# Patient Record
Sex: MALE | Race: WHITE | Employment: OTHER | URBAN - METROPOLITAN AREA
[De-identification: names, ages, dates, MRNs, and addresses within clinical notes are randomized per-mention and may not be internally consistent; named-entity substitution may affect disease eponyms.]

---

## 2017-01-12 DIAGNOSIS — Z76.0 MEDICATION REFILL: ICD-10-CM

## 2017-01-12 RX ORDER — TRAZODONE HYDROCHLORIDE 50 MG/1
TABLET ORAL
Qty: 180 TAB | Refills: 3 | Status: SHIPPED | OUTPATIENT
Start: 2017-01-12 | End: 2018-03-25 | Stop reason: SDUPTHER

## 2017-03-09 ENCOUNTER — OFFICE VISIT (OUTPATIENT)
Dept: INTERNAL MEDICINE CLINIC | Age: 67
End: 2017-03-09

## 2017-03-09 VITALS
DIASTOLIC BLOOD PRESSURE: 65 MMHG | HEIGHT: 74 IN | TEMPERATURE: 97.9 F | SYSTOLIC BLOOD PRESSURE: 133 MMHG | WEIGHT: 244.2 LBS | OXYGEN SATURATION: 96 % | HEART RATE: 69 BPM | RESPIRATION RATE: 18 BRPM | BODY MASS INDEX: 31.34 KG/M2

## 2017-03-09 DIAGNOSIS — E78.5 DYSLIPIDEMIA: Chronic | ICD-10-CM

## 2017-03-09 DIAGNOSIS — I11.9 BENIGN HYPERTENSIVE HEART DISEASE WITHOUT HEART FAILURE: Chronic | ICD-10-CM

## 2017-03-09 DIAGNOSIS — E11.9 TYPE 2 DIABETES MELLITUS WITHOUT COMPLICATION, WITHOUT LONG-TERM CURRENT USE OF INSULIN (HCC): Primary | Chronic | ICD-10-CM

## 2017-03-09 NOTE — PROGRESS NOTES
HISTORY OF PRESENT ILLNESS  Amada Mejia is a 77 y.o. male. Visit Vitals    /65    Pulse 69    Temp 97.9 °F (36.6 °C) (Oral)    Resp 18    Ht 6' 2\" (1.88 m)    Wt 244 lb 3.2 oz (110.8 kg)    SpO2 96%    BMI 31.35 kg/m2       Diabetes   The history is provided by the patient. This is a chronic problem. The current episode started more than 1 week ago. The problem occurs daily. The problem has not changed since onset. Exacerbated by: diet. Relieved by: diet. Treatments tried: see med list.   Hypertension    The history is provided by the patient. This is a chronic problem. The current episode started more than 1 week ago. The problem has not changed since onset. There are no associated agents to hypertension. Risk factors include stress, diabetes mellitus and male gender. Review of Systems   Constitutional: Negative. Respiratory: Negative. Cardiovascular: Negative. Neurological: Negative. Physical Exam   Constitutional: He is oriented to person, place, and time. He appears well-developed and well-nourished. No distress. Cardiovascular: Normal rate and regular rhythm. Pulmonary/Chest: Effort normal and breath sounds normal.   Musculoskeletal: He exhibits no edema. Neurological: He is alert and oriented to person, place, and time. Skin: Skin is warm and dry. He is not diaphoretic. Several small skin tears on his left arm   Psychiatric: He has a normal mood and affect. Nursing note and vitals reviewed. ASSESSMENT and PLAN    ICD-10-CM ICD-9-CM    1. Type 2 diabetes mellitus without complication, without long-term current use of insulin ([facility]) W92.9 710.11 METABOLIC PANEL, COMPREHENSIVE      HEMOGLOBIN A1C W/O EAG   2. Hypertension Q49.9 658.73 METABOLIC PANEL, COMPREHENSIVE   3.  Dyslipidemia E78.5 272.4 LIPID PANEL       BP controlled    Update lab    F/u 3-4 months

## 2017-03-09 NOTE — MR AVS SNAPSHOT
Visit Information Date & Time Provider Department Dept. Phone Encounter #  
 3/9/2017  1:15 PM [address] [phone] Follow-up Instructions Return in about 4 months (around 7/9/2017) for HTN, DM, cholesterol. Upcoming Health Maintenance Date Due HEMOGLOBIN A1C Q6M 3/6/2017 MICROALBUMIN Q1 5/3/2017 LIPID PANEL Q1 8/4/2017 FOOT EXAM Q1 9/6/2017 MEDICARE YEARLY EXAM 9/7/2017 EYE EXAM RETINAL OR DILATED Q1 11/10/2017 COLONOSCOPY 6/1/2018 GLAUCOMA SCREENING Q2Y 11/10/2018 DTaP/Tdap/Td series (2 - Td) 8/12/2023 Allergies as of 3/9/2017  Review Complete On: 3/9/2017 By: Esteban Jose MD  
  
 Severity Noted Reaction Type Reactions Beta Blocker [Beta-blockers (Beta-adrenergic Blocking Agts)]    Other (comments)  
 symptomatic bradycardia Codeine   Side Effect Other (comments) Passed//fainted 
patient doesn't recall reaction, occurred as a teenager Darvocet A500 [Propoxyphene N-acetaminophen]   Systemic Other (comments)  
 throat swelling Dexbrompheniramine-pseudoephed    Other (comments) Facial swelling. 1980\"s Lasix [Furosemide]  09/06/2016   Systemic Itching Lipitor [Atorvastatin]  03/16/2016    Other (comments) Sulfa (Sulfonamide Antibiotics)  10/18/2016    Hives Current Immunizations  Reviewed on 6/15/2015 Name Date Influenza High Dose Vaccine PF 10/18/2016 Influenza Vaccine 9/30/2014 Influenza Vaccine Split 10/1/2012 Pneumococcal Conjugate (PCV-13) 4/28/2015 Pneumococcal Polysaccharide (PPSV-23) 9/6/2016 10:15 AM  
 Pneumococcal Vaccine (Unspecified Type) 12/5/2009 Tdap 8/12/2013 10:56 AM  
 Zoster Vaccine, Live 1/25/2013 Not reviewed this visit You Were Diagnosed With   
  
 Codes Comments Type 2 diabetes mellitus without complication, without long-term current use of insulin (HCC)    -  Primary ICD-10-CM: E11.9 ICD-9-CM: 250.00 Benign hypertensive heart disease without heart failure     ICD-10-CM: I11.9 ICD-9-CM: 402.10 Dyslipidemia     ICD-10-CM: E78.5 ICD-9-CM: 272.4 Vitals BP Pulse Temp Resp Height(growth percentile) Weight(growth percentile) 133/65 69 97.9 °F (36.6 °C) (Oral) 18 6' 2\" (1.88 m) 244 lb 3.2 oz (110.8 kg) SpO2 BMI Smoking Status 96% 31.35 kg/m2 Current Every Day Smoker BMI and BSA Data Body Mass Index Body Surface Area  
 31.35 kg/m 2 2.41 m 2 Preferred Pharmacy Pharmacy Name Phone [facility] [place], [address] [place] [address] [phone] Your Updated Medication List  
  
   
This list is accurate as of: 3/9/17  1:39 PM.  Always use your most recent med list. amLODIPine 5 mg tablet Commonly known as:  Alma Delia Dumont Take 1 Tab by mouth two (2) times a day. aspirin delayed-release 81 mg tablet Take 1 tablet by mouth daily. chlorpheniramine-HYDROcodone 10-8 mg/5 mL suspension Commonly known as:  Tomer Melendez Take 5 mL by mouth every twelve (12) hours as needed for Cough. Max Daily Amount: 10 mL. Indications: COUGH, Nasal Congestion  
  
 cholecalciferol 1,000 unit tablet Commonly known as:  VITAMIN D3 Take  by mouth daily. clopidogrel 75 mg Tab Commonly known as:  PLAVIX Take 1 Tab by mouth daily. fluticasone 50 mcg/actuation nasal spray Commonly known as:  Michaelyn Drought 2 Sprays by Both Nostrils route daily. FREESTYLE LITE STRIPS strip Generic drug:  glucose blood VI test strips USE TWICE DAILY. garlic 8,032 mg Cap Take 1 Cap by mouth daily. glimepiride 1 mg tablet Commonly known as:  AMARYL Take 1 Tab by mouth Daily (before breakfast). hydroCHLOROthiazide 25 mg tablet Commonly known as:  HYDRODIURIL  
TAKE 1 TABLET BY MOUTH DAILY  
  
 isosorbide mononitrate ER 30 mg tablet Commonly known as:  IMDUR Take 1 Tab by mouth daily. Lancets Misc Use bid  
  
 losartan 100 mg tablet Commonly known as:  COZAAR Take 1 Tab by mouth daily. multivit-min-FA-lycopen-lutein 0.4-300-250 mg-mcg-mcg Tab Take 1 Tab by mouth daily. naproxen 375 mg tablet Commonly known as:  NAPROSYN Take 1 Tab by mouth as needed. NITROSTAT 0.4 mg SL tablet Generic drug:  nitroglycerin PLACE 1 TABLET UNDER THE TONGUE EVERY FIVE MINUTES AS NEEDED FOR CHEST PAIN( AS DIRECTED) nystatin 100,000 unit/gram ointment Commonly known as:  MYCOSTATIN Apply  to affected area two (2) times daily as needed. potassium chloride 10 mEq tablet Commonly known as:  K-DUR, KLOR-CON  
TAKE 1 TABLET BY MOUTH TWICE DAILY pravastatin 40 mg tablet Commonly known as:  PRAVACHOL Take 1 Tab by mouth nightly. raNITIdine 300 mg tablet Commonly known as:  ZANTAC TAKE 1 TABLET BY MOUTH ONCE DAILY  
  
 sertraline 100 mg tablet Commonly known as:  ZOLOFT Take 2 Tabs by mouth daily. terazosin 2 mg capsule Commonly known as:  HYTRIN Take 2 mg by mouth nightly. terbinafine HCl 1 % topical cream  
Commonly known as:  LAMISIL Use 1 Application to affected area Twice Daily. traMADol 50 mg tablet Commonly known as:  ULTRAM  
Take 1 Tab by mouth every six (6) hours as needed for Pain. Max Daily Amount: 200 mg.  
  
 traZODone 50 mg tablet Commonly known as:  DESYREL  
TAKE 1 TO 2 TABLETS BY MOUTH AT BEDTIME AS NEEDED  
  
 triamcinolone acetonide 0.025 % topical cream  
Commonly known as:  KENALOG Apply  to affected area two (2) times daily as needed. use thin layer VITAMIN C 250 mg tablet Generic drug:  ascorbic acid (vitamin C) Take  by mouth. Follow-up Instructions Return in about 4 months (around 7/9/2017) for HTN, DM, cholesterol. To-Do List   
 03/09/2017 Lab:  HEMOGLOBIN A1C W/O EAG   
  
 03/09/2017 Lab: LIPID PANEL   
  
 03/09/2017 Lab:  METABOLIC PANEL, COMPREHENSIVE \[facility] [place] [facility]\"" & HEALTH SERVICES! Dear Tracey Gomez: Thank you for requesting a [employer] account. Our records indicate that you already have an active [employer]ame[employer] account. You can access your account anytime at [email or web address]. [email or web address] Did you know that you can access your hospital and ER discharge instructions at any time in [employer]? You can also review all of your test results from your hospital stay or ER visit. Additional Information If you have questions, please visit the Frequently Asked Questions section of the [employer]ame[employer] website at [email or web address] Remember, [employer] is NOT to be used for urgent needs. For medical emergencies, dial 911. Now available from your iPhone and Android! Please provide this summary of care documentation to your next provider. Your primary care clinician is listed as [facility]. If you have any questions after today's visit, please call [phone].

## 2017-03-09 NOTE — PROGRESS NOTES
Chief Complaint   Patient presents with    Diabetes    Hypertension       Pt preferred language for health care discussion is english. Is someone accompanying this pt? no    Is the patient using any DME equipment during OV? no    Depression Screening completed. Yes    Learning Assessment completed. Yes    Abuse Screening completed. Yes    Health Maintenance reviewed and discussed per provider. Yes    Pt is due for A1C. Please order/place referral if appropriate. Advance Directive:  1. Do you have an advance directive in place? Patient Reply:no    2. If not, would you like material regarding how to put one in place? Patient Reply: No    Coordination of Care:  1. Have you been to the ER, urgent care clinic since your last visit? Hospitalized since your last visit? no    2. Have you seen or consulted any other health care providers outside of the [address] [place] [address] since your last visit? Include any pap smears or colon screening.  no

## 2017-03-26 DIAGNOSIS — Z76.0 MEDICATION REFILL: ICD-10-CM

## 2017-03-27 RX ORDER — AMLODIPINE BESYLATE 5 MG/1
5 TABLET ORAL 2 TIMES DAILY
Qty: 180 TAB | Refills: 3 | Status: SHIPPED | OUTPATIENT
Start: 2017-03-27 | End: 2018-11-06

## 2017-03-27 RX ORDER — NAPROXEN 375 MG/1
375 TABLET ORAL AS NEEDED
Qty: 90 TAB | Refills: 3 | Status: SHIPPED | OUTPATIENT
Start: 2017-03-27 | End: 2018-03-28

## 2017-03-27 RX ORDER — TERAZOSIN 2 MG/1
2 CAPSULE ORAL
Qty: 90 CAP | Refills: 3 | Status: SHIPPED | OUTPATIENT
Start: 2017-03-27 | End: 2018-04-30 | Stop reason: SDUPTHER

## 2017-03-27 NOTE — TELEPHONE ENCOUNTER
From: Keshia Yuen  To: Markel Larson MD  Sent: 3/26/2017 12:13 PM EDT  Subject: Medication Renewal Request    Original authorizing provider: MD Keshia Bolden would like a refill of the following medications:  naproxen (NAPROSYN) 375 mg tablet Markel Larson MD]  amLODIPine (NORVASC) 5 mg tablet Markel Larson MD]    Preferred pharmacy: [address] [place] [address], [address] [place] [address] [address] [facility]:  i need a perscription for TERAZOSIN HCL 2MG CAP

## 2017-04-18 ENCOUNTER — TELEPHONE (OUTPATIENT)
Dept: CARDIOLOGY CLINIC | Age: 67
End: 2017-04-18

## 2017-04-18 NOTE — TELEPHONE ENCOUNTER
Patient called and states that for the past few weeks he has noticed increased bilateral leg swelling. He says if he props his feet up at night then it sometimes is better in the morning but not always. He has mild shortness of breath. He mentioned it to his PCP at the visit with them and he was told it could be from \"his age. \"     Reviewed with Dr. Lexus Flanagan in the office, needs to schedule follow up with him for evaluation. No change in meds at this time. Continue to monitor, if worsens or develops symptoms before appointment call to let us know or go to ER. Patient aware and verbalized understanding.

## 2017-04-22 DIAGNOSIS — Z76.0 MEDICATION REFILL: ICD-10-CM

## 2017-04-24 RX ORDER — SERTRALINE HYDROCHLORIDE 100 MG/1
200 TABLET, FILM COATED ORAL DAILY
Qty: 180 TAB | Refills: 5 | Status: SHIPPED | OUTPATIENT
Start: 2017-04-24 | End: 2019-02-11 | Stop reason: SDUPTHER

## 2017-04-24 RX ORDER — TRAMADOL HYDROCHLORIDE 50 MG/1
50 TABLET ORAL
Qty: 120 TAB | Refills: 5 | Status: SHIPPED | OUTPATIENT
Start: 2017-04-24 | End: 2017-05-04

## 2017-04-24 NOTE — TELEPHONE ENCOUNTER
From: Pillo Yuan  To: Yaneth Jewell MD  Sent: 4/22/2017 9:49 PM EDT  Subject: Medication Renewal Request    Original authorizing provider:  MD Jaimee Palencia would like a refill of the following medications:  traMADol (ULTRAM) 50 mg tablet Theresa Robles MD]  sertraline (ZOLOFT) 100 mg tablet [EMILY Robles MD]    Preferred pharmacy: [facility], [address] [place] [address] [address] [facility] AT [address]:

## 2017-05-01 ENCOUNTER — APPOINTMENT (OUTPATIENT)
Dept: CT IMAGING | Age: 67
End: 2017-05-01
Attending: NURSE PRACTITIONER
Payer: MEDICARE

## 2017-05-01 ENCOUNTER — APPOINTMENT (OUTPATIENT)
Dept: GENERAL RADIOLOGY | Age: 67
End: 2017-05-01
Attending: NURSE PRACTITIONER
Payer: MEDICARE

## 2017-05-01 ENCOUNTER — HOSPITAL ENCOUNTER (EMERGENCY)
Age: 67
Discharge: HOME | End: 2017-05-01
Attending: EMERGENCY MEDICINE
Payer: MEDICARE

## 2017-05-01 VITALS
BODY MASS INDEX: 30.48 KG/M2 | HEART RATE: 79 BPM | SYSTOLIC BLOOD PRESSURE: 105 MMHG | OXYGEN SATURATION: 95 % | RESPIRATION RATE: 13 BRPM | DIASTOLIC BLOOD PRESSURE: 51 MMHG | TEMPERATURE: 98.7 F | WEIGHT: 230 LBS | HEIGHT: 73 IN

## 2017-05-01 DIAGNOSIS — S01.81XA FOREHEAD LACERATION, INITIAL ENCOUNTER: Primary | ICD-10-CM
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
## 2017-05-01 DIAGNOSIS — S41.112A SKIN TEAR OF LEFT UPPER ARM WITHOUT COMPLICATION, INITIAL ENCOUNTER: ICD-10-CM

## 2017-05-01 LAB
ATRIAL RATE: 80 BPM
CALCULATED P AXIS, ECG09: 67 DEGREES
CALCULATED R AXIS, ECG10: 27 DEGREES
CALCULATED T AXIS, ECG11: 33 DEGREES
DIAGNOSIS, 93000: NORMAL
GLUCOSE BLD STRIP.AUTO-MCNC: 81 MG/DL (ref 70–110)
P-R INTERVAL, ECG05: 154 MS
Q-T INTERVAL, ECG07: 414 MS
QRS DURATION, ECG06: 126 MS
QTC CALCULATION (BEZET), ECG08: 477 MS
VENTRICULAR RATE, ECG03: 80 BPM

## 2017-05-01 PROCEDURE — 70486 CT MAXILLOFACIAL W/O DYE: CPT

## 2017-05-01 PROCEDURE — 73080 X-RAY EXAM OF ELBOW: CPT

## 2017-05-01 PROCEDURE — 90471 IMMUNIZATION ADMIN: CPT

## 2017-05-01 PROCEDURE — 73130 X-RAY EXAM OF HAND: CPT

## 2017-05-01 PROCEDURE — 77030018836 HC SOL IRR NACL ICUM -A

## 2017-05-01 PROCEDURE — 90715 TDAP VACCINE 7 YRS/> IM: CPT | Performed by: NURSE PRACTITIONER

## 2017-05-01 PROCEDURE — 74011000250 HC RX REV CODE- 250: Performed by: NURSE PRACTITIONER

## 2017-05-01 PROCEDURE — 93005 ELECTROCARDIOGRAM TRACING: CPT

## 2017-05-01 PROCEDURE — 77030031132 HC SUT NYL COVD -A

## 2017-05-01 PROCEDURE — 82962 GLUCOSE BLOOD TEST: CPT

## 2017-05-01 PROCEDURE — 74011250636 HC RX REV CODE- 250/636: Performed by: NURSE PRACTITIONER

## 2017-05-01 PROCEDURE — 75810000293 HC SIMP/SUPERF WND  RPR

## 2017-05-01 PROCEDURE — 99285 EMERGENCY DEPT VISIT HI MDM: CPT

## 2017-05-01 PROCEDURE — 70450 CT HEAD/BRAIN W/O DYE: CPT

## 2017-05-01 PROCEDURE — 74011250637 HC RX REV CODE- 250/637: Performed by: NURSE PRACTITIONER

## 2017-05-01 RX ORDER — OXYCODONE AND ACETAMINOPHEN 5; 325 MG/1; MG/1
2 TABLET ORAL
Status: COMPLETED | OUTPATIENT
Start: 2017-05-01 | End: 2017-05-01

## 2017-05-01 RX ORDER — CEPHALEXIN 250 MG/1
250 CAPSULE ORAL 4 TIMES DAILY
Qty: 20 CAP | Refills: 0 | Status: SHIPPED | OUTPATIENT
Start: 2017-05-01 | End: 2017-05-06

## 2017-05-01 RX ORDER — OXYCODONE AND ACETAMINOPHEN 5; 325 MG/1; MG/1
1 TABLET ORAL
Qty: 6 TAB | Refills: 0 | Status: SHIPPED | OUTPATIENT
Start: 2017-05-01 | End: 2017-05-04 | Stop reason: SDUPTHER

## 2017-05-01 RX ADMIN — OXYCODONE HYDROCHLORIDE AND ACETAMINOPHEN 2 TABLET: 5; 325 TABLET ORAL at 14:56

## 2017-05-01 RX ADMIN — BACITRACIN ZINC AND POLYMYXIN B SULFATE: 500; 10000 OINTMENT TOPICAL at 17:40

## 2017-05-01 RX ADMIN — TETANUS TOXOID, REDUCED DIPHTHERIA TOXOID AND ACELLULAR PERTUSSIS VACCINE, ADSORBED 0.5 ML: 5; 2.5; 8; 8; 2.5 SUSPENSION INTRAMUSCULAR at 17:39

## 2017-05-01 NOTE — ED NOTES
Pt being discharged home. Discharge instructions given, and pt expresses understanding. Helped patient to gather belongings. Pt discharged with family member. Prescription given for percocet.

## 2017-05-01 NOTE — ED PROVIDER NOTES
HPI Comments: Shruti Franz is a 79year old male who presents to the Ed with a c/o head and face pain, left elbow pain, and  Xx  After falling today while walking. Pt denies a loss of consciousness, but states he got dizzy before he fell. States he felt like his sugar was low, but the Pt BS 79 on arrival to the ED. Patient is a 79 y.o. male presenting with fall. The history is provided by the patient and the EMS personnel. History limited by: No communication barrier. Fall   The accident occurred less than 1 hour ago.         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 Randy Delgado      7/14  bilat    HX CERVICAL LAMINECTOMY      HX LUMBAR LAMINECTOMY      1983    HX TUMOR REMOVAL      3/13  wide excision right thigh sarcoma    HX TYMPANOMASTOIDECTOMY           Family History:   Problem Relation Age of Onset    Diabetes Father     Heart Disease Father      cardiomyopathydementia    Hypertension Father     Cancer Father      prostate    Parkinsonism Father     Dementia Father     High Cholesterol Father     Kidney Disease Father     Other Father      colon polyps    Headache Mother     Psychiatric Disorder Mother     Kidney Disease Mother     Hypertension Mother     High Cholesterol Mother     Other Mother      GERD/polymyalgia rheumaticacolon osiris  Heart Disease Mother     Cancer Sister        Social History     Social History    Marital status: SINGLE     Spouse name: N/A    Number of children: N/A    Years of education: N/A     Occupational History    Not on file. Social History Main Topics    Smoking status: Former Smoker     Packs/day: 1.00     Years: 15.00    Smokeless tobacco: Never Used      Comment: Patient quit smoking December 3rd 2017, now does vaping    Alcohol use No    Drug use: No    Sexual activity: No     Other Topics Concern    Not on file     Social History Narrative         ALLERGIES: Beta blocker [beta-blockers (beta-adrenergic blocking agts)]; Codeine; Darvocet a500 [propoxyphene n-acetaminophen]; Dexbrompheniramine-pseudoephed; Lasix [furosemide]; Lipitor [atorvastatin]; and Sulfa (sulfonamide antibiotics)    Review of Systems   Constitutional: Negative. HENT: Negative. Eyes: Negative. Respiratory: Negative. Cardiovascular: Negative. Gastrointestinal: Negative. Endocrine: Negative. Genitourinary: Negative. Musculoskeletal: Negative. Skin: Negative. Allergic/Immunologic: Negative. Neurological: Negative. Psychiatric/Behavioral: Negative. Vitals:    05/01/17 1404   BP: 123/61   Pulse: 79   Resp: 18   Temp: 98.7 °F (37.1 °C)   SpO2: 97%   Weight: 104.3 kg (230 lb)   Height: 6' 1\" (1.854 m)            Physical Exam   Constitutional: He is oriented to person, place, and time. He appears well-developed and well-nourished. No distress. HENT:   Head: Normocephalic and atraumatic. Eyes: Pupils are equal, round, and reactive to light. Neck: Normal range of motion. Neck supple. Cardiovascular: Normal rate and regular rhythm. Pulmonary/Chest: Effort normal and breath sounds normal. He has no wheezes. He has no rales. Abdominal: Soft. Bowel sounds are normal.   Genitourinary:   Genitourinary Comments: NE   Musculoskeletal: Normal range of motion.    Neurological: He is alert and oriented to person, place, and time. No cranial nerve deficit. He exhibits normal muscle tone. Coordination normal.   Skin: Skin is warm and dry. Two lacerations on the left forehead just above the eyebrow. No FB. No tendon, ligament or nerve damage noted. Multiple abrasion notd on the left elbow and left forearm. No FB. Psychiatric: He has a normal mood and affect. Nursing note and vitals reviewed. MDM  Number of Diagnoses or Management Options  Fall, initial encounter:   Forehead laceration, initial encounter:   Skin tear of left upper arm without complication, initial encounter:   Diagnosis management comments: PROGRESS NOTE:  Glascow Coma Scale is 15. NOC score is 3. Will CT head for further evaluation of trauma. Amount and/or Complexity of Data Reviewed  Clinical lab tests: ordered and reviewed  Tests in the radiology section of CPT®: ordered and reviewed    Risk of Complications, Morbidity, and/or Mortality  Presenting problems: moderate  Diagnostic procedures: moderate  Management options: moderate    Patient Progress  Patient progress: stable    ED Course       Wound Repair  Date/Time: 5/1/2017 5:30 PM  Performed by: Barbara AGUILAR. Supervising provider: Dr Sharona Suarez MD.  Preparation: skin prepped with Shur-Clens and sterile field established  Pre-procedure re-eval: Immediately prior to the procedure, the patient was reevaluated and found suitable for the planned procedure and any planned medications. Location: left forehead.   Wound length:2.5 cm or less  Anesthesia: local infiltration    Anesthesia:  Anesthesia: local infiltration  Local Anesthetic: lidocaine 1% without epinephrine   Anesthetic total: 4 mL  Foreign bodies: no foreign bodies  Irrigation solution: saline  Skin closure: 5-0 nylon  Number of sutures: 4  Technique: simple  Approximation: close  Dressing: 4x4  Patient tolerance: Patient tolerated the procedure well with no immediate complications  My total time at bedside, performing this procedure was 1-15 minutes. Diagnosis:   1. Forehead laceration, initial encounter    2. Skin tear of left upper arm without complication, initial encounter    3. Fall, initial encounter          Disposition:   Discharged to Home. Follow-up Information     Follow up With Details Comments Contact Manda Lal MD Call in the morning to arrange follow ujp.   [address]  [address] [facility]  [phone]            Patient's Medications   Start Taking    CEPHALEXIN (KEFLEX) 250 MG CAPSULE    Take 1 Cap by mouth four (4) times daily for 5 days. OXYCODONE-ACETAMINOPHEN (PERCOCET) 5-325 MG PER TABLET    Take 1 Tab by mouth every six (6) hours as needed for Pain. Max Daily Amount: 4 Tabs. Continue Taking    AMLODIPINE (NORVASC) 5 MG TABLET    Take 1 Tab by mouth two (2) times a day. ASCORBIC ACID, VITAMIN C, (VITAMIN C) 250 MG TABLET    Take  by mouth. ASPIRIN DELAYED-RELEASE 81 MG TABLET    Take 1 tablet by mouth daily. CHLORPHENIRAMINE-HYDROCODONE (TUSSIONEX) 10-8 MG/5 ML SUSPENSION    Take 5 mL by mouth every twelve (12) hours as needed for Cough. Max Daily Amount: 10 mL. Indications: COUGH, Nasal Congestion    CHOLECALCIFEROL (VITAMIN D3) 1,000 UNIT TABLET    Take  by mouth daily. CLOPIDOGREL (PLAVIX) 75 MG TABLET    Take 1 Tab by mouth daily. FLUTICASONE (FLONASE) 50 MCG/ACTUATION NASAL SPRAY    2 Sprays by Both Nostrils route daily. FREESTYLE LITE STRIPS STRIP    USE TWICE DAILY. GARLIC 2,500 MG CAP    Take 1 Cap by mouth daily. GLIMEPIRIDE (AMARYL) 1 MG TABLET    Take 1 Tab by mouth Daily (before breakfast). HYDROCHLOROTHIAZIDE (HYDRODIURIL) 25 MG TABLET    TAKE 1 TABLET BY MOUTH DAILY    ISOSORBIDE MONONITRATE ER (IMDUR) 30 MG TABLET    Take 1 Tab by mouth daily. LANCETS MISC    Use bid    LOSARTAN (COZAAR) 100 MG TABLET    Take 1 Tab by mouth daily.     MULTIVIT-MIN-FA-LYCOPEN-LUTEIN 0.4-300-250 MG-MCG-MCG TAB    Take 1 Tab by mouth daily. NAPROXEN (NAPROSYN) 375 MG TABLET    Take 1 Tab by mouth as needed. NITROSTAT 0.4 MG SL TABLET    PLACE 1 TABLET UNDER THE TONGUE EVERY FIVE MINUTES AS NEEDED FOR CHEST PAIN( AS DIRECTED)    NYSTATIN (MYCOSTATIN) 100,000 UNIT/GRAM OINTMENT    Apply  to affected area two (2) times daily as needed. POTASSIUM CHLORIDE (K-DUR, KLOR-CON) 10 MEQ TABLET    TAKE 1 TABLET BY MOUTH TWICE DAILY    PRAVASTATIN (PRAVACHOL) 40 MG TABLET    Take 1 Tab by mouth nightly. RANITIDINE (ZANTAC) 300 MG TABLET    TAKE 1 TABLET BY MOUTH ONCE DAILY    SERTRALINE (ZOLOFT) 100 MG TABLET    Take 2 Tabs by mouth daily. TERAZOSIN (HYTRIN) 2 MG CAPSULE    Take 1 Cap by mouth nightly. TERBINAFINE HCL (LAMISIL) 1 % TOPICAL CREAM    Use 1 Application to affected area Twice Daily. TRAMADOL (ULTRAM) 50 MG TABLET    Take 1 Tab by mouth every six (6) hours as needed for Pain. Max Daily Amount: 200 mg. TRAZODONE (DESYREL) 50 MG TABLET    TAKE 1 TO 2 TABLETS BY MOUTH AT BEDTIME AS NEEDED    TRIAMCINOLONE ACETONIDE (KENALOG) 0.025 % TOPICAL CREAM    Apply  to affected area two (2) times daily as needed.  use thin layer    These Medications have changed    No medications on file   Stop Taking    No medications on file

## 2017-05-01 NOTE — DISCHARGE INSTRUCTIONS
Preventing Falls: Care Instructions  Your Care Instructions  Getting around your home safely can be a challenge if you have injuries or health problems that make it easy for you to fall. Loose rugs and furniture in walkways are among the dangers for many older people who have problems walking or who have poor eyesight. People who have conditions such as arthritis, osteoporosis, or dementia also have to be careful not to fall. You can make your home safer with a few simple measures. Follow-up care is a key part of your treatment and safety. Be sure to make and go to all appointments, and call your doctor if you are having problems. It's also a good idea to know your test results and keep a list of the medicines you take. How can you care for yourself at home? Taking care of yourself  · You may get dizzy if you do not drink enough water. To prevent dehydration, drink plenty of fluids, enough so that your urine is light yellow or clear like water. Choose water and other caffeine-free clear liquids. If you have kidney, heart, or liver disease and have to limit fluids, talk with your doctor before you increase the amount of fluids you drink. · Exercise regularly to improve your strength, muscle tone, and balance. Walk if you can. Swimming may be a good choice if you cannot walk easily. · Have your vision and hearing checked each year or any time you notice a change. If you have trouble seeing and hearing, you might not be able to avoid objects and could lose your balance. · Know the side effects of the medicines you take. Ask your doctor or pharmacist whether the medicines you take can affect your balance. Sleeping pills or sedatives can affect your balance. · Limit the amount of alcohol you drink. Alcohol can impair your balance and other senses. · Ask your doctor whether calluses or corns on your feet need to be removed.  If you wear loose-fitting shoes because of calluses or corns, you can lose your balance and fall. · Talk to your doctor if you have numbness in your feet. Preventing falls at home  · Remove raised doorway thresholds, throw rugs, and clutter. Repair loose carpet or raised areas in the floor. · Move furniture and electrical cords to keep them out of walking paths. · Use nonskid floor wax, and wipe up spills right away, especially on ceramic tile floors. · If you use a walker or cane, put rubber tips on it. If you use crutches, clean the bottoms of them regularly with an abrasive pad, such as steel wool. · Keep your house well lit, especially [place], and outside walkways. Use night-lights in areas such as hallways and bathrooms. Add extra light switches or use remote switches (such as switches that go on or off when you clap your hands) to make it easier to turn lights on if you have to get up during the night. · Install sturdy handrails on stairways. · Move items in your cabinets so that the things you use a lot are on the lower shelves (about waist level). · Keep a cordless phone and a flashlight with new batteries by your bed. If possible, put a phone in each of the main rooms of your house, or carry a cell phone in case you fall and cannot reach a phone. Or, you can wear a device around your neck or wrist. You push a button that sends a signal for help. · Wear low-heeled shoes that fit well and give your feet good support. Use footwear with nonskid soles. Check the heels and soles of your shoes for wear. Repair or replace worn heels or soles. · Do not wear socks without shoes on wood floors. · Walk on the grass when the sidewalks are slippery. If you live in an area that gets snow and ice in the winter, sprinkle salt on slippery steps and sidewalks. Preventing falls in the bath  · Install grab bars and nonskid mats inside and outside your shower or tub and near the toilet and sinks. · Use shower chairs and bath benches.   · Use a hand-held shower head that will allow you to sit while showering. · Get into a tub or shower by putting the weaker leg in first. Get out of a tub or shower with your strong side first.  · Repair loose toilet seats and consider installing a raised toilet seat to make getting on and off the toilet easier. · Keep your bathroom door unlocked while you are in the shower. Where can you learn more? Go to http://fuad-william.info/. Enter [id number] [id number] in the search box to learn more about \"Preventing Falls: Care Instructions. \"  Current as of: August 4, 2016  Content Version: 11.2  © 2166-7813 [employer]. Care instructions adapted under license by [employer] (which disclaims liability or warranty for this information). If you have questions about a medical condition or this instruction, always ask your healthcare professional. [place] [address] any warranty or liability for your use of this information. Cuts: Care Instructions  Your Care Instructions  A cut can happen anywhere on your body. Stitches, staples, skin adhesives, or pieces of tape called Steri-Strips are sometimes used to keep the edges of a cut together and help it heal. Steri-Strips can be used by themselves or with stitches or staples. Sometimes cuts are left open. If the cut went deep and through the skin, the doctor may have closed the cut in two layers. A deeper layer of stitches brings the deep part of the cut together. These stitches will dissolve and don't need to be removed. The upper layer closure, which could be stitches, staples, Steri-Strips, or adhesive, is what you see on the cut. A cut is often covered by a bandage. The doctor has checked you carefully, but problems can develop later. If you notice any problems or new symptoms, get medical treatment right away. Follow-up care is a key part of your treatment and safety. Be sure to make and go to all appointments, and call your doctor if you are having problems. It's also a good idea to know your test results and keep a list of the medicines you take. How can you care for yourself at home? If a cut is open or closed  · Prop up the sore area on a pillow anytime you sit or lie down during the next 3 days. Try to keep it above the level of your heart. This will help reduce swelling. · Keep the cut dry for the first 24 to 48 hours. After this, you can shower if your doctor okays it. Pat the cut dry. · Don't soak the cut, such as in a bathtub. Your doctor will tell you when it's safe to get the cut wet. · After the first 24 to 48 hours, clean the cut with soap and water 2 times a day unless your doctor gives you different instructions. ¨ Don't use hydrogen peroxide or alcohol, which can slow healing. ¨ You may cover the cut with a thin layer of petroleum jelly and a nonstick bandage. ¨ If the doctor put a bandage over the cut, put on a new bandage after cleaning the cut or if the bandage gets wet or dirty. · Avoid any activity that could cause your cut to reopen. · Be safe with medicines. Read and follow all instructions on the label. ¨ If the doctor gave you a prescription medicine for pain, take it as prescribed. ¨ If you are not taking a prescription pain medicine, ask your doctor if you can take an over-the-counter medicine. If the cut is closed with stitches, staples, or Steri-Strips  · Follow the above instructions for open or closed cuts. · Do not remove the stitches or staples on your own. Your doctor will tell you when to come back to have the stitches or staples removed. · Leave Steri-Strips on until they fall off. If the cut is closed with a skin adhesive  · Follow the above instructions for open or closed cuts. · Leave the skin adhesive on your skin until it falls off on its own. This may take 5 to 10 days. · Do not scratch, rub, or pick at the adhesive. · Do not put the sticky part of a bandage directly on the adhesive.   · Do not put any kind of ointment, cream, or lotion over the area. This can make the adhesive fall off too soon. Do not use hydrogen peroxide or alcohol, which can slow healing. When should you call for help? Call your doctor now or seek immediate medical care if:  · You have new pain, or your pain gets worse. · The skin near the cut is cold or pale or changes color. · You have tingling, weakness, or numbness near the cut. · The cut starts to bleed, and blood soaks through the bandage. Oozing small amounts of blood is normal.  · You have trouble moving the area near the cut. · You have symptoms of infection, such as:  ¨ Increased pain, swelling, warmth, or redness around the cut. ¨ Red streaks leading from the cut. ¨ Pus draining from the cut. ¨ A fever. Watch closely for changes in your health, and be sure to contact your doctor if:  · The cut reopens. · You do not get better as expected. Where can you learn more? Go to http://fuad-willima.info/. Enter M735 in the search box to learn more about \"Cuts: Care Instructions. \"  Current as of: May 27, 2016  Content Version: 11.2  © 5526-4116 [employer]. Care instructions adapted under license by [employer] (which disclaims liability or warranty for this information). If you have questions about a medical condition or this instruction, always ask your healthcare professional. [place] [address] any warranty or liability for your use of this information. Keep wound clean and dry. Wound check in 48 hours. Sutures out in 7 days. Return to the ER at once for increased pain, swelling or bleeding.

## 2017-05-01 NOTE — ED TRIAGE NOTES
Patient arrived via EMS after a fall, patient believes to be related to low blood sugar (76 in EMS) and 81 on arrival. Patient felt dizzy and then fell in the driveway. Has lacerations to left forehead, left elbow, and left knee and left hand.

## 2017-05-02 ENCOUNTER — PATIENT OUTREACH (OUTPATIENT)
Dept: INTERNAL MEDICINE CLINIC | Age: 67
End: 2017-05-02

## 2017-05-02 NOTE — PROGRESS NOTES
Transitional Care Nurse Navigator Note:  Emergency Department Follow Up for [facility] 5/1/17    Per EMR due to:  Patient became dizzy and fell in driveway. NN outgoing call to patient. ID verified. Pt states he's okay. He's sore from falling. He states it was from not eating, had lunch. He has sutures to his forehead. Pt education done on dizziness, seek medical attention, eat regular meals. Pt appt for Dr Schmid Shadow 5/11/17.

## 2017-05-04 ENCOUNTER — OFFICE VISIT (OUTPATIENT)
Dept: CARDIOLOGY CLINIC | Age: 67
End: 2017-05-04

## 2017-05-04 VITALS
BODY MASS INDEX: 31.32 KG/M2 | OXYGEN SATURATION: 96 % | HEART RATE: 78 BPM | DIASTOLIC BLOOD PRESSURE: 72 MMHG | WEIGHT: 244 LBS | SYSTOLIC BLOOD PRESSURE: 148 MMHG | HEIGHT: 74 IN

## 2017-05-04 DIAGNOSIS — R55 PRE-SYNCOPE: Primary | ICD-10-CM

## 2017-05-04 DIAGNOSIS — I11.9 BENIGN HYPERTENSIVE HEART DISEASE WITHOUT HEART FAILURE: Chronic | ICD-10-CM

## 2017-05-04 RX ORDER — OXYCODONE AND ACETAMINOPHEN 5; 325 MG/1; MG/1
1 TABLET ORAL
Qty: 20 TAB | Refills: 0 | Status: SHIPPED | OUTPATIENT
Start: 2017-05-04 | End: 2017-07-11

## 2017-05-04 NOTE — PROGRESS NOTES
[facility]    Mr. Ismael Mullen  is a 79 y.o. gentleman with diabetes, hypertension, dyslipidemia and tobacco use. He has coronary artery disease as documented by cardiac catheterization in October of 2014. His anatomy is as follows:      LM - patent  LAD - 30% prox, 80% mid, 90% apical  D1 - 100%  RI - 95% ostial (failed PCI, calcified 1.75 - 2.0 mm vessel)  Cx - 30-40% prox  OM1 - subtotal  OM2 - 80% (3.5 x 18mm XIENCE 10/14)  RCA - 30% diffuse  RPDA - 100%  RPLV - 50%    Mr. Ismael Mullen is here today for follow up appointment. He used to be an established patient of Dr. Cha Youssef. He is here to establish care with me. A few weeks ago Mr. Ismael Mullen had a presyncopal episode where he was walking on his driveway to go to the car. Suddenly he started feeling dizziness. He wanted to hold on to something to avoid fall, however he ended up sustaining a fall, hitting his head in the face. He was taken to the emergency department, where he was found to have a facial laceration requiring stitches. He did have some rib fractures. He has multiple bruises of upper extremities, lower extremities and on the face from the fall. He specifically remembers that he did not lose consciousness, he just felt dizzy. He thought he had hypoglycemia. His sugar was 76 according to ENT report. Since then Mr. Ismael Mullen has been doing well except he continues to have multiple joint problems, especially on the left side after he sustained a fall. He was given six tablets of Percocet by emergency department physician, which he's run out of. He continues to have this musculoskeletal pain after the fall. He denies any chest pain or chest tightness. He takes the rest of the medication appropriately.      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 Delicia Day      7/14  bilat    HX CERVICAL LAMINECTOMY      HX LUMBAR LAMINECTOMY      1983    HX TUMOR REMOVAL      3/13  wide excision right thigh sarcoma    HX TYMPANOMASTOIDECTOMY         Current Outpatient Prescriptions   Medication Sig    oxyCODONE-acetaminophen (PERCOCET) 5-325 mg per tablet Take 1 Tab by mouth every six (6) hours as needed for Pain. Max Daily Amount: 4 Tabs.  cephALEXin (KEFLEX) 250 mg capsule Take 1 Cap by mouth four (4) times daily for 5 days.  traMADol (ULTRAM) 50 mg tablet Take 1 Tab by mouth every six (6) hours as needed for Pain. Max Daily Amount: 200 mg.  sertraline (ZOLOFT) 100 mg tablet Take 2 Tabs by mouth daily.  naproxen (NAPROSYN) 375 mg tablet Take 1 Tab by mouth as needed.  amLODIPine (NORVASC) 5 mg tablet Take 1 Tab by mouth two (2) times a day.  terazosin (HYTRIN) 2 mg capsule Take 1 Cap by mouth nightly.  raNITIdine (ZANTAC) 300 mg tablet TAKE 1 TABLET BY MOUTH ONCE DAILY    traZODone (DESYREL) 50 mg tablet TAKE 1 TO 2 TABLETS BY MOUTH AT BEDTIME AS NEEDED    terbinafine HCl (LAMISIL) 1 % topical cream Use 1 Application to affected area Twice Daily.  chlorpheniramine-HYDROcodone (TUSSIONEX) 10-8 mg/5 mL suspension Take 5 mL by mouth every twelve (12) hours as needed for Cough. Max Daily Amount: 10 mL.  Indications: COUGH, Nasal Congestion    potassium chloride (K-DUR, KLOR-CON) 10 mEq tablet TAKE 1 TABLET BY MOUTH TWICE DAILY    cholecalciferol (VITAMIN D3) 1,000 unit tablet Take  by mouth daily.  ascorbic acid, vitamin C, (VITAMIN C) 250 mg tablet Take  by mouth.  hydroCHLOROthiazide (HYDRODIURIL) 25 mg tablet TAKE 1 TABLET BY MOUTH DAILY    glimepiride (AMARYL) 1 mg tablet Take 1 Tab by mouth Daily (before breakfast).  pravastatin (PRAVACHOL) 40 mg tablet Take 1 Tab by mouth nightly.  clopidogrel (PLAVIX) 75 mg tablet Take 1 Tab by mouth daily.  isosorbide mononitrate ER (IMDUR) 30 mg tablet Take 1 Tab by mouth daily.  losartan (COZAAR) 100 mg tablet Take 1 Tab by mouth daily.  garlic 3,836 mg cap Take 1 Cap by mouth daily.  multivit-min-FA-lycopen-lutein 0.4-300-250 mg-mcg-mcg tab Take 1 Tab by mouth daily.  triamcinolone acetonide (KENALOG) 0.025 % topical cream Apply  to affected area two (2) times daily as needed. use thin layer     nystatin (MYCOSTATIN) 100,000 unit/gram ointment Apply  to affected area two (2) times daily as needed.  Lancets misc Use bid    fluticasone (FLONASE) 50 mcg/actuation nasal spray 2 Sprays by Both Nostrils route daily.  FREESTYLE LITE STRIPS strip USE TWICE DAILY.  NITROSTAT 0.4 mg SL tablet PLACE 1 TABLET UNDER THE TONGUE EVERY FIVE MINUTES AS NEEDED FOR CHEST PAIN( AS DIRECTED)    aspirin delayed-release 81 mg tablet Take 1 tablet by mouth daily. No current facility-administered medications for this visit. Allergies   Allergen Reactions    Beta Blocker [Beta-Blockers (Beta-Adrenergic Blocking Agts)] Other (comments)     symptomatic bradycardia    Codeine Other (comments)     Passed//fainted  patient doesn't recall reaction, occurred as a teenager    Darvocet A500 [Propoxyphene N-Acetaminophen] Other (comments)     throat swelling    Dexbrompheniramine-Pseudoephed Other (comments)     Facial swelling.  1980\"s    Lasix [Furosemide] Itching    Lipitor [Atorvastatin] Other (comments)    Sulfa (Sulfonamide Antibiotics) Hives       Family History:   Non contributory for premature coronary disease in first degree relatives. Social History:   He  reports that he has quit smoking. He has a 15.00 pack-year smoking history. He has never used smokeless tobacco.  He  reports that he does not drink alcohol. [address], 3 years    Review of Systems:   As above otherwise 11 point review of systems negative including constitutional, skin, HENT, eyes, respiratory, cardiovascular, gastrointestinal, genitourinary, musculoskeletal, endo/heme/aller, neurological.      Physical:   Vitals:   BP: 148/72  HR: 78  Wt: 244 lb (110.7 kg)    Exam:   General: Older gentleman, no complaints, no distress.     Head/Neck: No JVD  Lungs:  No respiratory distress. Clear with good air movement. Heart:  Regular. No rubs or gallops. Abdomen: Bowel sounds present  Extremities: Intact pulses. No edema.     Neurological: Alert and oriented to person, place, time. No gross neurological deficit. Skin:  Warm and dry. Bruises over left eye, left arm     Review of Data:   LABS:   Lab Results   Component Value Date/Time    WBC 7.4 12/03/2015 10:00 AM    HGB 15.3 12/03/2015 10:00 AM    HCT 47.2 12/03/2015 10:00 AM    PLATELET 124 85/35/0976 10:00 AM     Lab Results   Component Value Date/Time    Sodium 137 09/06/2016 10:29 AM    Potassium 3.3 09/06/2016 10:29 AM    Chloride 100 09/06/2016 10:29 AM    CO2 30 09/06/2016 10:29 AM    Anion gap 7 09/06/2016 10:29 AM    Glucose 170 09/06/2016 10:29 AM    BUN 21 09/06/2016 10:29 AM    Creatinine 1.39 09/06/2016 10:29 AM    BUN/Creatinine ratio 15 09/06/2016 10:29 AM    GFR est AA >60 09/06/2016 10:29 AM    GFR est non-AA 51 09/06/2016 10:29 AM    Calcium 9.7 09/06/2016 10:29 AM    Bilirubin, total 0.5 08/04/2016 01:33 PM    AST (SGOT) 17 08/04/2016 01:33 PM    Alk.  phosphatase 73 08/04/2016 01:33 PM    Protein, total 7.1 08/04/2016 01:33 PM    Albumin 3.6 08/04/2016 01:33 PM    Globulin 3.5 08/04/2016 01:33 PM    A-G Ratio 1.0 08/04/2016 01:33 PM    ALT (SGPT) 24 08/04/2016 01:33 PM     Lab Results   Component Value Date/Time    Cholesterol, total 135 08/04/2016 01:33 PM    HDL Cholesterol 35 08/04/2016 01:33 PM    LDL, calculated 38.6 08/04/2016 01:33 PM    Triglyceride 307 08/04/2016 01:33 PM    CHOL/HDL Ratio 3.9 08/04/2016 01:33 PM     Lab Results   Component Value Date/Time    Hemoglobin A1c 5.6 09/06/2016 10:29 AM    Hemoglobin A1c (POC) 5.9 05/06/2013 11:17 AM     EKG:   Sinus rhythm, 78 beats per minute, right bundle branch block, nonspecific ST-T wave changes. TILT TABLE: February 2015:  Patient was initially placed in supine position. Heart rate was between 65-70 beats per minute, sinus rhythm. Blood pressure was 113/61 mmHg, with maximum blood pressure of 116/63 mmHg. Patient was placed in 60-degree upright tilting position. About 5-7 minutes into upright tilting position at 70 degrees, patient started feeling like her heart rate speeding up and legs feeling heavy, along with some dizziness. Blood pressure slowly decreased up to 70/43 mmHg. Heart rate maximum noted was up from 65 to 81 beats per minute. There was no obvious tachycardia. At the time, patient was given IV fluid and then placed back supine position, with normalization of the blood pressure and resolution of his symptoms. Heart rhythm remained in sinus. Patient did have a drop in the systolic blood pressure more than 20 mmHg upon upright tilting position, without any significant increase in the heart rate. This suggests possible autonomic dysfunction. SUMMARY:  Upright tilt table testing with reproduction of symptoms. More than 20 mmHg drop in systolic blood pressure, without change in the heart rate during upright tilting position, associated with symptoms. This may represent autonomic dysfunction    ECHO: February 2015:  LEFT VENTRICLE: Size was normal. Systolic function was normal. Ejection fraction was estimated in the range of 55-60%.  No obvious wall motion abnormalities identified in the views obtained. Wall thickness was normal. DOPPLER: Features were consistent with a pseudonormal left ventricular filling pattern, with concomitant abnormal relaxation and increased filling pressure (grade 2 diastolic dysfunction). RIGHT VENTRICLE: The size was normal. Systolic function was normal. Wall thickness was normal. DOPPLER: Estimated peak pressure was in the range of 25 mmHg to 30 mmHg. LEFT ATRIUM: The atrium was mildly dilated. LA volume index was 33 ml/m squared. RIGHT ATRIUM: The atrium was mildly dilated. MITRAL VALVE: There was normal leaflet separation. DOPPLER: The transmitral velocity was within the normal range. There was no evidence for stenosis. There was trivial regurgitation. AORTIC VALVE: The valve was trileaflet. Leaflets exhibited normal thickness and normal cuspal separation. DOPPLER: Transaortic velocity was within the normal range. There was no stenosis. There was no regurgitation. TRICUSPID VALVE: There was normal leaflet separation. DOPPLER: The transtricuspid velocity was within the normal range. There was no evidence for tricuspid stenosis. There was trivial regurgitation. PULMONIC VALVE: Not well visualized, but normal Doppler findings. DOPPLER: There was no stenosis. There was trivial regurgitation. AORTA: The root exhibited normal size. SYSTEMIC VEINS: IVC: The inferior vena cava was normal in size and course. Respirophasic changes were normal.    PERICARDIUM: No significant pericardial effusion identified. STRESS TEST (EST, PHARM, NUC, ECHO etc): October 2014:  1. Small to medium sized area of mildly intense reversible defect involvement anterolateral wall. 2.  There is also a small area of reversible defect involving basilar inferior wall concerning for ischemic focus. 3.  Calculated ejection fraction of 56% without any wall motion abnormality.      CATHETERIZATION: October 2014:  LM - patent  LAD - 30% prox, 80% mid, 90% apical  D1 - 100%  RI - 95% ostial (failed PCI, calcified 1.75 - 2.0 mm vessel)  Cx - 30-40% prox  OM1 - subtotal  OM2 - 80%  RCA - 30% diffuse  RPDA - 100%  RPLV - 50%    Impression / Plan:     Diabetes    Hypertension    Dyslipidemia    Coronary artery disease       Mr. Jermaine West returns to the office for follow up. Coronary artery disease:  Mr. Jermaine West had a cardiac catheterization in October, 2014 and required stent of the obtuse marginal branch. Since then he's remained angina free. He is on appropriate medication with dual antiplatelet agent aspirin and Plavix. He is taking Amlodipine, aspirin, Plavix, Pravachol and isosorbide mononitrate. He has a history of beta blocker induced symptomatic bradycardia, that is why he is not on any beta blocker. For now I would recommend to continue same. Hypertension:  His blood pressure today is 148/72 mmHg. As mentioned above, he is on appropriate medication. For now I would recommend to continue same. Diabetes: This is being managed by primary care provider. Goal Hgb A1c less than 7 is recommended from cardiovascular standpoint. Last Hgb A1c from September, 2016 was 5.6. Hyperlipidemia:  He is on lipid lowering agent. Last lipid profile on file is from August, 2016 and LDL was 38. Continue same. Presyncopal episode:  Mr. Jermaine West had what appears to be extreme dizziness, which caused a fall and traumatic injury after the fall. Considering his known history of coronary artery disease and hypertension, I would like to make sure he does not have any hypertensive cardiovascular heart disease or worsening of ejection fraction. I am going to proceed with echocardiogram to make sure there is no structural heart disease or valvular heart disease especially in the event of recent dizziness causing fall. Other than the above, or unless any additional issues arise, I have asked that he follow up in six-9months.

## 2017-05-04 NOTE — MR AVS SNAPSHOT
Visit Information Date & Time Provider Department Dept. Phone Encounter #  
 5/4/2017  9:30 AM Iglesia Lagunas MD Cardio Specialist at [facility] [phone] [id number] Follow-up Instructions Return in about 7 months (around 12/4/2017). Your Appointments 5/11/2017 11:00 AM  
Office Visit with MD Robin [address] [place] [address] Road) Appt Note: ER - F/U - [address]  
  
   
 [address] [facility]  
  
    
 7/11/2017 12:00 PM  
Office Visit with MD Robin [address] ([facility]) Appt Note: dx 4mos f/u appt  
 [address]  
  
   
 [id number] [place] [address]  
  
    
 12/14/2017 10:30 AM  
Follow Up with Hyacinth Balderas MD  
Cardio Specialist at [facility]/[address] [facility]) [facility] [address] Upcoming Health Maintenance Date Due HEMOGLOBIN A1C Q6M 3/6/2017 MICROALBUMIN Q1 5/3/2017 INFLUENZA AGE 9 TO ADULT 8/1/2017 LIPID PANEL Q1 8/4/2017 FOOT EXAM Q1 9/6/2017 MEDICARE YEARLY EXAM 9/7/2017 EYE EXAM RETINAL OR DILATED Q1 11/10/2017 COLONOSCOPY 6/1/2018 GLAUCOMA SCREENING Q2Y 11/10/2018 DTaP/Tdap/Td series (3 - Td) 5/1/2027 Allergies as of 5/4/2017  Review Complete On: 5/4/2017 By: Iris Melendez LPN Severity Noted Reaction Type Reactions Beta Blocker [Beta-blockers (Beta-adrenergic Blocking Agts)]    Other (comments)  
 symptomatic bradycardia Codeine   Side Effect Other (comments) Passed//fainted 
patient doesn't recall reaction, occurred as a teenager Darvocet A500 [Propoxyphene N-acetaminophen]   Systemic Other (comments)  
 throat swelling Dexbrompheniramine-pseudoephed    Other (comments) Facial swelling. 1980\"s Lasix [Furosemide]  09/06/2016   Systemic Itching Lipitor [Atorvastatin]  03/16/2016    Other (comments) Sulfa (Sulfonamide Antibiotics)  10/18/2016    Hives Current Immunizations  Reviewed on 6/15/2015 Name Date Influenza High Dose Vaccine PF 10/18/2016 Influenza Vaccine 9/30/2014 Influenza Vaccine Split 10/1/2012 Pneumococcal Conjugate (PCV-13) 4/28/2015 Pneumococcal Polysaccharide (PPSV-23) 9/6/2016 10:15 AM  
 Pneumococcal Vaccine (Unspecified Type) 12/5/2009 Tdap 5/1/2017  5:39 PM, 8/12/2013 10:56 AM  
 Zoster Vaccine, Live 1/25/2013 Not reviewed this visit You Were Diagnosed With   
  
 Codes Comments Pre-syncope    -  Primary ICD-10-CM: R55 
ICD-9-CM: 780.2 Benign hypertensive heart disease without heart failure     ICD-10-CM: I11.9 ICD-9-CM: 402.10 Vitals BP Pulse Height(growth percentile) Weight(growth percentile) SpO2 BMI  
 148/72 78 6' 2\" (1.88 m) 244 lb (110.7 kg) 96% 31.33 kg/m2 Smoking Status Former Smoker BMI and BSA Data Body Mass Index Body Surface Area  
 31.33 kg/m 2 2.4 m 2 Preferred Pharmacy Pharmacy Name Phone [facility] [place], [address] [place] [address] [phone] Your Updated Medication List  
  
   
This list is accurate as of: 5/4/17 10:18 AM.  Always use your most recent med list. amLODIPine 5 mg tablet Commonly known as:  Rody Arnt Take 1 Tab by mouth two (2) times a day. aspirin delayed-release 81 mg tablet Take 1 tablet by mouth daily. cephALEXin 250 mg capsule Commonly known as:  January Harding Take 1 Cap by mouth four (4) times daily for 5 days. cholecalciferol 1,000 unit tablet Commonly known as:  VITAMIN D3 Take  by mouth daily. clopidogrel 75 mg Tab Commonly known as:  PLAVIX Take 1 Tab by mouth daily. fluticasone 50 mcg/actuation nasal spray Commonly known as:  March ARB Longest 2 Sprays by Both Nostrils route daily. FREESTYLE LITE STRIPS strip Generic drug:  glucose blood VI test strips USE TWICE DAILY. garlic 4,708 mg Cap Take 1 Cap by mouth daily. glimepiride 1 mg tablet Commonly known as:  AMARYL Take 1 Tab by mouth Daily (before breakfast). hydroCHLOROthiazide 25 mg tablet Commonly known as:  HYDRODIURIL  
TAKE 1 TABLET BY MOUTH DAILY  
  
 isosorbide mononitrate ER 30 mg tablet Commonly known as:  IMDUR Take 1 Tab by mouth daily. Lancets Misc Use bid  
  
 losartan 100 mg tablet Commonly known as:  COZAAR Take 1 Tab by mouth daily. multivit-min-FA-lycopen-lutein 0.4-300-250 mg-mcg-mcg Tab Take 1 Tab by mouth daily. naproxen 375 mg tablet Commonly known as:  NAPROSYN Take 1 Tab by mouth as needed. NITROSTAT 0.4 mg SL tablet Generic drug:  nitroglycerin PLACE 1 TABLET UNDER THE TONGUE EVERY FIVE MINUTES AS NEEDED FOR CHEST PAIN( AS DIRECTED) nystatin 100,000 unit/gram ointment Commonly known as:  MYCOSTATIN Apply  to affected area two (2) times daily as needed. oxyCODONE-acetaminophen 5-325 mg per tablet Commonly known as:  PERCOCET Take 1 Tab by mouth every six (6) hours as needed for Pain. Max Daily Amount: 4 Tabs. potassium chloride 10 mEq tablet Commonly known as:  KLOR-CON  
TAKE 1 TABLET BY MOUTH TWICE DAILY pravastatin 40 mg tablet Commonly known as:  PRAVACHOL Take 1 Tab by mouth nightly. raNITIdine 300 mg tablet Commonly known as:  ZANTAC TAKE 1 TABLET BY MOUTH ONCE DAILY  
  
 sertraline 100 mg tablet Commonly known as:  ZOLOFT Take 2 Tabs by mouth daily. terazosin 2 mg capsule Commonly known as:  HYTRIN Take 1 Cap by mouth nightly. terbinafine HCl 1 % topical cream  
Commonly known as:  LAMISIL Use 1 Application to affected area Twice Daily. traZODone 50 mg tablet Commonly known as:  DESYREL  
TAKE 1 TO 2 TABLETS BY MOUTH AT BEDTIME AS NEEDED  
  
 triamcinolone acetonide 0.025 % topical cream  
Commonly known as:  KENALOG Apply  to affected area two (2) times daily as needed. use thin layer VITAMIN C 250 mg tablet Generic drug:  ascorbic acid (vitamin C) Take  by mouth. Prescriptions Printed Refills  
 oxyCODONE-acetaminophen (PERCOCET) 5-325 mg per tablet 0 Sig: Take 1 Tab by mouth every six (6) hours as needed for Pain. Max Daily Amount: 4 Tabs. Class: Print Route: Oral  
  
Follow-up Instructions Return in about 7 months (around 12/4/2017). To-Do List   
 05/04/2017 ECHO:  2D ECHO COMPLETE ADULT (TTE) W OR WO CONTR Introducing [facility]! Dear Martin Rather: Thank you for requesting a [employer] account. Our records indicate that you already have an active [employer] account. You can access your account anytime at [email or web address] Did you know that you can access your hospital and ER discharge instructions at any time in [employer]? You can also review all of your test results from your hospital stay or ER visit. Additional Information If you have questions, please visit the Frequently Asked Questions section of the [employer] website at [email or web address] Remember, [employer] is NOT to be used for urgent needs. For medical emergencies, dial 911. Now available from your iPhone and Android! Please provide this summary of care documentation to your next provider. Your primary care clinician is listed as [facility]. If you have any questions after today's visit, please call [phone].

## 2017-05-04 NOTE — PROGRESS NOTES
1. Have you been to the ER, urgent care clinic since your last visit? Hospitalized since your last visit? Yes When: 5/1/17 Where: [facility] ER Reason for visit: FALL    2. Have you seen or consulted any other health care providers outside of the [address] [place] [address] since your last visit? Include any pap smears or colon screening.  No

## 2017-05-09 ENCOUNTER — HOSPITAL ENCOUNTER (OUTPATIENT)
Dept: NON INVASIVE DIAGNOSTICS | Age: 67
Discharge: HOME | End: 2017-05-09
Attending: INTERNAL MEDICINE
Payer: MEDICARE

## 2017-05-09 DIAGNOSIS — I11.9 BENIGN HYPERTENSIVE HEART DISEASE WITHOUT HEART FAILURE: Chronic | ICD-10-CM

## 2017-05-09 DIAGNOSIS — R55 PRE-SYNCOPE: ICD-10-CM

## 2017-05-09 PROCEDURE — 93306 TTE W/DOPPLER COMPLETE: CPT

## 2017-05-11 ENCOUNTER — OFFICE VISIT (OUTPATIENT)
Dept: INTERNAL MEDICINE CLINIC | Age: 67
End: 2017-05-11

## 2017-05-11 VITALS
WEIGHT: 241.6 LBS | OXYGEN SATURATION: 97 % | SYSTOLIC BLOOD PRESSURE: 161 MMHG | DIASTOLIC BLOOD PRESSURE: 85 MMHG | BODY MASS INDEX: 31.01 KG/M2 | HEART RATE: 81 BPM | RESPIRATION RATE: 18 BRPM | TEMPERATURE: 97.9 F | HEIGHT: 74 IN

## 2017-05-11 DIAGNOSIS — S01.81XD FOREHEAD LACERATION, SUBSEQUENT ENCOUNTER: Primary | ICD-10-CM

## 2017-05-11 RX ORDER — TRAMADOL HYDROCHLORIDE 50 MG/1
TABLET ORAL
Refills: 5 | COMMUNITY
Start: 2017-04-24 | End: 2018-01-24 | Stop reason: SDUPTHER

## 2017-05-11 NOTE — MR AVS SNAPSHOT
Visit Information Date & Time Provider Department Dept. Phone Encounter #  
 5/11/2017 11:00 AM Miriam Horvath [phone] Your Appointments 7/11/2017 12:00 PM  
Office Visit with MD Robin [address] [facility]) Appt Note: dx 4mos f/u appt  
 [address]  
  
    
 12/14/2017 10:30 AM  
Follow Up with Asad Hernandez MD  
Cardio Specialist at [address]) Appt Note: 7 1/2 m f/u  
 [facility] [address] Upcoming Health Maintenance Date Due HEMOGLOBIN A1C Q6M 3/6/2017 MICROALBUMIN Q1 5/3/2017 INFLUENZA AGE 9 TO ADULT 8/1/2017 LIPID PANEL Q1 8/4/2017 FOOT EXAM Q1 9/6/2017 MEDICARE YEARLY EXAM 9/7/2017 EYE EXAM RETINAL OR DILATED Q1 11/10/2017 COLONOSCOPY 6/1/2018 GLAUCOMA SCREENING Q2Y 11/10/2018 DTaP/Tdap/Td series (3 - Td) 5/1/2027 Allergies as of 5/11/2017  Review Complete On: 5/11/2017 By: Claudia Cr LPN Severity Noted Reaction Type Reactions Beta Blocker [Beta-blockers (Beta-adrenergic Blocking Agts)]    Other (comments)  
 symptomatic bradycardia Codeine   Side Effect Other (comments) Passed//fainted 
patient doesn't recall reaction, occurred as a teenager Darvocet A500 [Propoxyphene N-acetaminophen]   Systemic Other (comments)  
 throat swelling Dexbrompheniramine-pseudoephed    Other (comments) Facial swelling. 1980\"s Lasix [Furosemide]  09/06/2016   Systemic Itching Lipitor [Atorvastatin]  03/16/2016    Other (comments) Sulfa (Sulfonamide Antibiotics)  10/18/2016    Hives Current Immunizations  Reviewed on 6/15/2015 Name Date Influenza High Dose Vaccine PF 10/18/2016 Influenza Vaccine 9/30/2014 Influenza Vaccine Split 10/1/2012 Pneumococcal Conjugate (PCV-13) 4/28/2015 Pneumococcal Polysaccharide (PPSV-23) 9/6/2016 10:15 AM  
 Pneumococcal Vaccine (Unspecified Type) 12/5/2009 Tdap 5/1/2017  5:39 PM, 8/12/2013 10:56 AM  
 Zoster Vaccine, Live 1/25/2013 Not reviewed this visit You Were Diagnosed With   
  
 Codes Comments Forehead laceration, subsequent encounter    -  Primary ICD-10-CM: L12.21QM ICD-9-CM: V58.89, 873.42 Vitals BP Pulse Temp Resp Height(growth percentile) Weight(growth percentile) 161/85 81 97.9 °F (36.6 °C) (Oral) 18 6' 2\" (1.88 m) 241 lb 9.6 oz (109.6 kg) SpO2 BMI Smoking Status 97% 31.02 kg/m2 Former Smoker BMI and BSA Data Body Mass Index Body Surface Area 31.02 kg/m 2 2.39 m 2 Preferred Pharmacy Pharmacy Name Phone [facility] [address] [phone] Your Updated Medication List  
  
   
This list is accurate as of: 5/11/17 11:33 AM.  Always use your most recent med list. amLODIPine 5 mg tablet Commonly known as:  Benny Donna Take 1 Tab by mouth two (2) times a day. aspirin delayed-release 81 mg tablet Take 1 tablet by mouth daily. cholecalciferol 1,000 unit tablet Commonly known as:  VITAMIN D3 Take  by mouth daily. clopidogrel 75 mg Tab Commonly known as:  PLAVIX Take 1 Tab by mouth daily. fluticasone 50 mcg/actuation nasal spray Commonly known as:  Katherne Fix 2 Sprays by Both Nostrils route daily. FREESTYLE LITE STRIPS strip Generic drug:  glucose blood VI test strips USE TWICE DAILY. garlic 5,212 mg Cap Take 1 Cap by mouth daily. glimepiride 1 mg tablet Commonly known as:  AMARYL Take 1 Tab by mouth Daily (before breakfast). hydroCHLOROthiazide 25 mg tablet Commonly known as:  HYDRODIURIL  
TAKE 1 TABLET BY MOUTH DAILY  
  
 isosorbide mononitrate ER 30 mg tablet Commonly known as:  IMDUR Take 1 Tab by mouth daily. Lancets Misc Use bid  
  
 losartan 100 mg tablet Commonly known as:  COZAAR Take 1 Tab by mouth daily. multivit-min-FA-lycopen-lutein 0.4-300-250 mg-mcg-mcg Tab Take 1 Tab by mouth daily. naproxen 375 mg tablet Commonly known as:  NAPROSYN Take 1 Tab by mouth as needed. NITROSTAT 0.4 mg SL tablet Generic drug:  nitroglycerin PLACE 1 TABLET UNDER THE TONGUE EVERY FIVE MINUTES AS NEEDED FOR CHEST PAIN( AS DIRECTED) nystatin 100,000 unit/gram ointment Commonly known as:  MYCOSTATIN Apply  to affected area two (2) times daily as needed. oxyCODONE-acetaminophen 5-325 mg per tablet Commonly known as:  PERCOCET Take 1 Tab by mouth every six (6) hours as needed for Pain. Max Daily Amount: 4 Tabs. potassium chloride 10 mEq tablet Commonly known as:  KLOR-CON  
TAKE 1 TABLET BY MOUTH TWICE DAILY pravastatin 40 mg tablet Commonly known as:  PRAVACHOL Take 1 Tab by mouth nightly. raNITIdine 300 mg tablet Commonly known as:  ZANTAC TAKE 1 TABLET BY MOUTH ONCE DAILY  
  
 sertraline 100 mg tablet Commonly known as:  ZOLOFT Take 2 Tabs by mouth daily. terazosin 2 mg capsule Commonly known as:  HYTRIN Take 1 Cap by mouth nightly. terbinafine HCl 1 % topical cream  
Commonly known as:  LAMISIL Use 1 Application to affected area Twice Daily. traMADol 50 mg tablet Commonly known as:  ULTRAM  
  
 traZODone 50 mg tablet Commonly known as:  DESYREL  
TAKE 1 TO 2 TABLETS BY MOUTH AT BEDTIME AS NEEDED  
  
 triamcinolone acetonide 0.025 % topical cream  
Commonly known as:  KENALOG Apply  to affected area two (2) times daily as needed. use thin layer VITAMIN C 250 mg tablet Generic drug:  ascorbic acid (vitamin C) Take  by mouth. Introducing hospitals & HEALTH SERVICES! Dear Brie Carter: Thank you for requesting a [employer] account. Our records indicate that you already have an active [employer] account. You can access your account anytime at [email or web address] Did you know that you can access your hospital and ER discharge instructions at any time in [employer]? You can also review all of your test results from your hospital stay or ER visit. Additional Information If you have questions, please visit the Frequently Asked Questions section of the [employer] website at [email or web address] Remember, [employer] is NOT to be used for urgent needs. For medical emergencies, dial 911. Now available from your iPhone and Android! Please provide this summary of care documentation to your next provider. Your primary care clinician is listed as [facility]. If you have any questions after today's visit, please call [phone].

## 2017-05-11 NOTE — PROGRESS NOTES
FAMILY MEDICINE CLINIC NOTE    S: The patient presents for follow up after a recent emergency department visit at [facility] on 5/1/17 for pre-syncopal episode resulting in a fall onto the patients face. The patietn sustained 2 lacerations to the left forehead above the eyebrow which were sutured. The patient needs sutures removed. No pain or subsequent presyncopal episodes. CT scan demonstrated minimally displaced nasal bone fracture. Current Outpatient Prescriptions on File Prior to Visit   Medication Sig Dispense Refill    sertraline (ZOLOFT) 100 mg tablet Take 2 Tabs by mouth daily. 180 Tab 5    naproxen (NAPROSYN) 375 mg tablet Take 1 Tab by mouth as needed. 90 Tab 3    amLODIPine (NORVASC) 5 mg tablet Take 1 Tab by mouth two (2) times a day. 180 Tab 3    terazosin (HYTRIN) 2 mg capsule Take 1 Cap by mouth nightly. 90 Cap 3    raNITIdine (ZANTAC) 300 mg tablet TAKE 1 TABLET BY MOUTH ONCE DAILY 90 Tab 1    traZODone (DESYREL) 50 mg tablet TAKE 1 TO 2 TABLETS BY MOUTH AT BEDTIME AS NEEDED 180 Tab 3    terbinafine HCl (LAMISIL) 1 % topical cream Use 1 Application to affected area Twice Daily.  potassium chloride (K-DUR, KLOR-CON) 10 mEq tablet TAKE 1 TABLET BY MOUTH TWICE DAILY 180 Tab 5    cholecalciferol (VITAMIN D3) 1,000 unit tablet Take  by mouth daily.  ascorbic acid, vitamin C, (VITAMIN C) 250 mg tablet Take  by mouth.  hydroCHLOROthiazide (HYDRODIURIL) 25 mg tablet TAKE 1 TABLET BY MOUTH DAILY 90 Tab 3    glimepiride (AMARYL) 1 mg tablet Take 1 Tab by mouth Daily (before breakfast). 90 Tab 4    pravastatin (PRAVACHOL) 40 mg tablet Take 1 Tab by mouth nightly. 90 Tab 3    clopidogrel (PLAVIX) 75 mg tablet Take 1 Tab by mouth daily. 90 Tab 3    isosorbide mononitrate ER (IMDUR) 30 mg tablet Take 1 Tab by mouth daily. 90 Tab 3    losartan (COZAAR) 100 mg tablet Take 1 Tab by mouth daily. 90 Tab 3    garlic 9,473 mg cap Take 1 Cap by mouth daily.       multivit-min-FA-lycopen-lutein 0.4-300-250 mg-mcg-mcg tab Take 1 Tab by mouth daily.  triamcinolone acetonide (KENALOG) 0.025 % topical cream Apply  to affected area two (2) times daily as needed. use thin layer       nystatin (MYCOSTATIN) 100,000 unit/gram ointment Apply  to affected area two (2) times daily as needed.  Lancets misc Use bid 1 Package 11    fluticasone (FLONASE) 50 mcg/actuation nasal spray 2 Sprays by Both Nostrils route daily. 1 Bottle 11    FREESTYLE LITE STRIPS strip USE TWICE DAILY. 100 Strip 5    aspirin delayed-release 81 mg tablet Take 1 tablet by mouth daily. 90 tablet 5    oxyCODONE-acetaminophen (PERCOCET) 5-325 mg per tablet Take 1 Tab by mouth every six (6) hours as needed for Pain. Max Daily Amount: 4 Tabs. 20 Tab 0    NITROSTAT 0.4 mg SL tablet PLACE 1 TABLET UNDER THE TONGUE EVERY FIVE MINUTES AS NEEDED FOR CHEST PAIN( AS DIRECTED) 25 Tab 0     No current facility-administered medications on file prior to visit. 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      Social History Main Topics    Smoking status: Former Smoker     Packs/day: 1.00     Years: 15.00    Smokeless tobacco: Never Used      Comment: Patient quit smoking December 3rd 2017, now does vaping    Alcohol use No    Drug use: No    Sexual activity: No     Other Topics Concern    Not on file     Social History Narrative       Family History   Problem Relation Age of Onset    Diabetes Father     Heart Disease Father      cardiomyopathydementia    Hypertension Father     Cancer Father      prostate    Parkinsonism Father     Dementia Father     High Cholesterol Father     Kidney Disease Father     Other Father      colon polyps    Headache Mother     Psychiatric Disorder Mother     Kidney Disease Mother     Hypertension Mother     High Cholesterol Mother     Other Mother      GERD/polymyalgia rheumaticacolon osiris    Heart Disease Mother     Cancer Sister        Review of Systems - Negative except as noted in HPI    O:  Visit Vitals    /85    Pulse 81    Temp 97.9 °F (36.6 °C) (Oral)    Resp 18    Ht 6' 2\" (1.88 m)    Wt 241 lb 9.6 oz (109.6 kg)    SpO2 97%    BMI 31.02 kg/m2     NAD, comfortable  RRR, no murmurs  CTABL, no wheezing/ronchi/rales  4 sutures in lac to the left forehead, well healed, no TTP    79 y.o. male      ICD-10-CM ICD-9-CM    1.  Forehead laceration, subsequent encounter S01.81XD V58.89 4 sutures removed  Follow up PRN     873.42

## 2017-07-11 ENCOUNTER — OFFICE VISIT (OUTPATIENT)
Dept: INTERNAL MEDICINE CLINIC | Age: 67
End: 2017-07-11

## 2017-07-11 VITALS
BODY MASS INDEX: 31.18 KG/M2 | WEIGHT: 243 LBS | HEART RATE: 72 BPM | RESPIRATION RATE: 18 BRPM | OXYGEN SATURATION: 96 % | SYSTOLIC BLOOD PRESSURE: 129 MMHG | HEIGHT: 74 IN | TEMPERATURE: 98 F | DIASTOLIC BLOOD PRESSURE: 65 MMHG

## 2017-07-11 DIAGNOSIS — E11.9 TYPE 2 DIABETES MELLITUS WITHOUT COMPLICATION, WITHOUT LONG-TERM CURRENT USE OF INSULIN (HCC): Primary | Chronic | ICD-10-CM

## 2017-07-11 DIAGNOSIS — I11.9 BENIGN HYPERTENSIVE HEART DISEASE WITHOUT HEART FAILURE: Chronic | ICD-10-CM

## 2017-07-11 DIAGNOSIS — E78.5 DYSLIPIDEMIA: Chronic | ICD-10-CM

## 2017-07-11 NOTE — PROGRESS NOTES
HISTORY OF PRESENT ILLNESS  Shea Houston is a 79 y.o. male. Visit Vitals    /65    Pulse 72    Temp 98 °F (36.7 °C) (Oral)    Resp 18    Ht 6' 2\" (1.88 m)    Wt 243 lb (110.2 kg)    SpO2 96%    BMI 31.2 kg/m2       HPI Comments: [facility] suggested he cut his diabetic med in half--his last HBA1c was 5.6 here. Saw cardiology last week and is doing well    Will see his ortho doctor in [place] in December. Cholesterol Problem   The history is provided by the patient. This is a chronic problem. The current episode started more than 1 week ago. The problem occurs daily. The problem has not changed since onset. Pertinent negatives include no headaches. Exacerbated by: diet. Relieved by: diet. Hypertension    The history is provided by the patient. This is a chronic problem. The current episode started more than 1 week ago. The problem has not changed since onset. Pertinent negatives include no headaches. There are no associated agents to hypertension. Risk factors include obesity, male gender, diabetes mellitus and dyslipidemia. Diabetes   The history is provided by the patient. This is a chronic problem. The current episode started more than 1 week ago. The problem occurs daily. The problem has not changed since onset. Pertinent negatives include no headaches. Exacerbated by: diet. The symptoms are relieved by medications (diet). Treatments tried: see med list. The treatment provided moderate relief. Review of Systems   Constitutional: Negative. Respiratory: Negative. Cardiovascular: Negative. Genitourinary: Negative for frequency and urgency. Neurological: Negative. Negative for headaches. Endo/Heme/Allergies: Negative for polydipsia. Physical Exam   Constitutional: He is oriented to person, place, and time. He appears well-developed and well-nourished. No distress. Cardiovascular: Normal rate and regular rhythm.     Pulmonary/Chest: Effort normal and breath sounds normal.   Musculoskeletal: He exhibits no edema. Neurological: He is alert and oriented to person, place, and time. Skin: Skin is warm and dry. He is not diaphoretic. Psychiatric: He has a normal mood and affect. Nursing note and vitals reviewed. ASSESSMENT and PLAN    ICD-10-CM ICD-9-CM    1. Type 2 diabetes mellitus without complication, without long-term current use of insulin ([facility]) H71.5 737.59 METABOLIC PANEL, COMPREHENSIVE      LIPID PANEL      HEMOGLOBIN A1C WITH EAG      MICROALBUMIN, UR, RAND W/ MICROALBUMIN/CREA RATIO   2. Dyslipidemia E78.5 272.4 LIPID PANEL   3.  Hypertension I88.7 514.72 METABOLIC PANEL, COMPREHENSIVE      LIPID PANEL       BP controlled    Looks good    Update lab today    Discussed BMI/weight, lifestyle, diet and exercise    F/u 4 months for MWV, HTN, DM, chol

## 2017-07-11 NOTE — PROGRESS NOTES
Chief Complaint   Patient presents with    Cholesterol Problem    Hypertension    Diabetes       Pt preferred language for health care discussion is [place]. Is someone accompanying this pt? no    Is the patient using any DME equipment during OV? no    Depression Screening:  PHQ over the last two weeks 7/11/2017 5/11/2017 3/9/2017 9/6/2016 5/2/2016 4/13/2015 4/17/2014   PHQ Not Done Active Diagnosis of Depression or Bipolar Disorder Active Diagnosis of Depression or Bipolar Disorder - Patient Decline Active Diagnosis of Depression or Bipolar Disorder - -   Little interest or pleasure in doing things Nearly every day - Not at all Nearly every day - Several days Not at all   Feeling down, depressed or hopeless Several days - Not at all Nearly every day - More than half the days Not at all   Total Score PHQ 2 4 - 0 6 - 3 0       Learning Assessment:  Learning Assessment 4/13/2015 4/17/2014 12/12/2013   PRIMARY LEARNER Patient Patient -   HIGHEST LEVEL OF EDUCATION - PRIMARY LEARNER  SOME COLLEGE - SOME COLLEGE   BARRIERS PRIMARY LEARNER NONE - [address] CAREGIVER No - -   PRIMARY LANGUAGE ENGLISH ENGLISH ENGLISH    NEED - - No   LEARNER PREFERENCE PRIMARY LISTENING DEMONSTRATION PICTURES   LEARNING SPECIAL TOPICS - - none   ANSWERED BY patient - -   RELATIONSHIP SELF - -       Abuse Screening:  Abuse Screening Questionnaire 4/13/2015   Do you ever feel afraid of your partner? N   Are you in a relationship with someone who physically or mentally threatens you? N   Is it safe for you to go home? Y       Fall Risk  Fall Risk Assessment, last 12 mths 7/11/2017 5/11/2017 3/9/2017 12/29/2016 9/6/2016 5/2/2016 4/13/2015   Able to walk? Yes Yes Yes Yes Yes Yes Yes   Fall in past 12 months? Yes Yes No Yes Yes Yes Yes   Fall with injury? Yes Yes - No No No Yes   Number of falls in past 12 months 1 1 - 3 3 3 2   Fall Risk Score 2 2 - 3 3 3 3         Health Maintenance reviewed and discussed per provider. Yes    Pt is due for Microalbumin, A1C. Please order/place referral if appropriate. Advance Directive:  1. Do you have an advance directive in place? Patient Reply:no    2. If not, would you like material regarding how to put one in place? Patient Reply: no    Coordination of Care:  1. Have you been to the ER, urgent care clinic since your last visit? Hospitalized since your last visit? no    2. Have you seen or consulted any other health care providers outside of the [address] [place] [address] since your last visit? Include any pap smears or colon screening.  [address] [place] [address]

## 2017-07-11 NOTE — MR AVS SNAPSHOT
Visit Information Date & Time Provider Department Dept. Phone Encounter #  
 7/11/2017 12:00 PM Garima West, [phone] Follow-up Instructions Return in about 4 months (around 11/11/2017) for Medicare Wellness Visit, HTN, DM, cholesterol. Your Appointments 12/14/2017 10:30 AM  
Follow Up with Iglesia Barron MD  
Cardio Specialist at [place] [id number] [facility]) Appt Note: 7 1/2 m f/u  
 [facility] [address] Upcoming Health Maintenance Date Due HEMOGLOBIN A1C Q6M 3/6/2017 MICROALBUMIN Q1 5/3/2017 LIPID PANEL Q1 8/4/2017 INFLUENZA AGE 9 TO ADULT 8/1/2017 FOOT EXAM Q1 9/6/2017 MEDICARE YEARLY EXAM 9/7/2017 EYE EXAM RETINAL OR DILATED Q1 11/10/2017 COLONOSCOPY 6/1/2018 GLAUCOMA SCREENING Q2Y 11/10/2018 DTaP/Tdap/Td series (3 - Td) 5/1/2027 Allergies as of 7/11/2017  Review Complete On: 7/11/2017 By: Garima West MD  
  
 Severity Noted Reaction Type Reactions Beta Blocker [Beta-blockers (Beta-adrenergic Blocking Agts)]    Other (comments)  
 symptomatic bradycardia Codeine   Side Effect Other (comments) Passed//fainted 
patient doesn't recall reaction, occurred as a teenager Darvocet A500 [Propoxyphene N-acetaminophen]   Systemic Other (comments)  
 throat swelling Dexbrompheniramine-pseudoephed    Other (comments) Facial swelling. 1980\"s Lasix [Furosemide]  09/06/2016   Systemic Itching Lipitor [Atorvastatin]  03/16/2016    Other (comments) Sulfa (Sulfonamide Antibiotics)  10/18/2016    Hives Current Immunizations  Reviewed on 6/15/2015 Name Date Influenza High Dose Vaccine PF 10/18/2016 Influenza Vaccine 9/30/2014 Influenza Vaccine Split 10/1/2012 Pneumococcal Conjugate (PCV-13) 4/28/2015 Pneumococcal Polysaccharide (PPSV-23) 9/6/2016 10:15 AM  
 Pneumococcal Vaccine (Unspecified Type) 12/5/2009 Tdap 5/1/2017  5:39 PM, 8/12/2013 10:56 AM  
 Zoster Vaccine, Live 1/25/2013 Not reviewed this visit You Were Diagnosed With   
  
 Codes Comments Type 2 diabetes mellitus without complication, without long-term current use of insulin (HCC)    -  Primary ICD-10-CM: E11.9 ICD-9-CM: 250.00 Dyslipidemia     ICD-10-CM: E78.5 ICD-9-CM: 272.4 Benign hypertensive heart disease without heart failure     ICD-10-CM: I11.9 ICD-9-CM: 402.10 Vitals BP Pulse Temp Resp Height(growth percentile) Weight(growth percentile) 129/65 72 98 °F (36.7 °C) (Oral) 18 6' 2\" (1.88 m) 243 lb (110.2 kg) SpO2 BMI Smoking Status 96% 31.2 kg/m2 Former Smoker BMI and BSA Data Body Mass Index Body Surface Area  
 31.2 kg/m 2 2.4 m 2 Preferred Pharmacy Pharmacy Name Phone [facility] [address] [phone] Your Updated Medication List  
  
   
This list is accurate as of: 7/11/17 12:37 PM.  Always use your most recent med list. amLODIPine 5 mg tablet Commonly known as:  Sherrye Acron Take 1 Tab by mouth two (2) times a day. aspirin delayed-release 81 mg tablet Take 1 tablet by mouth daily. cholecalciferol 1,000 unit tablet Commonly known as:  VITAMIN D3 Take  by mouth daily. clopidogrel 75 mg Tab Commonly known as:  PLAVIX Take 1 Tab by mouth daily. fluticasone 50 mcg/actuation nasal spray Commonly known as:  Yonathan Pete 2 Sprays by Both Nostrils route daily. FREESTYLE LITE STRIPS strip Generic drug:  glucose blood VI test strips USE TWICE DAILY. garlic 8,632 mg Cap Take 1 Cap by mouth daily. glimepiride 1 mg tablet Commonly known as:  AMARYL Take 1 Tab by mouth Daily (before breakfast). hydroCHLOROthiazide 25 mg tablet Commonly known as:  HYDRODIURIL  
TAKE 1 TABLET BY MOUTH DAILY  
  
 isosorbide mononitrate ER 30 mg tablet Commonly known as:  IMDUR Take 1 Tab by mouth daily. losartan 100 mg tablet Commonly known as:  COZAAR Take 1 Tab by mouth daily. multivit-min-FA-lycopen-lutein 0.4-300-250 mg-mcg-mcg Tab Take 1 Tab by mouth daily. naproxen 375 mg tablet Commonly known as:  NAPROSYN Take 1 Tab by mouth as needed. pravastatin 40 mg tablet Commonly known as:  PRAVACHOL Take 1 Tab by mouth nightly. raNITIdine 300 mg tablet Commonly known as:  ZANTAC TAKE 1 TABLET BY MOUTH ONCE DAILY  
  
 sertraline 100 mg tablet Commonly known as:  ZOLOFT Take 2 Tabs by mouth daily. terazosin 2 mg capsule Commonly known as:  HYTRIN Take 1 Cap by mouth nightly. traMADol 50 mg tablet Commonly known as:  ULTRAM  
  
 traZODone 50 mg tablet Commonly known as:  DESYREL  
TAKE 1 TO 2 TABLETS BY MOUTH AT BEDTIME AS NEEDED  
  
 triamcinolone acetonide 0.025 % topical cream  
Commonly known as:  KENALOG Apply  to affected area two (2) times daily as needed. use thin layer VITAMIN C 250 mg tablet Generic drug:  ascorbic acid (vitamin C) Take  by mouth. Follow-up Instructions Return in about 4 months (around 11/11/2017) for Medicare Wellness Visit, HTN, DM, cholesterol. To-Do List   
 07/11/2017 Lab:  HEMOGLOBIN A1C WITH EAG   
  
 07/11/2017 Lab:  LIPID PANEL   
  
 07/11/2017 Lab:  METABOLIC PANEL, COMPREHENSIVE   
  
 07/11/2017 Lab:  MICROALBUMIN, UR, RAND W/ MICROALBUMIN/CREA RATIO Introducing [facility]! Dear Gearldine Phoenix: Thank you for requesting a BizBrag account. Our records indicate that you already have an active BizBrag account. You can access your account anytime at [email or web address] Did you know that you can access your hospital and ER discharge instructions at any time in Trailerpop? You can also review all of your test results from your hospital stay or ER visit. Additional Information If you have questions, please visit the Frequently Asked Questions section of the Trailerpop website at [email or web address] Remember, Trailerpop is NOT to be used for urgent needs. For medical emergencies, dial 911. Now available from your iPhone and Android! Please provide this summary of care documentation to your next provider. Your primary care clinician is listed as [facility]. If you have any questions after today's visit, please call [phone].

## 2017-11-09 NOTE — PATIENT DISCUSSION
1.  DM Type II (Oral Medication) without sign of diabetic retinopathy and no blot heme on dilated retinal examination today OU No Macular Edema:  Discussed the pathophysiology of diabetes and its effect on the eye and risk of blindness. Stressed the importance of strong glucose control. Advised of importance of at least yearly dilated examinations but to contact us immediately for any problems or concerns. 2. PCO OU-Visually Significant secondary to glare and yag cap recommended. Risks and benefits discussed with pt and pt desires to schedule yag cap OD then OS. 3. Pseudophakia OU 4. Choroidal Nevus OS: Appears Benign and stable. Observe for changes. Followed by Dr. Jl Reilly 5. BREE w/ PEK OU-The use/continuation of artificial tears were recommended. 6.  Glaucoma Suspect OU (CD 0.55/0.70): Past work up negative. Patient is considered Low Risk. Condition was discussed with patient and patient understands. Will continue to monitor patient for any progression in condition. Patient was advised to call us with any problems questions or concerns. 7. PXE OUReturn for an appointment in YAG Cap OD then OS with Dr. Joaquin Simon.

## 2017-11-13 ENCOUNTER — HOSPITAL ENCOUNTER (OUTPATIENT)
Dept: LAB | Age: 67
Discharge: HOME | End: 2017-11-13
Payer: MEDICARE

## 2017-11-13 ENCOUNTER — OFFICE VISIT (OUTPATIENT)
Dept: INTERNAL MEDICINE CLINIC | Age: 67
End: 2017-11-13

## 2017-11-13 VITALS
BODY MASS INDEX: 30.16 KG/M2 | WEIGHT: 235 LBS | HEART RATE: 76 BPM | SYSTOLIC BLOOD PRESSURE: 151 MMHG | RESPIRATION RATE: 16 BRPM | DIASTOLIC BLOOD PRESSURE: 77 MMHG | TEMPERATURE: 95.9 F | OXYGEN SATURATION: 97 % | HEIGHT: 74 IN

## 2017-11-13 DIAGNOSIS — I11.9 BENIGN HYPERTENSIVE HEART DISEASE WITHOUT HEART FAILURE: Chronic | ICD-10-CM

## 2017-11-13 DIAGNOSIS — Z12.5 SPECIAL SCREENING FOR MALIGNANT NEOPLASM OF PROSTATE: ICD-10-CM

## 2017-11-13 DIAGNOSIS — E78.5 DYSLIPIDEMIA: Chronic | ICD-10-CM

## 2017-11-13 DIAGNOSIS — E11.9 TYPE 2 DIABETES MELLITUS WITHOUT COMPLICATION, WITHOUT LONG-TERM CURRENT USE OF INSULIN (HCC): Chronic | ICD-10-CM

## 2017-11-13 DIAGNOSIS — Z00.00 MEDICARE ANNUAL WELLNESS VISIT, SUBSEQUENT: Primary | ICD-10-CM

## 2017-11-13 DIAGNOSIS — M79.2 NEURALGIC PAIN: ICD-10-CM

## 2017-11-13 DIAGNOSIS — Z23 ENCOUNTER FOR IMMUNIZATION: ICD-10-CM

## 2017-11-13 DIAGNOSIS — Z76.0 MEDICATION REFILL: ICD-10-CM

## 2017-11-13 LAB
ALBUMIN SERPL-MCNC: 4.1 G/DL (ref 3.4–5)
ALBUMIN/GLOB SERPL: 1.1 {RATIO} (ref 0.8–1.7)
ALP SERPL-CCNC: 79 U/L (ref 45–117)
ALT SERPL-CCNC: 25 U/L (ref 16–61)
ANION GAP SERPL CALC-SCNC: 8 MMOL/L (ref 3–18)
AST SERPL-CCNC: 18 U/L (ref 15–37)
BILIRUB SERPL-MCNC: 0.6 MG/DL (ref 0.2–1)
BUN SERPL-MCNC: 18 MG/DL (ref 7–18)
BUN/CREAT SERPL: 14 (ref 12–20)
CALCIUM SERPL-MCNC: 8.8 MG/DL (ref 8.5–10.1)
CHLORIDE SERPL-SCNC: 100 MMOL/L (ref 100–108)
CHOLEST SERPL-MCNC: 128 MG/DL
CO2 SERPL-SCNC: 30 MMOL/L (ref 21–32)
CREAT SERPL-MCNC: 1.26 MG/DL (ref 0.6–1.3)
CREAT UR-MCNC: 76.12 MG/DL (ref 30–125)
GLOBULIN SER CALC-MCNC: 3.9 G/DL (ref 2–4)
GLUCOSE SERPL-MCNC: 95 MG/DL (ref 74–99)
HBA1C MFR BLD: 5.9 % (ref 4.2–5.6)
HDLC SERPL-MCNC: 47 MG/DL (ref 40–60)
HDLC SERPL: 2.7 {RATIO} (ref 0–5)
LDLC SERPL CALC-MCNC: 56.8 MG/DL (ref 0–100)
LIPID PROFILE,FLP: NORMAL
MICROALBUMIN UR-MCNC: 1.2 MG/DL (ref 0–3)
MICROALBUMIN/CREAT UR-RTO: 16 MG/G (ref 0–30)
POTASSIUM SERPL-SCNC: 3.7 MMOL/L (ref 3.5–5.5)
PROT SERPL-MCNC: 8 G/DL (ref 6.4–8.2)
PSA SERPL-MCNC: 0.6 NG/ML (ref 0–4)
SODIUM SERPL-SCNC: 138 MMOL/L (ref 136–145)
TRIGL SERPL-MCNC: 121 MG/DL (ref ?–150)
VLDLC SERPL CALC-MCNC: 24.2 MG/DL

## 2017-11-13 PROCEDURE — 84153 ASSAY OF PSA TOTAL: CPT | Performed by: INTERNAL MEDICINE

## 2017-11-13 PROCEDURE — 80053 COMPREHEN METABOLIC PANEL: CPT | Performed by: INTERNAL MEDICINE

## 2017-11-13 PROCEDURE — 83036 HEMOGLOBIN GLYCOSYLATED A1C: CPT | Performed by: INTERNAL MEDICINE

## 2017-11-13 PROCEDURE — 82043 UR ALBUMIN QUANTITATIVE: CPT | Performed by: INTERNAL MEDICINE

## 2017-11-13 PROCEDURE — 80061 LIPID PANEL: CPT | Performed by: INTERNAL MEDICINE

## 2017-11-13 RX ORDER — NEOMYCIN SULFATE, POLYMYXIN B SULFATE, HYDROCORTISONE 3.5; 10000; 1 MG/ML; [USP'U]/ML; MG/ML
SOLUTION/ DROPS AURICULAR (OTIC)
Refills: 0 | COMMUNITY
Start: 2017-11-04 | End: 2018-01-24 | Stop reason: ALTCHOICE

## 2017-11-13 RX ORDER — GABAPENTIN 300 MG/1
300 CAPSULE ORAL 3 TIMES DAILY
Qty: 90 CAP | Refills: 1 | Status: SHIPPED | OUTPATIENT
Start: 2017-11-13 | End: 2018-05-31

## 2017-11-13 RX ORDER — LANCETS
EACH MISCELLANEOUS
Qty: 200 EACH | Refills: 5 | Status: SHIPPED | OUTPATIENT
Start: 2017-11-13 | End: 2018-07-03 | Stop reason: CLARIF

## 2017-11-13 RX ORDER — METHYLPREDNISOLONE 4 MG/1
TABLET ORAL
Qty: 1 DOSE PACK | Refills: 0 | Status: SHIPPED | OUTPATIENT
Start: 2017-11-13 | End: 2018-01-24 | Stop reason: ALTCHOICE

## 2017-11-13 NOTE — PROGRESS NOTES
This is a Subsequent Medicare Annual Wellness Exam (AWV) (Performed 12 months after IPPE or effective date of Medicare Part B enrollment)    I have reviewed the patient's medical history in detail and updated the computerized patient record. 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 Butch Hutchinson      7/14  bilat    HX CERVICAL LAMINECTOMY      HX Stefan Clam HX TUMOR REMOVAL      3/13  wide excision right thigh sarcoma    HX TYMPANOMASTOIDECTOMY       Current Outpatient Prescriptions   Medication Sig Dispense Refill    neomycin-polymyxin-hydrocortisone (CORTISPORIN) otic solution INSTILL 4 DROPS ON BOTH EARS QID  0    traMADol (ULTRAM) 50 mg tablet   5    sertraline (ZOLOFT) 100 mg tablet Take 2 Tabs by mouth daily. 180 Tab 5    naproxen (NAPROSYN) 375 mg tablet Take 1 Tab by mouth as needed. 90 Tab 3    amLODIPine (NORVASC) 5 mg tablet Take 1 Tab by mouth two (2) times a day. 180 Tab 3    terazosin (HYTRIN) 2 mg capsule Take 1 Cap by mouth nightly.  90 Cap 3    raNITIdine (ZANTAC) 300 mg tablet TAKE 1 TABLET BY MOUTH ONCE DAILY 90 Tab 1    traZODone (DESYREL) 50 mg tablet TAKE 1 TO 2 TABLETS BY MOUTH AT BEDTIME AS NEEDED 180 Tab 3    cholecalciferol (VITAMIN D3) 1,000 unit tablet Take  by mouth daily.      ascorbic acid, vitamin C, (VITAMIN C) 250 mg tablet Take  by mouth.  hydroCHLOROthiazide (HYDRODIURIL) 25 mg tablet TAKE 1 TABLET BY MOUTH DAILY 90 Tab 3    glimepiride (AMARYL) 1 mg tablet Take 1 Tab by mouth Daily (before breakfast). 90 Tab 4    pravastatin (PRAVACHOL) 40 mg tablet Take 1 Tab by mouth nightly. 90 Tab 3    clopidogrel (PLAVIX) 75 mg tablet Take 1 Tab by mouth daily. 90 Tab 3    isosorbide mononitrate ER (IMDUR) 30 mg tablet Take 1 Tab by mouth daily. 90 Tab 3    losartan (COZAAR) 100 mg tablet Take 1 Tab by mouth daily. 90 Tab 3    garlic 5,843 mg cap Take 1 Cap by mouth daily.  multivit-min-FA-lycopen-lutein 0.4-300-250 mg-mcg-mcg tab Take 1 Tab by mouth daily.  triamcinolone acetonide (KENALOG) 0.025 % topical cream Apply  to affected area two (2) times daily as needed. use thin layer       fluticasone (FLONASE) 50 mcg/actuation nasal spray 2 Sprays by Both Nostrils route daily. 1 Bottle 11    FREESTYLE LITE STRIPS strip USE TWICE DAILY. 100 Strip 5    aspirin delayed-release 81 mg tablet Take 1 tablet by mouth daily. 90 tablet 5     Allergies   Allergen Reactions    Beta Blocker [Beta-Blockers (Beta-Adrenergic Blocking Agts)] Other (comments)     symptomatic bradycardia    Codeine Other (comments)     Passed//fainted  patient doesn't recall reaction, occurred as a teenager    Darvocet A500 [Propoxyphene N-Acetaminophen] Other (comments)     throat swelling    Dexbrompheniramine-Pseudoephed Other (comments)     Facial swelling.  1980\"s    Lasix [Furosemide] Itching    Lipitor [Atorvastatin] Other (comments)    Sulfa (Sulfonamide Antibiotics) Hives     Family History   Problem Relation Age of Onset    Diabetes Father     Heart Disease Father      cardiomyopathydementia    Hypertension Father     Cancer Father      prostate    Parkinsonism Father     Dementia Father     High Cholesterol Father     Kidney Disease Father     Other Father      colon polyps    Headache Mother     Psychiatric Disorder Mother     Kidney Disease Mother     Hypertension Mother     High Cholesterol Mother     Other Mother      GERD/polymyalgia rheumaticacolon osiris    Heart Disease Mother     Cancer Sister      Social History   Substance Use Topics    Smoking status: Former Smoker     Packs/day: 1.00     Years: 15.00    Smokeless tobacco: Never Used      Comment: Patient quit smoking December 3rd 2017, now does vaping    Alcohol use No     Patient Active Problem List   Diagnosis Code    Dyslipidemia E78.5    Coronary artery disease I25.10    Diabetes E11.9    Hypertension I11.9       Depression Risk Factor Screening:     PHQ over the last two weeks 7/11/2017   PHQ Not Done Active Diagnosis of Depression or Bipolar Disorder   Little interest or pleasure in doing things Nearly every day   Feeling down, depressed or hopeless Several days   Total Score PHQ 2 4     Alcohol Risk Factor Screening: You do not drink alcohol or very rarely. Functional Ability and Level of Safety:   Hearing Loss  The patient is supposed to wear hearing aids. But often does not. Activities of Daily Living  The home contains: grab bars. Moved into his late mother's house which had handicap adaptations. Patient does total self care    Fall Risk  Fall Risk Assessment, last 12 mths 11/13/2017   Able to walk? Yes   Fall in past 12 months? Yes   Fall with injury?  Yes   Number of falls in past 12 months 1   Fall Risk Score 2       Abuse Screen  Patient is not abused    Cognitive Screening   Evaluation of Cognitive Function:  Has your family/caregiver stated any concerns about your memory: no  Normal, Animal Naming test, Mini Cog test--2/3 initially    Patient Care Team   Patient Care Team:  Pam Nails MD as PCP - General (Internal Medicine)  Pam Nails MD (Internal Medicine)  Johnna Bryan MD as Consulting Provider (Ophthalmology)  Geetha Hunt DPM as Consulting Provider (Podiatry)  Mi Ponce MD as Consulting Provider (Cardiology)  Anuj Gaston MD as Consulting Provider (Gastroenterology)  Beni Kwok MD as Consulting Provider (Orthopedic Surgery)  Ca Mar MD as Consulting Provider (Ophthalmology)  Ashok Fung DPM as Consulting Provider (Podiatry)  Mi Ponce MD as Consulting Provider (Cardiology)  Anuj Gaston MD as Consulting Provider (Gastroenterology)  Beni Kwok MD as Consulting Provider (Orthopedic Surgery)  June Camarillo RN as Ambulatory Care Navigator    Assessment/Plan   Education and counseling provided:  Are appropriate based on today's review and evaluation    Diagnoses and all orders for this visit:    1. Medicare annual wellness visit, subsequent    2. Special screening for malignant neoplasm of prostate  -     PSA Screening-Future  ([id number]); Future    3.  Encounter for immunization  -     Influenza virus vaccine ([address]) 72 years and older ([address])  -     Administration fee () for Medicare insured patients        Health Maintenance Due   Topic Date Due    HEMOGLOBIN A1C Q6M  03/06/2017    MICROALBUMIN Q1  05/03/2017    Influenza Age 5 to Adult  08/01/2017    LIPID PANEL Q1  08/04/2017    FOOT EXAM Q1  09/06/2017    MEDICARE YEARLY EXAM  09/07/2017    EYE EXAM RETINAL OR DILATED Q1  11/10/2017

## 2017-11-13 NOTE — PROGRESS NOTES
HISTORY OF PRESENT ILLNESS  Yoko Archibald is a 79 y.o. male. Visit Vitals    /77 (BP 1 Location: Right arm, BP Patient Position: Sitting)    Pulse 76    Temp 95.9 °F (35.5 °C) (Oral)    Resp 16    Ht 6' 2\" (1.88 m)    Wt 235 lb (106.6 kg)    SpO2 97%    BMI 30.17 kg/m2       Cholesterol Problem   The history is provided by the patient. This is a chronic problem. The current episode started more than 1 week ago. The problem occurs daily. The problem has not changed since onset. Pertinent negatives include no chest pain and no shortness of breath. Exacerbated by: diet. The symptoms are relieved by medications. Diabetes   The history is provided by the patient. This is a chronic problem. The current episode started more than 1 week ago. The problem occurs daily. The problem has not changed since onset. Pertinent negatives include no chest pain and no shortness of breath. Exacerbated by: diet. The symptoms are relieved by medications (diet). Hypertension    The history is provided by the patient. This is a chronic problem. The current episode started more than 1 week ago. The problem has not changed since onset. Pertinent negatives include no chest pain, no palpitations and no shortness of breath. There are no associated agents to hypertension. Risk factors include obesity and hypertension. Leg Pain    The history is provided by the patient (right thigh nerve type. This is the side where he had a sarcoma. ). This is a new problem. The current episode started more than 1 week ago. The problem occurs daily. Associated symptoms include stiffness. Pertinent negatives include no tingling. Review of Systems   Constitutional: Negative for chills and fever. Respiratory: Negative for shortness of breath. Cardiovascular: Negative for chest pain and palpitations. Musculoskeletal: Positive for stiffness. Neurological: Negative for tingling and sensory change. Deep nagging thigh pain. Physical Exam   Constitutional: He is oriented to person, place, and time. He appears well-developed and well-nourished. No distress. Cardiovascular: Normal rate and regular rhythm. Pulmonary/Chest: Effort normal and breath sounds normal.   Musculoskeletal: He exhibits no edema. Right thigh is abnormal due to prior surgery. There is some tightness over the muscle but does not have the hardness of his prior tumor. It hurts to flex  His thigh. Good lower leg strength. No edema no redness. Neurological: He is alert and oriented to person, place, and time. Skin: Skin is warm and dry. He is not diaphoretic. Psychiatric: He has a normal mood and affect. Nursing note and vitals reviewed. ASSESSMENT and PLAN    ICD-10-CM ICD-9-CM    1. Medicare annual wellness visit, subsequent Z00.00 V70.0    2. Special screening for malignant neoplasm of prostate Z12.5 V76.44 PSA SCREENING (SCREENING)   3. Encounter for immunization Z23 V03.89 INFLUENZA VIRUS VACCINE, HIGH DOSE SEASONAL, PRESERVATIVE FREE      ADMIN INFLUENZA VIRUS VAC   4. Type 2 diabetes mellitus without complication, without long-term current use of insulin (HCC) X35.0 799.40 METABOLIC PANEL, COMPREHENSIVE      HEMOGLOBIN A1C W/O EAG      MICROALBUMIN, UR, RAND W/ MICROALBUMIN/CREA RATIO   5. Dyslipidemia E78.5 272.4 LIPID PANEL   6. Hypertension H23.5 837.98 METABOLIC PANEL, COMPREHENSIVE   7. Neuralgic pain M79.2 729.2 methylPREDNISolone (MEDROL DOSEPACK) 4 mg tablet      gabapentin (NEURONTIN) 300 mg capsule   8. Medication refill Z76.0 V68.1 glucose blood VI test strips (FREESTYLE LITE STRIPS) strip      Lancets misc     The neuropathic pain in his leg is worrisome given the h/o sarcoma. Will try and calm down with meds, but if does not responds, needs new CT (pt states had one at the [place] in Sept)  Start gabapentin.  Add medrol dose pack    Update lab  BP up a little today but he is in pain    BS has been good    Refills as noted    Discussed BMI/weight, lifestyle, diet and exercise  Pr is aware and is working on it    F/u 4 months

## 2017-11-13 NOTE — PROGRESS NOTES
Chief Complaint   Patient presents with    Annual Wellness Visit    Cholesterol Problem    Diabetes    Hypertension       Pt preferred language for health care discussion is english. Is someone accompanying this pt? no    Is the patient using any DME equipment during [address]? Yes, cane    Depression Screening:  PHQ over the last two weeks 7/11/2017 5/11/2017 3/9/2017 9/6/2016 5/2/2016 4/13/2015 4/17/2014   PHQ Not Done Active Diagnosis of Depression or Bipolar Disorder Active Diagnosis of Depression or Bipolar Disorder - Patient Decline Active Diagnosis of Depression or Bipolar Disorder - -   Little interest or pleasure in doing things Nearly every day - Not at all Nearly every day - Several days Not at all   Feeling down, depressed or hopeless Several days - Not at all Nearly every day - More than half the days Not at all   Total Score PHQ 2 4 - 0 6 - 3 0       Learning Assessment:  Learning Assessment 4/13/2015 4/17/2014 12/12/2013   PRIMARY LEARNER Patient Patient -   HIGHEST LEVEL OF EDUCATION - PRIMARY LEARNER  SOME COLLEGE - SOME COLLEGE   BARRIERS PRIMARY LEARNER NONE - NONE   CO-LEARNER CAREGIVER No - -   PRIMARY LANGUAGE ENGLISH ENGLISH ENGLISH    NEED - - No   LEARNER PREFERENCE PRIMARY LISTENING DEMONSTRATION PICTURES   LEARNING SPECIAL TOPICS - - none   ANSWERED BY patient - -   RELATIONSHIP SELF - -       Abuse Screening:  Abuse Screening Questionnaire 11/13/2017 4/13/2015   Do you ever feel afraid of your partner? N N   Are you in a relationship with someone who physically or mentally threatens you? N N   Is it safe for you to go home? Y Y       Fall Risk  Fall Risk Assessment, last 12 mths 11/13/2017 7/11/2017 5/11/2017 3/9/2017 12/29/2016 9/6/2016 5/2/2016   Able to walk? Yes Yes Yes Yes Yes Yes Yes   Fall in past 12 months? Yes Yes Yes No Yes Yes Yes   Fall with injury?  Yes Yes Yes - No No No   Number of falls in past 12 months 1 1 1 - 3 3 3   Fall Risk Score 2 2 2 - 3 3 3 Health Maintenance reviewed and discussed per provider. Yes    Pt is due for a1c, microalbumin, influenza getting today, lipid, foot exam, mwv (getting today) eye exam (getting release). Please order/place referral if appropriate. Pt currently taking Antiplatelet therapy? Yes, aspirin    Advance Directive:  1. Do you have an advance directive in place? Patient Reply:no    2. If not, would you like material regarding how to put one in place? Patient Reply: yes      Coordination of Care:  1. Have you been to the ER, urgent care clinic since your last visit? Hospitalized since your last visit? Urgent care, laceration ear pain    2. Have you seen or consulted any other health care providers outside of the [address] [place] [address] since your last visit? Include any pap smears or colon screening. Yes, ent    Please see Red banners under Allergies, Med rec, Immunizations to remove outside inquires. All correct information has been verified with patient and added to chart.

## 2017-11-13 NOTE — MR AVS SNAPSHOT
Visit Information Date & Time Provider Department Dept. Phone Encounter #  
 11/13/2017 11:00 AM Gerson Jimenez [phone] [id number] Follow-up Instructions Return in about 4 months (around 3/13/2018) for HTN, DM, cholesterol. Your Appointments 12/14/2017 10:30 AM  
Follow Up with Iglesia Baker MD  
Cardio Specialist at [facility] Appt Note: 7 1/2 m f/u  
 [id number] [facility] [address] [facility] [address]  
  
   
 [id number] [place] [address] 133 Upcoming Health Maintenance Date Due HEMOGLOBIN A1C Q6M 3/6/2017 MICROALBUMIN Q1 5/3/2017 Influenza Age 5 to Adult 8/1/2017 LIPID PANEL Q1 8/4/2017 MEDICARE YEARLY EXAM 9/7/2017 FOOT EXAM Q1 2/11/2018* EYE EXAM RETINAL OR DILATED Q1 2/11/2018* COLONOSCOPY 6/1/2018 GLAUCOMA SCREENING Q2Y 11/10/2018 DTaP/Tdap/Td series (3 - Td) 5/1/2027 *Topic was postponed. The date shown is not the original due date. Allergies as of 11/13/2017  Review Complete On: 11/13/2017 By: Randi Rivera Severity Noted Reaction Type Reactions Beta Blocker [Beta-blockers (Beta-adrenergic Blocking Agts)]    Other (comments)  
 symptomatic bradycardia Codeine   Side Effect Other (comments) Passed//fainted 
patient doesn't recall reaction, occurred as a teenager Darvocet A500 [Propoxyphene N-acetaminophen]   Systemic Other (comments)  
 throat swelling Dexbrompheniramine-pseudoephed    Other (comments) Facial swelling. 1980\"s Lasix [Furosemide]  09/06/2016   Systemic Itching Lipitor [Atorvastatin]  03/16/2016    Other (comments) Sulfa (Sulfonamide Antibiotics)  10/18/2016    Hives Current Immunizations  Reviewed on 6/15/2015 Name Date Influenza High Dose Vaccine PF  Incomplete, 10/18/2016 Influenza Vaccine 9/30/2014 Influenza Vaccine Split 10/1/2012 Pneumococcal Conjugate (PCV-13) 4/28/2015 Pneumococcal Polysaccharide (PPSV-23) 9/6/2016 10:15 AM  
 Tdap 5/1/2017  5:39 PM, 8/12/2013 10:56 AM  
 ZZZ-RETIRED (DO NOT USE) Pneumococcal Vaccine (Unspecified Type) 12/5/2009 Zoster Vaccine, Live 1/25/2013 Not reviewed this visit You Were Diagnosed With   
  
 Codes Comments Medicare annual wellness visit, subsequent    -  Primary ICD-10-CM: Z00.00 ICD-9-CM: V70.0 Special screening for malignant neoplasm of prostate     ICD-10-CM: Z12.5 ICD-9-CM: V76.44 Encounter for immunization     ICD-10-CM: V39 ICD-9-CM: V03.89 Type 2 diabetes mellitus without complication, without long-term current use of insulin (HCC)     ICD-10-CM: E11.9 ICD-9-CM: 250.00 Dyslipidemia     ICD-10-CM: E78.5 ICD-9-CM: 272.4 Benign hypertensive heart disease without heart failure     ICD-10-CM: I11.9 ICD-9-CM: 402.10 Neuralgic pain     ICD-10-CM: M79.2 ICD-9-CM: 729.2 Medication refill     ICD-10-CM: Z76.0 ICD-9-CM: V68.1 Vitals BP Pulse Temp Resp Height(growth percentile) Weight(growth percentile) 151/77 (BP 1 Location: Right arm, BP Patient Position: Sitting) 76 95.9 °F (35.5 °C) (Oral) 16 6' 2\" (1.88 m) 235 lb (106.6 kg) SpO2 BMI Smoking Status 97% 30.17 kg/m2 Former Smoker Vitals History BMI and BSA Data Body Mass Index Body Surface Area  
 30.17 kg/m 2 2.36 m 2 Preferred Pharmacy Pharmacy Name Phone [facility] [address] [phone] Your Updated Medication List  
  
   
This list is accurate as of: 11/13/17 11:58 AM.  Always use your most recent med list. amLODIPine 5 mg tablet Commonly known as:  Yvette Spruce Take 1 Tab by mouth two (2) times a day. aspirin delayed-release 81 mg tablet Take 1 tablet by mouth daily. cholecalciferol 1,000 unit tablet Commonly known as:  VITAMIN D3 Take  by mouth daily. clopidogrel 75 mg Tab Commonly known as:  PLAVIX Take 1 Tab by mouth daily. fluticasone 50 mcg/actuation nasal spray Commonly known as:  Kandis Fryer 2 Sprays by Both Nostrils route daily. gabapentin 300 mg capsule Commonly known as:  NEURONTIN Take 1 Cap by mouth three (3) times daily. Indications: NEUROPATHIC PAIN  
  
 garlic 5,291 mg Cap Take 1 Cap by mouth daily. glimepiride 1 mg tablet Commonly known as:  AMARYL Take 1 Tab by mouth Daily (before breakfast). glucose blood VI test strips strip Commonly known as:  FREESTYLE LITE STRIPS  
USE TWICE DAILY. hydroCHLOROthiazide 25 mg tablet Commonly known as:  HYDRODIURIL  
TAKE 1 TABLET BY MOUTH DAILY  
  
 isosorbide mononitrate ER 30 mg tablet Commonly known as:  IMDUR Take 1 Tab by mouth daily. Lancets Misc Use to test blood sugar twice daily  
  
 losartan 100 mg tablet Commonly known as:  COZAAR Take 1 Tab by mouth daily. methylPREDNISolone 4 mg tablet Commonly known as:  Willa Quintana Take as per package instructions  
  
 multivit-min-FA-lycopen-lutein 0.4-300-250 mg-mcg-mcg Tab Take 1 Tab by mouth daily. naproxen 375 mg tablet Commonly known as:  NAPROSYN Take 1 Tab by mouth as needed. neomycin-polymyxin-hydrocortisone otic solution Commonly known as:  CORTISPORIN  
INSTILL 4 DROPS ON BOTH EARS QID  
  
 pravastatin 40 mg tablet Commonly known as:  PRAVACHOL Take 1 Tab by mouth nightly. raNITIdine 300 mg tablet Commonly known as:  ZANTAC TAKE 1 TABLET BY MOUTH ONCE DAILY  
  
 sertraline 100 mg tablet Commonly known as:  ZOLOFT Take 2 Tabs by mouth daily. terazosin 2 mg capsule Commonly known as:  HYTRIN Take 1 Cap by mouth nightly. traMADol 50 mg tablet Commonly known as:  ULTRAM  
  
 traZODone 50 mg tablet Commonly known as:  Rodrick Long  
 TAKE 1 TO 2 TABLETS BY MOUTH AT BEDTIME AS NEEDED  
  
 triamcinolone acetonide 0.025 % topical cream  
Commonly known as:  KENALOG Apply  to affected area two (2) times daily as needed. use thin layer VITAMIN C 250 mg tablet Generic drug:  ascorbic acid (vitamin C) Take  by mouth. Prescriptions Sent to Pharmacy Refills  
 glucose blood VI test strips (FREESTYLE LITE STRIPS) strip 5 Sig: USE TWICE DAILY. Class: Normal  
 Pharmacy: [address] Ph #: [phone] Lancets misc 5 Sig: Use to test blood sugar twice daily Class: Normal  
 Pharmacy: [address] Ph #: [phone]  
 methylPREDNISolone (MEDROL DOSEPACK) 4 mg tablet 0 Sig: Take as per package instructions Class: Normal  
 Pharmacy: [address] Ph #: [phone]  
 gabapentin (NEURONTIN) 300 mg capsule 1 Sig: Take 1 Cap by mouth three (3) times daily. Indications: NEUROPATHIC PAIN Class: Normal  
 Pharmacy: [address] Ph #: [phone] Route: Oral  
  
We Performed the Following ADMIN INFLUENZA VIRUS VAC [ [facility] [place]] INFLUENZA VIRUS VACCINE, HIGH DOSE SEASONAL, PRESERVATIVE FREE [19576 CPT(R)] Follow-up Instructions Return in about 4 months (around 3/13/2018) for HTN, DM, cholesterol. To-Do List   
 11/13/2017 Lab:  HEMOGLOBIN A1C W/O EAG   
  
 11/13/2017 Lab:  LIPID PANEL   
  
 11/13/2017 Lab:  METABOLIC PANEL, COMPREHENSIVE   
  
 11/13/2017 Lab:  MICROALBUMIN, UR, RAND W/ MICROALBUMIN/CREA RATIO   
  
 11/13/2017 Lab:  PSA SCREENING (SCREENING) Patient Instructions Influenza (Flu) Vaccine (Inactivated or Recombinant): What You Need to Know Why get vaccinated? Influenza (\"flu\") is a contagious disease that spreads around the [place] every winter, usually between October and May. Flu is caused by influenza viruses and is spread mainly by coughing, sneezing, and close contact. Anyone can get flu. Flu strikes suddenly and can last several days. Symptoms vary by age, but can include: · Fever/chills. · Sore throat. · Muscle aches. · Fatigue. · Cough. · Headache. · Runny or stuffy nose. Flu can also lead to pneumonia and blood infections, and cause diarrhea and seizures in children. If you have a medical condition, such as heart or lung disease, flu can make it worse. Flu is more dangerous for some people. Infants and young children, people 72years of age and older, pregnant women, and people with certain health conditions or a weakened immune system are at greatest risk. Each year thousands of people in the [facility] die from flu, and many more are hospitalized. Flu vaccine can: · Keep you from getting flu. · Make flu less severe if you do get it. · Keep you from spreading flu to your family and other people. Inactivated and recombinant flu vaccines A dose of flu vaccine is recommended every flu season. Children 6 months through 6years of age may need two doses during the same flu season. Everyone else needs only one dose each flu season. Some inactivated flu vaccines contain a very small amount of a mercury-based preservative called thimerosal. Studies have not shown thimerosal in vaccines to be harmful, but flu vaccines that do not contain thimerosal are available. There is no live flu virus in flu shots. They cannot cause the flu. There are many flu viruses, and they are always changing. Each year a new flu vaccine is made to protect against three or four viruses that are likely to cause disease in the upcoming flu season.  But even when the vaccine doesn't exactly match these viruses, it may still provide some protection. Flu vaccine cannot prevent: · Flu that is caused by a virus not covered by the vaccine. · Illnesses that look like flu but are not. Some people should not get this vaccine Tell the person who is giving you the vaccine: · If you have any severe (life-threatening) allergies. If you ever had a life-threatening allergic reaction after a dose of flu vaccine, or have a severe allergy to any part of this vaccine, you may be advised not to get vaccinated. Most, but not all, types of flu vaccine contain a small amount of egg protein. · If you ever had Guillain-Barré syndrome (also called GBS) Some people with a history of GBS should not get this vaccine. This should be discussed with your doctor. · If you are not feeling well. It is usually okay to get flu vaccine when you have a mild illness, but you might be asked to come back when you feel better. Risks of a vaccine reaction With any medicine, including vaccines, there is a chance of reactions. These are usually mild and go away on their own, but serious reactions are also possible. Most people who get a flu shot do not have any problems with it. Minor problems following a flu shot include: · Soreness, redness, or swelling where the shot was given · Hoarseness · Sore, red or itchy eyes · Cough · Fever · Aches · Headache · Itching · Fatigue If these problems occur, they usually begin soon after the shot and last 1 or 2 days. More serious problems following a flu shot can include the following: · There may be a small increased risk of Guillain-Barré Syndrome (GBS) after inactivated flu vaccine. This risk has been estimated at 1 or 2 additional cases per million people vaccinated. This is much lower than the risk of severe complications from flu, which can be prevented by flu vaccine.  
· [facility] children who get the flu shot along with pneumococcal vaccine (PCV13) and/or DTaP vaccine at the same time might be slightly more likely to have a seizure caused by fever. Ask your doctor for more information. Tell your doctor if a child who is getting flu vaccine has ever had a seizure Problems that could happen after any injected vaccine: · People sometimes faint after a medical procedure, including vaccination. Sitting or lying down for about 15 minutes can help prevent fainting, and injuries caused by a fall. Tell your doctor if you feel dizzy, or have vision changes or ringing in the ears. · Some people get severe pain in the shoulder and have difficulty moving the arm where a shot was given. This happens very rarely. · Any medication can cause a severe allergic reaction. Such reactions from a vaccine are very rare, estimated at about 1 in a million doses, and would happen within a few minutes to a few hours after the vaccination. As with any medicine, there is a very remote chance of a vaccine causing a serious injury or death. The safety of vaccines is always being monitored. For more information, visit: www.cdc.gov/vaccinesafety/. What if there is a serious reaction? What should I look for? · Look for anything that concerns you, such as signs of a severe allergic reaction, very high fever, or unusual behavior. Signs of a severe allergic reaction can include hives, swelling of the face and throat, difficulty breathing, a fast heartbeat, dizziness, and weakness - usually within a few minutes to a few hours after the vaccination. What should I do? · If you think it is a severe allergic reaction or other emergency that can't wait, call 9-1-1 and get the person to the nearest hospital. Otherwise, call your doctor. · Reactions should be reported to the \"Vaccine Adverse Event Reporting System\" (VAERS). Your doctor should file this report, or you can do it yourself through the VAERS website at www.vaers. hhs.gov, or by calling 2-377.336.9328. [facility] does not give medical advice. The National Vaccine Injury Compensation Program 
The National Vaccine Injury Compensation Program (VICP) is a federal program that was created to compensate people who may have been injured by certain vaccines. Persons who believe they may have been injured by a vaccine can learn about the program and about filing a claim by calling 6-520.943.4669 or visiting the [address] website at www.[facility].gov/vaccinecompensation. There is a time limit to file a claim for compensation. How can I learn more? · Ask your healthcare provider. He or she can give you the vaccine package insert or suggest other sources of information. · Call your local or state health department. · Contact the Centers for Disease Control and Prevention (CDC): 
¨ Call 3-804.156.9735 (1-800-CDC-INFO) or ¨ Visit CDC's website at www.cdc.gov/flu Vaccine Information Statement Inactivated Influenza Vaccine 8/7/2015) [address] 061SN-10 [facility] and [employer] Centers for Disease Control and Prevention Many Vaccine Information Statements are available in [place] and other languages. See www.immunize.org/vis. Muchas hojas de información sobre vacunas están disponibles en español y en otros idiomas. Visite www.immunize.org/vis. Care instructions adapted under license by [employer] (which disclaims liability or warranty for this information). If you have questions about a medical condition or this instruction, always ask your healthcare professional. [place] [address] any warranty or liability for your use of this information. Medicare Wellness Visit, Male The best way to live healthy is to have a healthy lifestyle by eating a well-balanced diet, exercising regularly, limiting alcohol and stopping smoking. Regular physical exams and screening tests are another way to keep healthy.   
Preventive exams provided by your health care provider can find health problems before they become diseases or illnesses. Preventive services including immunizations, screening tests, monitoring and exams can help you take care of your own health. All people over age 72 should have a pneumovax  and and a prevnar shot to prevent pneumonia. These are once in a lifetime unless you and your provider decide differently. All people over 65 should have a yearly flu shot and a tetanus vaccine every 10 years. Screening for diabetes mellitus with a blood sugar test should be done every year. Glaucoma is a disease of the eye due to increased ocular pressure that can lead to blindness and it should be done every year by an eye professional. 
 
Cardiovascular screening tests that check for elevated lipids (fatty part of blood) which can lead to heart disease and strokes should be done every 5 years. Colorectal screening that evaluates for blood or polyps in your colon should be done yearly as a stool test or every five years as a flexible sigmoidoscope or every 10 years as a colonoscopy up to age 76. Men up to age 76 may need a screening blood test for prostate cancer at certain intervals, depending on their personal and family history. This decision is between the patient and his provider. If you have been a smoker or had family history of abdominal aortic aneurysms, you and your provider may decide to schedule an ultrasound test of your aorta. Hepatitis C screening is also recommended for anyone born between [birth year] through [address]. A shingles vaccine is also recommended once in a lifetime after age 61. Your Medicare Wellness Exam is recommended annually. Here is a list of your current Health Maintenance items with a due date: 
Health Maintenance Due Topic Date Due  
 Hemoglobin A1C    03/06/2017  Albumin Urine Test  05/03/2017  Flu Vaccine  08/01/2017  Cholesterol Test   08/04/2017 [facility] Diabetic Foot Care  09/06/2017 [facility] Annual Well Visit  09/07/2017 [facility] Eye Exam  11/10/2017 Introducing [facility]! Dear Boby Alvarenga: Thank you for requesting a [employer] account. Our records indicate that you already have an active [employer] account. You can access your account anytime at [email or web address] Did you know that you can access your hospital and ER discharge instructions at any time in [employer]? You can also review all of your test results from your hospital stay or ER visit. Additional Information If you have questions, please visit the Frequently Asked Questions section of the [employer] website at [email or web address] Remember, [employer] is NOT to be used for urgent needs. For medical emergencies, dial 911. Now available from your iPhone and Android! Please provide this summary of care documentation to your next provider. Your primary care clinician is listed as [facility]. If you have any questions after today's visit, please call [phone].

## 2017-11-13 NOTE — PATIENT INSTRUCTIONS
Influenza (Flu) Vaccine (Inactivated or Recombinant): What You Need to Know  Why get vaccinated? Influenza (\"flu\") is a contagious disease that spreads around the [place] every winter, usually between October and May. Flu is caused by influenza viruses and is spread mainly by coughing, sneezing, and close contact. Anyone can get flu. Flu strikes suddenly and can last several days. Symptoms vary by age, but can include:  · Fever/chills. · Sore throat. · Muscle aches. · Fatigue. · Cough. · Headache. · Runny or stuffy nose. Flu can also lead to pneumonia and blood infections, and cause diarrhea and seizures in children. If you have a medical condition, such as heart or lung disease, flu can make it worse. Flu is more dangerous for some people. Infants and young children, people 72years of age and older, pregnant women, and people with certain health conditions or a weakened immune system are at greatest risk. Each year thousands of people in the [facility] die from flu, and many more are hospitalized. Flu vaccine can:  · Keep you from getting flu. · Make flu less severe if you do get it. · Keep you from spreading flu to your family and other people. Inactivated and recombinant flu vaccines  A dose of flu vaccine is recommended every flu season. Children 6 months through 6years of age may need two doses during the same flu season. Everyone else needs only one dose each flu season. Some inactivated flu vaccines contain a very small amount of a mercury-based preservative called thimerosal. Studies have not shown thimerosal in vaccines to be harmful, but flu vaccines that do not contain thimerosal are available. There is no live flu virus in flu shots. They cannot cause the flu. There are many flu viruses, and they are always changing. Each year a new flu vaccine is made to protect against three or four viruses that are likely to cause disease in the upcoming flu season.  But even when the vaccine doesn't exactly match these viruses, it may still provide some protection. Flu vaccine cannot prevent:  · Flu that is caused by a virus not covered by the vaccine. · Illnesses that look like flu but are not. Some people should not get this vaccine  Tell the person who is giving you the vaccine:  · If you have any severe (life-threatening) allergies. If you ever had a life-threatening allergic reaction after a dose of flu vaccine, or have a severe allergy to any part of this vaccine, you may be advised not to get vaccinated. Most, but not all, types of flu vaccine contain a small amount of egg protein. · If you ever had Guillain-Barré syndrome (also called GBS) Some people with a history of GBS should not get this vaccine. This should be discussed with your doctor. · If you are not feeling well. It is usually okay to get flu vaccine when you have a mild illness, but you might be asked to come back when you feel better. Risks of a vaccine reaction  With any medicine, including vaccines, there is a chance of reactions. These are usually mild and go away on their own, but serious reactions are also possible. Most people who get a flu shot do not have any problems with it. Minor problems following a flu shot include:  · Soreness, redness, or swelling where the shot was given  · Hoarseness  · Sore, red or itchy eyes  · Cough  · Fever  · Aches  · Headache  · Itching  · Fatigue  If these problems occur, they usually begin soon after the shot and last 1 or 2 days. More serious problems following a flu shot can include the following:  · There may be a small increased risk of Guillain-Barré Syndrome (GBS) after inactivated flu vaccine. This risk has been estimated at 1 or 2 additional cases per million people vaccinated. This is much lower than the risk of severe complications from flu, which can be prevented by flu vaccine.   · Oscar Dutae children who get the flu shot along with pneumococcal vaccine (PCV13) and/or DTaP vaccine at the same time might be slightly more likely to have a seizure caused by fever. Ask your doctor for more information. Tell your doctor if a child who is getting flu vaccine has ever had a seizure  Problems that could happen after any injected vaccine:  · People sometimes faint after a medical procedure, including vaccination. Sitting or lying down for about 15 minutes can help prevent fainting, and injuries caused by a fall. Tell your doctor if you feel dizzy, or have vision changes or ringing in the ears. · Some people get severe pain in the shoulder and have difficulty moving the arm where a shot was given. This happens very rarely. · Any medication can cause a severe allergic reaction. Such reactions from a vaccine are very rare, estimated at about 1 in a million doses, and would happen within a few minutes to a few hours after the vaccination. As with any medicine, there is a very remote chance of a vaccine causing a serious injury or death. The safety of vaccines is always being monitored. For more information, visit: www.cdc.gov/vaccinesafety/. What if there is a serious reaction? What should I look for? · Look for anything that concerns you, such as signs of a severe allergic reaction, very high fever, or unusual behavior. Signs of a severe allergic reaction can include hives, swelling of the face and throat, difficulty breathing, a fast heartbeat, dizziness, and weakness - usually within a few minutes to a few hours after the vaccination. What should I do? · If you think it is a severe allergic reaction or other emergency that can't wait, call 9-1-1 and get the person to the nearest hospital. Otherwise, call your doctor. · Reactions should be reported to the \"Vaccine Adverse Event Reporting System\" (VAERS). Your doctor should file this report, or you can do it yourself through the VAERS website at www.vaers. [facility].gov, or by calling 3-570.416.6309.   "[employer] does not give medical advice. The National Vaccine Injury Compensation Program  The National Vaccine Injury Compensation Program (VICP) is a federal program that was created to compensate people who may have been injured by certain vaccines. Persons who believe they may have been injured by a vaccine can learn about the program and about filing a claim by calling 1-113.749.2824 or visiting the [address] website at www.[facility].gov/vaccinecompensation. There is a time limit to file a claim for compensation. How can I learn more? · Ask your healthcare provider. He or she can give you the vaccine package insert or suggest other sources of information. · Call your local or state health department. · Contact the Centers for Disease Control and Prevention (CDC):  ¨ Call 5-326.828.4710 (1-800-CDC-INFO) or  ¨ Visit CDC's website at www.cdc.gov/flu  Vaccine Information Statement  Inactivated Influenza Vaccine  8/7/2015)  [address] SHERLYN Alfonso [id number]  Department of Health and Human Services  Centers for Disease Control and Prevention  Many Vaccine Information Statements are available in [place] and other languages. See www.immunize.org/vis. Muchas hojas de información sobre vacunas están disponibles en español y en otros idiomas. Visite www.immunize.org/vis. Care instructions adapted under license by [employer] (which disclaims liability or warranty for this information). If you have questions about a medical condition or this instruction, always ask your healthcare professional. [address] any warranty or liability for your use of this information. Medicare Wellness Visit, Male    The best way to live healthy is to have a healthy lifestyle by eating a well-balanced diet, exercising regularly, limiting alcohol and stopping smoking. Regular physical exams and screening tests are another way to keep healthy.    Preventive exams provided by your health care provider can find health problems before they become diseases or illnesses. Preventive services including immunizations, screening tests, monitoring and exams can help you take care of your own health. All people over age 72 should have a pneumovax  and and a prevnar shot to prevent pneumonia. These are once in a lifetime unless you and your provider decide differently. All people over 65 should have a yearly flu shot and a tetanus vaccine every 10 years. Screening for diabetes mellitus with a blood sugar test should be done every year. Glaucoma is a disease of the eye due to increased ocular pressure that can lead to blindness and it should be done every year by an eye professional.    Cardiovascular screening tests that check for elevated lipids (fatty part of blood) which can lead to heart disease and strokes should be done every 5 years. Colorectal screening that evaluates for blood or polyps in your colon should be done yearly as a stool test or every five years as a flexible sigmoidoscope or every 10 years as a colonoscopy up to age 76. Men up to age 76 may need a screening blood test for prostate cancer at certain intervals, depending on their personal and family history. This decision is between the patient and his provider. If you have been a smoker or had family history of abdominal aortic aneurysms, you and your provider may decide to schedule an ultrasound test of your aorta. Hepatitis C screening is also recommended for anyone born between [birth year] through [address]. A shingles vaccine is also recommended once in a lifetime after age 61. Your Medicare Wellness Exam is recommended annually.     Here is a list of your current Health Maintenance items with a due date:  Health Maintenance Due   Topic Date Due    Hemoglobin A1C    03/06/2017    Albumin Urine Test  05/03/2017    Flu Vaccine  08/01/2017    Cholesterol Test   08/04/2017    Diabetic Foot Care  09/06/2017    Annual Well Visit  09/07/2017    Eye Exam  11/10/2017

## 2017-11-20 ENCOUNTER — LAB ONLY (OUTPATIENT)
Dept: INTERNAL MEDICINE CLINIC | Age: 67
End: 2017-11-20

## 2017-11-20 ENCOUNTER — HOSPITAL ENCOUNTER (OUTPATIENT)
Dept: LAB | Age: 67
Discharge: HOME | End: 2017-11-20
Payer: MEDICARE

## 2017-11-20 DIAGNOSIS — G89.29 OTHER CHRONIC PAIN: Primary | ICD-10-CM

## 2017-11-20 DIAGNOSIS — G89.29 OTHER CHRONIC PAIN: ICD-10-CM

## 2017-11-20 PROCEDURE — G0480 DRUG TEST DEF 1-7 CLASSES: HCPCS | Performed by: INTERNAL MEDICINE

## 2017-11-30 ENCOUNTER — TELEPHONE (OUTPATIENT)
Dept: INTERNAL MEDICINE CLINIC | Age: 67
End: 2017-11-30

## 2017-11-30 DIAGNOSIS — Z76.0 MEDICATION REFILL: ICD-10-CM

## 2017-11-30 RX ORDER — TRAMADOL HYDROCHLORIDE 50 MG/1
50 TABLET ORAL
Qty: 120 TAB | Refills: 5 | Status: SHIPPED | OUTPATIENT
Start: 2017-11-30 | End: 2018-03-28

## 2017-11-30 NOTE — TELEPHONE ENCOUNTER
Patient called in to get his UDS results. States this whole process is ridiculous and he doesn't want to have to keep going through this. States to just keep the Rx, he will manage without it.

## 2017-12-01 NOTE — PROGRESS NOTES
toxassure pending. I have never had any problems with this patient.  And  fine  Refill tramadol pending same

## 2017-12-04 LAB — TOXASSURE SELECT 13: NORMAL

## 2017-12-13 RX ORDER — HYDROCHLOROTHIAZIDE 25 MG/1
TABLET ORAL
Qty: 90 TAB | Refills: 3 | Status: SHIPPED | OUTPATIENT
Start: 2017-12-13 | End: 2018-01-02 | Stop reason: ALTCHOICE

## 2017-12-15 NOTE — PATIENT DISCUSSION
YAG CAP OS: (Consent signed and scanned into attachments) 1 gtt Prolensa applied. The purpose and nature of the procedure possible alternative methods of treatment the risks involved and the possibility of complications were discussed with patient. The Patient wishes to proceed and the consent was signed. The laser was then performed under topical anesthesia with no complications. Post op instructions were given to patient as well as a follow-up appointment. Patient was advised to call our office if any questions or concerns. Return for an appointment for Yag PO in 4-6 weeks with Dr. Dawit Jules.

## 2017-12-26 DIAGNOSIS — Z76.0 MEDICATION REFILL: ICD-10-CM

## 2017-12-26 RX ORDER — PRAVASTATIN SODIUM 40 MG/1
TABLET ORAL
Qty: 90 TAB | Refills: 0 | Status: SHIPPED | OUTPATIENT
Start: 2017-12-26 | End: 2018-01-24 | Stop reason: SDUPTHER

## 2017-12-26 RX ORDER — TRAZODONE HYDROCHLORIDE 50 MG/1
TABLET ORAL
Qty: 180 TAB | Refills: 0 | Status: SHIPPED | OUTPATIENT
Start: 2017-12-26 | End: 2018-01-24 | Stop reason: SDUPTHER

## 2018-01-01 DIAGNOSIS — Z76.0 MEDICATION REFILL: ICD-10-CM

## 2018-01-02 ENCOUNTER — OFFICE VISIT (OUTPATIENT)
Dept: CARDIOLOGY CLINIC | Age: 68
End: 2018-01-02

## 2018-01-02 VITALS
HEART RATE: 74 BPM | SYSTOLIC BLOOD PRESSURE: 140 MMHG | HEIGHT: 74 IN | DIASTOLIC BLOOD PRESSURE: 80 MMHG | BODY MASS INDEX: 31.32 KG/M2 | WEIGHT: 244 LBS | OXYGEN SATURATION: 96 %

## 2018-01-02 DIAGNOSIS — R60.0 LOCALIZED EDEMA: ICD-10-CM

## 2018-01-02 DIAGNOSIS — I25.10 CORONARY ARTERY DISEASE DUE TO LIPID RICH PLAQUE: Primary | ICD-10-CM

## 2018-01-02 DIAGNOSIS — I11.9 HYPERTENSIVE HEART DISEASE WITHOUT HEART FAILURE: ICD-10-CM

## 2018-01-02 DIAGNOSIS — E78.00 PURE HYPERCHOLESTEROLEMIA: ICD-10-CM

## 2018-01-02 DIAGNOSIS — I10 ESSENTIAL HYPERTENSION WITH GOAL BLOOD PRESSURE LESS THAN 140/90: ICD-10-CM

## 2018-01-02 DIAGNOSIS — I25.83 CORONARY ARTERY DISEASE DUE TO LIPID RICH PLAQUE: Primary | ICD-10-CM

## 2018-01-02 RX ORDER — GLIMEPIRIDE 1 MG/1
TABLET ORAL
Qty: 90 TAB | Refills: 0 | Status: SHIPPED | OUTPATIENT
Start: 2018-01-02 | End: 2018-09-15 | Stop reason: SDUPTHER

## 2018-01-02 RX ORDER — CHLORTHALIDONE 25 MG/1
25 TABLET ORAL DAILY
Qty: 30 TAB | Refills: 11 | Status: SHIPPED | OUTPATIENT
Start: 2018-01-02 | End: 2018-01-02 | Stop reason: SDUPTHER

## 2018-01-02 NOTE — PROGRESS NOTES
1. Have you been to the ER, urgent care clinic since your last visit? Hospitalized since your last visit? No    2. Have you seen or consulted any other health care providers outside of the [address] [place] [address] since your last visit? Include any pap smears or colon screening.  No

## 2018-01-02 NOTE — PROGRESS NOTES
[facility]    Mr. Pk Pena  is a 79 y.o. gentleman with diabetes, hypertension, dyslipidemia and tobacco use. He has coronary artery disease as documented by cardiac catheterization in October of 2014. His anatomy is as follows:      LM - patent  LAD - 30% prox, 80% mid, 90% apical  D1 - 100%  RI - 95% ostial (failed PCI, calcified 1.75 - 2.0 mm vessel)  Cx - 30-40% prox  OM1 - subtotal  OM2 - 80% (3.5 x 18mm XIENCE 10/14)  RCA - 30% diffuse  RPDA - 100%  RPLV - 50%    Mr. Pk Pena is here today for follow up appointment. He had two mechanical falls since last time. He is considering left knee replacement surgery next month. No chest pain or chest tightness, however he has noticed some lower extremity and ankle swelling for the last one month. He does not have any PND. He uses one pillow at night. He continues to have degenerative joint disease of knee and hip joints. He denies any presyncope or syncope.     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 Katty Kasper      7/14  bilat    HX CERVICAL LAMINECTOMY      HX Toñito Conradi HX TUMOR REMOVAL      3/13  wide excision right thigh sarcoma    HX TYMPANOMASTOIDECTOMY         Current Outpatient Prescriptions   Medication Sig    glimepiride (AMARYL) 1 mg tablet TAKE 1 TABLET BY MOUTH DAILY BEFORE BREAKFAST    aspirin-calcium carbonate 81 mg-300 mg calcium(777 mg) tab 81 mg.  pravastatin (PRAVACHOL) 40 mg tablet TAKE 1 TABLET BY MOUTH EVERY EVENING    traZODone (DESYREL) 50 mg tablet TAKE 1 TO 2 TABLETS BY MOUTH AT BEDTIME AS NEEDED    hydroCHLOROthiazide (HYDRODIURIL) 25 mg tablet TAKE 1 TABLET BY MOUTH DAILY    potassium chloride (KLOR-CON) 10 mEq tablet TAKE 1 TABLET BY MOUTH TWICE DAILY    traMADol (ULTRAM) 50 mg tablet Take 1 Tab by mouth every six (6) hours as needed for Pain. Max Daily Amount: 200 mg.    neomycin-polymyxin-hydrocortisone (CORTISPORIN) otic solution INSTILL 4 DROPS ON BOTH EARS QID    glucose blood VI test strips (FREESTYLE LITE STRIPS) strip USE TWICE DAILY.  Lancets misc Use to test blood sugar twice daily    methylPREDNISolone (MEDROL DOSEPACK) 4 mg tablet Take as per package instructions    gabapentin (NEURONTIN) 300 mg capsule Take 1 Cap by mouth three (3) times daily. Indications: NEUROPATHIC PAIN    traMADol (ULTRAM) 50 mg tablet     sertraline (ZOLOFT) 100 mg tablet Take 2 Tabs by mouth daily.  naproxen (NAPROSYN) 375 mg tablet Take 1 Tab by mouth as needed.  amLODIPine (NORVASC) 5 mg tablet Take 1 Tab by mouth two (2) times a day.  terazosin (HYTRIN) 2 mg capsule Take 1 Cap by mouth nightly.  raNITIdine (ZANTAC) 300 mg tablet TAKE 1 TABLET BY MOUTH ONCE DAILY    traZODone (DESYREL) 50 mg tablet TAKE 1 TO 2 TABLETS BY MOUTH AT BEDTIME AS NEEDED    cholecalciferol (VITAMIN D3) 1,000 unit tablet Take  by mouth daily.  ascorbic acid, vitamin C, (VITAMIN C) 250 mg tablet Take  by mouth.  pravastatin (PRAVACHOL) 40 mg tablet Take 1 Tab by mouth nightly.  clopidogrel (PLAVIX) 75 mg tablet Take 1 Tab by mouth daily.  isosorbide mononitrate ER (IMDUR) 30 mg tablet Take 1 Tab by mouth daily.  losartan (COZAAR) 100 mg tablet Take 1 Tab by mouth daily.     garlic 3,758 mg cap Take 1 Cap by mouth daily.    multivit-min-FA-lycopen-lutein 0.4-300-250 mg-mcg-mcg tab Take 1 Tab by mouth daily.  triamcinolone acetonide (KENALOG) 0.025 % topical cream Apply  to affected area two (2) times daily as needed. use thin layer     fluticasone (FLONASE) 50 mcg/actuation nasal spray 2 Sprays by Both Nostrils route daily.  aspirin delayed-release 81 mg tablet Take 1 tablet by mouth daily. No current facility-administered medications for this visit. Allergies   Allergen Reactions    Beta Blocker [Beta-Blockers (Beta-Adrenergic Blocking Agts)] Other (comments)     symptomatic bradycardia    Codeine Other (comments)     Passed//fainted  patient doesn't recall reaction, occurred as a teenager    Darvocet A500 [Propoxyphene N-Acetaminophen] Other (comments)     throat swelling    Dexbrompheniramine-Pseudoephed Other (comments)     Facial swelling. 1980\"s    Lasix [Furosemide] Itching    Lipitor [Atorvastatin] Other (comments)    Sulfa (Sulfonamide Antibiotics) Hives       Family History:   Non contributory for premature coronary disease in first degree relatives. Social History:   He  reports that he has quit smoking. He has a 15.00 pack-year smoking history. He has never used smokeless tobacco.  He  reports that he does not drink alcohol. [address] [facility], 3 years    Physical:   Vitals:   BP: 140/80  HR: 74  Wt: 244 lb (110.7 kg)    Exam:   General: Older gentleman, no complaints, no distress.     Head/Neck: No JVD  Lungs:  No respiratory distress. Clear with good air movement. Heart:  Regular. No rubs or gallops. Abdomen: Bowel sounds present  Extremities: Intact pulses. No edema.     Neurological: Alert and oriented to person, place, time. No gross neurological deficit. Skin:  Warm and dry.     Bruises over left eye, left arm     Review of Data:   LABS:   Lab Results   Component Value Date/Time    WBC 7.4 12/03/2015 10:00 AM    HGB 15.3 12/03/2015 10:00 AM    HCT 47.2 12/03/2015 10:00 AM    PLATELET 977 26/04/5436 10:00 AM     Lab Results   Component Value Date/Time    Sodium 138 11/13/2017 12:20 PM    Potassium 3.7 11/13/2017 12:20 PM    Chloride 100 11/13/2017 12:20 PM    CO2 30 11/13/2017 12:20 PM    Anion gap 8 11/13/2017 12:20 PM    Glucose 95 11/13/2017 12:20 PM    BUN 18 11/13/2017 12:20 PM    Creatinine 1.26 11/13/2017 12:20 PM    BUN/Creatinine ratio 14 11/13/2017 12:20 PM    GFR est AA >60 11/13/2017 12:20 PM    GFR est non-AA 57 11/13/2017 12:20 PM    Calcium 8.8 11/13/2017 12:20 PM    Bilirubin, total 0.6 11/13/2017 12:20 PM    AST (SGOT) 18 11/13/2017 12:20 PM    Alk. phosphatase 79 11/13/2017 12:20 PM    Protein, total 8.0 11/13/2017 12:20 PM    Albumin 4.1 11/13/2017 12:20 PM    Globulin 3.9 11/13/2017 12:20 PM    A-G Ratio 1.1 11/13/2017 12:20 PM    ALT (SGPT) 25 11/13/2017 12:20 PM     Lab Results   Component Value Date/Time    Cholesterol, total 128 11/13/2017 12:20 PM    HDL Cholesterol 47 11/13/2017 12:20 PM    LDL, calculated 56.8 11/13/2017 12:20 PM    Triglyceride 121 11/13/2017 12:20 PM    CHOL/HDL Ratio 2.7 11/13/2017 12:20 PM     Lab Results   Component Value Date/Time    Hemoglobin A1c 5.9 11/13/2017 12:20 PM    Hemoglobin A1c (POC) 5.9 05/06/2013 11:17 AM     EKG:   Sinus rhythm, 78 beats per minute, right bundle branch block, nonspecific ST-T wave changes. TILT TABLE: February 2015:  Patient was initially placed in supine position. Heart rate was between 65-70 beats per minute, sinus rhythm. Blood pressure was 113/61 mmHg, with maximum blood pressure of 116/63 mmHg. Patient was placed in 60-degree upright tilting position. About 5-7 minutes into upright tilting position at 70 degrees, patient started feeling like her heart rate speeding up and legs feeling heavy, along with some dizziness. Blood pressure slowly decreased up to 70/43 mmHg. Heart rate maximum noted was up from 65 to 81 beats per minute. There was no obvious tachycardia.  At the time, patient was given IV fluid and then placed back supine position, with normalization of the blood pressure and resolution of his symptoms. Heart rhythm remained in sinus. Patient did have a drop in the systolic blood pressure more than 20 mmHg upon upright tilting position, without any significant increase in the heart rate. This suggests possible autonomic dysfunction. SUMMARY:  Upright tilt table testing with reproduction of symptoms. More than 20 mmHg drop in systolic blood pressure, without change in the heart rate during upright tilting position, associated with symptoms. This may represent autonomic dysfunction    ECHO: February 2015:  LEFT VENTRICLE: Size was normal. Systolic function was normal. Ejection fraction was estimated in the range of 55-60%. No obvious wall motion abnormalities identified in the views obtained. Wall thickness was normal. DOPPLER: Features were consistent with a pseudonormal left ventricular filling pattern, with concomitant abnormal relaxation and increased filling pressure (grade 2 diastolic dysfunction). RIGHT VENTRICLE: The size was normal. Systolic function was normal. Wall thickness was normal. DOPPLER: Estimated peak pressure was in the range of 25 mmHg to 30 mmHg. LEFT ATRIUM: The atrium was mildly dilated. LA volume index was 33 ml/m squared. RIGHT ATRIUM: The atrium was mildly dilated. MITRAL VALVE: There was normal leaflet separation. DOPPLER: The transmitral velocity was within the normal range. There was no evidence for stenosis. There was trivial regurgitation. AORTIC VALVE: The valve was trileaflet. Leaflets exhibited normal thickness and normal cuspal separation. DOPPLER: Transaortic velocity was within the normal range. There was no stenosis. There was no regurgitation. TRICUSPID VALVE: There was normal leaflet separation. DOPPLER: The transtricuspid velocity was within the normal range. There was no evidence for tricuspid stenosis.  There was trivial regurgitation. PULMONIC VALVE: Not well visualized, but normal Doppler findings. DOPPLER: There was no stenosis. There was trivial regurgitation. AORTA: The root exhibited normal size. SYSTEMIC VEINS: IVC: The inferior vena cava was normal in size and course. Respirophasic changes were normal.    PERICARDIUM: No significant pericardial effusion identified. STRESS TEST (EST, PHARM, NUC, ECHO etc): October 2014:  1. Small to medium sized area of mildly intense reversible defect involvement anterolateral wall. 2.  There is also a small area of reversible defect involving basilar inferior wall concerning for ischemic focus. 3.  Calculated ejection fraction of 56% without any wall motion abnormality. CATHETERIZATION: October 2014:  LM - patent  LAD - 30% prox, 80% mid, 90% apical  D1 - 100%  RI - 95% ostial (failed PCI, calcified 1.75 - 2.0 mm vessel)  Cx - 30-40% prox  OM1 - subtotal  OM2 - 80%  RCA - 30% diffuse  RPDA - 100%  RPLV - 50%    Impression / Plan:     Diabetes    Hypertension    Dyslipidemia    Coronary artery disease       Mr. Dayron Wolfe returns to the office for follow up. Coronary artery disease:    Mr. Dayron Wolfe had a cardiac catheterization in October, 2014 and required stent of the obtuse marginal branch. No angina currently. He is on appropriate medication with dual antiplatelet agent aspirin and Plavix. He is taking Amlodipine, Pravachol and isosorbide mononitrate. He has a history of beta blocker induced symptomatic bradycardia, that is why he is not on any beta blocker. For now I would recommend to continue same. Hypertension:  His blood pressure today is 140/80 mmHg. As mentioned above, he is on appropriate medication. For now I would recommend to continue same. Diabetes: This is being managed by primary care provider. Goal Hgb A1c less than 7 is recommended from cardiovascular standpoint. Last Hgb A1c  was 5.9.     Hyperlipidemia:  He is on lipid lowering agent. Last lipid profile on file is 56. Continue same. Edema:  No PND  NO exertional dyspnea  Edema 1+ still being on HCTZ  Will try Chlorthalidone. Allergic to lasix. Stop HCTZ  ECHO order to rule out hypertensive CV disease and low EF.

## 2018-01-02 NOTE — MR AVS SNAPSHOT
Visit Information Date & Time Provider Department Dept. Phone Encounter #  
 1/2/2018  3:45 PM Iglesia Sidhu MD [facility] at [facility] [phone] [id number] Your Appointments 1/24/2018  1:00 PM  
PHYSICAL PRE OP with MD George Garnett Giftly ([facility]) Appt Note: PRE OP- LT TOTAL KNEE REPLACEMENT, 02/05/2018,DR. Stokes Doctor [address]  
  
   
 [id number] [facility]  
  
    
 1/30/2018  3:45 PM  
Follow Up with Sophy Helm MD  
Cardio Specialist at [facility]) Appt Note: 4 weeks f/u  
 [facility] [address] [facility] 3/13/2018 10:30 AM  
Office Visit with Tanisha Burroughs MD  
[facility]) Appt Note: 4 mo f/u htn, dm, cholesterol [address]  
  
   
 [address] [facility] Upcoming Health Maintenance Date Due COLONOSCOPY 6/1/2018 FOOT EXAM Q1 2/11/2018* HEMOGLOBIN A1C Q6M 5/13/2018 EYE EXAM RETINAL OR DILATED Q1 11/9/2018 MICROALBUMIN Q1 11/13/2018 LIPID PANEL Q1 11/13/2018 MEDICARE YEARLY EXAM 11/14/2018 GLAUCOMA SCREENING Q2Y 11/9/2019 DTaP/Tdap/Td series (3 - Td) 5/1/2027 *Topic was postponed. The date shown is not the original due date. Allergies as of 1/2/2018  Review Complete On: 1/2/2018 By: Jacky Alfredo RN Severity Noted Reaction Type Reactions Beta Blocker [Beta-blockers (Beta-adrenergic Blocking Agts)]    Other (comments)  
 symptomatic bradycardia Codeine   Side Effect Other (comments) Passed//fainted 
patient doesn't recall reaction, occurred as a teenager Darvocet A500 [Propoxyphene N-acetaminophen]   Systemic Other (comments)  
 throat swelling Dexbrompheniramine-pseudoephed    Other (comments) Facial swelling. 1980\"s Lasix [Furosemide]  09/06/2016   Systemic Itching Lipitor [Atorvastatin]  03/16/2016    Other (comments) Sulfa (Sulfonamide Antibiotics)  10/18/2016    Hives Current Immunizations  Reviewed on 11/13/2017 Name Date Influenza High Dose Vaccine PF 11/13/2017, 10/18/2016 Influenza Vaccine 9/30/2014, 9/2/2014 12:00 AM  
 Influenza Vaccine Split 10/1/2012 Pneumococcal Conjugate (PCV-13) 4/28/2015 Pneumococcal Polysaccharide (PPSV-23) 9/6/2016 10:15 AM  
 Tdap 5/1/2017  5:39 PM, 8/12/2013 10:56 AM  
 ZZZ-RETIRED (DO NOT USE) Pneumococcal Vaccine (Unspecified Type) 12/5/2009 Zoster Vaccine, Live 1/25/2013 Not reviewed this visit Vitals BP Pulse Height(growth percentile) Weight(growth percentile) SpO2 BMI  
 140/80 74 6' 2\" (1.88 m) 244 lb (110.7 kg) 96% 31.33 kg/m2 Smoking Status Former Smoker BMI and BSA Data Body Mass Index Body Surface Area  
 31.33 kg/m 2 2.4 m 2 Preferred Pharmacy Pharmacy Name Phone [facility] [place], [address] [place] [address] [phone] Your Updated Medication List  
  
   
This list is accurate as of: 1/2/18  4:16 PM.  Always use your most recent med list. amLODIPine 5 mg tablet Commonly known as:  Pat Zaidi Take 1 Tab by mouth two (2) times a day. aspirin delayed-release 81 mg tablet Take 1 tablet by mouth daily. aspirin-calcium carbonate 81 mg-300 mg calcium(777 mg) Tab 81 mg.  
  
 cholecalciferol 1,000 unit tablet Commonly known as:  VITAMIN D3 Take  by mouth daily. clopidogrel 75 mg Tab Commonly known as:  PLAVIX Take 1 Tab by mouth daily. fluticasone 50 mcg/actuation nasal spray Commonly known as:  Taj Cannon 2 Sprays by Both Nostrils route daily. gabapentin 300 mg capsule Commonly known as:  NEURONTIN Take 1 Cap by mouth three (3) times daily. Indications: NEUROPATHIC PAIN  
  
 garlic 4,808 mg Cap Take 1 Cap by mouth daily. glimepiride 1 mg tablet Commonly known as:  AMARYL  
TAKE 1 TABLET BY MOUTH DAILY BEFORE BREAKFAST  
  
 glucose blood VI test strips strip Commonly known as:  FREESTYLE LITE STRIPS  
USE TWICE DAILY. isosorbide mononitrate ER 30 mg tablet Commonly known as:  IMDUR Take 1 Tab by mouth daily. Lancets Misc Use to test blood sugar twice daily  
  
 losartan 100 mg tablet Commonly known as:  COZAAR Take 1 Tab by mouth daily. methylPREDNISolone 4 mg tablet Commonly known as:  Darien Bokeagan Take as per package instructions  
  
 multivit-min-FA-lycopen-lutein 0.4-300-250 mg-mcg-mcg Tab Take 1 Tab by mouth daily. naproxen 375 mg tablet Commonly known as:  NAPROSYN Take 1 Tab by mouth as needed. neomycin-polymyxin-hydrocortisone otic solution Commonly known as:  CORTISPORIN  
INSTILL 4 DROPS ON BOTH EARS QID  
  
 potassium chloride 10 mEq tablet Commonly known as:  KLOR-CON  
TAKE 1 TABLET BY MOUTH TWICE DAILY  
  
 * pravastatin 40 mg tablet Commonly known as:  PRAVACHOL Take 1 Tab by mouth nightly. * pravastatin 40 mg tablet Commonly known as:  PRAVACHOL  
TAKE 1 TABLET BY MOUTH EVERY EVENING  
  
 raNITIdine 300 mg tablet Commonly known as:  ZANTAC TAKE 1 TABLET BY MOUTH ONCE DAILY  
  
 sertraline 100 mg tablet Commonly known as:  ZOLOFT Take 2 Tabs by mouth daily. terazosin 2 mg capsule Commonly known as:  HYTRIN Take 1 Cap by mouth nightly. * traMADol 50 mg tablet Commonly known as:  ULTRAM  
  
 * traMADol 50 mg tablet Commonly known as:  ULTRAM  
Take 1 Tab by mouth every six (6) hours as needed for Pain. Max Daily Amount: 200 mg.  
  
 * traZODone 50 mg tablet Commonly known as:  DESYREL  
TAKE 1 TO 2 TABLETS BY MOUTH AT BEDTIME AS NEEDED  
  
 * traZODone 50 mg tablet Commonly known as:  DESYREL  
TAKE 1 TO 2 TABLETS BY MOUTH AT BEDTIME AS NEEDED  
  
 triamcinolone acetonide 0.025 % topical cream  
Commonly known as:  KENALOG Apply  to affected area two (2) times daily as needed. use thin layer VITAMIN C 250 mg tablet Generic drug:  ascorbic acid (vitamin C) Take  by mouth. * Notice: This list has 6 medication(s) that are the same as other medications prescribed for you. Read the directions carefully, and ask your doctor or other care provider to review them with you. Patient Instructions Stop HCTZ Start Chlorthalidone 25mg daily Follow up after ECHO Introducing [facility] SERVICES! Dear Rafael Melendez: Thank you for requesting a [employer] account. Our records indicate that you already have an active [employer] account. You can access your account anytime at [email or web address] Did you know that you can access your hospital and ER discharge instructions at any time in iBox[employer]? You can also review all of your test results from your hospital stay or ER visit. Additional Information If you have questions, please visit the Frequently Asked Questions section of the iBox[employer] website at [email or web address] Remember, iBoxPay is NOT to be used for urgent needs. For medical emergencies, dial 911. Now available from your iPhone and Android! Please provide this summary of care documentation to your next provider. Your primary care clinician is listed as [facility]. If you have any questions after today's visit, please call [phone].

## 2018-01-03 RX ORDER — CHLORTHALIDONE 25 MG/1
TABLET ORAL
Qty: 90 TAB | Refills: 11 | Status: SHIPPED | OUTPATIENT
Start: 2018-01-03 | End: 2018-04-30

## 2018-01-12 ENCOUNTER — HOSPITAL ENCOUNTER (OUTPATIENT)
Dept: NON INVASIVE DIAGNOSTICS | Age: 68
Discharge: HOME | End: 2018-01-12
Attending: INTERNAL MEDICINE
Payer: MEDICARE

## 2018-01-12 DIAGNOSIS — I11.9 HYPERTENSIVE HEART DISEASE WITHOUT HEART FAILURE: ICD-10-CM

## 2018-01-12 PROCEDURE — 74011000250 HC RX REV CODE- 250: Performed by: INTERNAL MEDICINE

## 2018-01-12 PROCEDURE — 74011250636 HC RX REV CODE- 250/636: Performed by: INTERNAL MEDICINE

## 2018-01-12 PROCEDURE — C8929 TTE W OR WO FOL WCON,DOPPLER: HCPCS

## 2018-01-12 RX ADMIN — PERFLUTREN 1 ML: 6.52 INJECTION, SUSPENSION INTRAVENOUS at 14:46

## 2018-01-19 NOTE — PATIENT DISCUSSION
PO YAG Cap OU: good result. MRX given. Return for an appointment for a 27 in November with Dr. Stevie Martinez.

## 2018-01-24 ENCOUNTER — OFFICE VISIT (OUTPATIENT)
Dept: INTERNAL MEDICINE CLINIC | Age: 68
End: 2018-01-24

## 2018-01-24 VITALS
OXYGEN SATURATION: 98 % | DIASTOLIC BLOOD PRESSURE: 74 MMHG | SYSTOLIC BLOOD PRESSURE: 154 MMHG | RESPIRATION RATE: 16 BRPM | BODY MASS INDEX: 30.67 KG/M2 | WEIGHT: 239 LBS | HEIGHT: 74 IN | HEART RATE: 72 BPM | TEMPERATURE: 97.6 F

## 2018-01-24 DIAGNOSIS — I11.9 BENIGN HYPERTENSIVE HEART DISEASE WITHOUT HEART FAILURE: Chronic | ICD-10-CM

## 2018-01-24 DIAGNOSIS — E11.9 TYPE 2 DIABETES MELLITUS WITHOUT COMPLICATION, WITHOUT LONG-TERM CURRENT USE OF INSULIN (HCC): Chronic | ICD-10-CM

## 2018-01-24 DIAGNOSIS — E11.21 TYPE 2 DIABETES MELLITUS WITH NEPHROPATHY (HCC): ICD-10-CM

## 2018-01-24 DIAGNOSIS — Z01.818 PRE-OP EXAM: Primary | ICD-10-CM

## 2018-01-24 DIAGNOSIS — M17.0 PRIMARY OSTEOARTHRITIS OF BOTH KNEES: ICD-10-CM

## 2018-01-24 DIAGNOSIS — E78.5 DYSLIPIDEMIA: Chronic | ICD-10-CM

## 2018-01-24 RX ORDER — DICLOFENAC SODIUM 10 MG/G
GEL TOPICAL 4 TIMES DAILY
COMMUNITY
End: 2018-06-20

## 2018-01-24 RX ORDER — HYDROCHLOROTHIAZIDE 25 MG/1
TABLET ORAL
Refills: 3 | COMMUNITY
Start: 2017-12-13 | End: 2018-03-28

## 2018-01-24 NOTE — ASSESSMENT & PLAN NOTE
Stable, based on history, physical exam and review of pertinent labs, studies and medications; meds reconciled; continue current treatment plan, lifestyle modifications recommended, medication compliance emphasized. Key Antihyperglycemic Medications             glimepiride (AMARYL) 1 mg tablet  (Taking) TAKE 1 TABLET BY MOUTH DAILY BEFORE BREAKFAST        Other Key Diabetic Medications             gabapentin (NEURONTIN) 300 mg capsule  (Taking) Take 1 Cap by mouth three (3) times daily. Indications: NEUROPATHIC PAIN    pravastatin (PRAVACHOL) 40 mg tablet  (Taking) Take 1 Tab by mouth nightly. losartan (COZAAR) 100 mg tablet  (Taking) Take 1 Tab by mouth daily.         Lab Results   Component Value Date/Time    Hemoglobin A1c 5.9 11/13/2017 12:20 PM    Glucose 95 11/13/2017 12:20 PM    Creatinine 1.26 11/13/2017 12:20 PM    Microalbumin/Creat ratio (mg/g creat) 16 11/13/2017 12:20 PM    Microalbumin,urine random 1.20 11/13/2017 12:20 PM    Cholesterol, total 128 11/13/2017 12:20 PM    HDL Cholesterol 47 11/13/2017 12:20 PM    LDL, calculated 56.8 11/13/2017 12:20 PM    Triglyceride 121 11/13/2017 12:20 PM     Diabetic Foot and Eye Exam HM Status   Topic Date Due    Diabetic Foot Care  02/11/2018 (Originally 9/6/2017)   [facility] Exam  11/09/2018

## 2018-01-24 NOTE — PROGRESS NOTES
ROOM # R Jessie 39 presents today for   Chief Complaint   Patient presents with    Pre-op Exam       Lennie Harley preferred language for health care discussion is english/other. Is someone accompanying this pt? no    Is the patient using any DME equipment during [address]? cane    Depression Screening:  PHQ over the last two weeks 7/11/2017 5/11/2017 3/9/2017 9/6/2016 5/2/2016 4/13/2015 4/17/2014   PHQ Not Done Active Diagnosis of Depression or Bipolar Disorder Active Diagnosis of Depression or Bipolar Disorder - Patient Decline Active Diagnosis of Depression or Bipolar Disorder - -   Little interest or pleasure in doing things Nearly every day - Not at all Nearly every day - Several days Not at all   Feeling down, depressed or hopeless Several days - Not at all Nearly every day - More than half the days Not at all   Total Score PHQ 2 4 - 0 6 - 3 0       Learning Assessment:  Learning Assessment 4/13/2015 4/17/2014 12/12/2013   PRIMARY LEARNER Patient Patient -   HIGHEST LEVEL OF EDUCATION - PRIMARY LEARNER  SOME COLLEGE - SOME COLLEGE   BARRIERS PRIMARY LEARNER NONE - NONE   CO-LEARNER CAREGIVER No - -   PRIMARY LANGUAGE ENGLISH ENGLISH ENGLISH    NEED - - No   LEARNER PREFERENCE PRIMARY LISTENING DEMONSTRATION PICTURES   LEARNING SPECIAL TOPICS - - none   ANSWERED BY patient - -   RELATIONSHIP SELF - -       Abuse Screening:  Abuse Screening Questionnaire 11/13/2017 4/13/2015   Do you ever feel afraid of your partner? N N   Are you in a relationship with someone who physically or mentally threatens you? N N   Is it safe for you to go home? Y Y       Fall Risk  Fall Risk Assessment, last 12 mths 11/13/2017 7/11/2017 5/11/2017 3/9/2017 12/29/2016 9/6/2016 5/2/2016   Able to walk? Yes Yes Yes Yes Yes Yes Yes   Fall in past 12 months? Yes Yes Yes No Yes Yes Yes   Fall with injury?  Yes Yes Yes - No No No   Number of falls in past 12 months 1 1 1 - 3 3 3   Fall Risk Score 2 2 2 - 3 3 3 Health Maintenance reviewed and discussed per provider. Yes    No hm due at this time. Will contact Dr Adolfo Sung for foot exam    Advance Directive:  1. Do you have an advance directive in place? Patient Reply: yes    2. If not, would you like material regarding how to put one in place? Patient Reply: no    Coordination of Care:  1. Have you been to the ER, urgent care clinic since your last visit? Hospitalized since your last visit? no    2. Have you seen or consulted any other health care providers outside of the [address] [place] [address] since your last visit? Include any pap smears or colon screening.  no

## 2018-01-24 NOTE — PROGRESS NOTES
HISTORY OF PRESENT ILLNESS  Celina Garcia is a 79 y.o. male. Visit Vitals    /74    Pulse 72    Temp 97.6 °F (36.4 °C) (Oral)    Resp 16    Ht 6' 2\" (1.88 m)    Wt 239 lb (108.4 kg)    SpO2 98%    BMI 30.69 kg/m2       HPI Comments: Pt here for evaluation for left TKR. Has never had complications from any prior surgery. No recent cardiac sxs--will be having a clearance with cardiology, Dr. Eva Martell, as well. Can \"barely\" walk a block. Can not bend over. Back aches a lot. He has known arthritis in his back. Prior lumbar and cervical spinal surgeries    He is not limited by chests pains or breathing issues during his daily routine but can can not do much due to orthopedic issues.  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 Arthor Lennox  No date: HX CATARACT REMOVAL      Comment: 7/14  bilat  No date: HX CERVICAL LAMINECTOMY  No date: HX LUMBAR LAMINECTOMY      Comment: 1983  No date: HX TUMOR REMOVAL      Comment: 3/13  wide excision right thigh sarcoma  No date: HX TYMPANOMASTOIDECTOMY                Smoking status: Former Smoker Packs/day: 1.00      Years: 15.00     Smokeless status: Never Used                      Comment: Patient quit smoking December 3rd 2017, now            does vaping  Alcohol use: No                              Current Outpatient Prescriptions:  ZINC ACETATE PO, Take 1,000 mg by mouth. diclofenac (VOLTAREN) 1 % gel, Apply  to affected area four (4) times daily. chlorthalidone (HYGROTEN) 25 mg tablet, TAKE 1 TABLET BY MOUTH DAILY. glimepiride (AMARYL) 1 mg tablet, TAKE 1 TABLET BY MOUTH DAILY BEFORE BREAKFAST  potassium chloride (KLOR-CON) 10 mEq tablet, TAKE 1 TABLET BY MOUTH TWICE DAILY  traMADol (ULTRAM) 50 mg tablet, Take 1 Tab by mouth every six (6) hours as needed for Pain. Max Daily Amount: 200 mg.  glucose blood VI test strips (FREESTYLE LITE STRIPS) strip, USE TWICE DAILY. Lancets misc, Use to test blood sugar twice daily  gabapentin (NEURONTIN) 300 mg capsule, Take 1 Cap by mouth three (3) times daily. Indications: NEUROPATHIC PAIN  sertraline (ZOLOFT) 100 mg tablet, Take 2 Tabs by mouth daily. naproxen (NAPROSYN) 375 mg tablet, Take 1 Tab by mouth as needed. amLODIPine (NORVASC) 5 mg tablet, Take 1 Tab by mouth two (2) times a day. terazosin (HYTRIN) 2 mg capsule, Take 1 Cap by mouth nightly. raNITIdine (ZANTAC) 300 mg tablet, TAKE 1 TABLET BY MOUTH ONCE DAILY  traZODone (DESYREL) 50 mg tablet, TAKE 1 TO 2 TABLETS BY MOUTH AT BEDTIME AS NEEDED  cholecalciferol (VITAMIN D3) 1,000 unit tablet, Take  by mouth daily. ascorbic acid, vitamin C, (VITAMIN C) 250 mg tablet, Take  by mouth.  pravastatin (PRAVACHOL) 40 mg tablet, Take 1 Tab by mouth nightly. clopidogrel (PLAVIX) 75 mg tablet, Take 1 Tab by mouth daily. isosorbide mononitrate ER (IMDUR) 30 mg tablet, Take 1 Tab by mouth daily. losartan (COZAAR) 100 mg tablet, Take 1 Tab by mouth daily. garlic 2,154 mg cap, Take 1 Cap by mouth daily.   multivit-min-FA-lycopen-lutein 0.4-300-250 mg-mcg-mcg tab, Take 1 Tab by mouth daily. fluticasone (FLONASE) 50 mcg/actuation nasal spray, 2 Sprays by Both Nostrils route daily. aspirin delayed-release 81 mg tablet, Take 1 tablet by mouth daily. hydroCHLOROthiazide (HYDRODIURIL) 25 mg tablet, TK 1 T PO QD  aspirin-calcium carbonate 81 mg-300 mg calcium(777 mg) tab, 81 mg.  triamcinolone acetonide (KENALOG) 0.025 % topical cream, Apply  to affected area two (2) times daily as needed. use thin layer     No current facility-administered medications for this visit. -- Beta Blocker (Beta-Blockers (Beta-Adrenergic Blocking Agts)) -- Other (comments)    --  symptomatic bradycardia   -- Codeine -- Other (comments)    --  Passed//fainted             patient doesn't recall reaction, occurred as a             teenager   -- Darvocet A500 (Propoxyphene N-Acetaminophen) -- Other (comments)    --  throat swelling   -- Dexbrompheniramine-Pseudoephed -- Other (comments)    --  Facial swelling. 1980\"s   -- Lasix (Furosemide) -- Itching   -- Lipitor (Atorvastatin) -- Other (comments)   -- Sulfa (Sulfonamide Antibiotics) -- Hives                Pre-op Exam   The history is provided by the patient. This is a chronic problem. The current episode started more than 1 week ago. The problem occurs daily. The problem has not changed since onset. Pertinent negatives include no chest pain and no shortness of breath. Review of Systems   Constitutional: Negative for chills and fever. HENT:        Bilateral hearing aids. Mucinous debris in left canal near TM   Respiratory: Negative for shortness of breath. Cardiovascular: Negative for chest pain and palpitations. Gastrointestinal: Negative for constipation, diarrhea, nausea and vomiting. Genitourinary: Negative for dysuria. Neurological: Negative. Physical Exam   Constitutional: He is oriented to person, place, and time. He appears well-developed and well-nourished. No distress.    HENT:   Right Ear: External ear normal.   Left Ear: External ear normal.   Mouth/Throat: Oropharynx is clear and moist.   Eyes: Conjunctivae are normal.   Cardiovascular: Normal rate and regular rhythm. Pulmonary/Chest: Effort normal and breath sounds normal. No respiratory distress. He has no wheezes. He has no rales. He exhibits no tenderness. Musculoskeletal: He exhibits no edema. Neurological: He is alert and oriented to person, place, and time. Skin: Skin is warm and dry. He is not diaphoretic. Psychiatric: He has a normal mood and affect. Nursing note and vitals reviewed. ASSESSMENT and PLAN    ICD-10-CM ICD-9-CM    1. Pre-op exam Z01.818 V72.84    2. Primary osteoarthritis of both knees M17.0 715.16    3. Hypertension I11.9 402.10    4. Type 2 diabetes mellitus without complication, without long-term current use of insulin (HCC) E11.9 250.00    5. Dyslipidemia E78.5 272.4        BP slightly high today  Cardiac appears stable  Lungs appear stable    Reviewed lab, CXR, EKG. EKG has old changes--RBBB. But will see cardiology as well    He appears at average risk for surgery    Cleared for surgery from general medical perspective.     F/u as appointed in March if he can

## 2018-01-24 NOTE — MR AVS SNAPSHOT
[facility] 
 
 
 [address] [address] 
[phone] Patient: Rosalee Sorto MRN: [id number] [facility]:7/69/6620 Visit Information Date & Time Provider Department Dept. Phone Encounter #  
 1/24/2018  1:00 PM Neha Cueva [place] [phone] [id number] Follow-up Instructions Return if symptoms worsen or fail to improve, for as appointed in March. Your Appointments 1/30/2018  3:45 PM  
Follow Up with John Metcalf MD  
Cardio Specialist at [facility]) Appt Note: 4 weeks f/u  
 [employer] [address] 1334 3/13/2018 10:30 AM  
Office Visit with Neha Cueva MD  
[facility] [place] [facility] Appt Note: 4 mo f/u htn, dm, cholesterol [address]  
  
   
 [address] [facility] Upcoming Health Maintenance Date Due COLONOSCOPY 6/1/2018 FOOT EXAM Q1 2/11/2018* HEMOGLOBIN A1C Q6M 5/13/2018 EYE EXAM RETINAL OR DILATED Q1 11/9/2018 MICROALBUMIN Q1 11/13/2018 LIPID PANEL Q1 11/13/2018 MEDICARE YEARLY EXAM 11/14/2018 GLAUCOMA SCREENING Q2Y 11/9/2019 DTaP/Tdap/Td series (3 - Td) 5/1/2027 *Topic was postponed. The date shown is not the original due date. Allergies as of 1/24/2018  Review Complete On: 1/24/2018 By: Neha Cueva MD  
  
 Severity Noted Reaction Type Reactions Beta Blocker [Beta-blockers (Beta-adrenergic Blocking Agts)]    Other (comments)  
 symptomatic bradycardia Codeine   Side Effect Other (comments) Passed//fainted 
patient doesn't recall reaction, occurred as a teenager Darvocet A500 [Propoxyphene N-acetaminophen]   Systemic Other (comments)  
 throat swelling Dexbrompheniramine-pseudoephed    Other (comments) Facial swelling. 1980\"s Lasix [Furosemide]  09/06/2016   Systemic Itching Lipitor [Atorvastatin]  03/16/2016    Other (comments) Sulfa (Sulfonamide Antibiotics)  10/18/2016    Hives Current Immunizations  Reviewed on 11/13/2017 Name Date Influenza High Dose Vaccine PF 11/13/2017, 10/18/2016 Influenza Vaccine 9/30/2014, 9/2/2014 12:00 AM  
 Influenza Vaccine Split 10/1/2012 Pneumococcal Conjugate (PCV-13) 4/28/2015 Pneumococcal Polysaccharide (PPSV-23) 9/6/2016 10:15 AM  
 Tdap 5/1/2017  5:39 PM, 8/12/2013 10:56 AM  
 ZZZ-RETIRED (DO NOT USE) Pneumococcal Vaccine (Unspecified Type) 12/5/2009 Zoster Vaccine, Live 1/25/2013 Not reviewed this visit You Were Diagnosed With   
  
 Codes Comments Pre-op exam    -  Primary ICD-10-CM: S16.221 ICD-9-CM: V72.84 Primary osteoarthritis of both knees     ICD-10-CM: M17.0 ICD-9-CM: 715.16 Benign hypertensive heart disease without heart failure     ICD-10-CM: I11.9 ICD-9-CM: 402.10 Type 2 diabetes mellitus without complication, without long-term current use of insulin (HCC)     ICD-10-CM: E11.9 ICD-9-CM: 250.00 Dyslipidemia     ICD-10-CM: E78.5 ICD-9-CM: 272.4 Vitals BP Pulse Temp Resp Height(growth percentile) Weight(growth percentile) 154/74 72 97.6 °F (36.4 °C) (Oral) 16 6' 2\" (1.88 m) 239 lb (108.4 kg) SpO2 BMI Smoking Status 98% 30.69 kg/m2 Former Smoker BMI and BSA Data Body Mass Index Body Surface Area  
 30.69 kg/m 2 2.38 m 2 Preferred Pharmacy Pharmacy Name Phone [facility] [place], [facility] [address] [phone] Your Updated Medication List  
  
   
This list is accurate as of: 1/24/18  1:44 PM.  Always use your most recent med list. amLODIPine 5 mg tablet Commonly known as:  Lynnell Manual Take 1 Tab by mouth two (2) times a day. aspirin delayed-release 81 mg tablet Take 1 tablet by mouth daily. aspirin-calcium carbonate 81 mg-300 mg calcium(777 mg) Tab 81 mg.  
  
 chlorthalidone 25 mg tablet Commonly known as:  HYGROTEN  
TAKE 1 TABLET BY MOUTH DAILY. cholecalciferol 1,000 unit tablet Commonly known as:  VITAMIN D3 Take  by mouth daily. clopidogrel 75 mg Tab Commonly known as:  PLAVIX Take 1 Tab by mouth daily. fluticasone 50 mcg/actuation nasal spray Commonly known as:  Mary Grace Finders 2 Sprays by Both Nostrils route daily. gabapentin 300 mg capsule Commonly known as:  NEURONTIN Take 1 Cap by mouth three (3) times daily. Indications: NEUROPATHIC PAIN  
  
 garlic 9,139 mg Cap Take 1 Cap by mouth daily. glimepiride 1 mg tablet Commonly known as:  AMARYL  
TAKE 1 TABLET BY MOUTH DAILY BEFORE BREAKFAST  
  
 glucose blood VI test strips strip Commonly known as:  FREESTYLE LITE STRIPS  
USE TWICE DAILY. hydroCHLOROthiazide 25 mg tablet Commonly known as:  HYDRODIURIL TK 1 T PO QD  
  
 isosorbide mononitrate ER 30 mg tablet Commonly known as:  IMDUR Take 1 Tab by mouth daily. Lancets Misc Use to test blood sugar twice daily  
  
 losartan 100 mg tablet Commonly known as:  COZAAR Take 1 Tab by mouth daily. multivit-min-FA-lycopen-lutein 0.4-300-250 mg-mcg-mcg Tab Take 1 Tab by mouth daily. naproxen 375 mg tablet Commonly known as:  NAPROSYN Take 1 Tab by mouth as needed. potassium chloride 10 mEq tablet Commonly known as:  KLOR-CON  
TAKE 1 TABLET BY MOUTH TWICE DAILY pravastatin 40 mg tablet Commonly known as:  PRAVACHOL Take 1 Tab by mouth nightly. raNITIdine 300 mg tablet Commonly known as:  ZANTAC TAKE 1 TABLET BY MOUTH ONCE DAILY  
  
 sertraline 100 mg tablet Commonly known as:  ZOLOFT Take 2 Tabs by mouth daily. terazosin 2 mg capsule Commonly known as:  HYTRIN Take 1 Cap by mouth nightly. traMADol 50 mg tablet Commonly known as:  ULTRAM  
Take 1 Tab by mouth every six (6) hours as needed for Pain. Max Daily Amount: 200 mg.  
  
 traZODone 50 mg tablet Commonly known as:  DESYREL  
TAKE 1 TO 2 TABLETS BY MOUTH AT BEDTIME AS NEEDED  
  
 triamcinolone acetonide 0.025 % topical cream  
Commonly known as:  KENALOG Apply  to affected area two (2) times daily as needed. use thin layer VITAMIN C 250 mg tablet Generic drug:  ascorbic acid (vitamin C) Take  by mouth. VOLTAREN 1 % Gel Generic drug:  diclofenac Apply  to affected area four (4) times daily. ZINC ACETATE PO Take 1,000 mg by mouth. Follow-up Instructions Return if symptoms worsen or fail to improve, for as appointed in March. Introducing [address]! Dear Filipe Gilbert: Thank you for requesting a [employer] account. Our records indicate that you already have an active [employer] account. You can access your account anytime at [email or web address] Did you know that you can access your hospital and ER discharge instructions at any time in [employer]? You can also review all of your test results from your hospital stay or ER visit. Additional Information If you have questions, please visit the Frequently Asked Questions section of the [employer] website at [email or web address] Remember, [employer] is NOT to be used for urgent needs. For medical emergencies, dial 911. Now available from your iPhone and Android! Please provide this summary of care documentation to your next provider. Your primary care clinician is listed as [facility]. If you have any questions after today's visit, please call [phone].

## 2018-01-30 ENCOUNTER — OFFICE VISIT (OUTPATIENT)
Dept: CARDIOLOGY CLINIC | Age: 68
End: 2018-01-30

## 2018-01-30 VITALS
WEIGHT: 241 LBS | HEART RATE: 91 BPM | BODY MASS INDEX: 30.93 KG/M2 | HEIGHT: 74 IN | DIASTOLIC BLOOD PRESSURE: 86 MMHG | OXYGEN SATURATION: 97 % | SYSTOLIC BLOOD PRESSURE: 142 MMHG

## 2018-01-30 DIAGNOSIS — E78.00 PURE HYPERCHOLESTEROLEMIA: ICD-10-CM

## 2018-01-30 DIAGNOSIS — I10 ESSENTIAL HYPERTENSION WITH GOAL BLOOD PRESSURE LESS THAN 140/90: ICD-10-CM

## 2018-01-30 DIAGNOSIS — Z01.818 PRE-OP EVALUATION: ICD-10-CM

## 2018-01-30 DIAGNOSIS — I25.83 CORONARY ARTERY DISEASE DUE TO LIPID RICH PLAQUE: ICD-10-CM

## 2018-01-30 DIAGNOSIS — I11.9 HYPERTENSIVE HEART DISEASE WITHOUT HEART FAILURE: Primary | ICD-10-CM

## 2018-01-30 DIAGNOSIS — I25.10 CORONARY ARTERY DISEASE DUE TO LIPID RICH PLAQUE: ICD-10-CM

## 2018-01-30 NOTE — MR AVS SNAPSHOT
[address] [facility] [address] [address] 
[phone] Patient: Lennie Harley MRN: [id number] RFW:[birth year] Visit Information Date & Time Provider Department Dept. Phone Encounter #  
 1/30/2018  3:45 PM Iglesia Barney MD [facility][note 3] [address] Specialist at [facility]/[address] [phone] [id number] Follow-up Instructions Return in about 6 months (around 7/30/2018). Your Appointments 3/13/2018 10:30 AM  
Office Visit with Maggie Noel MD  
[facility] Appt Note: 4 mo f/u htn, dm, cholesterol [address]  
  
    
 7/31/2018  3:15 PM  
Follow Up with Adia Cho MD  
Cardio Specialist at [facility]) Appt Note: 6 months [employer] [address] [place] [address] Upcoming Health Maintenance Date Due COLONOSCOPY 6/1/2018 FOOT EXAM Q1 2/11/2018* HEMOGLOBIN A1C Q6M 5/13/2018 EYE EXAM RETINAL OR DILATED Q1 11/9/2018 MICROALBUMIN Q1 11/13/2018 LIPID PANEL Q1 11/13/2018 MEDICARE YEARLY EXAM 11/14/2018 GLAUCOMA SCREENING Q2Y 11/9/2019 DTaP/Tdap/Td series (3 - Td) 5/1/2027 *Topic was postponed. The date shown is not the original due date. Allergies as of 1/30/2018  Review Complete On: 1/30/2018 By: Linden Gibbs Severity Noted Reaction Type Reactions Beta Blocker [Beta-blockers (Beta-adrenergic Blocking Agts)]    Other (comments)  
 symptomatic bradycardia Codeine   Side Effect Other (comments) Passed//fainted 
patient doesn't recall reaction, occurred as a teenager Darvocet A500 [Propoxyphene N-acetaminophen]   Systemic Other (comments)  
 throat swelling Dexbrompheniramine-pseudoephed    Other (comments) Facial swelling. 1980\"s Lasix [Furosemide]  09/06/2016   Systemic Itching Lipitor [Atorvastatin]  03/16/2016    Other (comments) Sulfa (Sulfonamide Antibiotics)  10/18/2016    Hives Current Immunizations  Reviewed on 11/13/2017 Name Date Influenza High Dose Vaccine PF 11/13/2017, 10/18/2016 Influenza Vaccine 9/30/2014, 9/2/2014 12:00 AM  
 Influenza Vaccine Split 10/1/2012 Pneumococcal Conjugate (PCV-13) 4/28/2015 Pneumococcal Polysaccharide (PPSV-23) 9/6/2016 10:15 AM  
 Tdap 5/1/2017  5:39 PM, 8/12/2013 10:56 AM  
 ZZZ-RETIRED (DO NOT USE) Pneumococcal Vaccine (Unspecified Type) 12/5/2009 Zoster Vaccine, Live 1/25/2013 Not reviewed this visit Vitals BP Pulse Height(growth percentile) Weight(growth percentile) SpO2 BMI  
 142/86 91 6' 2\" (1.88 m) 241 lb (109.3 kg) 97% 30.94 kg/m2 Smoking Status Former Smoker Vitals History BMI and BSA Data Body Mass Index Body Surface Area 30.94 kg/m 2 2.39 m 2 Preferred Pharmacy Pharmacy Name Phone [facility] [address] [phone] Your Updated Medication List  
  
   
This list is accurate as of: 1/30/18  4:20 PM.  Always use your most recent med list. amLODIPine 5 mg tablet Commonly known as:  Dazey Soles Take 1 Tab by mouth two (2) times a day. aspirin delayed-release 81 mg tablet Take 1 tablet by mouth daily. aspirin-calcium carbonate 81 mg-300 mg calcium(777 mg) Tab 81 mg.  
  
 chlorthalidone 25 mg tablet Commonly known as:  HYGROTEN  
TAKE 1 TABLET BY MOUTH DAILY. cholecalciferol 1,000 unit tablet Commonly known as:  VITAMIN D3 Take  by mouth daily. clopidogrel 75 mg Tab Commonly known as:  PLAVIX Take 1 Tab by mouth daily. fluticasone 50 mcg/actuation nasal spray Commonly known as:  Loren Paget 2 Sprays by Both Nostrils route daily. gabapentin 300 mg capsule Commonly known as:  NEURONTIN Take 1 Cap by mouth three (3) times daily. Indications: NEUROPATHIC PAIN  
  
 garlic 5,910 mg Cap Take 1 Cap by mouth daily. glimepiride 1 mg tablet Commonly known as:  AMARYL  
TAKE 1 TABLET BY MOUTH DAILY BEFORE BREAKFAST  
  
 glucose blood VI test strips strip Commonly known as:  FREESTYLE LITE STRIPS  
USE TWICE DAILY. hydroCHLOROthiazide 25 mg tablet Commonly known as:  HYDRODIURIL TK 1 T PO QD  
  
 isosorbide mononitrate ER 30 mg tablet Commonly known as:  IMDUR Take 1 Tab by mouth daily. Lancets Misc Use to test blood sugar twice daily  
  
 losartan 100 mg tablet Commonly known as:  COZAAR Take 1 Tab by mouth daily. multivit-min-FA-lycopen-lutein 0.4-300-250 mg-mcg-mcg Tab Take 1 Tab by mouth daily. naproxen 375 mg tablet Commonly known as:  NAPROSYN Take 1 Tab by mouth as needed. potassium chloride 10 mEq tablet Commonly known as:  KLOR-CON  
TAKE 1 TABLET BY MOUTH TWICE DAILY pravastatin 40 mg tablet Commonly known as:  PRAVACHOL Take 1 Tab by mouth nightly. raNITIdine 300 mg tablet Commonly known as:  ZANTAC TAKE 1 TABLET BY MOUTH ONCE DAILY  
  
 sertraline 100 mg tablet Commonly known as:  ZOLOFT Take 2 Tabs by mouth daily. terazosin 2 mg capsule Commonly known as:  HYTRIN Take 1 Cap by mouth nightly. traMADol 50 mg tablet Commonly known as:  ULTRAM  
Take 1 Tab by mouth every six (6) hours as needed for Pain. Max Daily Amount: 200 mg.  
  
 traZODone 50 mg tablet Commonly known as:  DESYREL  
TAKE 1 TO 2 TABLETS BY MOUTH AT BEDTIME AS NEEDED  
  
 triamcinolone acetonide 0.025 % topical cream  
Commonly known as:  KENALOG Apply  to affected area two (2) times daily as needed. use thin layer VITAMIN C 250 mg tablet Generic drug:  ascorbic acid (vitamin C) Take  by mouth. VOLTAREN 1 % Gel Generic drug:  diclofenac Apply  to affected area four (4) times daily. ZINC ACETATE PO Take 1,000 mg by mouth. Follow-up Instructions Return in about 6 months (around 7/30/2018). Introducing [facility]! Dear Marce Connors: Thank you for requesting a [employer] account. Our records indicate that you already have an active [employer] account. You can access your account anytime at [email or web address] Did you know that you can access your hospital and ER discharge instructions at any time in [employer]? You can also review all of your test results from your hospital stay or ER visit. Additional Information If you have questions, please visit the Frequently Asked Questions section of the [employer] website at [email or web address] Remember, [employer] is NOT to be used for urgent needs. For medical emergencies, dial 911. Now available from your iPhone and Android! Please provide this summary of care documentation to your next provider. Your primary care clinician is listed as [facility]. If you have any questions after today's visit, please call [phone].

## 2018-02-22 ENCOUNTER — OFFICE VISIT (OUTPATIENT)
Dept: INTERNAL MEDICINE CLINIC | Age: 68
End: 2018-02-22

## 2018-02-22 VITALS
SYSTOLIC BLOOD PRESSURE: 123 MMHG | OXYGEN SATURATION: 99 % | BODY MASS INDEX: 30.03 KG/M2 | HEIGHT: 74 IN | DIASTOLIC BLOOD PRESSURE: 78 MMHG | HEART RATE: 83 BPM | WEIGHT: 234 LBS | RESPIRATION RATE: 16 BRPM

## 2018-02-22 DIAGNOSIS — Z79.01 ANTICOAGULATED ON COUMADIN: ICD-10-CM

## 2018-02-22 DIAGNOSIS — E11.21 TYPE 2 DIABETES MELLITUS WITH NEPHROPATHY (HCC): ICD-10-CM

## 2018-02-22 DIAGNOSIS — Z09 HOSPITAL DISCHARGE FOLLOW-UP: Primary | ICD-10-CM

## 2018-02-22 DIAGNOSIS — I11.9 BENIGN HYPERTENSIVE HEART DISEASE WITHOUT HEART FAILURE: Chronic | ICD-10-CM

## 2018-02-22 LAB
INR BLD: 1.4
PT POC: 16.6 SECONDS
VALID INTERNAL CONTROL?: YES

## 2018-02-22 NOTE — PROGRESS NOTES
HISTORY OF PRESENT ILLNESS  Joe Reyez is a 79 y.o. male. Visit Vitals    /78 (BP 1 Location: Left arm, BP Patient Position: Sitting)    Pulse 83    Resp 16    Ht 6' 2\" (1.88 m)    Wt 234 lb (106.1 kg)    SpO2 99%    BMI 30.04 kg/m2       HPI Comments: Pt here to f/u after left TKR. He went to [facility]. for a week  He has been home 5 days. He declined home PT as he will be going to outpatient starting tomorrow. He is icing his knee when it aches    Weight is down 7#  States his BS was good at LT. At home it has also been good. He did have an episode of low BP and he was feeling poorly. He went to the ER and everything checked out. No evidence of blood clots/PE    He is on coumadin until Mar 1, 2018. Then can resume his plavix. He has oxycontin but is trying to use tylenol instead      Hospital Follow Up   The history is provided by the patient. This is a new problem. Pertinent negatives include no chest pain and no shortness of breath. Review of Systems   Constitutional: Negative. Respiratory: Negative for cough and shortness of breath. Cardiovascular: Positive for leg swelling. Negative for chest pain and palpitations. Musculoskeletal: Positive for joint pain. Neurological: Negative for dizziness. Physical Exam   Constitutional: He is oriented to person, place, and time. He appears well-developed and well-nourished. No distress. Cardiovascular: Normal rate and regular rhythm. Pulmonary/Chest: Effort normal and breath sounds normal.   Musculoskeletal:   Left knee healing well. Minimal redness noted. No warmth or evidence of infection. Using walker today and ambulating well with it currently   Neurological: He is alert and oriented to person, place, and time. Skin: Skin is warm and dry. He is not diaphoretic. Psychiatric: He has a normal mood and affect. Nursing note and vitals reviewed. ASSESSMENT and PLAN    ICD-10-CM ICD-9-CM    1.  Hospital discharge follow-up Z09 V67.59    2. Anticoagulated on Coumadin Z51.81 V58.83 AMB POC PT/INR    Z79.01 V58.61    3. Hypertension I11.9 402.10    4. Type 2 diabetes mellitus with nephropathy (HCC) E11.21 250.40      583.81      Available hospital/ER record reviewed    BP controlled    DM has been doing well    No home health needs. Will be going to outpt PT tomorrow. INR still low--will have him take 7 mg tonight. Took 6 mg last night, was on 5 mg daily. Will f/u at [place] coumadin clinic tomorrow. Stop date is March 1.     F/u here as appointed next month

## 2018-02-22 NOTE — ACP (ADVANCE CARE PLANNING)
Advance Directive:  1. Do you have an advance directive in place? Patient Reply:  yes    2. If not, would you like material regarding how to put one in place?  Patient Reply: No

## 2018-02-22 NOTE — MR AVS SNAPSHOT
Lizz Aram 
 
 
 [address] [address] 
[phone] Patient: Memo Medrano MRN: [id number]:0[id number] Visit Information Date & Time Provider Department Dept. Phone Encounter #  
 2/22/2018  3:00 PM [address] [phone] [id number] Follow-up Instructions Return if symptoms worsen or fail to improve, for as appointed. Your Appointments 3/13/2018 10:30 AM  
Office Visit with MD [address] [place] [address] Road) Appt Note: 4 mo f/u htn, dm, cholesterol [address]  
  
    
 7/31/2018  3:15 PM  
Follow Up with Rosa Bassett MD  
Cardio Specialist at [facility]/[address] [place] [address]) Appt Note: 6 months [address]  
  
   
 [address] Upcoming Health Maintenance Date Due COLONOSCOPY 6/1/2018 FOOT EXAM Q1 5/23/2018* HEMOGLOBIN A1C Q6M 5/13/2018 EYE EXAM RETINAL OR DILATED Q1 11/9/2018 MICROALBUMIN Q1 11/13/2018 LIPID PANEL Q1 11/13/2018 MEDICARE YEARLY EXAM 11/14/2018 GLAUCOMA SCREENING Q2Y 11/9/2019 DTaP/Tdap/Td series (3 - Td) 5/1/2027 *Topic was postponed. The date shown is not the original due date. Allergies as of 2/22/2018  Review Complete On: 2/22/2018 By: Sima Schreiber MD  
  
 Severity Noted Reaction Type Reactions Beta Blocker [Beta-blockers (Beta-adrenergic Blocking Agts)]    Other (comments)  
 symptomatic bradycardia Codeine   Side Effect Other (comments) Passed//fainted 
patient doesn't recall reaction, occurred as a teenager Darvocet A500 [Propoxyphene N-acetaminophen]   Systemic Other (comments)  
 throat swelling Dexbrompheniramine-pseudoephed    Other (comments) Facial swelling. 1980\"s Lasix [Furosemide]  09/06/2016   Systemic Itching Lipitor [Atorvastatin]  03/16/2016    Other (comments) Sulfa (Sulfonamide Antibiotics)  10/18/2016    Hives Current Immunizations  Reviewed on 11/13/2017 Name Date Influenza High Dose Vaccine PF 11/13/2017, 10/18/2016 Influenza Vaccine 9/30/2014, 9/2/2014 12:00 AM  
 Influenza Vaccine Split 10/1/2012 Pneumococcal Conjugate (PCV-13) 4/28/2015 Pneumococcal Polysaccharide (PPSV-23) 9/6/2016 10:15 AM  
 Tdap 5/1/2017  5:39 PM, 8/12/2013 10:56 AM  
 ZZZ-RETIRED (DO NOT USE) Pneumococcal Vaccine (Unspecified Type) 12/5/2009 Zoster Vaccine, Live 1/25/2013 Not reviewed this visit You Were Diagnosed With   
  
 Codes Comments Hospital discharge follow-up    -  Primary ICD-10-CM: [facility] ICD-9-CM: V67.59 Anticoagulated on Coumadin     ICD-10-CM: Z51.81, Z79.01 
ICD-9-CM: V58.83, V58.61 Benign hypertensive heart disease without heart failure     ICD-10-CM: I11.9 ICD-9-CM: 402.10 Type 2 diabetes mellitus with nephropathy (HCC)     ICD-10-CM: E11.21 
ICD-9-CM: 250.40, 583.81 Vitals BP Pulse Resp Height(growth percentile) Weight(growth percentile) SpO2  
 123/78 (BP 1 Location: Left arm, BP Patient Position: Sitting) 83 16 6' 2\" (1.88 m) 234 lb (106.1 kg) 99% BMI Smoking Status 30.04 kg/m2 Former Smoker Vitals History BMI and BSA Data Body Mass Index Body Surface Area 30.04 kg/m 2 2.35 m 2 Preferred Pharmacy Pharmacy Name Phone [facility] [address] [phone] Your Updated Medication List  
  
   
This list is accurate as of 2/22/18  3:52 PM.  Always use your most recent med list. amLODIPine 5 mg tablet Commonly known as:  Estee Bower Take 1 Tab by mouth two (2) times a day. aspirin delayed-release 81 mg tablet Take 1 tablet by mouth daily. aspirin-calcium carbonate 81 mg-300 mg calcium(777 mg) Tab 81 mg.  
  
 chlorthalidone 25 mg tablet Commonly known as:  HYGROTEN  
TAKE 1 TABLET BY MOUTH DAILY. cholecalciferol 1,000 unit tablet Commonly known as:  VITAMIN D3 Take  by mouth daily. clopidogrel 75 mg Tab Commonly known as:  PLAVIX Take 1 Tab by mouth daily. fluticasone 50 mcg/actuation nasal spray Commonly known as:  Theadore Krishna 2 Sprays by Both Nostrils route daily. gabapentin 300 mg capsule Commonly known as:  NEURONTIN Take 1 Cap by mouth three (3) times daily. Indications: NEUROPATHIC PAIN  
  
 garlic 6,933 mg Cap Take 1 Cap by mouth daily. glimepiride 1 mg tablet Commonly known as:  AMARYL  
TAKE 1 TABLET BY MOUTH DAILY BEFORE BREAKFAST  
  
 glucose blood VI test strips strip Commonly known as:  FREESTYLE LITE STRIPS  
USE TWICE DAILY. hydroCHLOROthiazide 25 mg tablet Commonly known as:  HYDRODIURIL TK 1 T PO QD  
  
 isosorbide mononitrate ER 30 mg tablet Commonly known as:  IMDUR Take 1 Tab by mouth daily. Lancets Misc Use to test blood sugar twice daily  
  
 losartan 100 mg tablet Commonly known as:  COZAAR Take 1 Tab by mouth daily. multivit-min-FA-lycopen-lutein 0.4-300-250 mg-mcg-mcg Tab Take 1 Tab by mouth daily. naproxen 375 mg tablet Commonly known as:  NAPROSYN Take 1 Tab by mouth as needed. potassium chloride 10 mEq tablet Commonly known as:  KLOR-CON  
TAKE 1 TABLET BY MOUTH TWICE DAILY pravastatin 40 mg tablet Commonly known as:  PRAVACHOL Take 1 Tab by mouth nightly. raNITIdine 300 mg tablet Commonly known as:  ZANTAC TAKE 1 TABLET BY MOUTH ONCE DAILY  
  
 sertraline 100 mg tablet Commonly known as:  ZOLOFT Take 2 Tabs by mouth daily. terazosin 2 mg capsule Commonly known as:  HYTRIN Take 1 Cap by mouth nightly. traMADol 50 mg tablet Commonly known as:  ULTRAM  
Take 1 Tab by mouth every six (6) hours as needed for Pain. Max Daily Amount: 200 mg.  
  
 traZODone 50 mg tablet Commonly known as:  DESYREL  
TAKE 1 TO 2 TABLETS BY MOUTH AT BEDTIME AS NEEDED  
  
 triamcinolone acetonide 0.025 % topical cream  
Commonly known as:  KENALOG Apply  to affected area two (2) times daily as needed. use thin layer VITAMIN C 250 mg tablet Generic drug:  ascorbic acid (vitamin C) Take  by mouth. VOLTAREN 1 % Gel Generic drug:  diclofenac Apply  to affected area four (4) times daily. ZINC ACETATE PO Take 1,000 mg by mouth. We Performed the Following AMB POC PT/INR [98575 CPT(R)] Follow-up Instructions Return if symptoms worsen or fail to improve, for as appointed. Introducing [address]! Dear Kizzy Alvarado: Thank you for requesting a [employer] account. Our records indicate that you already have an active [employer] account. You can access your account anytime at [email or web address] Did you know that you can access your hospital and ER discharge instructions at any time in [employer]? You can also review all of your test results from your hospital stay or ER visit. Additional Information If you have questions, please visit the Frequently Asked Questions section of the [employer] website at [email or web address] Remember, [employer] is NOT to be used for urgent needs. For medical emergencies, dial 911. Now available from your iPhone and Android! Please provide this summary of care documentation to your next provider. Your primary care clinician is listed as [facility]. If you have any questions after today's visit, please call [phone].

## 2018-02-22 NOTE — PROGRESS NOTES
ROOM #     Gisel Vasquez presents today for   Chief Complaint   Patient presents with   [facility] Follow Up     KENROY PAnand [facility] 2/5/2018 left      Pt has appt with Coumadin clinic tomorrow and is under [facility] now. Gisel Vasquez preferred language for health care discussion is english/other. Is someone accompanying this pt? No     Is the patient using any DME equipment during OV? Yes; walker     Depression Screening:  PHQ over the last two weeks 2/22/2018 7/11/2017 5/11/2017 3/9/2017 9/6/2016 5/2/2016 4/13/2015   PHQ Not Done - Active Diagnosis of Depression or Bipolar Disorder Active Diagnosis of Depression or Bipolar Disorder - Patient Decline Active Diagnosis of Depression or Bipolar Disorder -   Little interest or pleasure in doing things Not at all Nearly every day - Not at all Nearly every day - Several days   Feeling down, depressed or hopeless Not at all Several days - Not at all Nearly every day - More than half the days   Total Score PHQ 2 0 4 - 0 6 - 3       Learning Assessment:  Learning Assessment 4/13/2015 4/17/2014 12/12/2013   PRIMARY LEARNER Patient Patient -   HIGHEST LEVEL OF EDUCATION - PRIMARY LEARNER  SOME COLLEGE - SOME COLLEGE   BARRIERS PRIMARY LEARNER NONE - NONE   CO-LEARNER CAREGIVER No - -   PRIMARY LANGUAGE ENGLISH ENGLISH ENGLISH    NEED - - No   LEARNER PREFERENCE PRIMARY LISTENING DEMONSTRATION PICTURES   LEARNING SPECIAL TOPICS - - none   ANSWERED BY patient - -   RELATIONSHIP SELF - -       Abuse Screening:  Abuse Screening Questionnaire 11/13/2017 4/13/2015   Do you ever feel afraid of your partner? N N   Are you in a relationship with someone who physically or mentally threatens you? N N   Is it safe for you to go home? Y Y       Fall Risk  Fall Risk Assessment, last 12 mths 2/22/2018 1/30/2018 11/13/2017 7/11/2017 5/11/2017 3/9/2017 12/29/2016   Able to walk? Yes Yes Yes Yes Yes Yes Yes   Fall in past 12 months?  No Yes Yes Yes Yes No Yes Fall with injury? - Yes Yes Yes Yes - No   Number of falls in past 12 months - 4 1 1 1 - 3   Fall Risk Score - 5 2 2 2 - 3       Health Maintenance reviewed and discussed per provider. Yes    Atilio Powell is due for  . Please order/place referral if appropriate. Advance Directive:  1. Do you have an advance directive in place? Patient Reply:  yes    2. If not, would you like material regarding how to put one in place? Patient Reply: No     Coordination of Care:  1. Have you been to the ER, urgent care clinic since your last visit? Hospitalized since your last visit? Yes; [address] for left knee replacement and hypotension     2. Have you seen or consulted any other health care providers outside of the [address] [place] [address] since your last visit?   Yes coumadin clinic

## 2018-02-27 ENCOUNTER — OFFICE VISIT (OUTPATIENT)
Dept: INTERNAL MEDICINE CLINIC | Age: 68
End: 2018-02-27

## 2018-02-27 VITALS
HEIGHT: 74 IN | DIASTOLIC BLOOD PRESSURE: 52 MMHG | SYSTOLIC BLOOD PRESSURE: 86 MMHG | OXYGEN SATURATION: 99 % | HEART RATE: 99 BPM | WEIGHT: 231 LBS | RESPIRATION RATE: 16 BRPM | BODY MASS INDEX: 29.65 KG/M2 | TEMPERATURE: 95.6 F

## 2018-02-27 DIAGNOSIS — M10.9 ACUTE GOUT INVOLVING TOE OF RIGHT FOOT, UNSPECIFIED CAUSE: Primary | ICD-10-CM

## 2018-02-27 DIAGNOSIS — I95.9 HYPOTENSION, UNSPECIFIED HYPOTENSION TYPE: ICD-10-CM

## 2018-02-27 RX ORDER — COLCHICINE 0.6 MG/1
TABLET ORAL
Qty: 15 TAB | Refills: 1 | Status: SHIPPED | OUTPATIENT
Start: 2018-02-27 | End: 2018-09-15 | Stop reason: SDUPTHER

## 2018-02-27 NOTE — PROGRESS NOTES
Patient presents to the clinic for a gout flare up. Patient complains of right great toe pain. Pain complains of radiating pain when extending his toe. Patient complains of dizziness. Patient was given a cup of water and Dr. Dominga Canas was notified of low blood pressure reading of 72/42. Flu Shot Requested: received    Depression Screening:  PHQ over the last two weeks 2/27/2018 2/22/2018 7/11/2017 5/11/2017 3/9/2017 9/6/2016 5/2/2016   PHQ Not Done - - Active Diagnosis of Depression or Bipolar Disorder Active Diagnosis of Depression or Bipolar Disorder - Patient Decline Active Diagnosis of Depression or Bipolar Disorder   Little interest or pleasure in doing things Not at all Not at all Nearly every day - Not at all Nearly every day -   Feeling down, depressed or hopeless Not at all Not at all Several days - Not at all Nearly every day -   Total Score PHQ 2 0 0 4 - 0 6 -       Learning Assessment:  Learning Assessment 4/13/2015 4/17/2014 12/12/2013   PRIMARY LEARNER Patient Patient -   HIGHEST LEVEL OF EDUCATION - PRIMARY LEARNER  SOME COLLEGE - [address] [employer] PRIMARY LEARNER NONE - [address] CAREGIVER No - -   [facility]    NEED - - No   LEARNER PREFERENCE PRIMARY LISTENING DEMONSTRATION PICTURES   LEARNING SPECIAL TOPICS - - none   ANSWERED BY patient - -   RELATIONSHIP SELF - -       Abuse Screening:  Abuse Screening Questionnaire 11/13/2017 4/13/2015   Do you ever feel afraid of your partner? N N   Are you in a relationship with someone who physically or mentally threatens you? N N   Is it safe for you to go home? Y Y       Health Maintenance reviewed and discussed per provider: yes     Coordination of Care:    1. Have you been to the ER, urgent care clinic since your last visit? Hospitalized since your last visit? Yes, low BP    2.  Have you seen or consulted any other health care providers outside of the [address] [place] [address] since your last visit? Include any pap smears or colon screening.  Yes, Ortho

## 2018-02-27 NOTE — MR AVS SNAPSHOT
[facility] 
 
 
 [address] [address] 
[phone] Patient: Kayode Reza MRN: [id number] QVO:4/37/[id number] Visit Information Date & Time Provider Department Dept. Phone Encounter #  
 2/27/2018 11:00 AM MD Gerson Galindo [address] [id number] Follow-up Instructions Return if symptoms worsen or fail to improve, for as appointed. Your Appointments 3/13/2018 10:30 AM  
Office Visit with MD Robin [address] [facility]) Appt Note: 4 mo f/u htn, dm, cholesterol [address]  
  
    
 7/31/2018  3:15 PM  
Follow Up with Anjel Sofia MD  
Card[facility] Specialist [facility] Appt Note: 6 months [address]  
  
   
 [address] Upcoming Health Maintenance Date Due COLONOSCOPY 6/1/2018 FOOT EXAM Q1 5/23/2018* HEMOGLOBIN A1C Q6M 5/13/2018 EYE EXAM RETINAL OR DILATED Q1 11/9/2018 MICROALBUMIN Q1 11/13/2018 LIPID PANEL Q1 11/13/2018 MEDICARE YEARLY EXAM 11/14/2018 GLAUCOMA SCREENING Q2Y 11/9/2019 DTaP/Tdap/Td series (3 - Td) 5/1/2027 *Topic was postponed. The date shown is not the original due date. Allergies as of 2/27/2018  Review Complete On: 2/27/2018 By: Shanita Bates MD  
  
 Severity Noted Reaction Type Reactions Beta Blocker [Beta-blockers (Beta-adrenergic Blocking Agts)]    Other (comments)  
 symptomatic bradycardia Codeine   Side Effect Other (comments) Passed//fainted 
patient doesn't recall reaction, occurred as a teenager Darvocet A500 [Propoxyphene N-acetaminophen]   Systemic Other (comments)  
 throat swelling Dexbrompheniramine-pseudoephed    Other (comments) Facial swelling. 1980\"s Lasix [Furosemide]  09/06/2016   Systemic Itching Lipitor [Atorvastatin]  03/16/2016    Other (comments) Sulfa (Sulfonamide Antibiotics)  10/18/2016    Hives Current Immunizations  Reviewed on 11/13/2017 Name Date Influenza High Dose Vaccine PF 11/13/2017, 10/18/2016 Influenza Vaccine 10/1/2017 12:00 AM, 9/30/2014, 9/2/2014 12:00 AM  
 Influenza Vaccine Split 10/1/2012 Pneumococcal Conjugate (PCV-13) 4/28/2015 Pneumococcal Polysaccharide (PPSV-23) 10/1/2016 12:00 AM, 9/6/2016 10:15 AM  
 Tdap 5/1/2017  5:39 PM, 8/12/2013 10:56 AM  
 ZZZ-RETIRED (DO NOT USE) Pneumococcal Vaccine (Unspecified Type) 12/5/2009 Zoster Vaccine, Live 1/25/2013 Not reviewed this visit You Were Diagnosed With   
  
 Codes Comments Acute gout involving toe of right foot, unspecified cause    -  Primary ICD-10-CM: M10.9 ICD-9-CM: 274.01 Hypotension, unspecified hypotension type     ICD-10-CM: I95.9 ICD-9-CM: 456. 9 Vitals BP Pulse Temp Resp Height(growth percentile) Weight(growth percentile) (!) 86/52 99 95.6 °F (35.3 °C) (Oral) 16 6' 2\" (1.88 m) 231 lb (104.8 kg) SpO2 BMI Smoking Status 99% 29.66 kg/m2 Former Smoker Vitals History BMI and BSA Data Body Mass Index Body Surface Area  
 29.66 kg/m 2 2.34 m 2 Preferred Pharmacy Pharmacy Name Phone [facility] [place], [address] [place] [address] [phone] Your Updated Medication List  
  
   
This list is accurate as of 2/27/18 12:04 PM.  Always use your most recent med list. amLODIPine 5 mg tablet Commonly known as:  [place] Soles Take 1 Tab by mouth two (2) times a day. aspirin delayed-release 81 mg tablet Take 1 tablet by mouth daily. aspirin-calcium carbonate 81 mg-300 mg calcium(777 mg) Tab 81 mg. chlorthalidone 25 mg tablet Commonly known as:  HYGROTEN  
TAKE 1 TABLET BY MOUTH DAILY. cholecalciferol 1,000 unit tablet Commonly known as:  VITAMIN D3 Take  by mouth daily. clopidogrel 75 mg Tab Commonly known as:  PLAVIX Take 1 Tab by mouth daily. colchicine 0.6 mg tablet Take 2 tabs now and one in 6 hours if needed. Repeat in 6 hours as needed  Indications: acute gouty arthritis  
  
 fluticasone 50 mcg/actuation nasal spray Commonly known as:  Alfrieda Pillar 2 Sprays by Both Nostrils route daily. gabapentin 300 mg capsule Commonly known as:  NEURONTIN Take 1 Cap by mouth three (3) times daily. Indications: NEUROPATHIC PAIN  
  
 garlic 2,035 mg Cap Take 1 Cap by mouth daily. glimepiride 1 mg tablet Commonly known as:  AMARYL  
TAKE 1 TABLET BY MOUTH DAILY BEFORE BREAKFAST  
  
 glucose blood VI test strips strip Commonly known as:  FREESTYLE LITE STRIPS  
USE TWICE DAILY. hydroCHLOROthiazide 25 mg tablet Commonly known as:  HYDRODIURIL TK 1 T PO QD  
  
 isosorbide mononitrate ER 30 mg tablet Commonly known as:  IMDUR Take 1 Tab by mouth daily. Lancets Misc Use to test blood sugar twice daily  
  
 losartan 100 mg tablet Commonly known as:  COZAAR Take 1 Tab by mouth daily. multivit-min-FA-lycopen-lutein 0.4-300-250 mg-mcg-mcg Tab Take 1 Tab by mouth daily. naproxen 375 mg tablet Commonly known as:  NAPROSYN Take 1 Tab by mouth as needed. potassium chloride 10 mEq tablet Commonly known as:  KLOR-CON  
TAKE 1 TABLET BY MOUTH TWICE DAILY pravastatin 40 mg tablet Commonly known as:  PRAVACHOL Take 1 Tab by mouth nightly. raNITIdine 300 mg tablet Commonly known as:  ZANTAC TAKE 1 TABLET BY MOUTH ONCE DAILY  
  
 sertraline 100 mg tablet Commonly known as:  ZOLOFT Take 2 Tabs by mouth daily. terazosin 2 mg capsule Commonly known as:  HYTRIN Take 1 Cap by mouth nightly. traMADol 50 mg tablet Commonly known as:  ULTRAM  
Take 1 Tab by mouth every six (6) hours as needed for Pain. Max Daily Amount: 200 mg.  
  
 traZODone 50 mg tablet Commonly known as:  DESYREL  
TAKE 1 TO 2 TABLETS BY MOUTH AT BEDTIME AS NEEDED  
  
 triamcinolone acetonide 0.025 % topical cream  
Commonly known as:  KENALOG Apply  to affected area two (2) times daily as needed. use thin layer VITAMIN C 250 mg tablet Generic drug:  ascorbic acid (vitamin C) Take  by mouth. VOLTAREN 1 % Gel Generic drug:  diclofenac Apply  to affected area four (4) times daily. ZINC ACETATE PO Take 1,000 mg by mouth. Prescriptions Sent to Pharmacy Refills  
 colchicine 0.6 mg tablet 1 Sig: Take 2 tabs now and one in 6 hours if needed. Repeat in 6 hours as needed  Indications: acute gouty arthritis Class: Normal  
 Pharmacy: [facility] [address] #: [phone] Follow-up Instructions Return if symptoms worsen or fail to improve, for as appointed. Introducing [facility] & HEALTH SERVICES! Dear Husam Walsh: Thank you for requesting a [employer] account. Our records indicate that you already have an active [employer] account. You can access your account anytime at [email or web address] Did you know that you can access your hospital and ER discharge instructions at any time in [employer]? You can also review all of your test results from your hospital stay or ER visit. Additional Information If you have questions, please visit the Frequently Asked Questions section of the [employer] website at [email or web address] Remember, [employer] is NOT to be used for urgent needs. For medical emergencies, dial 911. Now available from your iPhone and Android! Please provide this summary of care documentation to your next provider. Your primary care clinician is listed as [facility]. If you have any questions after today's visit, please call [phone].

## 2018-02-27 NOTE — PROGRESS NOTES
HISTORY OF PRESENT ILLNESS  Julito Flaherty is a 79 y.o. male. Visit Vitals    BP (!) 86/52    Pulse 99    Temp 95.6 °F (35.3 °C) (Oral)  Comment: pt drank water prior to vitals    Resp 16    Ht 6' 2\" (1.88 m)    Wt 231 lb (104.8 kg)    SpO2 99%    BMI 29.66 kg/m2       HPI Comments: Still healing from his knee surgery. Still on coumadin--will be finishing in a couple of days. Gout   The history is provided by the patient (right great toe began to hurt. Has prior episode of gout 4-5 yrs ago. ). This is a new problem. The current episode started yesterday. Review of Systems   Constitutional: Negative for chills and fever. Musculoskeletal: Positive for gout. Sore right great toe       Physical Exam   Constitutional: He is oriented to person, place, and time. He appears well-developed and well-nourished. No distress. Cardiovascular: Normal rate. Pulmonary/Chest: Effort normal.   Musculoskeletal:   Right great toe is mildly reddened. Soft tissue edema noted. Tender to palpate. Neurological: He is alert and oriented to person, place, and time. Skin: Skin is warm and dry. He is not diaphoretic. There is erythema. Nursing note and vitals reviewed. ASSESSMENT and PLAN    ICD-10-CM ICD-9-CM    1. Acute gout involving toe of right foot, unspecified cause M10.9 274.01 colchicine 0.6 mg tablet   2. Hypotension, unspecified hypotension type I95.9 458.9        Pt also presented with hypotension. This happened once while in the hospital recovering from his knee surgery  He is not dyspneic or tachycardic and has not chest pain. His leg does not look of feel any different. Advised him to rest, elevated legs, drink plenty of fluid, no hygroton meds for now. To ER for ANY worrisome sxs. Colchicine pulse for gout.      F/u prn or as appointed

## 2018-03-13 ENCOUNTER — OFFICE VISIT (OUTPATIENT)
Dept: INTERNAL MEDICINE CLINIC | Age: 68
End: 2018-03-13

## 2018-03-13 VITALS
OXYGEN SATURATION: 98 % | HEIGHT: 74 IN | WEIGHT: 230 LBS | BODY MASS INDEX: 29.52 KG/M2 | RESPIRATION RATE: 16 BRPM | DIASTOLIC BLOOD PRESSURE: 62 MMHG | TEMPERATURE: 96.6 F | SYSTOLIC BLOOD PRESSURE: 112 MMHG | HEART RATE: 76 BPM

## 2018-03-13 RX ORDER — GABAPENTIN 300 MG/1
300 CAPSULE ORAL 2 TIMES DAILY
COMMUNITY
Start: 2017-11-13 | End: 2019-07-11

## 2018-03-13 NOTE — PROGRESS NOTES
Identified pt with two pt identifiers(name and ). Reviewed record in preparation for visit and have obtained necessary documentation. Chief Complaint   Patient presents with    Hypertension     4mo f/u    Diabetes        Health Maintenance Due   Topic    Bone Densitometry (Dexa) Screening     COLONOSCOPY        Coordination of Care Questionnaire:  :   1) Have you been to an emergency room, urgent care clinic since your last visit? no   Hospitalized since your last visit? no             2. Have seen or consulted any other health care provider since your last visit? NO  If yes, where when, and reason for visit? Patient is accompanied by self I have received verbal consent from Simon Jessica to discuss any/all medical information while they are present in the room.

## 2018-03-13 NOTE — PROGRESS NOTES
Pt left without being seen stated he had already waited 45 minutes and that he has another appt scheduled at 1230 that he needs to go to.

## 2018-03-28 ENCOUNTER — HOSPITAL ENCOUNTER (OUTPATIENT)
Dept: LAB | Age: 68
Discharge: HOME | End: 2018-03-28
Payer: COMMERCIAL

## 2018-03-28 ENCOUNTER — OFFICE VISIT (OUTPATIENT)
Dept: INTERNAL MEDICINE CLINIC | Age: 68
End: 2018-03-28

## 2018-03-28 VITALS
TEMPERATURE: 97.8 F | WEIGHT: 233 LBS | HEART RATE: 86 BPM | DIASTOLIC BLOOD PRESSURE: 70 MMHG | OXYGEN SATURATION: 97 % | SYSTOLIC BLOOD PRESSURE: 120 MMHG | HEIGHT: 74 IN | RESPIRATION RATE: 17 BRPM | BODY MASS INDEX: 29.9 KG/M2

## 2018-03-28 DIAGNOSIS — I25.119 ATHEROSCLEROSIS OF NATIVE CORONARY ARTERY OF NATIVE HEART WITH ANGINA PECTORIS (HCC): Chronic | ICD-10-CM

## 2018-03-28 DIAGNOSIS — I11.9 BENIGN HYPERTENSIVE HEART DISEASE WITHOUT HEART FAILURE: Chronic | ICD-10-CM

## 2018-03-28 DIAGNOSIS — E78.5 DYSLIPIDEMIA: Chronic | ICD-10-CM

## 2018-03-28 DIAGNOSIS — E11.42 DIABETIC POLYNEUROPATHY ASSOCIATED WITH TYPE 2 DIABETES MELLITUS (HCC): ICD-10-CM

## 2018-03-28 DIAGNOSIS — E11.21 TYPE 2 DIABETES MELLITUS WITH NEPHROPATHY (HCC): Chronic | ICD-10-CM

## 2018-03-28 DIAGNOSIS — E11.21 TYPE 2 DIABETES MELLITUS WITH NEPHROPATHY (HCC): Primary | Chronic | ICD-10-CM

## 2018-03-28 PROBLEM — E11.40 TYPE 2 DIABETES MELLITUS WITH DIABETIC NEUROPATHY (HCC): Status: ACTIVE | Noted: 2018-03-28

## 2018-03-28 LAB
ANION GAP SERPL CALC-SCNC: 8 MMOL/L (ref 3–18)
BUN SERPL-MCNC: 24 MG/DL (ref 7–18)
BUN/CREAT SERPL: 18 (ref 12–20)
CALCIUM SERPL-MCNC: 9.4 MG/DL (ref 8.5–10.1)
CHLORIDE SERPL-SCNC: 101 MMOL/L (ref 100–108)
CHOLEST SERPL-MCNC: 148 MG/DL
CO2 SERPL-SCNC: 29 MMOL/L (ref 21–32)
CREAT SERPL-MCNC: 1.36 MG/DL (ref 0.6–1.3)
GLUCOSE SERPL-MCNC: 69 MG/DL (ref 74–99)
HBA1C MFR BLD: 5.7 % (ref 4.2–5.6)
HDLC SERPL-MCNC: 38 MG/DL (ref 40–60)
HDLC SERPL: 3.9 {RATIO} (ref 0–5)
LDLC SERPL CALC-MCNC: 57.8 MG/DL (ref 0–100)
LIPID PROFILE,FLP: ABNORMAL
POTASSIUM SERPL-SCNC: 3.5 MMOL/L (ref 3.5–5.5)
SODIUM SERPL-SCNC: 138 MMOL/L (ref 136–145)
TRIGL SERPL-MCNC: 261 MG/DL (ref ?–150)
VLDLC SERPL CALC-MCNC: 52.2 MG/DL

## 2018-03-28 PROCEDURE — 80061 LIPID PANEL: CPT | Performed by: INTERNAL MEDICINE

## 2018-03-28 PROCEDURE — 83036 HEMOGLOBIN GLYCOSYLATED A1C: CPT | Performed by: INTERNAL MEDICINE

## 2018-03-28 PROCEDURE — 80048 BASIC METABOLIC PNL TOTAL CA: CPT | Performed by: INTERNAL MEDICINE

## 2018-03-28 PROCEDURE — 36415 COLL VENOUS BLD VENIPUNCTURE: CPT | Performed by: INTERNAL MEDICINE

## 2018-03-28 NOTE — PROGRESS NOTES
Identified pt with two pt identifiers(name and ). Reviewed record in preparation for visit and have obtained necessary documentation. Chief Complaint   Patient presents with    Hypertension     4mo f/u; pt states he feels BP sometimes drops too low, causing \"heaviness in legs\"; pt does not see his cardiologist, Dr. Desire Chappell until 2018    Diabetes     HM foot exam due around 18, order pended    Cholesterol Problem        Health Maintenance Due   Topic    COLONOSCOPY        Coordination of Care Questionnaire:  :   1) Have you been to an emergency room, urgent care clinic since your last visit? no   Hospitalized since your last visit? no             2. Have seen or consulted any other health care provider since your last visit? YES  If yes, where when, and reason for visit? 3/13/18: Dr. Wing Parkinson (ortho surg)    Patient is accompanied by self I have received verbal consent from Isabelle Garcia to discuss any/all medical information while they are present in the room.

## 2018-03-28 NOTE — MR AVS SNAPSHOT
[address] [facility] 
 
 
 [address] [address] 
[phone] Patient: Lima Brunner MRN: [id number] OFZ:[id number] Visit Information Date & Time Provider Department Dept. Phone Encounter #  
 3/28/2018  2:30 PM Francisco Jenniferin Fults [employer] [phone] [id number] Follow-up Instructions Return in about 1 month (around 4/28/2018) for recheck BP, low BP episodes. Routing History Your Appointments 7/31/2018  3:15 PM  
Follow Up with Javid Bass MD  
Cardio Specialist at [facility]) Appt Note: 6 months [address]  
  
   
 [address] [facility] [address] Upcoming Health Maintenance Date Due COLONOSCOPY 6/1/2018 FOOT EXAM Q1 5/23/2018* HEMOGLOBIN A1C Q6M 5/13/2018 EYE EXAM RETINAL OR DILATED Q1 11/9/2018 MICROALBUMIN Q1 11/13/2018 LIPID PANEL Q1 11/13/2018 MEDICARE YEARLY EXAM 11/14/2018 GLAUCOMA SCREENING Q2Y 11/9/2019 DTaP/Tdap/Td series (3 - Td) 5/1/2027 *Topic was postponed. The date shown is not the original due date. Allergies as of 3/28/2018  Review Complete On: 3/28/2018 By: Kavin Dakin, MD  
  
 Severity Noted Reaction Type Reactions Beta Blocker [Beta-blockers (Beta-adrenergic Blocking Agts)]    Other (comments)  
 symptomatic bradycardia Codeine   Side Effect Other (comments) Passed//fainted 
patient doesn't recall reaction, occurred as a teenager Darvocet A500 [Propoxyphene N-acetaminophen]   Systemic Other (comments)  
 throat swelling Dexbrompheniramine-pseudoephed    Other (comments) Facial swelling. 1980\"s Lasix [Furosemide]  09/06/2016   Systemic Itching Lipitor [Atorvastatin]  03/16/2016    Other (comments) Sulfa (Sulfonamide Antibiotics)  10/18/2016    Hives Current Immunizations  Reviewed on 3/12/2018 Name Date Influenza High Dose Vaccine PF 11/13/2017, 10/18/2016 Influenza Vaccine 10/1/2017 12:00 AM, 9/30/2014, 9/2/2014 12:00 AM  
 Influenza Vaccine Split 10/1/2012 Pneumococcal Conjugate (PCV-13) 4/28/2015 Pneumococcal Polysaccharide (PPSV-23) 10/1/2016 12:00 AM, 9/6/2016 10:15 AM  
 Tdap 5/1/2017  5:39 PM, 8/12/2013 10:56 AM  
 ZZZ-RETIRED (DO NOT USE) Pneumococcal Vaccine (Unspecified Type) 12/5/2009 Zoster Vaccine, Live 1/25/2013 Not reviewed this visit You Were Diagnosed With   
  
 Codes Comments Type 2 diabetes mellitus with nephropathy ([facility]    -  Primary ICD-10-CM: E11.21 
ICD-9-CM: 250.40, 583.81 Atherosclerosis of native coronary artery of native heart with angina pectoris ([facility]     ICD-10-CM: I25.119 ICD-9-CM: 414.01, 413.9 Dyslipidemia     ICD-10-CM: E78.5 ICD-9-CM: 272.4 Benign hypertensive heart disease without heart failure     ICD-10-CM: I11.9 ICD-9-CM: 402.10 Diabetic polyneuropathy associated with type 2 diabetes mellitus ([facility]     ICD-10-CM: E11.42 
ICD-9-CM: 250.60, 357.2 Vitals BP Pulse Temp Resp Height(growth percentile) Weight(growth percentile) 120/70 86 97.8 °F (36.6 °C) (Oral) 17 6' 2\" (1.88 m) 233 lb (105.7 kg) SpO2 BMI Smoking Status 97% 29.92 kg/m2 Former Smoker Vitals History BMI and BSA Data Body Mass Index Body Surface Area  
 29.92 kg/m 2 2.35 m 2 Preferred Pharmacy Pharmacy Name Phone [facility] [place], [address] [place] [address] [phone] Your Updated Medication List  
  
   
This list is accurate as of 3/28/18  3:18 PM.  Always use your most recent med list. amLODIPine 5 mg tablet Commonly known as:  [place] Take 1 Tab by mouth two (2) times a day. aspirin delayed-release 81 mg tablet Take 1 tablet by mouth daily. chlorthalidone 25 mg tablet Commonly known as:  HYGROTEN  
TAKE 1 TABLET BY MOUTH DAILY. cholecalciferol 1,000 unit tablet Commonly known as:  VITAMIN D3 Take  by mouth daily. clopidogrel 75 mg Tab Commonly known as:  PLAVIX Take 1 Tab by mouth daily. colchicine 0.6 mg tablet Take 2 tabs now and one in 6 hours if needed. Repeat in 6 hours as needed  Indications: acute gouty arthritis  
  
 fluticasone 50 mcg/actuation nasal spray Commonly known as:  [place] 2 Sprays by Both Nostrils route daily. * gabapentin 300 mg capsule Commonly known as:  NEURONTIN Take 1 Cap by mouth three (3) times daily. Indications: NEUROPATHIC PAIN  
  
 * gabapentin 300 mg capsule Commonly known as:  NEURONTIN Take 300 mg by mouth two (2) times a day.  
  
 garlic 9,382 mg Cap Take 1 Cap by mouth daily. glimepiride 1 mg tablet Commonly known as:  AMARYL  
TAKE 1 TABLET BY MOUTH DAILY BEFORE BREAKFAST  
  
 glucose blood VI test strips strip Commonly known as:  FREESTYLE LITE STRIPS  
USE TWICE DAILY. isosorbide mononitrate ER 30 mg tablet Commonly known as:  IMDUR Take 1 Tab by mouth daily. Lancets Misc Use to test blood sugar twice daily  
  
 losartan 100 mg tablet Commonly known as:  COZAAR Take 1 Tab by mouth daily. multivit-min-FA-lycopen-lutein 0.4-300-250 mg-mcg-mcg Tab Take 1 Tab by mouth daily. potassium chloride 10 mEq tablet Commonly known as:  KLOR-CON  
TAKE 1 TABLET BY MOUTH TWICE DAILY pravastatin 40 mg tablet Commonly known as:  PRAVACHOL Take 1 Tab by mouth nightly. raNITIdine 300 mg tablet Commonly known as:  ZANTAC TAKE 1 TABLET BY MOUTH ONCE DAILY  
  
 sertraline 100 mg tablet Commonly known as:  ZOLOFT Take 2 Tabs by mouth daily. terazosin 2 mg capsule Commonly known as:  HYTRIN Take 1 Cap by mouth nightly. traZODone 50 mg tablet Commonly known as:  Herbie Claudio  
 TAKE 1 TO 2 TABLETS BY MOUTH AT BEDTIME AS NEEDED  
  
 triamcinolone acetonide 0.025 % topical cream  
Commonly known as:  KENALOG Apply  to affected area two (2) times daily as needed. use thin layer VITAMIN C 250 mg tablet Generic drug:  ascorbic acid (vitamin C) Take  by mouth. VOLTAREN 1 % Gel Generic drug:  diclofenac Apply  to affected area four (4) times daily. ZINC ACETATE PO Take 1,000 mg by mouth. * Notice: This list has 2 medication(s) that are the same as other medications prescribed for you. Read the directions carefully, and ask your doctor or other care provider to review them with you. We Performed the Following  DIABETES FOOT EXAM [7 Custom] Follow-up Instructions Return in about 1 month (around 4/28/2018) for recheck BP, low BP episodes. To-Do List   
 03/28/2018 Lab:  HEMOGLOBIN A1C W/O EAG   
  
 03/28/2018 Lab:  LIPID PANEL   
  
 03/28/2018 Lab:  METABOLIC PANEL, BASIC Introducing [facility] & HEALTH SERVICES! Dear Filipe Gilbert: Thank you for requesting a [employer] account. Our records indicate that you already have an active [employer] account. You can access your account anytime at [email or web address] Did you know that you can access your hospital and ER discharge instructions at any time in [employer]? You can also review all of your test results from your hospital stay or ER visit. Additional Information If you have questions, please visit the Frequently Asked Questions section of the [employer] website at [email or web address] Remember, [employer] is NOT to be used for urgent needs. For medical emergencies, dial 911. Now available from your iPhone and Android! Please provide this summary of care documentation to your next provider. Your primary care clinician is listed as [facility].  If you have any questions after today's visit, please call [phone].

## 2018-03-28 NOTE — PROGRESS NOTES
HISTORY OF PRESENT ILLNESS  Rebekah Guzmán is a 79 y.o. male. Visit Vitals    /70    Pulse 86    Temp 97.8 °F (36.6 °C) (Oral)    Resp 17    Ht 6' 2\" (1.88 m)    Wt 233 lb (105.7 kg)    SpO2 97%    BMI 29.92 kg/m2       HPI Comments: He reports having episodes of low BP. Usually if walking a long time or standing a long time. His legs will feel weak and he has to sit down. Gets better if he sits and rests  Only new med is chlorthalidone. It was changed due to low K+ and gout. Hypertension    The history is provided by the patient. This is a chronic problem. The current episode started more than 1 week ago. The problem has not changed since onset. Risk factors include a sedentary lifestyle, obesity and male gender. Diabetes   The history is provided by the patient. This is a chronic problem. The current episode started more than 1 week ago. The problem occurs daily. The problem has not changed since onset. Exacerbated by: diet. The symptoms are relieved by medications (diet). Cholesterol Problem         ROS    Physical Exam   Constitutional: He is oriented to person, place, and time. He appears well-developed and well-nourished. No distress. Cardiovascular: Normal rate and regular rhythm. Pulmonary/Chest: Effort normal and breath sounds normal.   Musculoskeletal: He exhibits no edema. Neurological: He is alert and oriented to person, place, and time. Diabetic foot exam:     Left: Reflexes 1+     Vibratory sensation diminished    Proprioception normal   Sharp/dull discrimination     Filament test reduced sensation with micro filament   Pulse DP: 1+ (weak)   Pulse PT:    Deformities: Mild - high arches      Right: Reflexes 1+   Vibratory sensation diminished   Proprioception normal   Sharp/dull discrimination    Filament test reduced sensation with micro filament   Pulse DP: 1+ (weak)   Pulse PT:    Deformities: Mild - high arches     Skin: Skin is warm and dry. He is not diaphoretic. Psychiatric: He has a normal mood and affect. Nursing note and vitals reviewed. ASSESSMENT and PLAN    ICD-10-CM ICD-9-CM    1. Type 2 diabetes mellitus with nephropathy (HCC) E11.21 250.40  DIABETES FOOT EXAM     671.73 METABOLIC PANEL, BASIC      HEMOGLOBIN A1C W/O EAG   2. Atherosclerosis of native coronary artery of native heart with angina pectoris ([facility] [address] I25.119 414.01 LIPID PANEL     413.9    3. Dyslipidemia E78.5 272.4 LIPID PANEL   4. Hypertension E07.4 395.02 METABOLIC PANEL, BASIC   5.  Diabetic polyneuropathy associated with type 2 diabetes mellitus (HCC) E11.42 250.60      357.2        Stop the chlorthalidone for now    Update lab today    F/u one month for recheck on dizziness with low BP  Consider requesting tilt table testing given his neuropathy

## 2018-04-30 ENCOUNTER — OFFICE VISIT (OUTPATIENT)
Dept: INTERNAL MEDICINE CLINIC | Age: 68
End: 2018-04-30

## 2018-04-30 VITALS
HEART RATE: 68 BPM | RESPIRATION RATE: 18 BRPM | BODY MASS INDEX: 29.95 KG/M2 | WEIGHT: 233.4 LBS | DIASTOLIC BLOOD PRESSURE: 67 MMHG | TEMPERATURE: 95.9 F | SYSTOLIC BLOOD PRESSURE: 121 MMHG | HEIGHT: 74 IN | OXYGEN SATURATION: 95 %

## 2018-04-30 DIAGNOSIS — G89.29 CHRONIC BILATERAL LOW BACK PAIN WITHOUT SCIATICA: ICD-10-CM

## 2018-04-30 DIAGNOSIS — M54.50 CHRONIC BILATERAL LOW BACK PAIN WITHOUT SCIATICA: ICD-10-CM

## 2018-04-30 DIAGNOSIS — R60.0 PEDAL EDEMA: ICD-10-CM

## 2018-04-30 DIAGNOSIS — Z76.0 MEDICATION REFILL: ICD-10-CM

## 2018-04-30 DIAGNOSIS — M54.2 NECK PAIN: ICD-10-CM

## 2018-04-30 DIAGNOSIS — Z12.11 SCREEN FOR COLON CANCER: ICD-10-CM

## 2018-04-30 DIAGNOSIS — I11.9 BENIGN HYPERTENSIVE HEART DISEASE WITHOUT HEART FAILURE: Primary | Chronic | ICD-10-CM

## 2018-04-30 RX ORDER — CHLORTHALIDONE 25 MG/1
25 TABLET ORAL DAILY
Qty: 90 TAB | Refills: 4 | Status: SHIPPED | OUTPATIENT
Start: 2018-04-30 | End: 2018-11-06

## 2018-04-30 RX ORDER — TERAZOSIN 2 MG/1
2 CAPSULE ORAL
Qty: 90 CAP | Refills: 3 | Status: SHIPPED | OUTPATIENT
Start: 2018-04-30 | End: 2022-01-25 | Stop reason: SDUPTHER

## 2018-04-30 NOTE — PROGRESS NOTES
HISTORY OF PRESENT ILLNESS  Scott Sidhu is a 76 y.o. male. Visit Vitals    /67 (BP 1 Location: Right arm, BP Patient Position: Sitting)    Pulse 68    Temp 95.9 °F (35.5 °C) (Oral)    Resp 18    Ht 6' 2\" (1.88 m)    Wt 233 lb 6.4 oz (105.9 kg)    SpO2 95%    BMI 29.97 kg/m2       HPI Comments: Incidentally he feel yesterday landing on his knees. Scraped both but is otherwise OK. Hypertension    The history is provided by the patient. This is a chronic problem. The current episode started more than 1 week ago. The problem has not changed since onset. Associated symptoms include neck pain. Pertinent negatives include no chest pain, no palpitations and no shortness of breath. Risk factors include a sedentary lifestyle, hypertension, male gender and stress. Joint Pain    The history is provided by the patient (low back and cervical spine. ). This is a chronic problem. The current episode started more than 1 week ago. The problem occurs daily. The problem has been gradually worsening. Associated symptoms include back pain and neck pain. Pertinent negatives include no tingling. Review of Systems   Constitutional: Negative for chills and fever. Respiratory: Negative for shortness of breath. Cardiovascular: Negative for chest pain and palpitations. Musculoskeletal: Positive for back pain, joint pain and neck pain. Neurological: Negative for tingling, sensory change and focal weakness. Physical Exam   Constitutional: He is oriented to person, place, and time. He appears well-developed and well-nourished. No distress. Cardiovascular: Normal rate and regular rhythm. Pulmonary/Chest: Effort normal and breath sounds normal.   Musculoskeletal: He exhibits edema (1+ edema). Left knee looks great otherwise. Neurological: He is alert and oriented to person, place, and time. Skin: Skin is warm and dry. He is not diaphoretic.    Abrasions on both knees   Psychiatric: He has a normal mood and affect. Nursing note and vitals reviewed. ASSESSMENT and PLAN    ICD-10-CM ICD-9-CM    1. Hypertension I11.9 402.10    2. Neck pain M54.2 723.1 XR SPINE CERV W OBL/FLEX/EXT MIN 6 V COMP   3. Chronic bilateral low back pain without sciatica M54.5 724.2 XR SPINE LUMB COMP W BEND    G89.29 338.29    4. Screen for colon cancer Z12.11 V76.51 REFERRAL TO GASTROENTEROLOGY   5. Pedal edema R60.0 782. 3 chlorthalidone (HYGROTEN) 25 mg tablet   6.  Medication refill Z76.0 V68.1        BP controlled  Restart hygroton for the edema    Update xrays of neck and back    Incidental referral to GI for f/u colonoscopy    F/u one month to discuss the discuss the back and neck

## 2018-04-30 NOTE — PROGRESS NOTES
ROOM # 6  Pt presents today for 1m f/u htn. Pt reports headaches since last visit. Pt states where neck is fused it hurts and hard to turn neck at times. Quoc Campo presents today for   Chief Complaint   Patient presents with    Hypertension     1m f/u       Quoc Campo preferred language for health care discussion is english/other. Is someone accompanying this pt? no    Is the patient using any DME equipment during [address]? Yes, cane    Depression Screening:  PHQ over the last two weeks 2/27/2018 2/22/2018 7/11/2017 5/11/2017 3/9/2017 9/6/2016 5/2/2016   PHQ Not Done - - Active Diagnosis of Depression or Bipolar Disorder Active Diagnosis of Depression or Bipolar Disorder - Patient Decline Active Diagnosis of Depression or Bipolar Disorder   Little interest or pleasure in doing things Not at all Not at all Nearly every day - Not at all Nearly every day -   Feeling down, depressed or hopeless Not at all Not at all Several days - Not at all Nearly every day -   Total Score PHQ 2 0 0 4 - 0 6 -       Learning Assessment:  Learning Assessment 4/13/2015 4/17/2014 12/12/2013   PRIMARY LEARNER Patient Patient -   HIGHEST LEVEL OF EDUCATION - PRIMARY LEARNER  SOME COLLEGE - SOME COLLEGE   BARRIERS PRIMARY LEARNER NONE - [address] CAREGIVER No - -   [facility]    NEED - - No   LEARNER PREFERENCE PRIMARY LISTENING DEMONSTRATION PICTURES   LEARNING SPECIAL TOPICS - - none   ANSWERED BY patient - -   RELATIONSHIP SELF - -       Abuse Screening:  Abuse Screening Questionnaire 11/13/2017 4/13/2015   Do you ever feel afraid of your partner? N N   Are you in a relationship with someone who physically or mentally threatens you? N N   Is it safe for you to go home? Y Y       Fall Risk  Fall Risk Assessment, last 12 mths 4/30/2018 2/27/2018 2/22/2018 1/30/2018 11/13/2017 7/11/2017 5/11/2017   Able to walk? Yes Yes Yes Yes Yes Yes Yes   Fall in past 12 months?  Yes Yes No Yes Yes Yes Yes   Fall with injury? No No - Yes Yes Yes Yes   Number of falls in past 12 months 1 5 - 4 1 1 1   Fall Risk Score 1 5 - 5 2 2 2       Health Maintenance reviewed and discussed per provider. Yes    Kathe Hodgson is due for   Health Maintenance Due   Topic Date Due    COLONOSCOPY  06/01/2018     Please order/place referral if appropriate. Advance Directive:  1. Do you have an advance directive in place? Patient Reply: no    2. If not, would you like material regarding how to put one in place? Patient Reply: no    Coordination of Care:  1. Have you been to the ER, urgent care clinic since your last visit? Hospitalized since your last visit? no    2. Have you seen or consulted any other health care providers outside of the [address] [place] [address] since your last visit? Include any pap smears or colon screening.  no

## 2018-04-30 NOTE — MR AVS SNAPSHOT
[facility] 
 
 
 [address] [id number] 
[phone] Patient: Von Aldana MRN: [id number] JDD:[id number] Visit Information Date & Time Provider Department Dept. Phone Encounter #  
 4/30/2018  2:45 PM [facility] [phone] Follow-up Instructions Return in about 1 month (around 5/30/2018) for f/u neck and low back pain and xrays. Your Appointments 7/31/2018  3:15 PM  
Follow Up with Yamilex Palmer MD  
Cardio Specialist at [address]) Appt Note: 6 months [address] [facility] [address] [facility] [address]  
  
   
 [id number] [place] [address] Upcoming Health Maintenance Date Due COLONOSCOPY 6/1/2018 Influenza Age 5 to Adult 8/1/2018 HEMOGLOBIN A1C Q6M 9/28/2018 EYE EXAM RETINAL OR DILATED Q1 11/9/2018 MICROALBUMIN Q1 11/13/2018 MEDICARE YEARLY EXAM 11/14/2018 FOOT EXAM Q1 3/28/2019 LIPID PANEL Q1 3/28/2019 GLAUCOMA SCREENING Q2Y 11/9/2019 DTaP/Tdap/Td series (3 - Td) 5/1/2027 Allergies as of 4/30/2018  Review Complete On: 4/30/2018 By: Yelitza Palomares MD  
  
 Severity Noted Reaction Type Reactions Beta Blocker [Beta-blockers (Beta-adrenergic Blocking Agts)]    Other (comments)  
 symptomatic bradycardia Codeine   Side Effect Other (comments) Passed//fainted 
patient doesn't recall reaction, occurred as a teenager Darvocet A500 [Propoxyphene N-acetaminophen]   Systemic Other (comments)  
 throat swelling Dexbrompheniramine-pseudoephed    Other (comments) Facial swelling. 1980\"s Lasix [Furosemide]  09/06/2016   Systemic Itching Lipitor [Atorvastatin]  03/16/2016    Other (comments) Sulfa (Sulfonamide Antibiotics)  10/18/2016    Hives Current Immunizations  Reviewed on 3/12/2018 Name Date Influenza High Dose Vaccine PF 11/13/2017, 10/18/2016 Influenza Vaccine 10/1/2017 12:00 AM, 9/30/2014, 9/2/2014 12:00 AM  
 Influenza Vaccine Split 10/1/2012 Pneumococcal Conjugate (PCV-13) 4/28/2015 Pneumococcal Polysaccharide (PPSV-23) 10/1/2016 12:00 AM, 9/6/2016 10:15 AM  
 Tdap 5/1/2017  5:39 PM, 8/12/2013 10:56 AM  
 ZZZ-RETIRED (DO NOT USE) Pneumococcal Vaccine (Unspecified Type) 12/5/2009 Zoster Vaccine, Live 1/25/2013 Not reviewed this visit You Were Diagnosed With   
  
 Codes Comments Benign hypertensive heart disease without heart failure    -  Primary ICD-10-CM: I11.9 ICD-9-CM: 402.10 Neck pain     ICD-10-CM: M54.2 ICD-9-CM: 723.1 Chronic bilateral low back pain without sciatica     ICD-10-CM: M54.5, G89.29 ICD-9-CM: 724.2, 338.29 Screen for colon cancer     ICD-10-CM: Z12.11 ICD-9-CM: V76.51 Pedal edema     ICD-10-CM: R60.0 ICD-9-CM: 025. 3 Medication refill     ICD-10-CM: Z76.0 ICD-9-CM: V68.1 Vitals BP Pulse Temp Resp Height(growth percentile) Weight(growth percentile) 121/67 (BP 1 Location: Right arm, BP Patient Position: Sitting) 68 95.9 °F (35.5 °C) (Oral) 18 6' 2\" (1.88 m) 233 lb 6.4 oz (105.9 kg) SpO2 BMI Smoking Status 95% 29.97 kg/m2 Former Smoker Vitals History BMI and BSA Data Body Mass Index Body Surface Area  
 29.97 kg/m 2 2.35 m 2 Preferred Pharmacy Pharmacy Name Phone [facility] [address] [phone] Your Updated Medication List  
  
   
This list is accurate as of 4/30/18  4:08 PM.  Always use your most recent med list. amLODIPine 5 mg tablet Commonly known as:  Virginia Castellanos Take 1 Tab by mouth two (2) times a day. aspirin delayed-release 81 mg tablet Take 1 tablet by mouth daily. chlorthalidone 25 mg tablet Commonly known as:  Debbie Meza  
 Take 1 Tab by mouth daily. Indications: Edema  
  
 cholecalciferol 1,000 unit tablet Commonly known as:  VITAMIN D3 Take  by mouth daily. clopidogrel 75 mg Tab Commonly known as:  PLAVIX Take 1 Tab by mouth daily. colchicine 0.6 mg tablet Take 2 tabs now and one in 6 hours if needed. Repeat in 6 hours as needed  Indications: acute gouty arthritis  
  
 fluticasone 50 mcg/actuation nasal spray Commonly known as:  Cyndra [address] 2 Sprays by Both Nostrils route daily. * gabapentin 300 mg capsule Commonly known as:  NEURONTIN Take 1 Cap by mouth three (3) times daily. Indications: NEUROPATHIC PAIN  
  
 * gabapentin 300 mg capsule Commonly known as:  NEURONTIN Take 300 mg by mouth two (2) times a day.  
  
 garlic 9,973 mg Cap Take 1 Cap by mouth daily. glimepiride 1 mg tablet Commonly known as:  AMARYL  
TAKE 1 TABLET BY MOUTH DAILY BEFORE BREAKFAST  
  
 glucose blood VI test strips strip Commonly known as:  FREESTYLE LITE STRIPS  
USE TWICE DAILY. isosorbide mononitrate ER 30 mg tablet Commonly known as:  IMDUR Take 1 Tab by mouth daily. Lancets Misc Use to test blood sugar twice daily  
  
 losartan 100 mg tablet Commonly known as:  COZAAR Take 1 Tab by mouth daily. multivit-min-FA-lycopen-lutein 0.4-300-250 mg-mcg-mcg Tab Take 1 Tab by mouth daily. potassium chloride 10 mEq tablet Commonly known as:  KLOR-CON  
TAKE 1 TABLET BY MOUTH TWICE DAILY pravastatin 40 mg tablet Commonly known as:  PRAVACHOL Take 1 Tab by mouth nightly. raNITIdine 300 mg tablet Commonly known as:  ZANTAC TAKE 1 TABLET BY MOUTH ONCE DAILY  
  
 sertraline 100 mg tablet Commonly known as:  ZOLOFT Take 2 Tabs by mouth daily. terazosin 2 mg capsule Commonly known as:  HYTRIN Take 1 Cap by mouth nightly. traZODone 50 mg tablet Commonly known as:  DESYREL  
TAKE 1 TO 2 TABLETS BY MOUTH AT BEDTIME AS NEEDED  
  
 triamcinolone acetonide 0.025 % topical cream  
Commonly known as:  KENALOG Apply  to affected area two (2) times daily as needed. use thin layer VITAMIN C 250 mg tablet Generic drug:  ascorbic acid (vitamin C) Take  by mouth. VOLTAREN 1 % Gel Generic drug:  diclofenac Apply  to affected area four (4) times daily. ZINC ACETATE PO Take 1,000 mg by mouth. * Notice: This list has 2 medication(s) that are the same as other medications prescribed for you. Read the directions carefully, and ask your doctor or other care provider to review them with you. Prescriptions Sent to Pharmacy Refills  
 terazosin (HYTRIN) 2 mg capsule 3 Sig: Take 1 Cap by mouth nightly. Class: Normal  
 Pharmacy: [facility] Ph #: [phone] Route: Oral  
 chlorthalidone (HYGROTEN) 25 mg tablet 4 Sig: Take 1 Tab by mouth daily. Indications: Edema Class: Normal  
 Pharmacy: [facility] Ph #: [phone] Route: Oral  
  
We Performed the Following REFERRAL TO GASTROENTEROLOGY [RJG97 Custom] Comments:  
 Due for screening colonoscopy Follow-up Instructions Return in about 1 month (around 5/30/2018) for f/u neck and low back pain and xrays. To-Do List   
 04/30/2018 Imaging:  XR SPINE CERV W OBL/FLEX/EXT MIN 6 V COMP   
  
 04/30/2018 Imaging:  XR SPINE LUMB COMP W BEND Referral Information Referral ID Referred By Referred To  
  
 [id number] Lakeisha Valverde MD   
   [address] [place] [address] [address] [place] [address] Phone: [phone] Fax: [phone] Visits Status Start Date End Date 1 New Request 4/30/18 4/30/19  If your referral has a status of pending review or denied, additional information will be sent to support the outcome of this decision. Introducing [facility]! Dear Woodrow Estrella: Thank you for requesting a [employer] account. Our records indicate that you already have an active [employer] account. You can access your account anytime at [email or web address] Did you know that you can access your hospital and ER discharge instructions at any time in [employer]? You can also review all of your test results from your hospital stay or ER visit. Additional Information If you have questions, please visit the Frequently Asked Questions section of the [employer] website at [email or web address] Remember, [employer] is NOT to be used for urgent needs. For medical emergencies, dial 911. Now available from your iPhone and Android! Please provide this summary of care documentation to your next provider. Your primary care clinician is listed as [facility]. If you have any questions after today's visit, please call [phone].

## 2018-05-02 ENCOUNTER — HOSPITAL ENCOUNTER (OUTPATIENT)
Dept: GENERAL RADIOLOGY | Age: 68
Discharge: HOME | End: 2018-05-02
Payer: MEDICARE

## 2018-05-02 DIAGNOSIS — G89.29 CHRONIC BILATERAL LOW BACK PAIN WITHOUT SCIATICA: ICD-10-CM

## 2018-05-02 DIAGNOSIS — M54.2 NECK PAIN: ICD-10-CM

## 2018-05-02 DIAGNOSIS — M54.50 CHRONIC BILATERAL LOW BACK PAIN WITHOUT SCIATICA: ICD-10-CM

## 2018-05-02 PROCEDURE — 72114 X-RAY EXAM L-S SPINE BENDING: CPT

## 2018-05-02 PROCEDURE — 72052 X-RAY EXAM NECK SPINE 6/>VWS: CPT

## 2018-05-18 ENCOUNTER — TELEPHONE (OUTPATIENT)
Dept: CARDIOLOGY CLINIC | Age: 68
End: 2018-05-18

## 2018-05-18 NOTE — LETTER
6/5/2018 9:38 AM 
 
Mr. Cyndy Kelly 
[address] [facility] [address] [address] Cyndy Kelly was seen in our office on 1/30/2018 for cardiac evaluation. From a cardiac standpoint he may proceed with upcoming colonoscopy, but should remain on his **Aspirin. He will need to hold Plavix for 5 days prior. Please feel free to contact our office if you have any questions regarding this patient. Sincerely, Dylon Samayoa MD

## 2018-05-18 NOTE — TELEPHONE ENCOUNTER
Patient having colonoscopy in July, need to know if he can hold Plavix for 5 days prior.      Will send to Dr. Jody Lee for review

## 2018-05-28 ENCOUNTER — HOSPITAL ENCOUNTER (EMERGENCY)
Age: 68
Discharge: HOME | End: 2018-05-28
Attending: EMERGENCY MEDICINE
Payer: MEDICARE

## 2018-05-28 VITALS
DIASTOLIC BLOOD PRESSURE: 64 MMHG | HEART RATE: 76 BPM | TEMPERATURE: 98.2 F | SYSTOLIC BLOOD PRESSURE: 116 MMHG | RESPIRATION RATE: 17 BRPM | OXYGEN SATURATION: 100 % | WEIGHT: 230 LBS | BODY MASS INDEX: 29.53 KG/M2

## 2018-05-28 DIAGNOSIS — S61.412A LACERATION OF LEFT HAND WITHOUT FOREIGN BODY, INITIAL ENCOUNTER: Primary | ICD-10-CM

## 2018-05-28 PROCEDURE — 75810000293 HC SIMP/SUPERF WND  RPR

## 2018-05-28 PROCEDURE — 99282 EMERGENCY DEPT VISIT SF MDM: CPT

## 2018-05-28 NOTE — DISCHARGE INSTRUCTIONS
Cuts: Care Instructions  Your Care Instructions  A cut can happen anywhere on your body. Stitches, staples, skin adhesives, or pieces of tape called Steri-Strips are sometimes used to keep the edges of a cut together and help it heal. Steri-Strips can be used by themselves or with stitches or staples. Sometimes cuts are left open. If the cut went deep and through the skin, the doctor may have closed the cut in two layers. A deeper layer of stitches brings the deep part of the cut together. These stitches will dissolve and don't need to be removed. The upper layer closure, which could be stitches, staples, Steri-Strips, or adhesive, is what you see on the cut. A cut is often covered by a bandage. The doctor has checked you carefully, but problems can develop later. If you notice any problems or new symptoms, get medical treatment right away. Follow-up care is a key part of your treatment and safety. Be sure to make and go to all appointments, and call your doctor if you are having problems. It's also a good idea to know your test results and keep a list of the medicines you take. How can you care for yourself at home? If a cut is open or closed  · Prop up the sore area on a pillow anytime you sit or lie down during the next 3 days. Try to keep it above the level of your heart. This will help reduce swelling. · Keep the cut dry for the first 24 to 48 hours. After this, you can shower if your doctor okays it. Pat the cut dry. · Don't soak the cut, such as in a bathtub. Your doctor will tell you when it's safe to get the cut wet. · After the first 24 to 48 hours, clean the cut with soap and water 2 times a day unless your doctor gives you different instructions. ¨ Don't use hydrogen peroxide or alcohol, which can slow healing. ¨ You may cover the cut with a thin layer of petroleum jelly and a nonstick bandage.   ¨ If the doctor put a bandage over the cut, put on a new bandage after cleaning the cut or if the bandage gets wet or dirty. · Avoid any activity that could cause your cut to reopen. · Be safe with medicines. Read and follow all instructions on the label. ¨ If the doctor gave you a prescription medicine for pain, take it as prescribed. ¨ If you are not taking a prescription pain medicine, ask your doctor if you can take an over-the-counter medicine. If the cut is closed with stitches, staples, or Steri-Strips  · Follow the above instructions for open or closed cuts. · Do not remove the stitches or staples on your own. Your doctor will tell you when to come back to have the stitches or staples removed. · Leave Steri-Strips on until they fall off. If the cut is closed with a skin adhesive  · Follow the above instructions for open or closed cuts. · Leave the skin adhesive on your skin until it falls off on its own. This may take 5 to 10 days. · Do not scratch, rub, or pick at the adhesive. · Do not put the sticky part of a bandage directly on the adhesive. · Do not put any kind of ointment, cream, or lotion over the area. This can make the adhesive fall off too soon. Do not use hydrogen peroxide or alcohol, which can slow healing. When should you call for help? Call your doctor now or seek immediate medical care if:  ? · You have new pain, or your pain gets worse. ? · The skin near the cut is cold or pale or changes color. ? · You have tingling, weakness, or numbness near the cut.   ? · The cut starts to bleed, and blood soaks through the bandage. Oozing small amounts of blood is normal.   ? · You have trouble moving the area near the cut.   ? · You have symptoms of infection, such as:  ¨ Increased pain, swelling, warmth, or redness around the cut. ¨ Red streaks leading from the cut. ¨ Pus draining from the cut. ¨ A fever. ? Watch closely for changes in your health, and be sure to contact your doctor if:  ? · The cut reopens. ? · You do not get better as expected.    Where can you learn more? Go to http://fuad-william.info/. Enter M735 in the search box to learn more about \"Cuts: Care Instructions. \"  Current as of: March 20, 2017  Content Version: 11.4  © 0494-2644 [employer]. Care instructions adapted under license by [employer] (which disclaims liability or warranty for this information). If you have questions about a medical condition or this instruction, always ask your healthcare professional. [address] any warranty or liability for your use of this information.

## 2018-05-28 NOTE — ED PROVIDER NOTES
EMERGENCY DEPARTMENT HISTORY AND PHYSICAL EXAM    2:22 PM      Date: 5/28/2018  Patient Name: Nehal Wyatt    History of Presenting Illness     Chief Complaint   Patient presents with    Laceration         History Provided By: Patient    Chief Complaint: laceration to L hand 1 hour ago. Per patient last tetanus shot less than 5 yrs. Duration:  today  Timing:  Acute  Location: as noted above  Quality: Aching  Severity: Mild  Modifying Factors: none   Associated Symptoms: denies any other associated signs or symptoms      Additional History (Context): Nehal Wyatt is a 76 y.o. male with HPI as noted above who presents with complain as noted above. PCP: Shahnaz Lyles MD    Current Outpatient Prescriptions   Medication Sig Dispense Refill    terazosin (HYTRIN) 2 mg capsule Take 1 Cap by mouth nightly. 90 Cap 3    chlorthalidone (HYGROTEN) 25 mg tablet Take 1 Tab by mouth daily. Indications: Edema 90 Tab 4    traZODone (DESYREL) 50 mg tablet TAKE 1 TO 2 TABLETS BY MOUTH AT BEDTIME AS NEEDED 180 Tab 4    gabapentin (NEURONTIN) 300 mg capsule Take 300 mg by mouth two (2) times a day.  colchicine 0.6 mg tablet Take 2 tabs now and one in 6 hours if needed. Repeat in 6 hours as needed  Indications: acute gouty arthritis 15 Tab 1    ZINC ACETATE PO Take 1,000 mg by mouth.  diclofenac (VOLTAREN) 1 % gel Apply  to affected area four (4) times daily.  glimepiride (AMARYL) 1 mg tablet TAKE 1 TABLET BY MOUTH DAILY BEFORE BREAKFAST 90 Tab 0    potassium chloride (KLOR-CON) 10 mEq tablet TAKE 1 TABLET BY MOUTH TWICE DAILY 180 Tab 4    glucose blood VI test strips (FREESTYLE LITE STRIPS) strip USE TWICE DAILY. 200 Strip 5    Lancets misc Use to test blood sugar twice daily 200 Each 5    gabapentin (NEURONTIN) 300 mg capsule Take 1 Cap by mouth three (3) times daily. Indications: NEUROPATHIC PAIN (Patient taking differently: Take 300 mg by mouth three (3) times daily.  Pt takes 300mg twice daily  Indications: NEUROPATHIC PAIN) 90 Cap 1    sertraline (ZOLOFT) 100 mg tablet Take 2 Tabs by mouth daily. 180 Tab 5    amLODIPine (NORVASC) 5 mg tablet Take 1 Tab by mouth two (2) times a day. 180 Tab 3    raNITIdine (ZANTAC) 300 mg tablet TAKE 1 TABLET BY MOUTH ONCE DAILY 90 Tab 1    cholecalciferol (VITAMIN D3) 1,000 unit tablet Take  by mouth daily.  ascorbic acid, vitamin C, (VITAMIN C) 250 mg tablet Take  by mouth.  pravastatin (PRAVACHOL) 40 mg tablet Take 1 Tab by mouth nightly. 90 Tab 3    clopidogrel (PLAVIX) 75 mg tablet Take 1 Tab by mouth daily. 90 Tab 3    isosorbide mononitrate ER (IMDUR) 30 mg tablet Take 1 Tab by mouth daily. 90 Tab 3    losartan (COZAAR) 100 mg tablet Take 1 Tab by mouth daily. 90 Tab 3    garlic 7,584 mg cap Take 1 Cap by mouth daily.  multivit-min-FA-lycopen-lutein 0.4-300-250 mg-mcg-mcg tab Take 1 Tab by mouth daily.  triamcinolone acetonide (KENALOG) 0.025 % topical cream Apply  to affected area two (2) times daily as needed. use thin layer       fluticasone (FLONASE) 50 mcg/actuation nasal spray 2 Sprays by Both Nostrils route daily. 1 Bottle 11    aspirin delayed-release 81 mg tablet Take 1 tablet by mouth daily.  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 Km Proctor      7/14  bilat    HX CERVICAL LAMINECTOMY      [address]    HX ORTHOPAEDIC      right knee replacement    HX TUMOR REMOVAL      3/13  wide excision right thigh sarcoma    HX TYMPANOMASTOIDECTOMY         Family History:  Family History   Problem Relation Age of Onset    Diabetes Father     Heart Disease Father      cardiomyopathydementia    Hypertension Father     Cancer Father      prostate    Parkinsonism Father     Dementia Father     High Cholesterol Father     Kidney Disease Father     Other Father      colon polyps    Headache Mother     Psychiatric Disorder Mother     Kidney Disease Mother     Hypertension Mother     High Cholesterol Mother     Other Mother      GERD/polymyalgia rheumaticacolon osiris    Heart Disease Mother     Cancer Sister        Social History:  Social History   Substance Use Topics    Smoking status: Former Smoker     Packs/day: 1.00     Years: 15.00    Smokeless tobacco: Never Used      Comment: Pt stated she does use vapor cig.  Alcohol use No       Allergies: Allergies   Allergen Reactions    Beta Blocker [Beta-Blockers (Beta-Adrenergic Blocking Agts)] Other (comments)     symptomatic bradycardia    Codeine Other (comments)     Passed//fainted  patient doesn't recall reaction, occurred as a teenager    Darvocet A500 [Propoxyphene N-Acetaminophen] Other (comments)     throat swelling    Dexbrompheniramine-Pseudoephed Other (comments)     Facial swelling. 1980\"s    Lasix [Furosemide] Itching    Lipitor [Atorvastatin] Other (comments)    Sulfa (Sulfonamide Antibiotics) Hives         Review of Systems       Review of Systems   Musculoskeletal:        Laceration to hand   All other systems reviewed and are negative.         Physical Exam     Visit Vitals    /64 (BP 1 Location: Right arm, BP Patient Position: At rest)    Pulse 76    Temp 98.2 °F (36.8 °C)    Resp 17    Wt 104.3 kg (230 lb)    SpO2 100%    BMI 29.53 kg/m2         Physical Exam   Constitutional: He is oriented to person, place, and time. He appears well-developed and well-nourished. Cardiovascular: Normal rate, regular rhythm and normal heart sounds. Pulmonary/Chest: Effort normal and breath sounds normal. No respiratory distress. He has no wheezes. Abdominal: Soft. Bowel sounds are normal. He exhibits no distension. There is no tenderness. Musculoskeletal:        Hands:  Neurological: He is alert and oriented to person, place, and time. Skin: Skin is warm. Psychiatric: He has a normal mood and affect. His behavior is normal. Judgment and thought content normal.         Diagnostic Study Results     Labs -  No results found for this or any previous visit (from the past 12 hour(s)). Radiologic Studies -   No orders to display         Medical Decision Making   I am the first provider for this patient. I reviewed the vital signs, available nursing notes, past medical history, past surgical history, family history and social history. Vital Signs-Reviewed the patient's vital signs. Provider Notes (Medical Decision Making):    Wound Closure by Adhesive  Date/Time: 5/28/2018 2:28 PM  Performed by: Irene Valdes  Authorized by: Irene Valdes     Consent:     Consent obtained:  Verbal    Consent given by:  Patient    Risks discussed:  Infection, pain, retained foreign body, poor cosmetic result, tendon damage, poor wound healing, vascular damage, nerve damage and need for additional repair  Laceration details:     Location:  Hand    Hand location:  L hand, dorsum    Length (cm):  1.5    Depth (mm):  1  Repair type:     Repair type:  Simple  Pre-procedure details:     Preparation:  Patient was prepped and draped in usual sterile fashion  Exploration:     Hemostasis achieved with:  Direct pressure  Treatment:     Area cleansed with:  Betadine    Amount of cleaning: Standard    Irrigation solution:  Sterile water    Visualized foreign bodies/material removed: no    Skin repair:     Repair method:  Sutures    Suture size:  3-0  Approximation:     Approximation:  Close    Vermilion border: well-aligned    Post-procedure details:     Patient tolerance of procedure: Tolerated well, no immediate complications            Diagnosis     Clinical Impression:   1. Laceration of left hand without foreign body, initial encounter        Disposition: HOME    Follow-up Information     Follow up With Details Comments Contact Info    [facility] EMERGENCY DEPT  For suture removal [address] [place] [address]    Lenka Pinzon MD In 2 days  [address]  [address] [facility] [place]  [phone]             Patient's Medications   Start Taking    No medications on file   Continue Taking    AMLODIPINE (NORVASC) 5 MG TABLET    Take 1 Tab by mouth two (2) times a day. ASCORBIC ACID, VITAMIN C, (VITAMIN C) 250 MG TABLET    Take  by mouth. ASPIRIN DELAYED-RELEASE 81 MG TABLET    Take 1 tablet by mouth daily. CHLORTHALIDONE (HYGROTEN) 25 MG TABLET    Take 1 Tab by mouth daily. Indications: Edema    CHOLECALCIFEROL (VITAMIN D3) 1,000 UNIT TABLET    Take  by mouth daily. CLOPIDOGREL (PLAVIX) 75 MG TABLET    Take 1 Tab by mouth daily. COLCHICINE 0.6 MG TABLET    Take 2 tabs now and one in 6 hours if needed. Repeat in 6 hours as needed  Indications: acute gouty arthritis    DICLOFENAC (VOLTAREN) 1 % GEL    Apply  to affected area four (4) times daily. FLUTICASONE (FLONASE) 50 MCG/ACTUATION NASAL SPRAY    2 Sprays by Both Nostrils route daily. GABAPENTIN (NEURONTIN) 300 MG CAPSULE    Take 1 Cap by mouth three (3) times daily. Indications: NEUROPATHIC PAIN    GABAPENTIN (NEURONTIN) 300 MG CAPSULE    Take 300 mg by mouth two (2) times a day. GARLIC 0,129 MG CAP    Take 1 Cap by mouth daily.     GLIMEPIRIDE (AMARYL) 1 MG TABLET TAKE 1 TABLET BY MOUTH DAILY BEFORE BREAKFAST    GLUCOSE BLOOD VI TEST STRIPS (FREESTYLE LITE STRIPS) STRIP    USE TWICE DAILY. ISOSORBIDE MONONITRATE ER (IMDUR) 30 MG TABLET    Take 1 Tab by mouth daily. LANCETS MISC    Use to test blood sugar twice daily    LOSARTAN (COZAAR) 100 MG TABLET    Take 1 Tab by mouth daily. MULTIVIT-MIN-FA-LYCOPEN-LUTEIN 0.4-300-250 MG-MCG-MCG TAB    Take 1 Tab by mouth daily. POTASSIUM CHLORIDE (KLOR-CON) 10 MEQ TABLET    TAKE 1 TABLET BY MOUTH TWICE DAILY    PRAVASTATIN (PRAVACHOL) 40 MG TABLET    Take 1 Tab by mouth nightly. RANITIDINE (ZANTAC) 300 MG TABLET    TAKE 1 TABLET BY MOUTH ONCE DAILY    SERTRALINE (ZOLOFT) 100 MG TABLET    Take 2 Tabs by mouth daily. TERAZOSIN (HYTRIN) 2 MG CAPSULE    Take 1 Cap by mouth nightly. TRAZODONE (DESYREL) 50 MG TABLET    TAKE 1 TO 2 TABLETS BY MOUTH AT BEDTIME AS NEEDED    TRIAMCINOLONE ACETONIDE (KENALOG) 0.025 % TOPICAL CREAM    Apply  to affected area two (2) times daily as needed. use thin layer     ZINC ACETATE PO    Take 1,000 mg by mouth.    These Medications have changed    No medications on file   Stop Taking    No medications on file

## 2018-05-30 ENCOUNTER — PATIENT OUTREACH (OUTPATIENT)
Dept: INTERNAL MEDICINE CLINIC | Age: 68
End: 2018-05-30

## 2018-05-30 DIAGNOSIS — Z76.0 MEDICATION REFILL: ICD-10-CM

## 2018-05-30 RX ORDER — FLUTICASONE PROPIONATE 50 MCG
2 SPRAY, SUSPENSION (ML) NASAL DAILY
Qty: 1 BOTTLE | Refills: 6 | Status: SHIPPED | OUTPATIENT
Start: 2018-05-30 | End: 2018-05-30 | Stop reason: SDUPTHER

## 2018-05-30 RX ORDER — FLUTICASONE PROPIONATE 50 MCG
SPRAY, SUSPENSION (ML) NASAL
Qty: 3 BOTTLE | Refills: 6 | Status: SHIPPED | OUTPATIENT
Start: 2018-05-30

## 2018-05-30 NOTE — TELEPHONE ENCOUNTER
Anand Rowan Ayala is requesting a refill on his flonase. He has an appt with you tomorrow 5/31/18. Please send to [facility].

## 2018-05-30 NOTE — PROGRESS NOTES
Hospital Follow Up      18    Mr. Nguyen was seen in the ED at [facility] on 18 due to laceration to finger. He finger was stitched and he was discharged home on 18. I called Mr. Drea Hanson today, 18 in hospital follow up. Verified name and  as two identifiers. Patient states he is doing well. Denies fever or chills. No drainage from laceration on finger. Patient has a f/u appt tomorrow 18 with Dr. Reji Newsome.  He states he needs a new Rx for his flonase inhaler  - will send to Dr. Reji Newsome to refill. Patient was not given any new Rx while at the ED.     Kevon Crocker RN

## 2018-05-31 ENCOUNTER — OFFICE VISIT (OUTPATIENT)
Dept: INTERNAL MEDICINE CLINIC | Age: 68
End: 2018-05-31

## 2018-05-31 VITALS
OXYGEN SATURATION: 96 % | BODY MASS INDEX: 30.26 KG/M2 | TEMPERATURE: 97.2 F | WEIGHT: 235.8 LBS | HEART RATE: 73 BPM | RESPIRATION RATE: 20 BRPM | HEIGHT: 74 IN | DIASTOLIC BLOOD PRESSURE: 55 MMHG | SYSTOLIC BLOOD PRESSURE: 104 MMHG

## 2018-05-31 DIAGNOSIS — M47.816 OSTEOARTHRITIS OF LUMBAR SPINE, UNSPECIFIED SPINAL OSTEOARTHRITIS COMPLICATION STATUS: ICD-10-CM

## 2018-05-31 DIAGNOSIS — M70.22 OLECRANON BURSITIS OF LEFT ELBOW: ICD-10-CM

## 2018-05-31 DIAGNOSIS — M15.9 PRIMARY OSTEOARTHRITIS INVOLVING MULTIPLE JOINTS: Primary | ICD-10-CM

## 2018-05-31 DIAGNOSIS — M75.41 SHOULDER IMPINGEMENT SYNDROME, RIGHT: ICD-10-CM

## 2018-05-31 DIAGNOSIS — Z79.899 MEDICATION MANAGEMENT: ICD-10-CM

## 2018-05-31 RX ORDER — METHYLPREDNISOLONE 4 MG/1
TABLET ORAL
Qty: 1 DOSE PACK | Refills: 1 | Status: SHIPPED | OUTPATIENT
Start: 2018-05-31 | End: 2018-07-03 | Stop reason: CLARIF

## 2018-05-31 NOTE — MR AVS SNAPSHOT
[facility] 
 
 
 [address] [address] 
[phone] Patient: Jami Callaway MRN: [id number] UJS:[id number] Visit Information Date & Time Provider Department Dept. Phone Encounter #  
 5/31/2018  1:15 PM Chelo Mandel, [facility] [phone] [id number] Follow-up Instructions Return in about 6 weeks (around 7/12/2018) for HTN, DM, cholesterol, back pain. Your Appointments 7/31/2018  3:15 PM  
Follow Up with Owen Jimenez MD  
Cardio Specialist at [address]) Appt Note: 6 months [id number] [facility] [address] [facility] [address]  
  
   
 [id number] [place] [address] Upcoming Health Maintenance Date Due COLONOSCOPY 6/1/2018 Influenza Age 5 to Adult 8/1/2018 HEMOGLOBIN A1C Q6M 9/28/2018 EYE EXAM RETINAL OR DILATED Q1 11/9/2018 MICROALBUMIN Q1 11/13/2018 MEDICARE YEARLY EXAM 11/14/2018 FOOT EXAM Q1 3/28/2019 LIPID PANEL Q1 3/28/2019 GLAUCOMA SCREENING Q2Y 11/9/2019 DTaP/Tdap/Td series (3 - Td) 5/1/2027 Allergies as of 5/31/2018  Review Complete On: 5/31/2018 By: Chelo Mandel MD  
  
 Severity Noted Reaction Type Reactions Beta Blocker [Beta-blockers (Beta-adrenergic Blocking Agts)]    Other (comments)  
 symptomatic bradycardia Codeine   Side Effect Other (comments) Passed//fainted 
patient doesn't recall reaction, occurred as a teenager Darvocet A500 [Propoxyphene N-acetaminophen]   Systemic Other (comments)  
 throat swelling Dexbrompheniramine-pseudoephed    Other (comments) Facial swelling. 1980\"s Lasix [Furosemide]  09/06/2016   Systemic Itching Lipitor [Atorvastatin]  03/16/2016    Other (comments) Sulfa (Sulfonamide Antibiotics)  10/18/2016    Hives Current Immunizations  Reviewed on 3/12/2018 Name Date Influenza High Dose Vaccine PF 11/13/2017, 10/18/2016 Influenza Vaccine 10/1/2017 12:00 AM, 9/30/2014, 9/2/2014 12:00 AM  
 Influenza Vaccine Split 10/1/2012 Pneumococcal Conjugate (PCV-13) 4/28/2015 Pneumococcal Polysaccharide (PPSV-23) 10/1/2016 12:00 AM, 9/6/2016 10:15 AM  
 Tdap 5/1/2017  5:39 PM, 8/12/2013 10:56 AM  
 ZZZ-RETIRED (DO NOT USE) Pneumococcal Vaccine (Unspecified Type) 12/5/2009 Zoster Vaccine, Live 1/25/2013 Not reviewed this visit You Were Diagnosed With   
  
 Codes Comments Primary osteoarthritis involving multiple joints    -  Primary ICD-10-CM: M15.0 ICD-9-CM: 715.09 Shoulder impingement syndrome, right     ICD-10-CM: M75.41 
ICD-9-CM: 726.2 Olecranon bursitis of left elbow     ICD-10-CM: M70.22 ICD-9-CM: 726.33 Osteoarthritis of lumbar spine, unspecified spinal osteoarthritis complication status     MBF-33-LX: M47.816 ICD-9-CM: 721.3 Vitals BP Pulse Temp Resp Height(growth percentile) Weight(growth percentile) 104/55 (BP 1 Location: Right arm, BP Patient Position: Sitting) 73 97.2 °F (36.2 °C) (Oral) 20 6' 2\" (1.88 m) 235 lb 12.8 oz (107 kg) SpO2 BMI Smoking Status 96% 30.27 kg/m2 Former Smoker Vitals History BMI and BSA Data Body Mass Index Body Surface Area  
 30.27 kg/m 2 2.36 m 2 Preferred Pharmacy Pharmacy Name Phone [facility] [address] [phone] Your Updated Medication List  
  
   
This list is accurate as of 5/31/18  1:55 PM.  Always use your most recent med list. amLODIPine 5 mg tablet Commonly known as:  Andi Joel Take 1 Tab by mouth two (2) times a day. aspirin delayed-release 81 mg tablet Take 1 tablet by mouth daily. chlorthalidone 25 mg tablet Commonly known as:  Chantel [place] Take 1 Tab by mouth daily. Indications: Edema  
  
 cholecalciferol 1,000 unit tablet Commonly known as:  VITAMIN D3 Take  by mouth daily. clopidogrel 75 mg Tab Commonly known as:  PLAVIX Take 1 Tab by mouth daily. colchicine 0.6 mg tablet Take 2 tabs now and one in 6 hours if needed. Repeat in 6 hours as needed  Indications: acute gouty arthritis  
  
 fluticasone 50 mcg/actuation nasal spray Commonly known as:  Truett Dowse SHAKE LIQUID AND USE 2 SPRAYS IN EACH NOSTRIL DAILY  
  
 gabapentin 300 mg capsule Commonly known as:  NEURONTIN Take 300 mg by mouth two (2) times a day.  
  
 garlic 7,460 mg Cap Take 1 Cap by mouth daily. glimepiride 1 mg tablet Commonly known as:  AMARYL  
TAKE 1 TABLET BY MOUTH DAILY BEFORE BREAKFAST  
  
 glucose blood VI test strips strip Commonly known as:  FREESTYLE LITE STRIPS  
USE TWICE DAILY. isosorbide mononitrate ER 30 mg tablet Commonly known as:  IMDUR Take 1 Tab by mouth daily. Lancets Misc Use to test blood sugar twice daily  
  
 losartan 100 mg tablet Commonly known as:  COZAAR Take 1 Tab by mouth daily. methylPREDNISolone 4 mg tablet Commonly known as:  Mirellaell Persons Per dose pack instructions  
  
 multivit-min-FA-lycopen-lutein 0.4-300-250 mg-mcg-mcg Tab Take 1 Tab by mouth daily. potassium chloride 10 mEq tablet Commonly known as:  KLOR-CON  
TAKE 1 TABLET BY MOUTH TWICE DAILY pravastatin 40 mg tablet Commonly known as:  PRAVACHOL Take 1 Tab by mouth nightly. raNITIdine 300 mg tablet Commonly known as:  ZANTAC TAKE 1 TABLET BY MOUTH ONCE DAILY  
  
 sertraline 100 mg tablet Commonly known as:  ZOLOFT Take 2 Tabs by mouth daily. terazosin 2 mg capsule Commonly known as:  HYTRIN Take 1 Cap by mouth nightly. traZODone 50 mg tablet Commonly known as:  DESYREL  
TAKE 1 TO 2 TABLETS BY MOUTH AT BEDTIME AS NEEDED  
  
 triamcinolone acetonide 0.025 % topical cream  
Commonly known as:  KENALOG Apply  to affected area two (2) times daily as needed. use thin layer VITAMIN C 250 mg tablet Generic drug:  ascorbic acid (vitamin C) Take  by mouth. VOLTAREN 1 % Gel Generic drug:  diclofenac Apply  to affected area four (4) times daily. ZINC ACETATE PO Take 1,000 mg by mouth. Prescriptions Sent to Pharmacy Refills  
 methylPREDNISolone (MEDROL DOSEPACK) 4 mg tablet 1 Sig: Per dose pack instructions Class: Normal  
 Pharmacy: [facility] [address] #: [phone] Follow-up Instructions Return in about 6 weeks (around 7/12/2018) for HTN, DM, cholesterol, back pain. To-Do List   
 05/31/2018 Imaging:  MRI LUMB SPINE WO CONT Referral Information Referral ID Referred By Referred To  
  
 [id number] [facility] Not Available Visits Status Start Date End Date 1 New Request 5/31/18 5/31/19 If your referral has a status of pending review or denied, additional information will be sent to support the outcome of this decision. Introducing hospitals & HEALTH SERVICES! Dear Alex Haney: Thank you for requesting a [employer] account. Our records indicate that you already have an active [employer] account. You can access your account anytime at [email or web address] Did you know that you can access your hospital and ER discharge instructions at any time in [employer]? You can also review all of your test results from your hospital stay or ER visit. Additional Information If you have questions, please visit the Frequently Asked Questions section of the [employer] website at [email or web address] Remember, [employer] is NOT to be used for urgent needs. For medical emergencies, dial 911. Now available from your iPhone and Android! Please provide this summary of care documentation to your next provider. Your primary care clinician is listed as [facility]. If you have any questions after today's visit, please call [phone].

## 2018-05-31 NOTE — PROGRESS NOTES
HISTORY OF PRESENT ILLNESS  Lorie Deleon is a 76 y.o. male. Visit Vitals    /55 (BP 1 Location: Right arm, BP Patient Position: Sitting)    Pulse 73    Temp 97.2 °F (36.2 °C) (Oral)    Resp 20    Ht 6' 2\" (1.88 m)    Wt 235 lb 12.8 oz (107 kg)    SpO2 96%    BMI 30.27 kg/m2       HPI Comments: Right shoulder hurts and has limited ROM. Left elbow has some swelling (of the bursa) since a fall and scrape    Neck is becoming more painful. Flex if he looks up    Right knee gives away some. Back Pain    The history is provided by the patient. This is a chronic problem. The current episode started more than 1 week ago. The problem has been resolved. The problem occurs constantly. Patient reports not work related injury. The pain is associated with a remote injury. The pain is present in the lumbar spine. The quality of the pain is described as aching and similar to previous episodes. The symptoms are aggravated by bending and twisting. Pertinent negatives include no fever. Risk factors include lack of exercise and a sedentary lifestyle. The patient's surgical history includes laminectomy. Neck Pain   The history is provided by the patient. This is a chronic problem. The current episode started more than 2 days ago. The problem occurs daily. The problem has not changed since onset. The symptoms are aggravated by twisting. Nothing relieves the symptoms. He has tried nothing for the symptoms. Shoulder Pain    The history is provided by the patient. The incident occurred more than 1 week ago. There was no injury mechanism. The right shoulder is affected. The pain is moderate. The pain has been intermittent since onset. Associated symptoms comments: Decreased range of motion. Review of Systems   Constitutional: Negative for chills and fever. Musculoskeletal: Positive for back pain, falls, joint pain and neck pain. Neurological: Positive for dizziness.        Physical Exam   Constitutional: He is oriented to person, place, and time. He appears well-developed and well-nourished. No distress. Cardiovascular: Normal rate. Pulmonary/Chest: Effort normal.   Musculoskeletal:        Right shoulder: He exhibits decreased range of motion, tenderness and pain. He exhibits no swelling, normal pulse and normal strength. AC joint impingement noted with limitation of ROM    Left elbow small effusion of olecranon bursa with clean abrasion noted. Not hot or red or tender. Neck with OK ROM but some tightness of muscles. Neurological: He is alert and oriented to person, place, and time. Skin: Skin is warm and dry. He is not diaphoretic. Nursing note and vitals reviewed. ASSESSMENT and PLAN    ICD-10-CM ICD-9-CM    1. Primary osteoarthritis involving multiple joints M15.0 715.09 methylPREDNISolone (MEDROL DOSEPACK) 4 mg tablet   2. Shoulder impingement syndrome, right M75.41 726.2    3. Olecranon bursitis of left elbow M70.22 726.33    4. Osteoarthritis of lumbar spine, unspecified spinal osteoarthritis complication status V63.463 721.3 MRI LUMB SPINE WO CONT   5. Medication management Z79.899 V58.69        Will pulse with prednisone. Consider daily colchicine as a trial    OK to take 1 naprosyn 1-2 times a week but no more.     F/u with ortho for possible right shoulder injection    F/u here 6 weeks for HTN, DM, chol

## 2018-06-01 LAB
HBA1C MFR BLD HPLC: 6.1 %
LDL-C, EXTERNAL: 93

## 2018-06-05 NOTE — TELEPHONE ENCOUNTER
Message  Received:  Today       Iglesia Membreno Necessary, MD Lindsay Sortodler       Caller: Unspecified (2 weeks ago)                     Will need at least ASA

## 2018-06-08 ENCOUNTER — HOSPITAL ENCOUNTER (OUTPATIENT)
Dept: MRI IMAGING | Age: 68
Discharge: HOME | End: 2018-06-08
Attending: INTERNAL MEDICINE
Payer: MEDICARE

## 2018-06-08 DIAGNOSIS — M47.26 OSTEOARTHRITIS OF SPINE WITH RADICULOPATHY, LUMBAR REGION: ICD-10-CM

## 2018-06-08 LAB — CREAT UR-MCNC: 1.6 MG/DL (ref 0.6–1.3)

## 2018-06-08 PROCEDURE — 82565 ASSAY OF CREATININE: CPT

## 2018-06-08 PROCEDURE — 72158 MRI LUMBAR SPINE W/O & W/DYE: CPT

## 2018-06-08 PROCEDURE — 74011250636 HC RX REV CODE- 250/636: Performed by: INTERNAL MEDICINE

## 2018-06-08 PROCEDURE — A9575 INJ GADOTERATE MEGLUMI 0.1ML: HCPCS | Performed by: INTERNAL MEDICINE

## 2018-06-08 RX ORDER — GADOTERATE MEGLUMINE 376.9 MG/ML
20 INJECTION INTRAVENOUS
Status: COMPLETED | OUTPATIENT
Start: 2018-06-08 | End: 2018-06-08

## 2018-06-08 RX ADMIN — GADOTERATE MEGLUMINE 20 ML: 376.9 INJECTION INTRAVENOUS at 15:21

## 2018-06-20 DIAGNOSIS — Z76.0 MEDICATION REFILL: ICD-10-CM

## 2018-06-20 RX ORDER — CLOPIDOGREL BISULFATE 75 MG/1
75 TABLET ORAL DAILY
Qty: 90 TAB | Refills: 3 | Status: SHIPPED | OUTPATIENT
Start: 2018-06-20

## 2018-06-20 RX ORDER — ISOSORBIDE MONONITRATE 30 MG/1
30 TABLET, EXTENDED RELEASE ORAL DAILY
Qty: 90 TAB | Refills: 3 | Status: SHIPPED | OUTPATIENT
Start: 2018-06-20 | End: 2020-10-16 | Stop reason: DRUGHIGH

## 2018-06-20 NOTE — TELEPHONE ENCOUNTER
Incoming from pt. Two patient Identifiers confirmed. Pt request diagnostic report from stent procedure and mediaiton refill. Pt stated he did make request for mediation through [place] but had not heard anything in the last couple of days. He also advised he had been out of his Plavix  X 3 days. Advised pt I would send medication to Dr Nik Dougherty for review.

## 2018-07-03 NOTE — PERIOP NOTES
PAT - SURGICAL PRE-ADMISSION INSTRUCTIONS    NAME:  Ana Bergeron                                                          TODAY'S DATE:  7/3/2018    SURGERY DATE:  7/9/2018                                  SURGERY ARRIVAL TIME:   0800    1. Do NOT eat or drink anything, including candy or gum, after MIDNIGHT on 07/08/2018 , unless you have specific instructions from your Surgeon or Anesthesia Provider to do so. 2. No smoking on the day of surgery. 3. No alcohol 24 hours prior to the day of surgery. 4. No recreational drugs for one week prior to the day of surgery. 5. Leave all valuables, including money/purse, at home. 6. Remove all jewelry, nail polish, makeup (including mascara); no lotions, powders, deodorant, or perfume/cologne/after shave. 7. Glasses/Contact lenses and Dentures may be worn to the hospital.  They will be removed prior to surgery. 8. Call your doctor if symptoms of a cold or illness develop within 24 ours prior to surgery. 9. AN ADULT MUST DRIVE YOU HOME AFTER OUTPATIENT SURGERY. 10. If you are having an OUTPATIENT procedure, please make arrangements for a responsible adult to be with you for 24 hours after your surgery. 11. If you are admitted to the hospital, you will be assigned to a bed after surgery is complete. Normally a family member will not be able to see you until you are in your assigned bed. 15. Family is encouraged to accompany you to the hospital.  We ask visitors in the treatment area to be limited to ONE person at a time to ensure patient privacy. EXCEPTIONS WILL BE MADE AS NEEDED. 15. Children under 12 are discouraged from entering the treatment area and need to be supervised by an adult when in the waiting room. Special Instructions:     Take these medications the morning of surgery with a sip of water:  Amlodipine, Bring any pertinent legal medical records., HOLD oral diabetic medication on the MORNING OF surgery., STOP anticoagulants AT LEAST 1 WEEK PRIOR to your surgery or, follow other MD instructions: Will stop Plavix in AM            These surgical instructions were reviewed with  Ramon Nava during the PAT phone call. Directions: On the morning of surgery, please go to the [facility]. Enter the building from the [facility] entrance, 1st floor (next to the Emergency Room entrance). Take the elevator to the 2nd floor. Sign in at the Registration Desk.     If you have any questions and/or concerns, please do not hesitate to call:  (Prior to the day of surgery)  [facility] unit:  [phone]  (Day of surgery)  [facility] unit:  [phone]

## 2018-07-06 ENCOUNTER — ANESTHESIA EVENT (OUTPATIENT)
Dept: ENDOSCOPY | Age: 68
End: 2018-07-06
Payer: MEDICARE

## 2018-07-09 ENCOUNTER — ANESTHESIA (OUTPATIENT)
Dept: ENDOSCOPY | Age: 68
End: 2018-07-09
Payer: MEDICARE

## 2018-07-09 ENCOUNTER — HOSPITAL ENCOUNTER (OUTPATIENT)
Age: 68
Setting detail: OUTPATIENT SURGERY
Discharge: HOME | End: 2018-07-09
Attending: INTERNAL MEDICINE | Admitting: INTERNAL MEDICINE
Payer: MEDICARE

## 2018-07-09 VITALS
HEART RATE: 62 BPM | OXYGEN SATURATION: 93 % | WEIGHT: 232 LBS | SYSTOLIC BLOOD PRESSURE: 95 MMHG | HEIGHT: 74 IN | TEMPERATURE: 97.3 F | BODY MASS INDEX: 29.77 KG/M2 | DIASTOLIC BLOOD PRESSURE: 58 MMHG | RESPIRATION RATE: 16 BRPM

## 2018-07-09 LAB — GLUCOSE BLD STRIP.AUTO-MCNC: 94 MG/DL (ref 70–110)

## 2018-07-09 PROCEDURE — 82962 GLUCOSE BLOOD TEST: CPT

## 2018-07-09 PROCEDURE — 77030031670 HC DEV INFL ENDOTEK BIG60 MRTM -B: Performed by: INTERNAL MEDICINE

## 2018-07-09 PROCEDURE — 76060000031 HC ANESTHESIA FIRST 0.5 HR: Performed by: INTERNAL MEDICINE

## 2018-07-09 PROCEDURE — 74011250636 HC RX REV CODE- 250/636

## 2018-07-09 PROCEDURE — 74011250636 HC RX REV CODE- 250/636: Performed by: NURSE ANESTHETIST, CERTIFIED REGISTERED

## 2018-07-09 PROCEDURE — 76040000019: Performed by: INTERNAL MEDICINE

## 2018-07-09 RX ORDER — SODIUM CHLORIDE, SODIUM LACTATE, POTASSIUM CHLORIDE, CALCIUM CHLORIDE 600; 310; 30; 20 MG/100ML; MG/100ML; MG/100ML; MG/100ML
50 INJECTION, SOLUTION INTRAVENOUS CONTINUOUS
Status: DISCONTINUED | OUTPATIENT
Start: 2018-07-09 | End: 2018-07-09 | Stop reason: HOSPADM

## 2018-07-09 RX ORDER — PROPOFOL 10 MG/ML
INJECTION, EMULSION INTRAVENOUS AS NEEDED
Status: DISCONTINUED | OUTPATIENT
Start: 2018-07-09 | End: 2018-07-09 | Stop reason: HOSPADM

## 2018-07-09 RX ORDER — DEXTROMETHORPHAN/PSEUDOEPHED 2.5-7.5/.8
1.2 DROPS ORAL
Status: CANCELLED | OUTPATIENT
Start: 2018-07-09

## 2018-07-09 RX ORDER — SODIUM CHLORIDE 0.9 % (FLUSH) 0.9 %
5-10 SYRINGE (ML) INJECTION EVERY 8 HOURS
Status: CANCELLED | OUTPATIENT
Start: 2018-07-09 | End: 2018-07-09

## 2018-07-09 RX ORDER — SODIUM CHLORIDE 0.9 % (FLUSH) 0.9 %
5-10 SYRINGE (ML) INJECTION AS NEEDED
Status: CANCELLED | OUTPATIENT
Start: 2018-07-09 | End: 2018-07-09

## 2018-07-09 RX ADMIN — PROPOFOL 70 MG: 10 INJECTION, EMULSION INTRAVENOUS at 09:42

## 2018-07-09 RX ADMIN — SODIUM CHLORIDE, SODIUM LACTATE, POTASSIUM CHLORIDE, AND CALCIUM CHLORIDE: 600; 310; 30; 20 INJECTION, SOLUTION INTRAVENOUS at 09:15

## 2018-07-09 RX ADMIN — PROPOFOL 50 MG: 10 INJECTION, EMULSION INTRAVENOUS at 09:43

## 2018-07-09 NOTE — DISCHARGE INSTRUCTIONS
DISCHARGE SUMMARY from Nurse    PATIENT INSTRUCTIONS:    After general anesthesia or intravenous sedation, for 24 hours or while taking prescription Narcotics:  · Limit your activities  · Do not drive and operate hazardous machinery  · Do not make important personal or business decisions  · Do  not drink alcoholic beverages  · If you have not urinated within 8 hours after discharge, please contact your surgeon on call. Report the following to your surgeon:  · Excessive pain, swelling, redness or odor of or around the surgical area  · Temperature over 100.5  · Nausea and vomiting lasting longer than 4 hours or if unable to take medications  · Any signs of decreased circulation or nerve impairment to extremity: change in color, persistent  numbness, tingling, coldness or increase pain  · Any questions    What to do at Home:  Recommended activity: Activity as tolerated and no driving for today. These are general instructions for a healthy lifestyle:    No smoking/ No tobacco products/ Avoid exposure to second hand smoke  Surgeon General's Warning:  Quitting smoking now greatly reduces serious risk to your health. Obesity, smoking, and sedentary lifestyle greatly increases your risk for illness    A healthy diet, regular physical exercise & weight monitoring are important for maintaining a healthy lifestyle    You may be retaining fluid if you have a history of heart failure or if you experience any of the following symptoms:  Weight gain of 3 pounds or more overnight or 5 pounds in a week, increased swelling in our hands or feet or shortness of breath while lying flat in bed. Please call your doctor as soon as you notice any of these symptoms; do not wait until your next office visit.     Recognize signs and symptoms of STROKE:    F-face looks uneven    A-arms unable to move or move unevenly    S-speech slurred or non-existent    T-time-call 911 as soon as signs and symptoms begin-DO NOT go       Back to bed or wait to see if you get better-TIME IS BRAIN. Warning Signs of HEART ATTACK     Call 911 if you have these symptoms:   Chest discomfort. Most heart attacks involve discomfort in the center of the chest that lasts more than a few minutes, or that goes away and comes back. It can feel like uncomfortable pressure, squeezing, fullness, or pain.  Discomfort in other areas of the upper body. Symptoms can include pain or discomfort in one or both arms, the back, neck, jaw, or stomach.  Shortness of breath with or without chest discomfort.  Other signs may include breaking out in a cold sweat, nausea, or lightheadedness. Don't wait more than five minutes to call 911 - MINUTES MATTER! Fast action can save your life. Calling 911 is almost always the fastest way to get lifesaving treatment. Emergency Medical Services staff can begin treatment when they arrive -- up to an hour sooner than if someone gets to the hospital by car. The discharge information has been reviewed with the patient. The patient verbalized understanding. Discharge medications reviewed with the patient and appropriate educational materials and side effects teaching were provided. ___________________________________________________________________________________________________________________________________  Patient armband removed and given to patient to take home.   Patient was informed of the privacy risks if armband lost or stolen

## 2018-07-09 NOTE — H&P
Gastroenterology Consult     Referring Physician: Angelia Fearing Date: 7/9/2018     Subjective:     Chief Complaint: screening colon    History of Present Illness: Rolo Clark is a 76 y.o. male who is seen in consultation for screening colonoscopy. Patient was evaluated and examined in the office. Please see scanned note. No interval changes in medical status or examination.      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 Jazmine Otero      7/14  bilat    HX CERVICAL LAMINECTOMY      [address]    HX ORTHOPAEDIC Left     knee replacement    HX TUMOR REMOVAL      3/13  wide excision right thigh sarcoma    HX TYMPANOMASTOIDECTOMY        Family History   Problem Relation Age of Onset    Diabetes Father     Heart Disease Father      cardiomyopathydementia    Hypertension Father     Cancer Father      prostate    Parkinsonism Father     Dementia Father     High Cholesterol Father     Kidney Disease Father     Other Father      colon polyps    Headache Mother     Psychiatric Disorder Mother     Kidney Disease Mother     Hypertension Mother     High Cholesterol Mother     Other Mother      GERD/polymyalgia rheumaticacolon osiris    Heart Disease Mother     Cancer Sister      Social History   Substance Use Topics    Smoking status: Former Smoker     Packs/day: 1.00     Years: 15.00     Quit date: 12/3/2017    Smokeless tobacco: Never Used    Alcohol use No      Allergies   Allergen Reactions    Beta Blocker [Beta-Blockers (Beta-Adrenergic Blocking Agts)] Other (comments)     symptomatic bradycardia    Codeine Other (comments)     Passed//fainted  patient doesn't recall reaction, occurred as a teenager    Darvocet A500 [Propoxyphene N-Acetaminophen] Other (comments)     throat swelling    Dexbrompheniramine-Pseudoephed Other (comments)     Facial swelling. 1980\"s    Lasix [Furosemide] Itching    Lipitor [Atorvastatin] Other (comments)    Sulfa (Sulfonamide Antibiotics) Hives     Current Facility-Administered Medications   Medication Dose Route Frequency    lactated Ringers infusion  50 mL/hr IntraVENous CONTINUOUS        Review of Systems:  A detailed 10 organ review of systems is obtained with pertinent positives as listed in the History of Present Illness and Past Medical History. All others are negative. Objective:     Physical Exam:  Visit Vitals    /71    Pulse 74    Temp 97.3 °F (36.3 °C)    Resp 16    Ht 6' 2\" (1.88 m)    Wt 105.2 kg (232 lb)    SpO2 99%    BMI 29.79 kg/m2        Skin:  Extremities and face reveal no rashes. No casper erythema. No telangiectasias on the chest wall. HEENT: Sclerae anicteric. Extra-occular muscles are intact. No oral ulcers. No abnormal pigmentation of the lips. The neck is supple. Cardiovascular: Regular rate and rhythm. No murmurs, gallops, or rubs. PMI nondisplaced. Carotids without bruits. Respiratory:  Comfortable breathing with no accessory muscle use. Clear breath sounds with no wheezes, rales, or rhonchi. GI:  Abdomen nondistended, soft, and nontender. Normal active bowel sounds. No enlargement of the liver or spleen. No masses palpable.   Rectal:  Deferred  Musculoskeletal:  No pitting edema of the lower legs. Extremities have good range of motion. No costovertebral tenderness. Neurological:  Gross memory appears intact. Patient is alert and oriented. Psychiatric:  Mood appears appropriate with judgement intact. Lymphatic:  No cervical or supraclavicular adenopathy.     Assessment/Plan:     Colonoscopy as planned

## 2018-07-09 NOTE — PROCEDURES
Colonoscopy Report    Patient: Edwardo Aldana MRN: [id number]  SSN: [id number]    YOB: 1950  Age: 76 y.o. Sex: male      Date of Procedure: 7/9/2018    IMPRESSION:  1. Poor prep. Procedure aborted due to inability to see the mucosa and intubate safely. RECOMMENDATIONS:  1. Resume regular diet, Recommend high fiber. 2. Repeat colon in 4-6 weeks    Indication:  Screening colonoscopy  Procedure Performed: Colonoscopy (aborted)  Endoscopist: Prabhakar Benito MD  Assistant: Endoscopy Technician-1: Demetrius Martinez  Endoscopy RN-1: Alex Menezes RN  ASA: ASA 2 - Patient with mild systemic disease with no functional limitations  Mallampati Score: II (soft palate, uvula, fauces visible)  Anesthesia: MAC anesthesia Propofol  Endoscope:     [x]  CF H 190AL   []  PCF H190AL   []  GIF H 190    Extent of Examination:sigmoid colon  Quality of Preparation:     []  Excellent   []  Very Good   [] Fair but adequate   [] Fair, inadequate   [x]  Poor      Technique: The procedure was discussed with the patient including risks, benefits, alternatives including risks of IV sedation, bleeding, perforation and missed polyp. A safety timeout was performed. The patient was given incremental doses of intravenous sedation to achieve moderate sedation. The patients vital signs were monitored at all times including heart rate, rhythm, blood pressure and oxygen saturation. The patient was placed in left lateral position. When adequate sedation was achieved a perianal inspection and a digital rectal exam were performed. Video colonoscope was introduced into the rectum and advanced under direct vision up to the sigmoid colon. Poor prep procedure aborted. Patient tolerated the procedure very well and was transferred to recovery area. Findings:  1. Poor prep. Procedure aborted due to inability to see the mucosa and intubate safely.      EBL:Minimal  Specimen: * No specimens in log *    Prabhakar Benito MD  July 9, 2018  10:40 AM

## 2018-07-09 NOTE — ANESTHESIA PREPROCEDURE EVALUATION
Anesthetic History   No history of anesthetic complications            Review of Systems / Medical History  Patient summary reviewed and pertinent labs reviewed    Pulmonary        Sleep apnea: CPAP           Neuro/Psych   Within defined limits           Cardiovascular    Hypertension          CAD    Exercise tolerance: >4 METS     GI/Hepatic/Renal     GERD           Endo/Other    Diabetes: type 2         Other Findings   Comments: Documentation of current medication  Current medications obtained, documented and obtained? YES      Risk Factors for Postoperative nausea/vomiting:       History of postoperative nausea/vomiting? NO       Female? NO       Motion sickness? NO       Intended opioid administration for postoperative analgesia? NO      Smoking Abstinence:  Current Smoker? NO  Elective Surgery? YES  Seen preoperatively by anesthesiologist or proxy prior to day of surgery? YES  Pt abstained from smoking 24 hours prior to anesthesia?  N/A    Preventive care/screening for High Blood Pressure:  Aged 18 years and older: YES  Screened for high blood pressure: YES  Patients with high blood pressure referred to primary care provider   for BP management: YES               Physical Exam    Airway  Mallampati: II  TM Distance: 4 - 6 cm  Neck ROM: normal range of motion   Mouth opening: Normal     Cardiovascular  Regular rate and rhythm,  S1 and S2 normal,  no murmur, click, rub, or gallop  Rhythm: regular  Rate: normal         Dental    Dentition: Full lower dentures and Full upper dentures     Pulmonary  Breath sounds clear to auscultation               Abdominal  GI exam deferred       Other Findings            Anesthetic Plan    ASA: 3  Anesthesia type: MAC          Induction: Intravenous  Anesthetic plan and risks discussed with: Patient

## 2018-07-09 NOTE — ADDENDUM NOTE
Addendum  created 07/09/18 1000 by Brien Carbajal MD    Anesthesia Attestations filed, Sign clinical note, SmartForm saved

## 2018-07-09 NOTE — ANESTHESIA POSTPROCEDURE EVALUATION
Post-Anesthesia Evaluation & Assessment    Visit Vitals    /65    Pulse (!) 54    Temp 36.3 °C (97.3 °F)    Resp 16    Ht 6' 2\" (1.88 m)    Wt 105.2 kg (232 lb)    SpO2 98%    BMI 29.79 kg/m2       Nausea/Vomiting: no nausea and no vomiting    Post-operative hydration adequate. Pain score (VAS): 0    Mental status & Level of consciousness: alert and oriented x 3    Neurological status: moves all extremities, sensation grossly intact    Pulmonary status: airway patent, no supplemental oxygen required    Complications related to anesthesia: none    Patient has met all discharge requirements. Additional comments:        Miky Mike CRNA  July 9, 2018Post-Anesthesia Evaluation and Assessment    Patient: Orlando Sofia MRN: [id number]  SSN: [id number]    YOB: 1950  Age: 76 y.o. Sex: male      Data from PACU flowsheet    Cardiovascular Function/Vital Signs  Visit Vitals    /65    Pulse (!) 54    Temp 36.3 °C (97.3 °F)    Resp 16    Ht 6' 2\" (1.88 m)    Wt 105.2 kg (232 lb)    SpO2 98%    BMI 29.79 kg/m2       Patient is status post general anesthesia for Procedure(s):  COLONOSCOPY. Nausea/Vomiting: controlled    Postoperative hydration reviewed and adequate. Pain:  Pain Scale 1: Numeric (0 - 10) (07/09/18 0820)  Pain Intensity 1: 0 (07/09/18 0820)   Managed      Mental Status and Level of Consciousness: Alert and oriented     Pulmonary Status:   O2 Device: Room air (07/09/18 0831)   Adequate oxygenation and airway patent    Complications related to anesthesia: None    Post-anesthesia assessment completed.  No concerns    Signed By: Miky Mike CRNA     July 9, 2018

## 2018-07-09 NOTE — IP AVS SNAPSHOT
[address] [address] Donya Mata  
[phone] Patient: Ekaterina Gonzalez MRN: [id number] [facility]:0/60/6362 About your hospitalization You were admitted on:  July 9, 2018 [facility] You were discharged on:  July 9, 2018 Why you were hospitalized Your primary diagnosis was:  Not on File Follow-up Information None Your Scheduled Appointments Thursday July 12, 2018  1:45 PM EDT Office Visit with MD Ethan Dixon[address]) [address] [id number]  
[phone] Tuesday July 31, 2018  3:15 PM EDT Follow Up with Nicole Osborne MD  
Cardio Specialist at [facility] [place] [address]) [address] [facility] [address] [id number]  
[phone] Discharge Orders None A check sugar indicates which time of day the medication should be taken. My Medications ASK your doctor about these medications Instructions Each Dose to Equal  
 Morning Noon Evening Bedtime  
 amLODIPine 5 mg tablet Commonly known as:  Elyn Coffer Your last dose was: Your next dose is: Take 1 Tab by mouth two (2) times a day. 5 mg  
    
   
   
   
  
 aspirin delayed-release 81 mg tablet Your last dose was: Your next dose is: Take 1 tablet by mouth daily. 81 mg  
    
   
   
   
  
 chlorthalidone 25 mg tablet Commonly known as:  Sueanne Drought Your last dose was: Your next dose is: Take 1 Tab by mouth daily. Indications: Edema 25 mg  
    
   
   
   
  
 cholecalciferol 1,000 unit tablet Commonly known as:  VITAMIN D3 Your last dose was: Your next dose is: Take  by mouth daily. clopidogrel 75 mg Tab Commonly known as:  PLAVIX Your last dose was: Your next dose is: Take 1 Tab by mouth daily. 75 mg  
    
   
   
   
  
 colchicine 0.6 mg tablet Your last dose was: Your next dose is: Take 2 tabs now and one in 6 hours if needed. Repeat in 6 hours as needed  Indications: acute gouty arthritis  
     
   
   
   
  
 fluticasone 50 mcg/actuation nasal spray Commonly known as:  Wagner Samayoa Your last dose was: Your next dose is: SHAKE LIQUID AND USE 2 SPRAYS IN EACH NOSTRIL DAILY  
     
   
   
   
  
 gabapentin 300 mg capsule Commonly known as:  NEURONTIN Your last dose was: Your next dose is: Take 300 mg by mouth two (2) times a day. 300 mg  
    
   
   
   
  
 garlic 3,617 mg Cap Your last dose was: Your next dose is: Take 1 Cap by mouth daily. 1 Cap  
    
   
   
   
  
 glimepiride 1 mg tablet Commonly known as:  AMARYL Your last dose was: Your next dose is: TAKE 1 TABLET BY MOUTH DAILY BEFORE BREAKFAST  
     
   
   
   
  
 isosorbide mononitrate ER 30 mg tablet Commonly known as:  IMDUR Your last dose was: Your next dose is: Take 1 Tab by mouth daily. 30 mg  
    
   
   
   
  
 losartan 100 mg tablet Commonly known as:  COZAAR Your last dose was: Your next dose is: Take 1 Tab by mouth daily. 100 mg  
    
   
   
   
  
 multivit-min-FA-lycopen-lutein 0.4-300-250 mg-mcg-mcg Tab Your last dose was: Your next dose is: Take 1 Tab by mouth daily. 1 Tab  
    
   
   
   
  
 potassium chloride 10 mEq tablet Commonly known as:  KLOR-CON Your last dose was: Your next dose is: TAKE 1 TABLET BY MOUTH TWICE DAILY pravastatin 40 mg tablet Commonly known as:  PRAVACHOL Your last dose was: Your next dose is: Take 1 Tab by mouth nightly. 40 mg  
    
   
   
   
  
 raNITIdine 300 mg tablet Commonly known as:  ZANTAC Your last dose was: Your next dose is: TAKE 1 TABLET BY MOUTH ONCE DAILY  
     
   
   
   
  
 sertraline 100 mg tablet Commonly known as:  ZOLOFT Your last dose was: Your next dose is: Take 2 Tabs by mouth daily. 200 mg  
    
   
   
   
  
 terazosin 2 mg capsule Commonly known as:  HYTRIN Your last dose was: Your next dose is: Take 1 Cap by mouth nightly. 2 mg  
    
   
   
   
  
 traZODone 50 mg tablet Commonly known as:  Orma Paddy Your last dose was: Your next dose is: TAKE 1 TO 2 TABLETS BY MOUTH AT BEDTIME AS NEEDED  
     
   
   
   
  
 triamcinolone acetonide 0.025 % topical cream  
Commonly known as:  KENALOG Your last dose was: Your next dose is:    
   
   
 Apply  to affected area two (2) times daily as needed. use thin layer VITAMIN C 250 mg tablet Generic drug:  ascorbic acid (vitamin C) Your last dose was: Your next dose is: Take  by mouth. ZINC ACETATE PO Your last dose was: Your next dose is: Take 1,000 mg by mouth. 1000 mg Discharge Instructions DISCHARGE SUMMARY from Nurse PATIENT INSTRUCTIONS: 
 
After general anesthesia or intravenous sedation, for 24 hours or while taking prescription Narcotics: · Limit your activities · Do not drive and operate hazardous machinery · Do not make important personal or business decisions · Do  not drink alcoholic beverages · If you have not urinated within 8 hours after discharge, please contact your surgeon on call. Report the following to your surgeon: 
· Excessive pain, swelling, redness or odor of or around the surgical area · Temperature over 100.5 · Nausea and vomiting lasting longer than 4 hours or if unable to take medications · Any signs of decreased circulation or nerve impairment to extremity: change in color, persistent  numbness, tingling, coldness or increase pain · Any questions What to do at Home: 
Recommended activity: Activity as tolerated and no driving for today. These are general instructions for a healthy lifestyle: No smoking/ No tobacco products/ Avoid exposure to second hand smoke Surgeon General's Warning:  Quitting smoking now greatly reduces serious risk to your health. Obesity, smoking, and sedentary lifestyle greatly increases your risk for illness A healthy diet, regular physical exercise & weight monitoring are important for maintaining a healthy lifestyle You may be retaining fluid if you have a history of heart failure or if you experience any of the following symptoms:  Weight gain of 3 pounds or more overnight or 5 pounds in a week, increased swelling in our hands or feet or shortness of breath while lying flat in bed. Please call your doctor as soon as you notice any of these symptoms; do not wait until your next office visit. Recognize signs and symptoms of STROKE: 
 
F-face looks uneven A-arms unable to move or move unevenly S-speech slurred or non-existent T-time-call 911 as soon as signs and symptoms begin-DO NOT go Back to bed or wait to see if you get better-TIME IS BRAIN. Warning Signs of HEART ATTACK Call 911 if you have these symptoms: 
? Chest discomfort. Most heart attacks involve discomfort in the center of the chest that lasts more than a few minutes, or that goes away and comes back. It can feel like uncomfortable pressure, squeezing, fullness, or pain. ? Discomfort in other areas of the upper body. Symptoms can include pain or discomfort in one or both arms, the back, neck, jaw, or stomach. ? Shortness of breath with or without chest discomfort. ? Other signs may include breaking out in a cold sweat, nausea, or lightheadedness. Don't wait more than five minutes to call [address]! Fast action can save your life. Calling 911 is almost always the fastest way to get lifesaving treatment. Emergency Medical Services staff can begin treatment when they arrive  up to an hour sooner than if someone gets to the hospital by car. The discharge information has been reviewed with the patient. The patient verbalized understanding. Discharge medications reviewed with the patient and appropriate educational materials and side effects teaching were provided. ___________________________________________________________________________________________________________________________________ Patient armband removed and given to patient to take home. Patient was informed of the privacy risks if armband lost or stolen ACO Transitions of Care Introducing [employer] [address] offers a voluntary care coordination program to provide high quality service and care to [facility] fee-for-service beneficiaries. Megha Tai was designed to help you enhance your health and well-being through the following services: ? Transitions of Care  support for individuals who are transitioning from one care setting to another (example: Hospital to home). ? Chronic and Complex Care Coordination  support for individuals and caregivers of those with serious or chronic illnesses or with more than one chronic (ongoing) condition and those who take a number of different medications. If you meet specific medical criteria, a [facility] may call you directly to coordinate your care with your primary care physician and your other care providers.  
 
For questions about the [facility] programs, please, contact your physicians office. For general questions or additional information about Accountable Care Organizations: 
Please visit www.medicare.gov/acos. html or call 1-800-MEDICARE (4-201.522.9539) TTY users should call 5-568.581.2502. Introducing [facility] & HEALTH SERVICES! Dear Elio Laboy: Thank you for requesting a Trippy account. Our records indicate that you already have an active Trippy account. You can access your account anytime at [email or web address] Did you know that you can access your hospital and ER discharge instructions at any time in Trippy? You can also review all of your test results from your hospital stay or ER visit. Additional Information If you have questions, please visit the Frequently Asked Questions section of the Trippy website at [email or web address] Remember, Trippy is NOT to be used for urgent needs. For medical emergencies, dial 911. Now available from your iPhone and Android! Introducing Lucas Vargas As a [employer] patient, I wanted to make you aware of our electronic visit tool called Lucas Vargas. [employer] 24/7 allows you to connect within minutes with a medical provider 24 hours a day, seven days a week via a mobile device or tablet or logging into a secure website from your computer. You can access Lucas Vargas from anywhere in the [place]. A virtual visit might be right for you when you have a simple condition and feel like you just dont want to get out of bed, or cant get away from work for an appointment, when your regular [employer] provider is not available (evenings, weekends or holidays), or when youre out of town and need minor care. Electronic visits cost only $49 and if the [employer] 24/7 provider determines a prescription is needed to treat your condition, one can be electronically transmitted to a nearby pharmacy*. Please take a moment to enroll today if you have not already done so. The enrollment process is free and takes just a few minutes. To enroll, please download the [employer] 24/7 abraham to your tablet or phone, or visit [email or web address] to enroll on your computer. And, as an [address] [place] [address] patient with a "[employer]" account, the results of your visits will be scanned into your electronic medical record and your primary care provider will be able to view the scanned results. We urge you to continue to see your regular [employer] provider for your ongoing medical care. And while your primary care provider may not be the one available when you seek a [employer] virtual visit, the peace of mind you get from getting a real diagnosis real time can be priceless. For more information on [employer], view our Frequently Asked Questions (FAQs) at [email or web address]. Sincerely, 
 
Yvon Roberts MD 
Chief Medical Officer [address] [place] [address] *:  certain medications cannot be prescribed via [employer] Providers Seen During Your Hospitalization Provider Specialty Primary office phone Richmond Evans MD Gastroenterology [phone] Your Primary Care Physician (PCP) Primary Care Physician Office Phone Office Fax [address], [address] [phone] You are allergic to the following Allergen Reactions Beta Blocker (Beta-Blockers (Beta-Adrenergic Blocking Agts)) Other (comments)  
 symptomatic bradycardia Codeine Other (comments) Passed//fainted 
patient doesn't recall reaction, occurred as a teenager Darvocet A500 (Propoxyphene N-Acetaminophen) Other (comments)  
 throat swelling Dexbrompheniramine-Pseudoephed Other (comments) Facial swelling. 1980\"s Lasix (Furosemide) Itching Lipitor (Atorvastatin) Other (comments) Sulfa (Sulfonamide Antibiotics) Hives Recent Documentation Height Weight BMI Smoking Status 1.88 m 105.2 kg 29.79 kg/m2 Former Smoker Emergency Contacts Name Discharge Info Relation Home Work Mobile PatrickCarlota DISCHARGE CAREGIVER [3] Daughter [21]   [phone] Patient Belongings The following personal items are in your possession at time of discharge: 
  Dental Appliances: None  Visual Aid: None Please provide this summary of care documentation to your next provider. Signatures-by signing, you are acknowledging that this After Visit Summary has been reviewed with you and you have received a copy. Patient Signature:  ____________________________________________________________ Date:  ____________________________________________________________  
  
Earnstine Jay Provider Signature:  ____________________________________________________________ Date:  ____________________________________________________________

## 2018-07-12 ENCOUNTER — OFFICE VISIT (OUTPATIENT)
Dept: INTERNAL MEDICINE CLINIC | Age: 68
End: 2018-07-12

## 2018-07-12 VITALS
HEART RATE: 80 BPM | RESPIRATION RATE: 18 BRPM | SYSTOLIC BLOOD PRESSURE: 115 MMHG | HEIGHT: 74 IN | TEMPERATURE: 97.8 F | BODY MASS INDEX: 30.31 KG/M2 | DIASTOLIC BLOOD PRESSURE: 65 MMHG | OXYGEN SATURATION: 95 % | WEIGHT: 236.2 LBS

## 2018-07-12 DIAGNOSIS — E11.40 TYPE 2 DIABETES MELLITUS WITH DIABETIC NEUROPATHY, WITHOUT LONG-TERM CURRENT USE OF INSULIN (HCC): Chronic | ICD-10-CM

## 2018-07-12 DIAGNOSIS — I11.9 BENIGN HYPERTENSIVE HEART DISEASE WITHOUT HEART FAILURE: Primary | Chronic | ICD-10-CM

## 2018-07-12 DIAGNOSIS — E78.5 DYSLIPIDEMIA: Chronic | ICD-10-CM

## 2018-07-12 RX ORDER — DICLOFENAC SODIUM 10 MG/G
2 GEL TOPICAL 4 TIMES DAILY
COMMUNITY

## 2018-07-12 RX ORDER — LANOLIN ALCOHOL/MO/W.PET/CERES
65 CREAM (GRAM) TOPICAL
COMMUNITY

## 2018-07-12 RX ORDER — ROSUVASTATIN CALCIUM 20 MG/1
20 TABLET, COATED ORAL
COMMUNITY
End: 2019-02-13 | Stop reason: SDUPTHER

## 2018-07-12 RX ORDER — NITROGLYCERIN 0.4 MG/1
0.4 TABLET SUBLINGUAL
COMMUNITY

## 2018-07-12 NOTE — PROGRESS NOTES
HISTORY OF PRESENT ILLNESS  Edwardo Aldana is a 76 y.o. male. Visit Vitals    /65 (BP 1 Location: Right arm, BP Patient Position: Sitting)    Pulse 80    Temp 97.8 °F (36.6 °C) (Oral)    Resp 18    Ht 6' 2\" (1.88 m)    Wt 236 lb 3.2 oz (107.1 kg)    SpO2 95%    BMI 30.33 kg/m2       HPI Comments: Should be on chlorthalidone 25 mg, losartan 100 mg, amlodipine 5 mg, and [facility] at [facility] for his BP. Somewhere he also got an RX for losartan 50 mg--but I have not written for it. Since last visit he also had a cortisone shot in his right shoulder, and in his right knee          Current Outpatient Prescriptions:  diclofenac (VOLTAREN) 1 % gel, Apply 2 g to affected area four (4) times daily. carboxymethylcellulose sodium (REFRESH OP), Apply 1 Drop to eye daily. nitroglycerin (NITROSTAT) 0.4 mg SL tablet, 0.4 mg by SubLINGual route every five (5) minutes as needed for Chest Pain. Up to 3 doses. rosuvastatin (CRESTOR) 20 mg tablet, Take 20 mg by mouth nightly. ferrous sulfate 325 mg (65 mg iron) tablet, Take  by mouth Daily (before breakfast). clopidogrel (PLAVIX) 75 mg tab, Take 1 Tab by mouth daily. isosorbide mononitrate ER (IMDUR) 30 mg tablet, Take 1 Tab by mouth daily. fluticasone (FLONASE) 50 mcg/actuation nasal spray, SHAKE LIQUID AND USE 2 SPRAYS IN EACH NOSTRIL DAILY  terazosin (HYTRIN) 2 mg capsule, Take 1 Cap by mouth nightly. chlorthalidone (HYGROTEN) 25 mg tablet, Take 1 Tab by mouth daily. Indications: Edema  traZODone (DESYREL) 50 mg tablet, TAKE 1 TO 2 TABLETS BY MOUTH AT BEDTIME AS NEEDED  gabapentin (NEURONTIN) 300 mg capsule, Take 300 mg by mouth two (2) times a day. colchicine 0.6 mg tablet, Take 2 tabs now and one in 6 hours if needed.  Repeat in 6 hours as needed  Indications: acute gouty arthritis  ZINC ACETATE PO, Take 1,000 mg by mouth.  glimepiride (AMARYL) 1 mg tablet, TAKE 1 TABLET BY MOUTH DAILY BEFORE BREAKFAST  potassium chloride (KLOR-CON) 10 mEq tablet, TAKE 1 TABLET BY MOUTH TWICE DAILY  sertraline (ZOLOFT) 100 mg tablet, Take 2 Tabs by mouth daily. amLODIPine (NORVASC) 5 mg tablet, Take 1 Tab by mouth two (2) times a day. raNITIdine (ZANTAC) 300 mg tablet, TAKE 1 TABLET BY MOUTH ONCE DAILY  cholecalciferol (VITAMIN D3) 1,000 unit tablet, Take  by mouth daily. ascorbic acid, vitamin C, (VITAMIN C) 250 mg tablet, Take  by mouth.  losartan (COZAAR) 100 mg tablet, Take 1 Tab by mouth daily. garlic 0,617 mg cap, Take 1 Cap by mouth daily. multivit-min-FA-lycopen-lutein 0.4-300-250 mg-mcg-mcg tab, Take 1 Tab by mouth daily. triamcinolone acetonide (KENALOG) 0.025 % topical cream, Apply  to affected area two (2) times daily as needed. use thin layer   aspirin delayed-release 81 mg tablet, Take 1 tablet by mouth daily. pravastatin (PRAVACHOL) 40 mg tablet, Take 1 Tab by mouth nightly. No current facility-administered medications for this visit. Hypertension    The history is provided by the patient. The current episode started more than 1 week ago. The problem has not changed since onset. Associated symptoms include dizziness (intermittent since back on chlorthalidone). Pertinent negatives include no chest pain, no palpitations and no shortness of breath. There are no associated agents to hypertension. Risk factors include a sedentary lifestyle, male gender, hypertension, dyslipidemia and diabetes mellitus. Diabetes   The history is provided by the patient. This is a chronic problem. The current episode started more than 1 week ago. The problem occurs daily. The problem has not changed since onset. Pertinent negatives include no chest pain and no shortness of breath. Exacerbated by: diet. The symptoms are relieved by medications (diet). Cholesterol Problem   Pertinent negatives include no chest pain and no shortness of breath. Review of Systems   Constitutional: Negative. Respiratory: Negative for shortness of breath. Cardiovascular: Negative for chest pain, palpitations and leg swelling (since back on chlorthalidone). Neurological: Positive for dizziness (intermittent since back on chlorthalidone). Physical Exam   Constitutional: He is oriented to person, place, and time. He appears well-developed and well-nourished. No distress. Cardiovascular: Normal rate and regular rhythm. Pulmonary/Chest: Effort normal and breath sounds normal.   Musculoskeletal: He exhibits no edema. Neurological: He is alert and oriented to person, place, and time. Skin: Skin is warm and dry. He is not diaphoretic. Psychiatric: He has a normal mood and affect. Nursing note and vitals reviewed. ASSESSMENT and PLAN    ICD-10-CM ICD-9-CM    1. Hypertension I11.9 402.10    2. Type 2 diabetes mellitus with diabetic neuropathy, without long-term current use of insulin (HCC) E11.40 250.60      357.2    3. Dyslipidemia E78.5 272.4          [facility]  Discussed his BS control which is great on low dose amaryl. Even though this is considered a high risk med given his age, I am not going to stop it. Can't take metformin due to renal impairment. [facility] usually does not cover brand name meds well. Chol doing well    BP controlled. Discussed BMI/weight, lifestyle, diet and exercise. Discussed effect on blood pressure, blood sugar, and joints especially  Focus on limiting white carbs, portion control, and moving more.     F/u 4 months for [address]

## 2018-07-12 NOTE — PROGRESS NOTES
ROOM # 5  Pt states has two Losartan Rx at home. Pt reports that one is 50 mg and one is 100 mg and both state take one per day. Pt is unsure od which dose to take. Pt currently taking 100 mg tab. Tom Nascimento presents today for   Chief Complaint   Patient presents with    Hypertension    Diabetes    Cholesterol Problem       Tom Nascimento preferred language for health care discussion is english/other. Is someone accompanying this pt? no    Is the patient using any DME equipment during OV? no    Depression Screening:  PHQ over the last two weeks 2/27/2018 2/22/2018 7/11/2017 5/11/2017 3/9/2017 9/6/2016 5/2/2016   PHQ Not Done - - Active Diagnosis of Depression or Bipolar Disorder Active Diagnosis of Depression or Bipolar Disorder - Patient Decline Active Diagnosis of Depression or Bipolar Disorder   Little interest or pleasure in doing things Not at all Not at all Nearly every day - Not at all Nearly every day -   Feeling down, depressed or hopeless Not at all Not at all Several days - Not at all Nearly every day -   Total Score PHQ 2 0 0 4 - 0 6 -       Learning Assessment:  Learning Assessment 4/13/2015 4/17/2014 12/12/2013   PRIMARY LEARNER Patient Patient -   HIGHEST LEVEL OF EDUCATION - PRIMARY LEARNER  SOME COLLEGE - [facility] [place] PRIMARY LEARNER NONE - [address] CAREGIVER No - -   [facility]    NEED - - No   LEARNER PREFERENCE PRIMARY LISTENING DEMONSTRATION PICTURES   LEARNING SPECIAL TOPICS - - none   ANSWERED BY patient - -   RELATIONSHIP SELF - -       Abuse Screening:  Abuse Screening Questionnaire 11/13/2017 4/13/2015   Do you ever feel afraid of your partner? N N   Are you in a relationship with someone who physically or mentally threatens you? N N   Is it safe for you to go home?  Y Y       Fall Risk  Fall Risk Assessment, last 12 mths 7/12/2018 4/30/2018 2/27/2018 2/22/2018 1/30/2018 11/13/2017 7/11/2017   Able to walk? Yes Yes Yes Yes Yes Yes Yes   Fall in past 12 months? No Yes Yes No Yes Yes Yes   Fall with injury? - No No - Yes Yes Yes   Number of falls in past 12 months - 1 5 - 4 1 1   Fall Risk Score - 1 5 - 5 2 2       Health Maintenance reviewed and discussed per provider. Yes    Rolo Clark is due for There are no preventive care reminders to display for this patient. Please order/place referral if appropriate. Advance Directive:  1. Do you have an advance directive in place? Patient Reply: yes    2. If not, would you like material regarding how to put one in place? Patient Reply: no    Coordination of Care:  1. Have you been to the ER, urgent care clinic since your last visit? Hospitalized since your last visit? no    2. Have you seen or consulted any other health care providers outside of the [address] [place] [address] since your last visit? Include any pap smears or colon screening.  no

## 2018-07-12 NOTE — MR AVS SNAPSHOT
Jose Cespedes 
 
 
 [address] [address] [address] 
[phone] Patient: Ekaterina Gonzalez MRN: [id number] Visit Information Date & Time Provider Department Dept. Phone Encounter #  
 7/12/2018  1:45 PM [address] [phone] [id number] Follow-up Instructions Return in about 4 months (around 11/12/2018) for Medicare Wellness Visit, HTN, DM, cholesterol. Your Appointments 7/31/2018  3:15 PM  
Follow Up with Brain Townsend MD  
Cardio Specialist at [address]) Appt Note: 6 months [facility] [address] [address] [address] Upcoming Health Maintenance Date Due Influenza Age 5 to Adult 8/1/2018 HEMOGLOBIN A1C Q6M 9/28/2018 EYE EXAM RETINAL OR DILATED Q1 11/9/2018 MICROALBUMIN Q1 11/13/2018 MEDICARE YEARLY EXAM 11/14/2018 FOOT EXAM Q1 3/28/2019 LIPID PANEL Q1 3/28/2019 GLAUCOMA SCREENING Q2Y 11/9/2019 DTaP/Tdap/Td series (3 - Td) 5/1/2027 COLONOSCOPY 7/9/2028 Allergies as of 7/12/2018  Review Complete On: 7/12/2018 By: Juany Noriega MD  
  
 Severity Noted Reaction Type Reactions Beta Blocker [Beta-blockers (Beta-adrenergic Blocking Agts)]    Other (comments)  
 symptomatic bradycardia Codeine   Side Effect Other (comments) Passed//fainted 
patient doesn't recall reaction, occurred as a teenager Darvocet A500 [Propoxyphene N-acetaminophen]   Systemic Other (comments)  
 throat swelling Dexbrompheniramine-pseudoephed    Other (comments) Facial swelling. 1980\"s Lasix [Furosemide]  09/06/2016   Systemic Itching Lipitor [Atorvastatin]  03/16/2016    Other (comments) Sulfa (Sulfonamide Antibiotics)  10/18/2016    Hives Current Immunizations  Reviewed on 3/12/2018 Name Date Influenza High Dose Vaccine PF 11/13/2017, 10/18/2016 Influenza Vaccine 10/1/2017 12:00 AM, 9/30/2014, 9/2/2014 12:00 AM  
 Influenza Vaccine Split 10/1/2012 Pneumococcal Conjugate (PCV-13) 4/28/2015 Pneumococcal Polysaccharide (PPSV-23) 10/1/2016 12:00 AM, 9/6/2016 10:15 AM  
 Tdap 5/1/2017  5:39 PM, 8/12/2013 10:56 AM  
 ZZZ-RETIRED (DO NOT USE) Pneumococcal Vaccine (Unspecified Type) 12/5/2009 Zoster Vaccine, Live 1/25/2013 Not reviewed this visit You Were Diagnosed With   
  
 Codes Comments Benign hypertensive heart disease without heart failure    -  Primary ICD-10-CM: I11.9 ICD-9-CM: 402.10 Type 2 diabetes mellitus with diabetic neuropathy, without long-term current use of insulin (HCC)     ICD-10-CM: E11.40 ICD-9-CM: 250.60, 357.2 Dyslipidemia     ICD-10-CM: E78.5 ICD-9-CM: 272.4 Vitals BP Pulse Temp Resp Height(growth percentile) Weight(growth percentile)  
 115/65 (BP 1 Location: Right arm, BP Patient Position: Sitting) 80 97.8 °F (36.6 °C) (Oral) 18 6' 2\" (1.88 m) 236 lb 3.2 oz (107.1 kg) SpO2 BMI Smoking Status 95% 30.33 kg/m2 Former Smoker Vitals History BMI and BSA Data Body Mass Index Body Surface Area  
 30.33 kg/m 2 2.36 m 2 Preferred Pharmacy Pharmacy Name Phone [facility] [address] [phone] Your Updated Medication List  
  
   
This list is accurate as of 7/12/18  2:24 PM.  Always use your most recent med list. amLODIPine 5 mg tablet Commonly known as:  Emilee Prisca Take 1 Tab by mouth two (2) times a day. aspirin delayed-release 81 mg tablet Take 1 tablet by mouth daily. chlorthalidone 25 mg tablet Commonly known as:  Wilhemena Nickels Take 1 Tab by mouth daily. Indications: Edema  
  
 cholecalciferol 1,000 unit tablet Commonly known as:  VITAMIN D3 Take  by mouth daily. clopidogrel 75 mg Tab Commonly known as:  PLAVIX Take 1 Tab by mouth daily. colchicine 0.6 mg tablet Take 2 tabs now and one in 6 hours if needed. Repeat in 6 hours as needed  Indications: acute gouty arthritis  
  
 diclofenac 1 % Gel Commonly known as:  VOLTAREN Apply 2 g to affected area four (4) times daily. ferrous sulfate 325 mg (65 mg iron) tablet Take  by mouth Daily (before breakfast). fluticasone 50 mcg/actuation nasal spray Commonly known as:  Nic Shook SHAKE LIQUID AND USE 2 SPRAYS IN EACH NOSTRIL DAILY  
  
 gabapentin 300 mg capsule Commonly known as:  NEURONTIN Take 300 mg by mouth two (2) times a day.  
  
 garlic 8,138 mg Cap Take 1 Cap by mouth daily. glimepiride 1 mg tablet Commonly known as:  AMARYL  
TAKE 1 TABLET BY MOUTH DAILY BEFORE BREAKFAST  
  
 isosorbide mononitrate ER 30 mg tablet Commonly known as:  IMDUR Take 1 Tab by mouth daily. losartan 100 mg tablet Commonly known as:  COZAAR Take 1 Tab by mouth daily. multivit-min-FA-lycopen-lutein 0.4-300-250 mg-mcg-mcg Tab Take 1 Tab by mouth daily. nitroglycerin 0.4 mg SL tablet Commonly known as:  NITROSTAT  
0.4 mg by SubLINGual route every five (5) minutes as needed for Chest Pain. Up to 3 doses. potassium chloride 10 mEq tablet Commonly known as:  KLOR-CON  
TAKE 1 TABLET BY MOUTH TWICE DAILY pravastatin 40 mg tablet Commonly known as:  PRAVACHOL Take 1 Tab by mouth nightly. raNITIdine 300 mg tablet Commonly known as:  ZANTAC TAKE 1 TABLET BY MOUTH ONCE DAILY REFRESH OP Apply 1 Drop to eye daily. rosuvastatin 20 mg tablet Commonly known as:  CRESTOR Take 20 mg by mouth nightly. sertraline 100 mg tablet Commonly known as:  ZOLOFT Take 2 Tabs by mouth daily. terazosin 2 mg capsule Commonly known as:  HYTRIN Take 1 Cap by mouth nightly. traZODone 50 mg tablet Commonly known as:  Carry [place]  
 TAKE 1 TO 2 TABLETS BY MOUTH AT BEDTIME AS NEEDED  
  
 triamcinolone acetonide 0.025 % topical cream  
Commonly known as:  KENALOG Apply  to affected area two (2) times daily as needed. use thin layer VITAMIN C 250 mg tablet Generic drug:  ascorbic acid (vitamin C) Take  by mouth. ZINC ACETATE PO Take 1,000 mg by mouth. We Performed the Following AMB EXT HGBA1C [UKM13624 CPT(R)] Comments: This external order was created through the Results Console. AMB EXT LDL-C [SIE25038 CPT(R)] Comments: This external order was created through the Results Console. Follow-up Instructions Return in about 4 months (around 11/12/2018) for Medicare Wellness Visit, HTN, DM, cholesterol. Introducing [facility]! Dear Shen Domingo: Thank you for requesting a [employer] account. Our records indicate that you already have an active [employer] account. You can access your account anytime at [email or web address] Did you know that you can access your hospital and ER discharge instructions at any time in [employer] You can also review all of your test results from your hospital stay or ER visit. Additional Information If you have questions, please visit the Frequently Asked Questions section of the [employer] website at [email or web address] Remember, [employer] is NOT to be used for urgent needs. For medical emergencies, dial 911. Now available from your iPhone and Android! Please provide this summary of care documentation to your next provider. Your primary care clinician is listed as [facility]. If you have any questions after today's visit, please call [phone].

## 2018-07-19 ENCOUNTER — TELEPHONE (OUTPATIENT)
Dept: CARDIOLOGY CLINIC | Age: 68
End: 2018-07-19

## 2018-07-19 NOTE — TELEPHONE ENCOUNTER
Kayley Juárez called and states that patient needs another colonoscopy, is it ok to hold Plavix again. Advised Dr. Álvaro Tate is not in the office but we will check with Dr. Julissa Thomas tomorrow to see if it is ok. Then we need to call her back at [phone] ext      She verbalized understanding.      Printed last note for Dr. Julissa Thomas to review

## 2018-07-20 ENCOUNTER — ANESTHESIA EVENT (OUTPATIENT)
Dept: ENDOSCOPY | Age: 68
End: 2018-07-20
Payer: MEDICARE

## 2018-07-20 NOTE — TELEPHONE ENCOUNTER
Per Dr. Donna Perkins, patient is okay to hold Plavix 5 days prior to colonoscopy. Called Alexandra and made aware. Verbal order and read back per Dr. Donna Perkins.

## 2018-07-23 ENCOUNTER — ANESTHESIA (OUTPATIENT)
Dept: ENDOSCOPY | Age: 68
End: 2018-07-23
Payer: MEDICARE

## 2018-07-26 ENCOUNTER — TELEPHONE (OUTPATIENT)
Dept: CARDIOLOGY CLINIC | Age: 68
End: 2018-07-26

## 2018-07-26 NOTE — LETTER
7/26/2018 Mr. Jose Carlos Carpenter [phone] 
[address] [facility] [phone] To whom it may concern: 
 
 
Bob Crystal was seen in our office on   for cardiac evaluation. From a cardiac standpoint he is cleared for surgery, and can hold his plavix for 5 days prior to surgery. Please feel free to contact our office if you have any questions regarding this patient. Sincerely, Yris Bah MD

## 2018-07-26 NOTE — TELEPHONE ENCOUNTER
Incoming from Dr Delmer Robert nurse. Two patient Identifiers confirmed. Advised the need clearance from provider faxed to  [phone]. Will fax paper.

## 2018-07-31 ENCOUNTER — OFFICE VISIT (OUTPATIENT)
Dept: CARDIOLOGY CLINIC | Age: 68
End: 2018-07-31

## 2018-07-31 VITALS
OXYGEN SATURATION: 96 % | HEIGHT: 74 IN | SYSTOLIC BLOOD PRESSURE: 137 MMHG | HEART RATE: 80 BPM | DIASTOLIC BLOOD PRESSURE: 68 MMHG | WEIGHT: 239 LBS | BODY MASS INDEX: 30.67 KG/M2

## 2018-07-31 DIAGNOSIS — I10 ESSENTIAL HYPERTENSION WITH GOAL BLOOD PRESSURE LESS THAN 140/90: ICD-10-CM

## 2018-07-31 DIAGNOSIS — I11.9 HYPERTENSIVE HEART DISEASE WITHOUT HEART FAILURE: Primary | ICD-10-CM

## 2018-07-31 DIAGNOSIS — I25.10 CORONARY ARTERY DISEASE DUE TO LIPID RICH PLAQUE: ICD-10-CM

## 2018-07-31 DIAGNOSIS — I25.83 CORONARY ARTERY DISEASE DUE TO LIPID RICH PLAQUE: ICD-10-CM

## 2018-07-31 DIAGNOSIS — E78.00 PURE HYPERCHOLESTEROLEMIA: ICD-10-CM

## 2018-07-31 NOTE — PROGRESS NOTES
[facility] Mr. Lanae Aschoff  is a 76 y.o. gentleman with diabetes, hypertension, dyslipidemia and tobacco use. He has coronary artery disease as documented by cardiac catheterization in October of 2014. His anatomy is as follows: LM - patent LAD - 30% prox, 80% mid, 90% apical 
D1 - 100% RI - 95% ostial (failed PCI, calcified 1.75 - 2.0 mm vessel) Cx - 30-40% prox OM1 - subtotal 
OM2 - 80% (3.5 x 18mm XIENCE 10/14) RCA - 30% diffuse RPDA - 100% RPLV - 50% Mr. Lanae Aschoff is here today for follow up appointment. He had his left knee replacement surgery since last visit. He is able to function better. He has mild dyspnea on moderate exertion. He denies any chest pain or chest tightness. He denies any use of SL nitroglycerin since last time. Occasionally his blood pressure runs in the 80s and 90s with feeling of fatigue and tiredness. He denies any syncopal episode. Past Medical History:  
Diagnosis Date  Agent orange exposure  Asbestos exposure  Autonomic dysfunction   
 abnormal tilt table 2/15  BPH  CAD (coronary artery disease) S/P OM NOY in 2014  Coronary artery disease OM2 - 3.5 x 18mm XIENCE 10/14  Degenerative joint disease  Diabetes  Dyslipidemia  Erectile dysfunction  Fibrous histiocytoma  GERD   
 Gout  Hypertension  Nephrolithiasis  Neuropathy  Non-nicotine vapor product user  Pulmonary fibrosis  Raynaud phenomenon  Sleep apnea  Spindle cell sarcoma   
 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  
Procedure Laterality Date  COLONOSCOPY    
 6/08  10 y f/u, Dr. Rachel Leong  COLONOSCOPY N/A 7/9/2018 COLONOSCOPY performed by Sylvia Rousseau MD at [facility] ENDOSCOPY  
 HX CATARACT REMOVAL    
 7/14  bilat  HX CERVICAL LAMINECTOMY  HX COLONOSCOPY  07/09/2018  
 pt states have to redo d/t unclear imaging [address]  HX ORTHOPAEDIC Left 02/05/2018  
 knee replacement  HX TUMOR REMOVAL    
 3/13  wide excision right thigh sarcoma  HX TYMPANOMASTOIDECTOMY Current Outpatient Prescriptions Medication Sig  
 diclofenac (VOLTAREN) 1 % gel Apply 2 g to affected area four (4) times daily.  carboxymethylcellulose sodium (REFRESH OP) Apply 1 Drop to eye daily.  nitroglycerin (NITROSTAT) 0.4 mg SL tablet 0.4 mg by SubLINGual route every five (5) minutes as needed for Chest Pain. Up to 3 doses.  rosuvastatin (CRESTOR) 20 mg tablet Take 20 mg by mouth nightly.  ferrous sulfate 325 mg (65 mg iron) tablet Take  by mouth Daily (before breakfast).  clopidogrel (PLAVIX) 75 mg tab Take 1 Tab by mouth daily.  isosorbide mononitrate ER (IMDUR) 30 mg tablet Take 1 Tab by mouth daily.  fluticasone (FLONASE) 50 mcg/actuation nasal spray SHAKE LIQUID AND USE 2 SPRAYS IN EACH NOSTRIL DAILY  terazosin (HYTRIN) 2 mg capsule Take 1 Cap by mouth nightly.  chlorthalidone (HYGROTEN) 25 mg tablet Take 1 Tab by mouth daily. Indications: Edema  traZODone (DESYREL) 50 mg tablet TAKE 1 TO 2 TABLETS BY MOUTH AT BEDTIME AS NEEDED  
 gabapentin (NEURONTIN) 300 mg capsule Take 300 mg by mouth two (2) times a day.  colchicine 0.6 mg tablet Take 2 tabs now and one in 6 hours if needed. Repeat in 6 hours as needed  Indications: acute gouty arthritis  ZINC ACETATE PO Take 1,000 mg by mouth.  glimepiride (AMARYL) 1 mg tablet TAKE 1 TABLET BY MOUTH DAILY BEFORE BREAKFAST  potassium chloride (KLOR-CON) 10 mEq tablet TAKE 1 TABLET BY MOUTH TWICE DAILY  sertraline (ZOLOFT) 100 mg tablet Take 2 Tabs by mouth daily.  amLODIPine (NORVASC) 5 mg tablet Take 1 Tab by mouth two (2) times a day.  raNITIdine (ZANTAC) 300 mg tablet TAKE 1 TABLET BY MOUTH ONCE DAILY  cholecalciferol (VITAMIN D3) 1,000 unit tablet Take  by mouth daily.  ascorbic acid, vitamin C, (VITAMIN C) 250 mg tablet Take  by mouth.   
 pravastatin (PRAVACHOL) 40 mg tablet Take 1 Tab by mouth nightly.  losartan (COZAAR) 100 mg tablet Take 1 Tab by mouth daily.  garlic 6,798 mg cap Take 1 Cap by mouth daily.  multivit-min-FA-lycopen-lutein 0.4-300-250 mg-mcg-mcg tab Take 1 Tab by mouth daily.  triamcinolone acetonide (KENALOG) 0.025 % topical cream Apply  to affected area two (2) times daily as needed. use thin layer  aspirin delayed-release 81 mg tablet Take 1 tablet by mouth daily. No current facility-administered medications for this visit. Allergies Allergen Reactions  Beta Blocker [Beta-Blockers (Beta-Adrenergic Blocking Agts)] Other (comments)  
  symptomatic bradycardia  Codeine Other (comments) Passed//fainted 
patient doesn't recall reaction, occurred as a teenager  Darvocet A500 [Propoxyphene N-Acetaminophen] Other (comments)  
  throat swelling  Dexbrompheniramine-Pseudoephed Other (comments) Facial swelling. 1980\"s  Lasix [Furosemide] Itching  Lipitor [Atorvastatin] Other (comments)  Sulfa (Sulfonamide Antibiotics) Hives Family History:  
Non contributory for premature coronary disease in first degree relatives. Social History: He  reports that he quit smoking about 7 months ago. He has a 15.00 pack-year smoking history. He has never used smokeless tobacco.  He  reports that he does not drink alcohol. [address] [facility], 3 years Physical:  
Vitals:  
BP: 137/68 HR: 80 Wt: 239 lb (108.4 kg) Exam:  
General: Older gentleman, no complaints, no distress.    
Head/Neck: No JVD Lungs:  No respiratory distress. Clear with good air movement. Heart:  Regular. No rubs or gallops. Abdomen: Bowel sounds present Extremities: Intact pulses. No significant edema.    
Neurological: Alert and oriented to person, place, time. No gross neurological deficit. Skin:  Warm and dry. Review of Data:  
LABS:  
Lab Results Component Value Date/Time  WBC 7.4 12/03/2015 10:00 AM  
 HGB 15.3 12/03/2015 10:00 AM  
 HCT 47.2 12/03/2015 10:00 AM  
 PLATELET 898 14/45/2378 10:00 AM  
 
Lab Results Component Value Date/Time Sodium 138 03/28/2018 03:23 PM  
 Potassium 3.5 03/28/2018 03:23 PM  
 Chloride 101 03/28/2018 03:23 PM  
 CO2 29 03/28/2018 03:23 PM  
 Anion gap 8 03/28/2018 03:23 PM  
 Glucose 69 (L) 03/28/2018 03:23 PM  
 BUN 24 (H) 03/28/2018 03:23 PM  
 Creatinine 1.36 (H) 03/28/2018 03:23 PM  
 BUN/Creatinine ratio 18 03/28/2018 03:23 PM  
 GFR est AA >60 03/28/2018 03:23 PM  
 GFR est non-AA 52 (L) 03/28/2018 03:23 PM  
 Calcium 9.4 03/28/2018 03:23 PM  
 Bilirubin, total 0.6 11/13/2017 12:20 PM  
 AST (SGOT) 18 11/13/2017 12:20 PM  
 Alk. phosphatase 79 11/13/2017 12:20 PM  
 Protein, total 8.0 11/13/2017 12:20 PM  
 Albumin 4.1 11/13/2017 12:20 PM  
 Globulin 3.9 11/13/2017 12:20 PM  
 A-G Ratio 1.1 11/13/2017 12:20 PM  
 ALT (SGPT) 25 11/13/2017 12:20 PM  
 
Lab Results Component Value Date/Time Cholesterol, total 148 03/28/2018 03:23 PM  
 HDL Cholesterol 38 (L) 03/28/2018 03:23 PM  
 LDL, calculated 57.8 03/28/2018 03:23 PM  
 Triglyceride 261 (H) 03/28/2018 03:23 PM  
 CHOL/HDL Ratio 3.9 03/28/2018 03:23 PM  
 
Lab Results Component Value Date/Time Hemoglobin A1c 5.7 (H) 03/28/2018 03:23 PM  
 Hemoglobin A1c (POC) 5.9 05/06/2013 11:17 AM  
 Hemoglobin A1c, External 6.1 06/01/2018 EKG:  
Sinus rhythm, 78 beats per minute, right bundle branch block, nonspecific ST-T wave changes. TILT TABLE: February 2015: 
Patient was initially placed in supine position. Heart rate was between 65-70 beats per minute, sinus rhythm. Blood pressure was 113/61 mmHg, with maximum blood pressure of 116/63 mmHg. Patient was placed in 60-degree upright tilting position. About 5-7 minutes into upright tilting position at 70 degrees, patient started feeling like her heart rate speeding up and legs feeling heavy, along with some dizziness.  Blood pressure slowly decreased up to 70/43 mmHg. Heart rate maximum noted was up from 65 to 81 beats per minute. There was no obvious tachycardia. At the time, patient was given IV fluid and then placed back supine position, with normalization of the blood pressure and resolution of his symptoms. Heart rhythm remained in sinus. Patient did have a drop in the systolic blood pressure more than 20 mmHg upon upright tilting position, without any significant increase in the heart rate. This suggests possible autonomic dysfunction. SUMMARY: 
Upright tilt table testing with reproduction of symptoms. More than 20 mmHg drop in systolic blood pressure, without change in the heart rate during upright tilting position, associated with symptoms. This may represent autonomic dysfunction ECHO: (01/18) SUMMARY: 
Left ventricle: Systolic function was normal. Ejection fraction was estimated 
 in the range of 55 % to 60 %. There was 
hypokinesis of the basal inferoseptal wall. Wall thickness was at the upper  
limits of normal. 
Right ventricle: The ventricle was dilated. Inferior vena cava, hepatic veins: The inferior vena cava was mildly dilated. 
 The respirophasic change in diameter was 
less than 50%. No major VHD STRESS TEST (EST, PHARM, NUC, ECHO etc): October 2014: 
1. Small to medium sized area of mildly intense reversible defect involvement anterolateral wall. 2.  There is also a small area of reversible defect involving basilar inferior wall concerning for ischemic focus. 3.  Calculated ejection fraction of 56% without any wall motion abnormality. CATHETERIZATION: October 2014: LM - patent LAD - 30% prox, 80% mid, 90% apical 
D1 - 100% RI - 95% ostial (failed PCI, calcified 1.75 - 2.0 mm vessel) Cx - 30-40% prox OM1 - subtotal 
OM2 - 80% RCA - 30% diffuse RPDA - 100% RPLV - 50% Impression / Plan:  Diabetes  Hypertension  Dyslipidemia  Coronary artery disease Mr. Yamel Jim returns to the office for follow up. Coronary artery disease: Mr. Keith Kirby had a cardiac catheterization in October, 2014 and required stent of the obtuse marginal branch. No angina currently. He is on appropriate medication with dual antiplatelet agent aspirin and Plavix. He is taking Amlodipine, Pravachol and isosorbide mononitrate. He has a history of beta blocker induced symptomatic bradycardia, that is why he is not on any beta blocker. No S/L since last visit Continue same. Hypertension:  His blood pressure today is 137/68 mmHg. On 4 BP meds. Will reduce dose of Chlorthalidone 12.5 mg daily. Continue rest of them. Has been feeling dizzy with low BP once a week lately. Diabetes: This is being managed by primary care provider. Goal Hgb A1c less than 7 is recommended from cardiovascular standpoint. Last Hgb A1c  was 5.7. Hyperlipidemia:  He is on lipid lowering agent. Last lipid profile on file is 57. Continue same. RTC in 6 months or sooner if needed.

## 2018-07-31 NOTE — MR AVS SNAPSHOT
[address] [facility] [address] [address] 
[phone] Patient: Rolo Clark MRN: [id number] FCM:3/51/[id number] Visit Information Date & Time Provider Department Dept. Phone Encounter #  
 7/31/2018  3:15 PM Iglesia Em  [facility] Specialist at [facility]/[address] [phone] [id number] Follow-up Instructions Return in about 6 months (around 1/31/2019). Your Appointments 11/12/2018  3:30 PM  
Medicare Physical with Alvino Pabon MD  
[facility]: 30 min MWV annual HTN and DM/chol  
 [address]  
  
   
 [address] [facility] Upcoming Health Maintenance Date Due Influenza Age 5 to Adult 8/1/2018 EYE EXAM RETINAL OR DILATED Q1 11/9/2018 MICROALBUMIN Q1 11/13/2018 MEDICARE YEARLY EXAM 11/14/2018 HEMOGLOBIN A1C Q6M 12/1/2018 FOOT EXAM Q1 3/28/2019 LIPID PANEL Q1 6/1/2019 GLAUCOMA SCREENING Q2Y 11/9/2019 DTaP/Tdap/Td series (3 - Td) 5/1/2027 COLONOSCOPY 7/9/2028 Allergies as of 7/31/2018  Review Complete On: 7/31/2018 By: Angeles Browning RN Severity Noted Reaction Type Reactions Beta Blocker [Beta-blockers (Beta-adrenergic Blocking Agts)]    Other (comments)  
 symptomatic bradycardia Codeine   Side Effect Other (comments) Passed//fainted 
patient doesn't recall reaction, occurred as a teenager Darvocet A500 [Propoxyphene N-acetaminophen]   Systemic Other (comments)  
 throat swelling Dexbrompheniramine-pseudoephed    Other (comments) Facial swelling. 1980\"s Lasix [Furosemide]  09/06/2016   Systemic Itching Lipitor [Atorvastatin]  03/16/2016    Other (comments) Sulfa (Sulfonamide Antibiotics)  10/18/2016    Hives Current Immunizations  Reviewed on 3/12/2018 Name Date Influenza High Dose Vaccine PF 11/13/2017, 10/18/2016 Influenza Vaccine 10/1/2017 12:00 AM, 9/30/2014, 9/2/2014 12:00 AM  
 Influenza Vaccine Split 10/1/2012 Pneumococcal Conjugate (PCV-13) 4/28/2015 Pneumococcal Polysaccharide (PPSV-23) 10/1/2016 12:00 AM, 9/6/2016 10:15 AM  
 Tdap 5/1/2017  5:39 PM, 8/12/2013 10:56 AM  
 ZZZ-RETIRED (DO NOT USE) Pneumococcal Vaccine (Unspecified Type) 12/5/2009 Zoster Vaccine, Live 1/25/2013 Not reviewed this visit Vitals BP Pulse Height(growth percentile) Weight(growth percentile) SpO2 BMI  
 137/68 80 6' 2\" (1.88 m) 239 lb (108.4 kg) 96% 30.69 kg/m2 Smoking Status Former Smoker BMI and BSA Data Body Mass Index Body Surface Area  
 30.69 kg/m 2 2.38 m 2 Preferred Pharmacy Pharmacy Name Phone [facility] [address] [phone] Your Updated Medication List  
  
   
This list is accurate as of 7/31/18  3:31 PM.  Always use your most recent med list. amLODIPine 5 mg tablet Commonly known as:  Greco Fanti Take 1 Tab by mouth two (2) times a day. aspirin delayed-release 81 mg tablet Take 1 tablet by mouth daily. chlorthalidone 25 mg tablet Commonly known as:  Chales Chamber Take 1 Tab by mouth daily. Indications: Edema  
  
 cholecalciferol 1,000 unit tablet Commonly known as:  VITAMIN D3 Take  by mouth daily. clopidogrel 75 mg Tab Commonly known as:  PLAVIX Take 1 Tab by mouth daily. colchicine 0.6 mg tablet Take 2 tabs now and one in 6 hours if needed. Repeat in 6 hours as needed  Indications: acute gouty arthritis  
  
 diclofenac 1 % Gel Commonly known as:  VOLTAREN Apply 2 g to affected area four (4) times daily. ferrous sulfate 325 mg (65 mg iron) tablet Take  by mouth Daily (before breakfast). fluticasone 50 mcg/actuation nasal spray Commonly known as:  Larkspur Sykes SHAKE LIQUID AND USE 2 SPRAYS IN EACH NOSTRIL DAILY  
  
 gabapentin 300 mg capsule Commonly known as:  NEURONTIN Take 300 mg by mouth two (2) times a day.  
  
 garlic 5,047 mg Cap Take 1 Cap by mouth daily. glimepiride 1 mg tablet Commonly known as:  AMARYL  
TAKE 1 TABLET BY MOUTH DAILY BEFORE BREAKFAST  
  
 isosorbide mononitrate ER 30 mg tablet Commonly known as:  IMDUR Take 1 Tab by mouth daily. losartan 100 mg tablet Commonly known as:  COZAAR Take 1 Tab by mouth daily. multivit-min-FA-lycopen-lutein 0.4-300-250 mg-mcg-mcg Tab Take 1 Tab by mouth daily. nitroglycerin 0.4 mg SL tablet Commonly known as:  NITROSTAT  
0.4 mg by SubLINGual route every five (5) minutes as needed for Chest Pain. Up to 3 doses. potassium chloride 10 mEq tablet Commonly known as:  KLOR-CON  
TAKE 1 TABLET BY MOUTH TWICE DAILY pravastatin 40 mg tablet Commonly known as:  PRAVACHOL Take 1 Tab by mouth nightly. raNITIdine 300 mg tablet Commonly known as:  ZANTAC TAKE 1 TABLET BY MOUTH ONCE DAILY REFRESH OP Apply 1 Drop to eye daily. rosuvastatin 20 mg tablet Commonly known as:  CRESTOR Take 20 mg by mouth nightly. sertraline 100 mg tablet Commonly known as:  ZOLOFT Take 2 Tabs by mouth daily. terazosin 2 mg capsule Commonly known as:  HYTRIN Take 1 Cap by mouth nightly. traZODone 50 mg tablet Commonly known as:  DESYREL  
TAKE 1 TO 2 TABLETS BY MOUTH AT BEDTIME AS NEEDED  
  
 triamcinolone acetonide 0.025 % topical cream  
Commonly known as:  KENALOG Apply  to affected area two (2) times daily as needed. use thin layer VITAMIN C 250 mg tablet Generic drug:  ascorbic acid (vitamin C) Take  by mouth. ZINC ACETATE PO Take 1,000 mg by mouth. Follow-up Instructions Return in about 6 months (around 1/31/2019). Patient Instructions Decrease hygroten to 12.5 mg daily F/U in 6 months Introducing [facility] & HEALTH SERVICES! Dear Karime Box: Thank you for requesting a [employer] account. Our records indicate that you already have an active [employer] account. You can access your account anytime at [email or web address] Did you know that you can access your hospital and ER discharge instructions at any time in [employer]? You can also review all of your test results from your hospital stay or ER visit. Additional Information If you have questions, please visit the Frequently Asked Questions section of the [employer] website at [email or web address] Remember, [employer] is NOT to be used for urgent needs. For medical emergencies, dial 911. Now available from your iPhone and Android! Please provide this summary of care documentation to your next provider. Your primary care clinician is listed as [facility]. If you have any questions after today's visit, please call [phone].

## 2018-07-31 NOTE — PROGRESS NOTES
1. Have you been to the ER, urgent care clinic since your last visit? Hospitalized since your last visit? No 
 
2. Have you seen or consulted any other health care providers outside of the [facility] since your last visit? Include any pap smears or colon screening.  No

## 2018-08-07 ENCOUNTER — OFFICE VISIT (OUTPATIENT)
Dept: INTERNAL MEDICINE CLINIC | Age: 68
End: 2018-08-07

## 2018-08-07 VITALS
RESPIRATION RATE: 17 BRPM | OXYGEN SATURATION: 94 % | DIASTOLIC BLOOD PRESSURE: 68 MMHG | TEMPERATURE: 95.7 F | HEIGHT: 74 IN | SYSTOLIC BLOOD PRESSURE: 138 MMHG | WEIGHT: 239 LBS | HEART RATE: 80 BPM | BODY MASS INDEX: 30.67 KG/M2

## 2018-08-07 DIAGNOSIS — H66.92 OTITIS OF LEFT EAR: ICD-10-CM

## 2018-08-07 DIAGNOSIS — H92.22 BLOOD IN LEFT EAR CANAL: Primary | ICD-10-CM

## 2018-08-07 RX ORDER — OFLOXACIN 3 MG/ML
5 SOLUTION AURICULAR (OTIC) DAILY
Qty: 5 ML | Refills: 0 | Status: SHIPPED | OUTPATIENT
Start: 2018-08-07 | End: 2021-11-11

## 2018-08-07 NOTE — PROGRESS NOTES
ROOM # 1  Identified pt with two pt identifiers(name and ). Reviewed record in preparation for visit and have obtained necessary documentation. Chief Complaint   Patient presents with    Ear Drainage     blood coming out of left ear today and last week       Simeon Kruse preferred language for health care discussion is english/other. Is the patient using any DME equipment during OV? Yossi Winter is due for:  Health Maintenance Due   Topic    Influenza Age 5 to Adult      Health Maintenance reviewed and discussed per provider  Please order/place referral if appropriate. Advance Directive:  1. Do you have an advance directive in place? Patient Reply: NO    2. If not, would you like material regarding how to put one in place? NO    Coordination of Care:  1. Have you been to the ER, urgent care clinic since your last visit? Hospitalized since your last visit? NO    2. Have you seen or consulted any other health care providers outside of the [address] [place] [address] since your last visit? Include any pap smears or colon screening. NO    Patient is accompanied by self I have received verbal consent from Simeon Kruse to discuss any/all medical information while they are present in the room.     Learning Assessment:  Learning Assessment 2013   PRIMARY LEARNER Patient Patient -   HIGHEST LEVEL OF EDUCATION - PRIMARY LEARNER  SOME COLLEGE - SOME COLLEGE   BARRIERS PRIMARY LEARNER NONE - NONE   CO-LEARNER CAREGIVER No - -   PRIMARY LANGUAGE ENGLISH ENGLISH ENGLISH    NEED - - No   LEARNER PREFERENCE PRIMARY LISTENING DEMONSTRATION PICTURES   LEARNING SPECIAL TOPICS - - none   ANSWERED BY patient - -   RELATIONSHIP SELF - -     Depression Screening:  PHQ over the last two weeks 2018 2018 2017 2017 3/9/2017 2016 2016   PHQ Not Done - - Active Diagnosis of Depression or Bipolar Disorder Active Diagnosis of Depression or Bipolar Disorder - Patient Decline Active Diagnosis of Depression or Bipolar Disorder   Little interest or pleasure in doing things Not at all Not at all Nearly every day - Not at all Nearly every day -   Feeling down, depressed, irritable, or hopeless Not at all Not at all Several days - Not at all Nearly every day -   Total Score PHQ 2 0 0 4 - 0 6 -     Abuse Screening:  Abuse Screening Questionnaire 11/13/2017 4/13/2015   Do you ever feel afraid of your partner? N N   Are you in a relationship with someone who physically or mentally threatens you? N N   Is it safe for you to go home? Y Y     Fall Risk  Fall Risk Assessment, last 12 mths 7/31/2018 7/12/2018 4/30/2018 2/27/2018 2/22/2018 1/30/2018 11/13/2017   Able to walk? Yes Yes Yes Yes Yes Yes Yes   Fall in past 12 months? No No Yes Yes No Yes Yes   Fall with injury?  - - No No - Yes Yes   Number of falls in past 12 months - - 1 5 - 4 1   Fall Risk Score - - 1 5 - 5 2

## 2018-08-07 NOTE — PATIENT INSTRUCTIONS
The Ear: Anatomy Sketch    Current as of: May 12, 2017  Content Version: 11.7  © 9940-4881 [employer]. Care instructions adapted under license by [employer] (which disclaims liability or warranty for this information). If you have questions about a medical condition or this instruction, always ask your healthcare professional. [place] [address] any warranty or liability for your use of this information.

## 2018-08-07 NOTE — MR AVS SNAPSHOT
[address] [facility] 
 
 
 [address] [address] 
[phone] Patient: Minnie Menezes MRN: [id number] RXE:[birth year] Visit Information Date & Time Provider Department Dept. Phone Encounter #  
 8/7/2018  3:00 PM Frnakie Valle NP [facility] [phone] [id number] Follow-up Instructions Return if symptoms worsen or fail to improve. Your Appointments 11/12/2018  3:30 PM  
Medicare Physical with Kiet Rios MD  
[facility] [address] [facility]) Appt Note: 30 min MWV annual HTN and DM/chol  
 [address]  
  
   
 [address] [facility] Upcoming Health Maintenance Date Due Influenza Age 5 to Adult 8/1/2018 EYE EXAM RETINAL OR DILATED Q1 11/9/2018 MICROALBUMIN Q1 11/13/2018 MEDICARE YEARLY EXAM 11/14/2018 HEMOGLOBIN A1C Q6M 12/1/2018 FOOT EXAM Q1 3/28/2019 LIPID PANEL Q1 6/1/2019 GLAUCOMA SCREENING Q2Y 11/9/2019 DTaP/Tdap/Td series (3 - Td) 5/1/2027 COLONOSCOPY 7/9/2028 Allergies as of 8/7/2018  Review Complete On: 8/7/2018 By: Chetna Abreu Severity Noted Reaction Type Reactions Beta Blocker [Beta-blockers (Beta-adrenergic Blocking Agts)]    Other (comments)  
 symptomatic bradycardia Codeine   Side Effect Other (comments) Passed//fainted 
patient doesn't recall reaction, occurred as a teenager Darvocet A500 [Propoxyphene N-acetaminophen]   Systemic Other (comments)  
 throat swelling Dexbrompheniramine-pseudoephed    Other (comments) Facial swelling. 1980\"s Lasix [Furosemide]  09/06/2016   Systemic Itching Lipitor [Atorvastatin]  03/16/2016    Other (comments) Sulfa (Sulfonamide Antibiotics)  10/18/2016    Hives Current Immunizations  Reviewed on 3/12/2018 Name Date Influenza High Dose Vaccine PF 11/13/2017, 10/18/2016 Influenza Vaccine 10/1/2017 12:00 AM, 9/30/2014, 9/2/2014 12:00 AM  
 Influenza Vaccine Split 10/1/2012 Pneumococcal Conjugate (PCV-13) 4/28/2015 Pneumococcal Polysaccharide (PPSV-23) 10/1/2016 12:00 AM, 9/6/2016 10:15 AM  
 Tdap 5/1/2017  5:39 PM, 8/12/2013 10:56 AM  
 ZZZ-RETIRED (DO NOT USE) Pneumococcal Vaccine (Unspecified Type) 12/5/2009 Zoster Vaccine, Live 1/25/2013 Not reviewed this visit You Were Diagnosed With   
  
 Codes Comments Blood in left ear canal    -  Primary ICD-10-CM: S52.64 
ICD-9-CM: 388.69 Otitis of left ear     ICD-10-CM: H66.92 
ICD-9-CM: 382. 9 Vitals BP Pulse Temp Resp Height(growth percentile) Weight(growth percentile)  
 138/68 (BP 1 Location: Right arm, BP Patient Position: Sitting) 80 95.7 °F (35.4 °C) (Oral) 17 6' 2\" (1.88 m) 239 lb (108.4 kg) SpO2 BMI Smoking Status 94% 30.69 kg/m2 Former Smoker BMI and BSA Data Body Mass Index Body Surface Area  
 30.69 kg/m 2 2.38 m 2 Preferred Pharmacy Pharmacy Name Phone [facility] [address] [phone] Your Updated Medication List  
  
   
This list is accurate as of 8/7/18  3:51 PM.  Always use your most recent med list. amLODIPine 5 mg tablet Commonly known as:  Gilson Presser Take 1 Tab by mouth two (2) times a day. aspirin delayed-release 81 mg tablet Take 1 tablet by mouth daily. chlorthalidone 25 mg tablet Commonly known as:  Maribel Cork Take 1 Tab by mouth daily. Indications: Edema  
  
 cholecalciferol 1,000 unit tablet Commonly known as:  VITAMIN D3 Take  by mouth daily. clopidogrel 75 mg Tab Commonly known as:  PLAVIX Take 1 Tab by mouth daily. colchicine 0.6 mg tablet Take 2 tabs now and one in 6 hours if needed.  Repeat in 6 hours as needed Indications: acute gouty arthritis  
  
 diclofenac 1 % Gel Commonly known as:  VOLTAREN Apply 2 g to affected area four (4) times daily. ferrous sulfate 325 mg (65 mg iron) tablet Take  by mouth Daily (before breakfast). fluticasone 50 mcg/actuation nasal spray Commonly known as:  Suzy Leesylvia SHAKE LIQUID AND USE 2 SPRAYS IN EACH NOSTRIL DAILY  
  
 gabapentin 300 mg capsule Commonly known as:  NEURONTIN Take 300 mg by mouth two (2) times a day.  
  
 garlic 8,652 mg Cap Take 1 Cap by mouth daily. glimepiride 1 mg tablet Commonly known as:  AMARYL  
TAKE 1 TABLET BY MOUTH DAILY BEFORE BREAKFAST  
  
 isosorbide mononitrate ER 30 mg tablet Commonly known as:  IMDUR Take 1 Tab by mouth daily. losartan 100 mg tablet Commonly known as:  COZAAR Take 1 Tab by mouth daily. multivit-min-FA-lycopen-lutein 0.4-300-250 mg-mcg-mcg Tab Take 1 Tab by mouth daily. nitroglycerin 0.4 mg SL tablet Commonly known as:  NITROSTAT  
0.4 mg by SubLINGual route every five (5) minutes as needed for Chest Pain. Up to 3 doses. ofloxacin 0.3 % otic solution Commonly known as:  FLOXIN Administer 5 Drops in left ear daily. potassium chloride 10 mEq tablet Commonly known as:  KLOR-CON  
TAKE 1 TABLET BY MOUTH TWICE DAILY pravastatin 40 mg tablet Commonly known as:  PRAVACHOL Take 1 Tab by mouth nightly. raNITIdine 300 mg tablet Commonly known as:  ZANTAC TAKE 1 TABLET BY MOUTH ONCE DAILY REFRESH OP Apply 1 Drop to eye daily. rosuvastatin 20 mg tablet Commonly known as:  CRESTOR Take 20 mg by mouth nightly. sertraline 100 mg tablet Commonly known as:  ZOLOFT Take 2 Tabs by mouth daily. terazosin 2 mg capsule Commonly known as:  HYTRIN Take 1 Cap by mouth nightly. traZODone 50 mg tablet Commonly known as:  DESYREL  
TAKE 1 TO 2 TABLETS BY MOUTH AT BEDTIME AS NEEDED  
  
 triamcinolone acetonide 0.025 % topical cream  
Commonly known as:  KENALOG Apply  to affected area two (2) times daily as needed. use thin layer VITAMIN C 250 mg tablet Generic drug:  ascorbic acid (vitamin C) Take  by mouth. ZINC ACETATE PO Take 1,000 mg by mouth. Prescriptions Sent to Pharmacy Refills  
 ofloxacin (FLOXIN) 0.3 % otic solution 0 Sig: Administer 5 Drops in left ear daily. Class: Normal  
 Pharmacy: [facility] [address] #: [phone] Route: Left Ear Follow-up Instructions Return if symptoms worsen or fail to improve. Patient Instructions The Ear: Anatomy Sketch Current as of: May 12, 2017 Content Version: 11.7 © 5386-6494 commercetools. Care instructions adapted under license by [employer] (which disclaims liability or warranty for this information). If you have questions about a medical condition or this instruction, always ask your healthcare professional. [place] [address] any warranty or liability for your use of this information. Introducing [facility] & HEALTH SERVICES! Dear Karime Box: Thank you for requesting a [employer] account. Our records indicate that you already have an active [employer] account. You can access your account anytime at [email or web address] Did you know that you can access your hospital and ER discharge instructions at any time in [employer]? You can also review all of your test results from your hospital stay or ER visit. Additional Information If you have questions, please visit the Frequently Asked Questions section of the [employer] website at [email or web address] Remember, [employer] is NOT to be used for urgent needs. For medical emergencies, dial 911. Now available from your iPhone and Android! Please provide this summary of care documentation to your next provider. Your primary care clinician is listed as [facility]. If you have any questions after today's visit, please call [phone].

## 2018-08-07 NOTE — PROGRESS NOTES
Patient presents with bloody drainage to right ear , states he had one episode last week and one episode this AM,denies any head or ear trauma, denies inserting any object in his ear before the bleeding, he admits to using a Qtip in his right ear to clean the ear. He denies any ear pain and is not sure if he has decreased hearing or not because he normally wears hearing aid but has not been able to put them on because his ear bleed. He states he normally seen ENT every three months since he had a ear surgery, states he called ENT and has an appointment on the 08/10/2018. He denies any HA,dizziness,imbalance, NVD,fever,chills, palpitations, SOb,CP.

## 2018-08-07 NOTE — PROGRESS NOTES
HISTORY OF PRESENT ILLNESS  Tom Nascimento is a 76 y.o. male. Patient presents with bloody drainage to right ear , states he had one episode last week and one episode this AM,denies any head or ear trauma, denies inserting any object in his ear before the bleeding, he admits to using a Qtip in his right ear to clean the ear. He denies any ear pain and is not sure if he has decreased hearing or not because he normally wears hearing aid but has not been able to put them on because his ear bleed. He states he normally seen ENT every three months since he had a ear surgery, states he called ENT and has an appointment on the 08/10/2018. He denies any HA,dizziness,imbalance, NVD,fever,chills, palpitations, SOb,CP. Ear Drainage    The history is provided by the patient. This is a new problem. The current episode started more than 1 week ago. The problem has not changed since onset. There has been no fever. The patient is experiencing no pain. Pertinent negatives include no headaches, no hearing loss, no rhinorrhea, no sore throat, no abdominal pain, no diarrhea, no vomiting, no neck pain, no cough and no rash. Review of Systems   Constitutional: Negative. HENT: Negative. Negative for hearing loss, rhinorrhea and sore throat. Right ear bleed. Eyes: Negative. Respiratory: Negative. Negative for cough. Cardiovascular: Negative. Gastrointestinal: Negative. Negative for abdominal pain, diarrhea and vomiting. Genitourinary: Negative. Musculoskeletal: Negative. Negative for neck pain. Skin: Negative. Negative for rash. Neurological: Negative. Negative for headaches. Psychiatric/Behavioral: Negative. Physical Exam   Constitutional: He is oriented to person, place, and time. He appears well-developed and well-nourished.    /68 (BP 1 Location: Right arm, BP Patient Position: Sitting)  Pulse 80  Temp 95.7 °F (35.4 °C) (Oral)   Resp 17  Ht 6' 2\" (1.88 m)  Wt 239 lb (108.4 kg)  SpO2 94%  BMI 30.69 kg/m2     HENT:   Head: Normocephalic and atraumatic. Right Ear: There is drainage, swelling and tenderness. Tympanic membrane is injected and erythematous. Eyes: Conjunctivae and EOM are normal. Pupils are equal, round, and reactive to light. Neck: Normal range of motion. Cardiovascular: Normal rate. Pulmonary/Chest: Effort normal and breath sounds normal.   Musculoskeletal: Normal range of motion. Neurological: He is alert and oriented to person, place, and time. GCS eye subscore is 4. GCS verbal subscore is 5. GCS motor subscore is 6. Skin: Skin is warm and dry. Psychiatric: He has a normal mood and affect. His speech is normal and behavior is normal. Judgment and thought content normal. Cognition and memory are normal.   Vitals reviewed. ASSESSMENT and PLAN    ICD-10-CM ICD-9-CM    1. Blood in left ear canal H92.22 388. 69 ofloxacin (FLOXIN) 0.3 % otic solution   2. Otitis of left ear H66.92 382.9 ofloxacin (FLOXIN) 0.3 % otic solution     Encounter Diagnoses   Name Primary?  Blood in left ear canal Yes    Otitis of left ear      Orders Placed This Encounter    ofloxacin (FLOXIN) 0.3 % otic solution     Orders Placed This Encounter    ofloxacin (FLOXIN) 0.3 % otic solution     Sig: Administer 5 Drops in left ear daily. Dispense:  5 mL     Refill:  0     Orders Placed This Encounter    ofloxacin (FLOXIN) 0.3 % otic solution     Diagnoses and all orders for this visit:    1. Blood in left ear canal  -     ofloxacin (FLOXIN) 0.3 % otic solution; Administer 5 Drops in left ear daily. 2. Otitis of left ear  -     ofloxacin (FLOXIN) 0.3 % otic solution; Administer 5 Drops in left ear daily. Follow-up Disposition:  Return if symptoms worsen or fail to improve.   current treatment plan is effective, no change in therapy  the following changes in treatment are made: PAtient appointment with ENT on the 8/10

## 2018-08-22 NOTE — PERIOP NOTES
PAT - SURGICAL PRE-ADMISSION INSTRUCTIONS    NAME:  Bob Crystal                                                          TODAY'S DATE:  8/22/2018    SURGERY DATE:  8/28/2018                                  SURGERY ARRIVAL TIME:   0900    1. Do NOT eat or drink anything, including candy or gum, after MIDNIGHT on 08/27/18 , unless you have specific instructions from your Surgeon or Anesthesia Provider to do so. 2. No smoking on the day of surgery. 3. No alcohol 24 hours prior to the day of surgery. 4. No recreational drugs for one week prior to the day of surgery. 5. Leave all valuables, including money/purse, at home. 6. Remove all jewelry, nail polish, makeup (including mascara); no lotions, powders, deodorant, or perfume/cologne/after shave. 7. Glasses/Contact lenses and Dentures may be worn to the hospital.  They will be removed prior to surgery. 8. Call your doctor if symptoms of a cold or illness develop within 24 ours prior to surgery. 9. AN ADULT MUST DRIVE YOU HOME AFTER OUTPATIENT SURGERY. 10. If you are having an OUTPATIENT procedure, please make arrangements for a responsible adult to be with you for 24 hours after your surgery. 11. If you are admitted to the hospital, you will be assigned to a bed after surgery is complete. Normally a family member will not be able to see you until you are in your assigned bed. 15. Family is encouraged to accompany you to the hospital.  We ask visitors in the treatment area to be limited to ONE person at a time to ensure patient privacy. EXCEPTIONS WILL BE MADE AS NEEDED. 15. Children under 12 are discouraged from entering the treatment area and need to be supervised by an adult when in the waiting room. Special Instructions:    Complete bowel prep per MD instructions. Patient Prep:    shower with anti-bacterial soap    These surgical instructions were reviewed with Sophia Ruth during the PAT phone call. Directions:   On the morning of surgery, please go to the [facility]. Enter the building from the [facility] entrance, 1st floor (next to the Emergency Room entrance). Take the elevator to the 2nd floor. Sign in at the Registration Desk.     If you have any questions and/or concerns, please do not hesitate to call:  (Prior to the day of surgery)  \[facility]\"" unit:  [phone]  (Day of surgery)  [facility] unit:  [phone]

## 2018-08-28 ENCOUNTER — HOSPITAL ENCOUNTER (OUTPATIENT)
Age: 68
Setting detail: OUTPATIENT SURGERY
Discharge: HOME | End: 2018-08-28
Attending: INTERNAL MEDICINE | Admitting: INTERNAL MEDICINE
Payer: MEDICARE

## 2018-08-28 VITALS
SYSTOLIC BLOOD PRESSURE: 101 MMHG | HEIGHT: 74 IN | RESPIRATION RATE: 16 BRPM | TEMPERATURE: 98 F | HEART RATE: 64 BPM | WEIGHT: 234.13 LBS | DIASTOLIC BLOOD PRESSURE: 62 MMHG | OXYGEN SATURATION: 98 % | BODY MASS INDEX: 30.05 KG/M2

## 2018-08-28 LAB — GLUCOSE BLD STRIP.AUTO-MCNC: 101 MG/DL (ref 70–110)

## 2018-08-28 PROCEDURE — 74011250636 HC RX REV CODE- 250/636: Performed by: NURSE ANESTHETIST, CERTIFIED REGISTERED

## 2018-08-28 PROCEDURE — 88305 TISSUE EXAM BY PATHOLOGIST: CPT | Performed by: INTERNAL MEDICINE

## 2018-08-28 PROCEDURE — 74011250636 HC RX REV CODE- 250/636

## 2018-08-28 PROCEDURE — 76060000031 HC ANESTHESIA FIRST 0.5 HR: Performed by: INTERNAL MEDICINE

## 2018-08-28 PROCEDURE — 76040000019: Performed by: INTERNAL MEDICINE

## 2018-08-28 PROCEDURE — 82962 GLUCOSE BLOOD TEST: CPT

## 2018-08-28 PROCEDURE — 77030031670 HC DEV INFL ENDOTEK BIG60 MRTM -B: Performed by: INTERNAL MEDICINE

## 2018-08-28 PROCEDURE — 77030038604 HC SNR ENDO EXACTO USEN -B: Performed by: INTERNAL MEDICINE

## 2018-08-28 RX ORDER — INSULIN LISPRO 100 [IU]/ML
INJECTION, SOLUTION INTRAVENOUS; SUBCUTANEOUS ONCE
Status: DISCONTINUED | OUTPATIENT
Start: 2018-08-28 | End: 2018-08-28 | Stop reason: HOSPADM

## 2018-08-28 RX ORDER — PROPOFOL 10 MG/ML
INJECTION, EMULSION INTRAVENOUS AS NEEDED
Status: DISCONTINUED | OUTPATIENT
Start: 2018-08-28 | End: 2018-08-28 | Stop reason: HOSPADM

## 2018-08-28 RX ORDER — FAMOTIDINE 20 MG/1
20 TABLET, FILM COATED ORAL ONCE
Status: DISCONTINUED | OUTPATIENT
Start: 2018-08-28 | End: 2018-08-28 | Stop reason: HOSPADM

## 2018-08-28 RX ORDER — SODIUM CHLORIDE 0.9 % (FLUSH) 0.9 %
5-10 SYRINGE (ML) INJECTION EVERY 8 HOURS
Status: CANCELLED | OUTPATIENT
Start: 2018-08-28 | End: 2018-08-28

## 2018-08-28 RX ORDER — LIDOCAINE HYDROCHLORIDE 10 MG/ML
0.1 INJECTION, SOLUTION EPIDURAL; INFILTRATION; INTRACAUDAL; PERINEURAL AS NEEDED
Status: DISCONTINUED | OUTPATIENT
Start: 2018-08-28 | End: 2018-08-28 | Stop reason: HOSPADM

## 2018-08-28 RX ORDER — SODIUM CHLORIDE, SODIUM LACTATE, POTASSIUM CHLORIDE, CALCIUM CHLORIDE 600; 310; 30; 20 MG/100ML; MG/100ML; MG/100ML; MG/100ML
25 INJECTION, SOLUTION INTRAVENOUS CONTINUOUS
Status: DISCONTINUED | OUTPATIENT
Start: 2018-08-28 | End: 2018-08-28 | Stop reason: HOSPADM

## 2018-08-28 RX ORDER — DEXTROMETHORPHAN/PSEUDOEPHED 2.5-7.5/.8
1.2 DROPS ORAL
Status: CANCELLED | OUTPATIENT
Start: 2018-08-28

## 2018-08-28 RX ORDER — SODIUM CHLORIDE 0.9 % (FLUSH) 0.9 %
5-10 SYRINGE (ML) INJECTION AS NEEDED
Status: CANCELLED | OUTPATIENT
Start: 2018-08-28 | End: 2018-08-28

## 2018-08-28 RX ADMIN — SODIUM CHLORIDE, SODIUM LACTATE, POTASSIUM CHLORIDE, AND CALCIUM CHLORIDE 25 ML/HR: 600; 310; 30; 20 INJECTION, SOLUTION INTRAVENOUS at 09:38

## 2018-08-28 RX ADMIN — PROPOFOL 10 MG: 10 INJECTION, EMULSION INTRAVENOUS at 10:46

## 2018-08-28 RX ADMIN — PROPOFOL 60 MG: 10 INJECTION, EMULSION INTRAVENOUS at 10:38

## 2018-08-28 RX ADMIN — PROPOFOL 20 MG: 10 INJECTION, EMULSION INTRAVENOUS at 10:45

## 2018-08-28 RX ADMIN — PROPOFOL 20 MG: 10 INJECTION, EMULSION INTRAVENOUS at 10:49

## 2018-08-28 RX ADMIN — SODIUM CHLORIDE, SODIUM LACTATE, POTASSIUM CHLORIDE, AND CALCIUM CHLORIDE: 600; 310; 30; 20 INJECTION, SOLUTION INTRAVENOUS at 10:34

## 2018-08-28 RX ADMIN — PROPOFOL 20 MG: 10 INJECTION, EMULSION INTRAVENOUS at 10:48

## 2018-08-28 RX ADMIN — PROPOFOL 20 MG: 10 INJECTION, EMULSION INTRAVENOUS at 10:42

## 2018-08-28 RX ADMIN — PROPOFOL 20 MG: 10 INJECTION, EMULSION INTRAVENOUS at 10:44

## 2018-08-28 RX ADMIN — PROPOFOL 20 MG: 10 INJECTION, EMULSION INTRAVENOUS at 10:43

## 2018-08-28 RX ADMIN — PROPOFOL 20 MG: 10 INJECTION, EMULSION INTRAVENOUS at 10:41

## 2018-08-28 RX ADMIN — PROPOFOL 20 MG: 10 INJECTION, EMULSION INTRAVENOUS at 10:47

## 2018-08-28 RX ADMIN — PROPOFOL 20 MG: 10 INJECTION, EMULSION INTRAVENOUS at 10:40

## 2018-08-28 NOTE — ANESTHESIA PREPROCEDURE EVALUATION
Anesthetic History No history of anesthetic complications Review of Systems / Medical History Patient summary reviewed and pertinent labs reviewed Pulmonary Sleep apnea: CPAP Comments: pulm fibrosis/ asbestosis Neuro/Psych Within defined limits Cardiovascular Hypertension CAD and cardiac stents Exercise tolerance: >4 METS Comments: Autonomic dysfunction (tilit table) GI/Hepatic/Renal 
  
GERD Renal disease: stones Endo/Other Diabetes: type 2 Obesity Other Findings Comments: Documentation of current medication Current medications obtained, documented and obtained? YES Risk Factors for Postoperative nausea/vomiting: 
     History of postoperative nausea/vomiting? NO Female? NO Motion sickness? NO Intended opioid administration for postoperative analgesia? NO Smoking Abstinence: 
Current Smoker? YES Elective Surgery? YES Seen preoperatively by anesthesiologist or proxy prior to day of surgery? YES Pt abstained from smoking 24 hours prior to anesthesia? No 
 
Preventive care/screening for High Blood Pressure: 
Aged 18 years and older: YES Screened for high blood pressure: YES Patients with high blood pressure referred to primary care provider 
 for BP management: YES Physical Exam 
 
Airway Mallampati: II 
TM Distance: 4 - 6 cm Neck ROM: normal range of motion Mouth opening: Normal 
 
 Cardiovascular Regular rate and rhythm,  S1 and S2 normal,  no murmur, click, rub, or gallop Rhythm: regular Rate: normal 
 
 
 
 Dental 
 
Dentition: Full upper dentures and Full lower dentures Pulmonary Breath sounds clear to auscultation Abdominal 
GI exam deferred Other Findings Anesthetic Plan ASA: 3 Anesthesia type: MAC Induction: Intravenous Anesthetic plan and risks discussed with: Patient

## 2018-08-28 NOTE — PERIOP NOTES
Phase 2 Recovery Summary  Patient arrived to Phase 2 at 1102  Report received from Kinga Smythad:    08/22/18 1414 08/28/18 0920 08/28/18 1053 08/28/18 1102   BP:  114/73 117/65 101/62   Pulse:  73 (!) 58 64   Resp:  15 16 16   Temp:  97.8 °F (36.6 °C) 98 °F (36.7 °C)    SpO2:  96% 98% 98%   Weight: 105.7 kg (233 lb) 106.2 kg (234 lb 2 oz)     Height: 6' 2\" (1.88 m) 6' 2\" (1.88 m)         oriented to time, place, person and situation    Lines and Drains  Peripheral Intravenous Line:      Wound         1109- Dr. Davy Browning at bedside discussing procedure results with patient and family member. No complaints of pain or discomfort voiced. Discharged instructions given and questions and concerns addressed.      Patient discharged to home with brother in law, Stefany Romans  at [facility]

## 2018-08-28 NOTE — ANESTHESIA POSTPROCEDURE EVALUATION
Post-Anesthesia Evaluation and Assessment Patient: Melquiades Tracy MRN: [id number]  SSN: [id number] YOB: 1950  Age: 76 y.o. Sex: male Cardiovascular Function/Vital Signs Visit Vitals  /65  Pulse (!) 58  Temp 36.7 °C (98 °F)  Resp 16  
 Ht 6' 2\" (1.88 m)  Wt 106.2 kg (234 lb 2 oz)  SpO2 98%  BMI 30.06 kg/m2 Patient is status post MAC anesthesia for Procedure(s): 
COLONOSCOPY. Nausea/Vomiting: None Postoperative hydration reviewed and adequate. Pain: 
Pain Scale 1: Numeric (0 - 10) (08/28/18 0920) Pain Intensity 1: 0 (08/28/18 0920) Managed Neurological Status: At baseline Mental Status and Level of Consciousness: Alert and oriented Pulmonary Status:  
O2 Device: Room air (08/28/18 1053) Adequate oxygenation and airway patent Complications related to anesthesia: None Post-anesthesia assessment completed. No concerns Signed By: Zachary Clancy CRNA August 28, 2018

## 2018-08-28 NOTE — DISCHARGE INSTRUCTIONS
DISCHARGE SUMMARY from Nurse    PATIENT INSTRUCTIONS:    After general anesthesia or intravenous sedation, for 24 hours or while taking prescription Narcotics:  · Limit your activities  · Do not drive and operate hazardous machinery  · Do not make important personal or business decisions  · Do  not drink alcoholic beverages  · If you have not urinated within 8 hours after discharge, please contact your surgeon on call. Report the following to your surgeon:  · Excessive pain, swelling, redness or odor of or around the surgical area  · Temperature over 100.5  · Nausea and vomiting lasting longer than 4 hours or if unable to take medications  · Any signs of decreased circulation or nerve impairment to extremity: change in color, persistent  numbness, tingling, coldness or increase pain  · Any questions    What to do at Home:  Recommended activity: Activity as tolerated and no driving for today,         These are general instructions for a healthy lifestyle:    No smoking/ No tobacco products/ Avoid exposure to second hand smoke  Surgeon General's Warning:  Quitting smoking now greatly reduces serious risk to your health. Obesity, smoking, and sedentary lifestyle greatly increases your risk for illness    A healthy diet, regular physical exercise & weight monitoring are important for maintaining a healthy lifestyle    You may be retaining fluid if you have a history of heart failure or if you experience any of the following symptoms:  Weight gain of 3 pounds or more overnight or 5 pounds in a week, increased swelling in our hands or feet or shortness of breath while lying flat in bed. Please call your doctor as soon as you notice any of these symptoms; do not wait until your next office visit.     Recognize signs and symptoms of STROKE:    F-face looks uneven    A-arms unable to move or move unevenly    S-speech slurred or non-existent    T-time-call 911 as soon as signs and symptoms begin-DO NOT go       Back to bed or wait to see if you get better-TIME IS BRAIN. Warning Signs of HEART ATTACK     Call 911 if you have these symptoms:   Chest discomfort. Most heart attacks involve discomfort in the center of the chest that lasts more than a few minutes, or that goes away and comes back. It can feel like uncomfortable pressure, squeezing, fullness, or pain.  Discomfort in other areas of the upper body. Symptoms can include pain or discomfort in one or both arms, the back, neck, jaw, or stomach.  Shortness of breath with or without chest discomfort.  Other signs may include breaking out in a cold sweat, nausea, or lightheadedness. Don't wait more than five minutes to call 911 - MINUTES MATTER! Fast action can save your life. Calling 911 is almost always the fastest way to get lifesaving treatment. Emergency Medical Services staff can begin treatment when they arrive -- up to an hour sooner than if someone gets to the hospital by car. The discharge information has been reviewed with the patient. The patient verbalized understanding. Discharge medications reviewed with the patient and appropriate educational materials and side effects teaching were provided. Patient armband removed and given to patient to take home.   Patient was informed of the privacy risks if armband lost or stolen        ___________________________________________________________________________________________________________________________________

## 2018-08-28 NOTE — PROCEDURES
Colonoscopy Report    Patient: Sally Ngo MRN: [id number]  SSN: [id number]    YOB: 1950  Age: 76 y.o. Sex: male      Date of Procedure: 8/28/2018    IMPRESSION:  1. Ascending colon polyps x 2, 6 mm, flat status post cold snare removed  2. Transverse colon polyp, 6 mm, flat, status post cold snare removed  3. Descending colon polyps x 2, 6 mm, status post cold snare removed  4. Sigmoid colon polyps x 4, 3-6 mm, status post cold snare removed  5. Diverticulosis  6. Internal hemorrhoids    RECOMMENDATIONS:  1. Resume regular diet, Recommend high fiber. 2. Will contact with polyp results in 2 weeks. These results will determine timing to next screening. Patient will be instructed to contact our office if they have not received the results by three weeks. Indication:  Screening colonoscopy  Procedure Performed: Colonoscopy polypectomy (cold snare)  Endoscopist: Latoya Matthews MD  Assistant: Endoscopy Technician-1: Tawnya Mcbride RN-1: Gisel Pete RN  ASA: ASA 2 - Patient with mild systemic disease with no functional limitations  Mallampati Score: II (soft palate, uvula, fauces visible)  Anesthesia: MAC anesthesia Propofol  Endoscope:     [x]  CF H 190AL   []  PCF H190AL   []  GIF H 190    Extent of Examination:cecum, which was identified by the ileocecal valve and appendiceal orifice  Quality of Preparation:     []  Excellent   [x]  Very Good   [] Fair but adequate   [] Fair, inadequate   []  Poor      Technique: The procedure was discussed with the patient including risks, benefits, alternatives including risks of IV sedation, bleeding, perforation and missed polyp. A safety timeout was performed. The patient was given incremental doses of intravenous sedation to achieve moderate sedation. The patients vital signs were monitored at all times including heart rate, rhythm, blood pressure and oxygen saturation. The patient was placed in left lateral position. When adequate sedation was achieved a perianal inspection and a digital rectal exam were performed. Video colonoscope was introduced into the rectum and advanced under direct vision up to the cecum, which was identified by the ileocecal valve and appendiceal orifice. The cecum was identified by IC valve, appendiceal orifice and crows foot. With adequate insufflation and maneuvering of the withdrawing scope, the colonic mucosa was visualized carefully. Retroflexion was performed in the rectum and the distal rectum visualized. The patient tolerated the procedure very well and was transferred to recovery area. Findings:  1. Ascending colon polyps x 2, 6 mm, flat status post cold snare removed  2. Transverse colon polyp, 6 mm, flat, status post cold snare removed  3. Descending colon polyps x 2, 6 mm, status post cold snare removed  4. Sigmoid colon polyps x 4, 3-6 mm, status post cold snare removed  5. Diverticulosis  6.  Internal hemorrhoids    EBL:Minimal  Specimen:   ID Type Source Tests Collected by Time Destination   1 : cold snare polyps x2 Preservative Colon, Ascending  Marcel Mackenzie MD 8/28/2018 1047 Pathology   2 : cold snare polyp Preservative Colon, Transverse  Marcel Mackenzie MD 8/28/2018 1047 Pathology   3 : cold snare polyps x2 Preservative Colon, Descending  Marcel Mackenzie MD 8/28/2018 1048 Pathology   4 : cold snare polyps x4 Preservative Sigmoid  Marcel Mackenzie MD 8/28/2018 1050 Pathology       Marcel Mackenzie MD  August 28, 2018  10:58 AM

## 2018-08-28 NOTE — H&P
Gastroenterology Consult     Referring Physician: Gabriele Liang    Consult Date: 8/28/2018     Subjective:     Chief Complaint: Screening colonoscopy    History of Present Illness: Bull Arreguin is a 76 y.o. male who is seen in consultation for screening colonoscopy. Patient was evaluated and examined in the office. Please see scanned note. No interval changes in medical status or examination.      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 Desiree Grissom COLONOSCOPY N/A 7/9/2018    COLONOSCOPY performed by Thanh Villar MD at [facility] ENDOSCOPY    HX CATARACT REMOVAL      7/14  bilat    HX CERVICAL LAMINECTOMY      HX COLONOSCOPY  07/09/2018    pt states have to redo d/t unclear imaging    HX HEART CATHETERIZATION  2014    Stent    HX LUMBAR LAMINECTOMY      1983    HX ORTHOPAEDIC Left 02/05/2018    knee replacement    HX TUMOR REMOVAL      3/13  wide excision right thigh sarcoma    HX TYMPANOMASTOIDECTOMY        Family History   Problem Relation Age of Onset    Diabetes Father     Heart Disease Father      cardiomyopathydementia    Hypertension Father     Cancer Father      prostate    Parkinsonism Father     Dementia Father     High Cholesterol Father     Kidney Disease Father     Other Father colon polyps    Headache Mother     Psychiatric Disorder Mother     Kidney Disease Mother     Hypertension Mother     High Cholesterol Mother     Other Mother      GERD/polymyalgia rheumaticacolon osiris    Heart Disease Mother     Cancer Sister      Social History   Substance Use Topics    Smoking status: Former Smoker     Packs/day: 1.00     Years: 15.00     Quit date: 12/3/2016    Smokeless tobacco: Never Used    Alcohol use No      Allergies   Allergen Reactions    Beta Blocker [Beta-Blockers (Beta-Adrenergic Blocking Agts)] Other (comments)     symptomatic bradycardia    Codeine Other (comments)     Passed//fainted  patient doesn't recall reaction, occurred as a teenager    Darvocet A500 [Propoxyphene N-Acetaminophen] Other (comments)     throat swelling    Dexbrompheniramine-Pseudoephed Other (comments)     Facial swelling. 1980\"s    Lasix [Furosemide] Itching    Lipitor [Atorvastatin] Other (comments)    Sulfa (Sulfonamide Antibiotics) Hives     Current Facility-Administered Medications   Medication Dose Route Frequency    lidocaine (PF) (XYLOCAINE) 10 mg/mL (1 %) injection 0.1 mL  0.1 mL SubCUTAneous PRN    lactated Ringers infusion  25 mL/hr IntraVENous CONTINUOUS    insulin lispro (HUMALOG) injection   SubCUTAneous ONCE    famotidine (PEPCID) tablet 20 mg  20 mg Oral ONCE        Review of Systems:  A detailed 10 organ review of systems is obtained with pertinent positives as listed in the History of Present Illness and Past Medical History. All others are negative. Objective:     Physical Exam:  Visit Vitals    /73    Pulse 73    Temp 97.8 °F (36.6 °C)    Resp 15    Ht 6' 2\" (1.88 m)    Wt 106.2 kg (234 lb 2 oz)    SpO2 96%    BMI 30.06 kg/m2      HEENT: Sclerae anicteric. Cardiovascular: Regular rate and rhythm. Respiratory:  Comfortable breathing with no accessory muscle use. GI:  Abdomen nondistended, soft, and nontender.     Neurological:  Gross memory appears intact. Patient is alert and oriented.       Assessment/Plan:     Colonoscopy as planned

## 2018-08-28 NOTE — IP AVS SNAPSHOT
Roman Raya 
 
 
 [address] 
[phone] Patient: Kiran Ellington MRN: [id number] About your hospitalization You were admitted on:  August 28, 2018 You last received care in the:  [facility] PHASE 2 RECOVERY You were discharged on:  August 28, 2018 Why you were hospitalized Your primary diagnosis was:  Not on File Follow-up Information Follow up With Details Comments Contact Info MD Woody Fernandez [facility] [address] [facility] [address] [phone] Discharge Orders None A check sugar indicates which time of day the medication should be taken. My Medications CHANGE how you take these medications Instructions Each Dose to Equal  
 Morning Noon Evening Bedtime  
 chlorthalidone 25 mg tablet Commonly known as:  Velia Schroeder What changed:  how much to take Your last dose was: Your next dose is: Take 1 Tab by mouth daily. Indications: Edema 25 mg  
    
   
   
   
  
 ofloxacin 0.3 % otic solution Commonly known as:  FLOXIN What changed:   
- when to take this 
- reasons to take this Your last dose was: Your next dose is:    
   
   
 Administer 5 Drops in left ear daily. 5 Drop CONTINUE taking these medications Instructions Each Dose to Equal  
 Morning Noon Evening Bedtime  
 amLODIPine 5 mg tablet Commonly known as:  Annie Mcfarlane Your last dose was: Your next dose is: Take 1 Tab by mouth two (2) times a day. 5 mg  
    
   
   
   
  
 aspirin delayed-release 81 mg tablet Your last dose was: Your next dose is: Take 1 tablet by mouth daily. 81 mg  
    
   
   
   
  
 cholecalciferol 1,000 unit tablet Commonly known as:  VITAMIN D3 Your last dose was: Your next dose is: Take  by mouth daily. clopidogrel 75 mg Tab Commonly known as:  PLAVIX Your last dose was: Your next dose is: Take 1 Tab by mouth daily. 75 mg  
    
   
   
   
  
 colchicine 0.6 mg tablet Your last dose was: Your next dose is: Take 2 tabs now and one in 6 hours if needed. Repeat in 6 hours as needed  Indications: acute gouty arthritis  
     
   
   
   
  
 diclofenac 1 % Gel Commonly known as:  VOLTAREN Your last dose was: Your next dose is:    
   
   
 Apply 2 g to affected area four (4) times daily. 2 g  
    
   
   
   
  
 ferrous sulfate 325 mg (65 mg iron) tablet Your last dose was: Your next dose is: Take  by mouth Daily (before breakfast). fluticasone 50 mcg/actuation nasal spray Commonly known as:  Paulino Albrecht Your last dose was: Your next dose is: SHAKE LIQUID AND USE 2 SPRAYS IN EACH NOSTRIL DAILY  
     
   
   
   
  
 gabapentin 300 mg capsule Commonly known as:  NEURONTIN Your last dose was: Your next dose is: Take 300 mg by mouth two (2) times a day. 300 mg  
    
   
   
   
  
 garlic 0,845 mg Cap Your last dose was: Your next dose is: Take 1 Cap by mouth daily. 1 Cap  
    
   
   
   
  
 glimepiride 1 mg tablet Commonly known as:  AMARYL Your last dose was: Your next dose is: TAKE 1 TABLET BY MOUTH DAILY BEFORE BREAKFAST  
     
   
   
   
  
 isosorbide mononitrate ER 30 mg tablet Commonly known as:  IMDUR Your last dose was: Your next dose is: Take 1 Tab by mouth daily. 30 mg  
    
   
   
   
  
 losartan 100 mg tablet Commonly known as:  COZAAR Your last dose was: Your next dose is: Take 1 Tab by mouth daily.   
 100 mg  
    
   
   
   
  
 multivit-min-FA-lycopen-lutein 0.4-300-250 mg-mcg-mcg Tab Your last dose was: Your next dose is: Take 1 Tab by mouth daily. 1 Tab  
    
   
   
   
  
 nitroglycerin 0.4 mg SL tablet Commonly known as:  NITROSTAT Your last dose was: Your next dose is: 0.4 mg by SubLINGual route every five (5) minutes as needed for Chest Pain. Up to 3 doses. 0.4 mg  
    
   
   
   
  
 potassium chloride 10 mEq tablet Commonly known as:  KLOR-CON Your last dose was: Your next dose is: TAKE 1 TABLET BY MOUTH TWICE DAILY pravastatin 40 mg tablet Commonly known as:  PRAVACHOL Your last dose was: Your next dose is: Take 1 Tab by mouth nightly. 40 mg  
    
   
   
   
  
 raNITIdine 300 mg tablet Commonly known as:  ZANTAC Your last dose was: Your next dose is: TAKE 1 TABLET BY MOUTH ONCE DAILY REFRESH OP Your last dose was: Your next dose is:    
   
   
 Apply 1 Drop to eye daily. 1 Drop  
    
   
   
   
  
 rosuvastatin 20 mg tablet Commonly known as:  CRESTOR Your last dose was: Your next dose is: Take 20 mg by mouth nightly. 20 mg  
    
   
   
   
  
 sertraline 100 mg tablet Commonly known as:  ZOLOFT Your last dose was: Your next dose is: Take 2 Tabs by mouth daily. 200 mg  
    
   
   
   
  
 terazosin 2 mg capsule Commonly known as:  HYTRIN Your last dose was: Your next dose is: Take 1 Cap by mouth nightly. 2 mg  
    
   
   
   
  
 traZODone 50 mg tablet Commonly known as:  [place]umps Your last dose was: Your next dose is: TAKE 1 TO 2 TABLETS BY MOUTH AT BEDTIME AS NEEDED  
     
   
   
   
  
 VITAMIN C 250 mg tablet Generic drug:  ascorbic acid (vitamin C) Your last dose was: Your next dose is: Take  by mouth. ZINC ACETATE PO Your last dose was: Your next dose is: Take 1,000 mg by mouth. 1000 mg Discharge Instructions DISCHARGE SUMMARY from Nurse PATIENT INSTRUCTIONS: 
 
After general anesthesia or intravenous sedation, for 24 hours or while taking prescription Narcotics: · Limit your activities · Do not drive and operate hazardous machinery · Do not make important personal or business decisions · Do  not drink alcoholic beverages · If you have not urinated within 8 hours after discharge, please contact your surgeon on call. Report the following to your surgeon: 
· Excessive pain, swelling, redness or odor of or around the surgical area · Temperature over 100.5 · Nausea and vomiting lasting longer than 4 hours or if unable to take medications · Any signs of decreased circulation or nerve impairment to extremity: change in color, persistent  numbness, tingling, coldness or increase pain · Any questions What to do at Home: 
Recommended activity: Activity as tolerated and no driving for today, These are general instructions for a healthy lifestyle: No smoking/ No tobacco products/ Avoid exposure to second hand smoke Surgeon General's Warning:  Quitting smoking now greatly reduces serious risk to your health. Obesity, smoking, and sedentary lifestyle greatly increases your risk for illness A healthy diet, regular physical exercise & weight monitoring are important for maintaining a healthy lifestyle You may be retaining fluid if you have a history of heart failure or if you experience any of the following symptoms:  Weight gain of 3 pounds or more overnight or 5 pounds in a week, increased swelling in our hands or feet or shortness of breath while lying flat in bed.   Please call your doctor as soon as you notice any of these symptoms; do not wait until your next office visit. Recognize signs and symptoms of STROKE: 
 
F-face looks uneven A-arms unable to move or move unevenly S-speech slurred or non-existent T-time-call 911 as soon as signs and symptoms begin-DO NOT go Back to bed or wait to see if you get better-TIME IS BRAIN. Warning Signs of HEART ATTACK Call 911 if you have these symptoms: 
? Chest discomfort. Most heart attacks involve discomfort in the center of the chest that lasts more than a few minutes, or that goes away and comes back. It can feel like uncomfortable pressure, squeezing, fullness, or pain. ? Discomfort in other areas of the upper body. Symptoms can include pain or discomfort in one or both arms, the back, neck, jaw, or stomach. ? Shortness of breath with or without chest discomfort. ? Other signs may include breaking out in a cold sweat, nausea, or lightheadedness. Don't wait more than five minutes to call [address]! Fast action can save your life. Calling 911 is almost always the fastest way to get lifesaving treatment. Emergency Medical Services staff can begin treatment when they arrive  up to an hour sooner than if someone gets to the hospital by car. The discharge information has been reviewed with the patient. The patient verbalized understanding. Discharge medications reviewed with the patient and appropriate educational materials and side effects teaching were provided. Patient armband removed and given to patient to take home. Patient was informed of the privacy risks if armband lost or stolen 
 
 
 
___________________________________________________________________________________________________________________________________ [facility] Transitions [facility] offers a voluntary care coordination program to provide high quality service and care to [facility] fee-for-service beneficiaries. Zohaib Porras was designed to help you enhance your health and well-being through the following services: ? Transitions of Care  support for individuals who are transitioning from one care setting to another (example: Hospital to home). ? Chronic and Complex Care Coordination  support for individuals and caregivers of those with serious or chronic illnesses or with more than one chronic (ongoing) condition and those who take a number of different medications. If you meet specific medical criteria, a [address] [place] [address] Rd may call you directly to coordinate your care with your primary care physician and your other care providers. For questions about the [facility] programs, please, contact your physicians office. For general questions or additional information about Accountable Care Organizations: 
Please visit www.medicare.gov/acos. html or call 1-800-MEDICARE (1-752.632.5841) TTY users should call 9-301.244.3160. Introducing [facility]! Dear Ang Winn: Thank you for requesting a Home[employer] account. Our records indicate that you already have an active HomeTouch account. You can access your account anytime at https://"[employer]. [employer]/"[employer] Did you know that you can access your hospital and ER discharge instructions at any time in HomeTouch? You can also review all of your test results from your hospital stay or ER visit. Additional Information If you have questions, please visit the Frequently Asked Questions section of the HomeTouch website at https://"[employer]. [employer]/. Remember, HomeTouch is NOT to be used for urgent needs. For medical emergencies, dial 911. Now available from your iPhone and Android! Introducing Lucas Vargas As a [employer] patient, I wanted to make you aware of our electronic visit tool called Lucas Vargas. [employer] 24/7 allows you to connect within minutes with a medical provider 24 hours a day, seven days a week via a mobile device or tablet or logging into a secure website from your computer. You can access [employer] from anywhere in the [place]. A virtual visit might be right for you when you have a simple condition and feel like you just dont want to get out of bed, or cant get away from work for an appointment, when your regular [employer] provider is not available (evenings, weekends or holidays), or when youre out of town and need minor care. Electronic visits cost only $49 and if the [employer] 24/7 provider determines a prescription is needed to treat your condition, one can be electronically transmitted to a nearby pharmacy*. Please take a moment to enroll today if you have not already done so. The enrollment process is free and takes just a few minutes. To enroll, please download the [employer] 24/7 abraham to your tablet or phone, or visit [email or web address] to enroll on your computer. And, as an [address] [place] [address] patient with a [employer] account, the results of your visits will be scanned into your electronic medical record and your primary care provider will be able to view the scanned results. We urge you to continue to see your regular [employer] provider for your ongoing medical care. And while your primary care provider may not be the one available when you seek a [employer] virtual visit, the peace of mind you get from getting a real diagnosis real time can be priceless. For more information on [employer], view our Frequently Asked Questions (FAQs) at [email or web address]. Sincerely, 
 
Kong Franco MD 
Chief Medical Officer Andreia Dowell *:  certain medications cannot be prescribed via [employer] Providers Seen During Your Hospitalization Provider Specialty Primary office phone Nadine Serrato MD Gastroenterology [phone] Your Primary Care Physician (PCP) Primary Care Physician Office Phone Office Fax [address], [address] [phone] You are allergic to the following Allergen Reactions Beta Blocker (Beta-Blockers (Beta-Adrenergic Blocking Agts)) Other (comments)  
 symptomatic bradycardia Codeine Other (comments) Passed//fainted 
patient doesn't recall reaction, occurred as a teenager Darvocet A500 (Propoxyphene N-Acetaminophen) Other (comments)  
 throat swelling Dexbrompheniramine-Pseudoephed Other (comments) Facial swelling. 1980\"s Lasix (Furosemide) Itching Lipitor (Atorvastatin) Other (comments) Sulfa (Sulfonamide Antibiotics) Hives Recent Documentation Height Weight BMI Smoking Status 1.88 m 106.2 kg 30.06 kg/m2 Former Smoker Emergency Contacts Name Discharge Info Relation Home Work Mobile Carlota Nguyen DISCHARGE CAREGIVER [3] Daughter [21]   [phone] Patient Belongings The following personal items are in your possession at time of discharge: 
  Dental Appliances: Lowers, Uppers  Visual Aid: Glasses (Readers)   Hearing Aids/Status: Bilateral, At home Please provide this summary of care documentation to your next provider. Signatures-by signing, you are acknowledging that this After Visit Summary has been reviewed with you and you have received a copy. Patient Signature:  ____________________________________________________________ Date:  ____________________________________________________________  
  
Janyth [facility] Provider Signature:  ____________________________________________________________ Date:  ____________________________________________________________

## 2018-11-06 ENCOUNTER — OFFICE VISIT (OUTPATIENT)
Dept: INTERNAL MEDICINE CLINIC | Age: 68
End: 2018-11-06

## 2018-11-06 ENCOUNTER — HOSPITAL ENCOUNTER (OUTPATIENT)
Dept: LAB | Age: 68
Discharge: HOME | End: 2018-11-06
Payer: MEDICARE

## 2018-11-06 VITALS
BODY MASS INDEX: 30.31 KG/M2 | HEIGHT: 74 IN | OXYGEN SATURATION: 96 % | DIASTOLIC BLOOD PRESSURE: 58 MMHG | SYSTOLIC BLOOD PRESSURE: 92 MMHG | WEIGHT: 236.2 LBS | TEMPERATURE: 96.9 F | HEART RATE: 76 BPM | RESPIRATION RATE: 12 BRPM

## 2018-11-06 DIAGNOSIS — E78.5 DYSLIPIDEMIA: ICD-10-CM

## 2018-11-06 DIAGNOSIS — Z23 ENCOUNTER FOR IMMUNIZATION: ICD-10-CM

## 2018-11-06 DIAGNOSIS — M72.2 PLANTAR FASCIITIS, LEFT: ICD-10-CM

## 2018-11-06 DIAGNOSIS — Z76.0 MEDICATION REFILL: ICD-10-CM

## 2018-11-06 DIAGNOSIS — R60.0 PEDAL EDEMA: ICD-10-CM

## 2018-11-06 DIAGNOSIS — I11.9 BENIGN HYPERTENSIVE HEART DISEASE WITHOUT HEART FAILURE: ICD-10-CM

## 2018-11-06 DIAGNOSIS — Z00.00 MEDICARE ANNUAL WELLNESS VISIT, SUBSEQUENT: Primary | ICD-10-CM

## 2018-11-06 DIAGNOSIS — E11.40 TYPE 2 DIABETES MELLITUS WITH DIABETIC NEUROPATHY, WITHOUT LONG-TERM CURRENT USE OF INSULIN (HCC): ICD-10-CM

## 2018-11-06 DIAGNOSIS — M79.672 PAIN OF LEFT HEEL: ICD-10-CM

## 2018-11-06 LAB
ALBUMIN SERPL-MCNC: 3.7 G/DL (ref 3.4–5)
ALBUMIN/GLOB SERPL: 1 {RATIO} (ref 0.8–1.7)
ALP SERPL-CCNC: 83 U/L (ref 45–117)
ALT SERPL-CCNC: 26 U/L (ref 16–61)
ANION GAP SERPL CALC-SCNC: 9 MMOL/L (ref 3–18)
AST SERPL-CCNC: 17 U/L (ref 15–37)
BILIRUB SERPL-MCNC: 0.6 MG/DL (ref 0.2–1)
BUN SERPL-MCNC: 19 MG/DL (ref 7–18)
BUN/CREAT SERPL: 13 (ref 12–20)
CALCIUM SERPL-MCNC: 9.1 MG/DL (ref 8.5–10.1)
CHLORIDE SERPL-SCNC: 98 MMOL/L (ref 100–108)
CHOLEST SERPL-MCNC: 114 MG/DL
CO2 SERPL-SCNC: 31 MMOL/L (ref 21–32)
CREAT SERPL-MCNC: 1.49 MG/DL (ref 0.6–1.3)
GLOBULIN SER CALC-MCNC: 3.6 G/DL (ref 2–4)
GLUCOSE SERPL-MCNC: 147 MG/DL (ref 74–99)
HBA1C MFR BLD: 6.2 % (ref 4.2–5.6)
HDLC SERPL-MCNC: 41 MG/DL (ref 40–60)
HDLC SERPL: 2.8 {RATIO} (ref 0–5)
LDLC SERPL CALC-MCNC: 42.6 MG/DL (ref 0–100)
LIPID PROFILE,FLP: ABNORMAL
POTASSIUM SERPL-SCNC: 3.9 MMOL/L (ref 3.5–5.5)
PROT SERPL-MCNC: 7.3 G/DL (ref 6.4–8.2)
SODIUM SERPL-SCNC: 138 MMOL/L (ref 136–145)
TRIGL SERPL-MCNC: 152 MG/DL (ref ?–150)
VLDLC SERPL CALC-MCNC: 30.4 MG/DL

## 2018-11-06 PROCEDURE — 80053 COMPREHEN METABOLIC PANEL: CPT | Performed by: INTERNAL MEDICINE

## 2018-11-06 PROCEDURE — 36415 COLL VENOUS BLD VENIPUNCTURE: CPT | Performed by: INTERNAL MEDICINE

## 2018-11-06 PROCEDURE — 80061 LIPID PANEL: CPT | Performed by: INTERNAL MEDICINE

## 2018-11-06 PROCEDURE — 83036 HEMOGLOBIN GLYCOSYLATED A1C: CPT | Performed by: INTERNAL MEDICINE

## 2018-11-06 PROCEDURE — 82043 UR ALBUMIN QUANTITATIVE: CPT | Performed by: INTERNAL MEDICINE

## 2018-11-06 RX ORDER — CHLORTHALIDONE 25 MG/1
12.5 TABLET ORAL DAILY
Qty: 90 TAB | Refills: 0
Start: 2018-11-06 | End: 2020-05-21 | Stop reason: ALTCHOICE

## 2018-11-06 RX ORDER — DOCUSATE SODIUM 100 MG/1
100 CAPSULE, LIQUID FILLED ORAL
COMMUNITY
Start: 2018-02-06 | End: 2020-12-18

## 2018-11-06 RX ORDER — AMLODIPINE BESYLATE 5 MG/1
5 TABLET ORAL DAILY
Qty: 180 TAB | Refills: 3
Start: 2018-11-06 | End: 2020-05-21 | Stop reason: ALTCHOICE

## 2018-11-06 RX ORDER — ACETAMINOPHEN 500 MG
1000 TABLET ORAL
COMMUNITY
Start: 2018-02-06

## 2018-11-06 NOTE — PATIENT INSTRUCTIONS
Preventing Falls: Care Instructions Your Care Instructions Getting around your home safely can be a challenge if you have injuries or health problems that make it easy for you to fall. Loose rugs and furniture in walkways are among the dangers for many older people who have problems walking or who have poor eyesight. People who have conditions such as arthritis, osteoporosis, or dementia also have to be careful not to fall. You can make your home safer with a few simple measures. Follow-up care is a key part of your treatment and safety. Be sure to make and go to all appointments, and call your doctor if you are having problems. It's also a good idea to know your test results and keep a list of the medicines you take. How can you care for yourself at home? Taking care of yourself · You may get dizzy if you do not drink enough water. To prevent dehydration, drink plenty of fluids, enough so that your urine is light yellow or clear like water. Choose water and other caffeine-free clear liquids. If you have kidney, heart, or liver disease and have to limit fluids, talk with your doctor before you increase the amount of fluids you drink. · Exercise regularly to improve your strength, muscle tone, and balance. Walk if you can. Swimming may be a good choice if you cannot walk easily. · Have your vision and hearing checked each year or any time you notice a change. If you have trouble seeing and hearing, you might not be able to avoid objects and could lose your balance. · Know the side effects of the medicines you take. Ask your doctor or pharmacist whether the medicines you take can affect your balance. Sleeping pills or sedatives can affect your balance. · Limit the amount of alcohol you drink. Alcohol can impair your balance and other senses. · Ask your doctor whether calluses or corns on your feet need to be removed.  If you wear loose-fitting shoes because of calluses or corns, you can lose your balance and fall. · Talk to your doctor if you have numbness in your feet. Preventing falls at home · Remove raised doorway thresholds, throw rugs, and clutter. Repair loose carpet or raised areas in the floor. · Move furniture and electrical cords to keep them out of walking paths. · Use nonskid floor wax, and wipe up spills right away, especially on ceramic tile floors. · If you use a walker or cane, put rubber tips on it. If you use crutches, clean the bottoms of them regularly with an abrasive pad, such as steel wool. · Keep your house well lit, especially Dionna Small, and outside walkways. Use night-lights in areas such as hallways and bathrooms. Add extra light switches or use remote switches (such as switches that go on or off when you clap your hands) to make it easier to turn lights on if you have to get up during the night. · Install sturdy handrails on stairways. · Move items in your cabinets so that the things you use a lot are on the lower shelves (about waist level). · Keep a cordless phone and a flashlight with new batteries by your bed. If possible, put a phone in each of the main rooms of your house, or carry a cell phone in case you fall and cannot reach a phone. Or, you can wear a device around your neck or wrist. You push a button that sends a signal for help. · Wear low-heeled shoes that fit well and give your feet good support. Use footwear with nonskid soles. Check the heels and soles of your shoes for wear. Repair or replace worn heels or soles. · Do not wear socks without shoes on wood floors. · Walk on the grass when the sidewalks are slippery. If you live in an area that gets snow and ice in the winter, sprinkle salt on slippery steps and sidewalks. Preventing falls in the bath · Install grab bars and nonskid mats inside and outside your shower or tub and near the toilet and sinks. · Use shower chairs and bath benches. · Use a hand-held shower head that will allow you to sit while showering. · Get into a tub or shower by putting the weaker leg in first. Get out of a tub or shower with your strong side first. 
· Repair loose toilet seats and consider installing a raised toilet seat to make getting on and off the toilet easier. · Keep your bathroom door unlocked while you are in the shower. Where can you learn more? Go to http://fuad-william.info/. Enter [id number] in the search box to learn more about \"Preventing Falls: Care Instructions. \" Current as of: March 16, 2018 Content Version: 11.8 © 1210-2761 [email or web address]. Care instructions adapted under license by [employer] (which disclaims liability or warranty for this information). If you have questions about a medical condition or this instruction, always ask your healthcare professional. [place] [address] any warranty or liability for your use of this information. Medicare Wellness Visit, Male The best way to live healthy is to have a lifestyle where you eat a well-balanced diet, exercise regularly, limit alcohol use, and quit all forms of tobacco/nicotine, if applicable. Regular preventive services are another way to keep healthy. Preventive services (vaccines, screening tests, monitoring & exams) can help personalize your care plan, which helps you manage your own care. Screening tests can find health problems at the earliest stages, when they are easiest to treat. [facility] follows the current, evidence-based guidelines published by the [facility] (USPSTF) when recommending preventive services for our patients. Because we follow these guidelines, sometimes recommendations change over time as research supports it. (For example, a prostate screening blood test is no longer routinely recommended for men with no symptoms.) Of course, you and your doctor may decide to screen more often for some diseases, based on your risk and co-morbidities (chronic disease you are already diagnosed with). Preventive services for you include: - Medicare offers their members a free annual wellness visit, which is time for you and your primary care provider to discuss and plan for your preventive service needs. Take advantage of this benefit every year! 
-All adults over age 72 should receive the recommended pneumonia vaccines. Current USPSTF guidelines recommend a series of two vaccines for the best pneumonia protection.  
-All adults should have a flu vaccine yearly and an ECG. All adults age 61 and older should receive a shingles vaccine once in their lifetime.   
-All adults age 38-68 who are overweight should have a diabetes screening test once every three years.  
-Other screening tests & preventive services for persons with diabetes include: an eye exam to screen for diabetic retinopathy, a kidney function test, a foot exam, and stricter control over your cholesterol.  
-Cardiovascular screening for adults with routine risk involves an electrocardiogram (ECG) at intervals determined by the provider.  
-Colorectal cancer screening should be done for adults age 54-65 with no increased risk factors for colorectal cancer. There are a number of acceptable methods of screening for this type of cancer. Each test has its own benefits and drawbacks. Discuss with your provider what is most appropriate for you during your annual wellness visit. The different tests include: colonoscopy (considered the best screening method), a fecal occult blood test, a fecal DNA test, and sigmoidoscopy. 
-All adults born [facility] should be screened once for Hepatitis C. 
-An Abdominal Aortic Aneurysm (AAA) Screening is recommended for men age 73-68 who has ever smoked in their lifetime. Here is a list of your current Health Maintenance items (your personalized list of preventive services) with a due date: 
Health Maintenance Due Topic Date Due  Shingles Vaccine (1 of 2) 03/29/2000  Flu Vaccine  08/01/2018 M[facility] Ricardo Eye Exam  11/09/2018  Albumin Urine Test  11/13/2018  Hemoglobin A1C    12/05/2018 Plantar Fasciitis: Care Instructions Your Care Instructions Plantar fasciitis is pain and inflammation of the plantar fascia, the tissue at the bottom of your foot that connects the heel bone to the toes. The plantar fascia also supports the arch. If you strain the plantar fascia, it can develop small tears and cause heel pain when you stand or walk. Plantar fasciitis can be caused by running or other sports. It also may occur in people who are overweight or who have high arches or flat feet. You may get plantar fasciitis if you walk or stand for long periods, or have a tight Achilles tendon or calf muscles. You can improve your foot pain with rest and other care at home. It might take a few weeks to a few months for your foot to heal completely. Follow-up care is a key part of your treatment and safety. Be sure to make and go to all appointments, and call your doctor if you are having problems. It's also a good idea to know your test results and keep a list of the medicines you take. How can you care for yourself at home? · Rest your feet often. Reduce your activity to a level that lets you avoid pain. If possible, do not run or walk on hard surfaces. · Take pain medicines exactly as directed. ? If the doctor gave you a prescription medicine for pain, take it as prescribed. ? If you are not taking a prescription pain medicine, take an over-the-counter anti-inflammatory medicine for pain and swelling, such as ibuprofen (Advil, Motrin) or naproxen (Aleve). Read and follow all instructions on the label. · Use ice massage to help with pain and swelling.  You can use an ice cube or an ice cup several times a day. To make an ice cup, fill a paper cup with water and freeze it. Cut off the top of the cup until a half-inch of ice shows. Hold onto the remaining paper to use the cup. Rub the ice in small circles over the area for 5 to 7 minutes. · Contrast baths, which alternate hot and cold water, can also help reduce swelling. But because heat alone may make pain and swelling worse, end a contrast bath with a soak in cold water. · Wear a night splint if your doctor suggests it. A night splint holds your foot with the toes pointed up and the foot and ankle at a 90-degree angle. This position gives the bottom of your foot a constant, gentle stretch. · Do simple exercises such as calf stretches and towel stretches 2 to 3 times each day, especially when you first get up in the morning. These can help the plantar fascia become more flexible. They also make the muscles that support your arch stronger. Hold these stretches for 15 to 30 seconds per stretch. Repeat 2 to 4 times. ? Stand about 1 foot from a wall. Place the palms of both hands against the wall at chest level. Lean forward against the wall, keeping one leg with the knee straight and heel on the ground while bending the knee of the other leg. 
? Sit down on the floor or a mat with your feet stretched in front of you. Roll up a towel lengthwise, and loop it over the ball of your foot. Holding the towel at both ends, gently pull the towel toward you to stretch your foot. · Wear shoes with good arch support. Athletic shoes or shoes with a well-cushioned sole are good choices. · Try heel cups or shoe inserts (orthotics) to help cushion your heel. You can buy these at many shoe stores. · Put on your shoes as soon as you get out of bed. Going barefoot or wearing slippers may make your pain worse. · Reach and stay at a good weight for your height. This puts less strain on your feet. When should you call for help? Call your doctor now or seek immediate medical care if: 
  · You have heel pain with fever, redness, or warmth in your heel.  
  · You cannot put weight on the sore foot.  
 Watch closely for changes in your health, and be sure to contact your doctor if: 
  · You have numbness or tingling in your heel.  
  · Your heel pain lasts more than 2 weeks. Where can you learn more? Go to http://fuad-william.info/. Sherlyn Butler in the search box to learn more about \"Plantar Fasciitis: Care Instructions. \" Current as of: November 29, 2017 Content Version: 11.8 © 3465-7881 [employer]. Care instructions adapted under license by [employer] (which disclaims liability or warranty for this information). If you have questions about a medical condition or this instruction, always ask your healthcare professional. [place] [address] any warranty or liability for your use of this information. Plantar Fasciitis: Exercises Your Care Instructions Here are some examples of typical rehabilitation exercises for your condition. Start each exercise slowly. Ease off the exercise if you start to have pain. Your doctor or physical therapist will tell you when you can start these exercises and which ones will work best for you. How to do the exercises Towel stretch 1. Sit with your legs extended and knees straight. 2. Place a towel around your foot just under the toes. 3. Hold each end of the towel in each hand, with your hands above your knees. 4. Pull back with the towel so that your foot stretches toward you. 5. Hold the position for at least 15 to 30 seconds. 6. Repeat 2 to 4 times a session, up to 5 sessions a day. Calf stretch 1. Stand facing a wall with your hands on the wall at about eye level. Put the leg you want to stretch about a step behind your other leg.  
2. Keeping your back heel on the floor, bend your front knee until you feel a stretch in the back leg. 3. Hold the stretch for 15 to 30 seconds. Repeat 2 to 4 times. Plantar fascia and calf stretch 1. Stand on a step as shown above. Be sure to hold on to the banister. 2. Slowly let your heels down over the edge of the step as you relax your calf muscles. You should feel a gentle stretch across the bottom of your foot and up the back of your leg to your knee. 3. Hold the stretch about 15 to 30 seconds, and then tighten your calf muscle a little to bring your heel back up to the level of the step. Repeat 2 to 4 times. Towel curls 1. While sitting, place your foot on a towel on the floor and scrunch the towel toward you with your toes. 2. Then, also using your toes, push the towel away from you. [place] pickups 1. Put marbles on the floor next to a cup. 
2. Using your toes, try to lift the marbles up from the floor and put them in the cup. Follow-up care is a key part of your treatment and safety. Be sure to make and go to all appointments, and call your doctor if you are having problems. It's also a good idea to know your test results and keep a list of the medicines you take. Where can you learn more? Go to http://fuad-william.info/. Moris Speaks in the search box to learn more about \"Plantar Fasciitis: Exercises. \" Current as of: November 29, 2017 Content Version: 11.8 © 8552-3058 [employer]. Care instructions adapted under license by [employer] (which disclaims liability or warranty for this information). If you have questions about a medical condition or this instruction, always ask your healthcare professional. [address] any warranty or liability for your use of this information.

## 2018-11-06 NOTE — PROGRESS NOTES
Qi Brandt is a 76 y.o. male (: 1950) presenting to address: Chief Complaint Patient presents with Philip Annual Wellness Visit Vitals:  
 18 1058 BP: (!) 83/43 Pulse: 76 Resp: 12 Temp: 96.9 °F (36.1 °C) TempSrc: Oral  
SpO2: 96% Weight: 236 lb 3.2 oz (107.1 kg) Height: 6' 2\" (1.88 m) PainSc:   6 PainLoc: Back Hearing/Vision: No exam data present Learning Assessment:  
 
Learning Assessment 2015 PRIMARY LEARNER Patient HIGHEST LEVEL OF EDUCATION - PRIMARY LEARNER  SOME COLLEGE  
BARRIERS PRIMARY LEARNER NONE  
CO-LEARNER CAREGIVER No  
PRIMARY LANGUAGE ENGLISH  NEED -  
LEARNER PREFERENCE PRIMARY LISTENING  
LEARNING SPECIAL TOPICS -  
ANSWERED BY patient RELATIONSHIP SELF Depression Screening: PHQ over the last two weeks 2018 PHQ Not Done - Little interest or pleasure in doing things Nearly every day Feeling down, depressed, irritable, or hopeless Nearly every day Total Score PHQ 2 6 Trouble falling or staying asleep, or sleeping too much Not at all Feeling tired or having little energy Nearly every day Poor appetite, weight loss, or overeating Several days Feeling bad about yourself - or that you are a failure or have let yourself or your family down Several days Trouble concentrating on things such as school, work, reading, or watching TV Not at all Moving or speaking so slowly that other people could have noticed; or the opposite being so fidgety that others notice Not at all Thoughts of being better off dead, or hurting yourself in some way Not at all PHQ 9 Score 11 How difficult have these problems made it for you to do your work, take care of your home and get along with others Not difficult at all Fall Risk Assessment:  
 
Fall Risk Assessment, last 12 mths 2018 Able to walk? Yes Fall in past 12 months? Yes Fall with injury?  No  
Number of falls in past 12 months 5  
 Fall Risk Score 5 Abuse Screening:  
 
Abuse Screening Questionnaire 11/13/2017 Do you ever feel afraid of your partner? Nic Canales Are you in a relationship with someone who physically or mentally threatens you? Nic Canales Is it safe for you to go home? Wiley Bryson Coordination of Care Questionaire: 1. Have you been to the ER, urgent care clinic since your last visit? Hospitalized since your last visit? NO 
 
2. Have you seen or consulted any other health care providers outside of the [address] [place] [address] since your last visit? Include any pap smears or colon screening. NO Advanced Directive: 1. Do you have an Advanced Directive? YES 
 
2. Would you like information on Advanced Directives? NO Pt request flu vaccine. Flu Immunization/s administered 11/6/2018 by [facility] [employer] in left deltoid. Patient tolerated procedure well. No reactions noted.

## 2018-11-06 NOTE — PROGRESS NOTES
This is the Subsequent Medicare Annual Wellness Exam, performed 12 months or more after the Initial AWV or the last Subsequent AWV I have reviewed the patient's medical history in detail and updated the computerized patient record. History Past Medical History:  
Diagnosis Date  Agent orange exposure  Asbestos exposure  Autonomic dysfunction   
 abnormal tilt table 2/15  BPH  CAD (coronary artery disease) S/P OM NOY in 2014  Colonic polyp 08/28/2018  
 multiple. each region had polyp, 4 tubular adenomas, 1 hyperplastic  Coronary artery disease OM2 - 3.5 x 18mm XIENCE 10/14  Degenerative joint disease  Diabetes  Diverticulosis 08/28/2018  Dyslipidemia  Erectile dysfunction  Fibrous histiocytoma  GERD   
 Gout  Hypertension  Nephrolithiasis  Neuropathy  Non-nicotine vapor product user  Pulmonary fibrosis  Raynaud phenomenon  Sleep apnea Uses CPAP  Spindle cell sarcoma   
 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  
Procedure Laterality Date  COLONOSCOPY    
 6/08  10 y f/u, Dr. Vladislav Donohue  HX CATARACT REMOVAL    
 7/14  bilat  HX CERVICAL LAMINECTOMY  HX COLONOSCOPY  07/09/2018  
 pt states have to redo d/t unclear imaging [address] CATHETERIZATION  2014 Stent [address]  HX ORTHOPAEDIC Left 02/05/2018  
 knee replacement  HX TUMOR REMOVAL    
 3/13  wide excision right thigh sarcoma  HX TYMPANOMASTOIDECTOMY Current Outpatient Medications Medication Sig Dispense Refill  acetaminophen (TYLENOL) 500 mg tablet 1,000 mg.    
 docusate sodium (COLACE) 100 mg capsule 100 mg.    
 glimepiride (AMARYL) 1 mg tablet TAKE 1 TABLET BY MOUTH DAILY BEFORE BREAKFAST 90 Tab 1  
 colchicine 0.6 mg tablet TAKE 2 TABLETS NOW AND 1 TABLET IN 6 HOURS IF NEEDED REPEAT IN 6 HOURS AS NEEDED 15 Tab 5  ofloxacin (FLOXIN) 0.3 % otic solution Administer 5 Drops in left ear daily. (Patient taking differently: Administer 5 Drops in left ear as needed.) 5 mL 0  
 diclofenac (VOLTAREN) 1 % gel Apply 2 g to affected area four (4) times daily.  carboxymethylcellulose sodium (REFRESH OP) Apply 1 Drop to eye daily.  nitroglycerin (NITROSTAT) 0.4 mg SL tablet 0.4 mg by SubLINGual route every five (5) minutes as needed for Chest Pain. Up to 3 doses.  rosuvastatin (CRESTOR) 20 mg tablet Take 20 mg by mouth nightly.  ferrous sulfate 325 mg (65 mg iron) tablet Take  by mouth Daily (before breakfast).  clopidogrel (PLAVIX) 75 mg tab Take 1 Tab by mouth daily. 90 Tab 3  
 isosorbide mononitrate ER (IMDUR) 30 mg tablet Take 1 Tab by mouth daily. 90 Tab 3  
 fluticasone (FLONASE) 50 mcg/actuation nasal spray SHAKE LIQUID AND USE 2 SPRAYS IN EACH NOSTRIL DAILY 3 Bottle 6  
 terazosin (HYTRIN) 2 mg capsule Take 1 Cap by mouth nightly. 90 Cap 3  chlorthalidone (HYGROTEN) 25 mg tablet Take 1 Tab by mouth daily. Indications: Edema (Patient taking differently: Take 12.5 mg by mouth daily. Indications: Edema) 90 Tab 4  
 traZODone (DESYREL) 50 mg tablet TAKE 1 TO 2 TABLETS BY MOUTH AT BEDTIME AS NEEDED 180 Tab 4  
 gabapentin (NEURONTIN) 300 mg capsule Take 300 mg by mouth two (2) times a day.  ZINC ACETATE PO Take 1,000 mg by mouth.  potassium chloride (KLOR-CON) 10 mEq tablet TAKE 1 TABLET BY MOUTH TWICE DAILY 180 Tab 4  
 sertraline (ZOLOFT) 100 mg tablet Take 2 Tabs by mouth daily. 180 Tab 5  
 amLODIPine (NORVASC) 5 mg tablet Take 1 Tab by mouth two (2) times a day. 180 Tab 3  
 raNITIdine (ZANTAC) 300 mg tablet TAKE 1 TABLET BY MOUTH ONCE DAILY 90 Tab 1  cholecalciferol (VITAMIN D3) 1,000 unit tablet Take  by mouth daily.  ascorbic acid, vitamin C, (VITAMIN C) 250 mg tablet Take  by mouth.  pravastatin (PRAVACHOL) 40 mg tablet Take 1 Tab by mouth nightly.  90 Tab 3  
  losartan (COZAAR) 100 mg tablet Take 1 Tab by mouth daily. 90 Tab 3  
 garlic 3,101 mg cap Take 1 Cap by mouth daily.  multivit-min-FA-lycopen-lutein 0.4-300-250 mg-mcg-mcg tab Take 1 Tab by mouth daily.  aspirin delayed-release 81 mg tablet Take 1 tablet by mouth daily. 90 tablet 5 Allergies Allergen Reactions  Beta Blocker [Beta-Blockers (Beta-Adrenergic Blocking Agts)] Other (comments)  
  symptomatic bradycardia  Codeine Other (comments) Passed//fainted 
patient doesn't recall reaction, occurred as a teenager  Darvocet A500 [Propoxyphene N-Acetaminophen] Other (comments)  
  throat swelling  Dexbrompheniramine-Pseudoephed Other (comments) Facial swelling. \"s  Lasix [Furosemide] Itching  Lipitor [Atorvastatin] Other (comments)  Sulfa (Sulfonamide Antibiotics) Hives Family History Problem Relation Age of Onset  Diabetes Father  Heart Disease Father   
     cardiomyopathydementia  Hypertension Father  Cancer Father   
     prostate  Parkinsonism Father  Dementia Father  High Cholesterol Father  Kidney Disease Father  Other Father   
     colon polyps  Headache Mother  Psychiatric Disorder Mother  Kidney Disease Mother  Hypertension Mother  High Cholesterol Mother [address] Other Mother GERD/polymyalgia rheumaticacolon osiris  
 Heart Disease Mother  Cancer Sister Social History Tobacco Use  Smoking status: Former Smoker Packs/day: 1.00 Years: 15.00 Pack years: 15.00 Last attempt to quit: 12/3/2016 Years since quittin.9  Smokeless tobacco: Never Used Substance Use Topics  Alcohol use: No  
  Alcohol/week: 0.0 oz Patient Active Problem List  
Diagnosis Code  Dyslipidemia E78.5  Coronary artery disease I25.10  Hypertension I11.9  Type 2 diabetes mellitus with nephropathy (HCC) E11.21  
  Type 2 diabetes mellitus with diabetic neuropathy (HCC) E11.40 Depression Risk Factor Screening: PHQ over the last two weeks 11/6/2018 PHQ Not Done - Little interest or pleasure in doing things Nearly every day Feeling down, depressed, irritable, or hopeless Nearly every day Total Score PHQ 2 6 Trouble falling or staying asleep, or sleeping too much Not at all Feeling tired or having little energy Nearly every day Poor appetite, weight loss, or overeating Several days Feeling bad about yourself - or that you are a failure or have let yourself or your family down Several days Trouble concentrating on things such as school, work, reading, or watching TV Not at all Moving or speaking so slowly that other people could have noticed; or the opposite being so fidgety that others notice Not at all Thoughts of being better off dead, or hurting yourself in some way Not at all PHQ 9 Score 11 How difficult have these problems made it for you to do your work, take care of your home and get along with others Not difficult at all Alcohol Risk Factor Screening: You do not drink alcohol or very rarely. Functional Ability and Level of Safety:  
Hearing Loss The patient wears hearing aids. Activities of Daily Living The home contains: grab Wavii Patient does total self care Fall Risk Fall Risk Assessment, last 12 mths 11/6/2018 Able to walk? Yes Fall in past 12 months? Yes Fall with injury? No  
Number of falls in past 12 months 5 Fall Risk Score 5 Abuse Screen Patient is not abused Cognitive Screening Evaluation of Cognitive Function: 
Has your family/caregiver stated any concerns about your memory: no 
Normal, Mini Cog test 
 
Patient Care Team  
Patient Care Team: 
Andie Canada MD as PCP - General (Internal Medicine) Andie Canada MD (Internal Medicine) Mahnaz Doshi MD as Consulting Provider (Ophthalmology) Maxx Logan MD as Consulting Provider (Cardiology) Nico Harper MD as Consulting Provider (Gastroenterology) Jose York MD as Consulting Provider (Orthopedic Surgery) Mohsen Finn MD as Consulting Provider (Ophthalmology) Maxx Logan MD as Consulting Provider (Cardiology) Nico Harper MD as Consulting Provider (Gastroenterology) Jose York MD as Consulting Provider (Orthopedic Surgery) Audrey Ya RN as Ambulatory Care Navigator Bonny Albright MD as Consulting Provider (Orthopedic Surgery) Assessment/Plan Education and counseling provided: 
Are appropriate based on today's review and evaluation Diagnoses and all orders for this visit: 
 
1. Medicare annual wellness visit, subsequent 2. Encounter for immunization 
-     INFLUENZA VACCINE INACTIVATED (IIV), SUBUNIT, ADJUVANTED, IM 
-     ADMIN INFLUENZA VIRUS VAC Health Maintenance Due Topic Date Due  Shingrix Vaccine Age 50> (1 of 2) 03/29/2000  Influenza Age 5 to Adult  08/01/2018  
 EYE EXAM RETINAL OR DILATED Q1  11/09/2018  MICROALBUMIN Q1  11/13/2018  HEMOGLOBIN A1C Q6M  12/05/2018

## 2018-11-06 NOTE — PROGRESS NOTES
HISTORY OF PRESENT ILLNESS Mary Mullen is a 76 y.o. male. Visit Vitals BP 92/58 Pulse 76 Temp 96.9 °F (36.1 °C) (Oral) Resp 12 Ht 6' 2\" (1.88 m) Wt 236 lb 3.2 oz (107.1 kg) SpO2 96% BMI 30.33 kg/m² Diabetes The history is provided by the patient. This is a chronic problem. The current episode started more than 1 week ago. The problem occurs daily. The problem has been gradually improving. Pertinent negatives include no chest pain, no headaches and no shortness of breath. The symptoms are relieved by medications. Foot Pain The history is provided by the patient (left heel). This is a new problem. The current episode started more than 1 week ago. The problem occurs daily. The problem has not changed since onset. Pertinent negatives include no chest pain, no headaches and no shortness of breath. Exacerbated by: hurts to put his foot on the floor in his heeel. Gets better as he waslkd. Does not hurt at rest. Relieved by: stretching. He has tried acetaminophen for the symptoms. The treatment provided no relief. Review of Systems Constitutional: Negative for chills and fever. Respiratory: Negative for shortness of breath. Cardiovascular: Negative for chest pain and palpitations. Having episodes of hypotension with some sxs upon standing Musculoskeletal: Positive for back pain (just had shots from Dr. Roberson Spar did not last well. ). Left heel pain Neurological: Negative for dizziness and headaches. Physical Exam  
Constitutional: He appears well-developed and well-nourished. No distress. Eyes: Conjunctivae are normal.  
Neck: No JVD present. Cardiovascular: Normal rate and regular rhythm. Pulmonary/Chest: Effort normal and breath sounds normal.  
Musculoskeletal: He exhibits no edema. Tender to palpation under left heel c/w plantar fasciitis Neurological: He is alert. Skin: Skin is warm and dry. He is not diaphoretic. Psychiatric: He has a normal mood and affect. Nursing note and vitals reviewed. ASSESSMENT and PLAN 
  ICD-10-CM ICD-9-CM 3. Type 2 diabetes mellitus with diabetic neuropathy, without long-term current use of insulin (HCC) E11.40 250.60 HEMOGLOBIN A1C W/O EAG  
  357.2 MICROALBUMIN, UR, RAND W/ MICROALB/CREAT RATIO 4. Hypertension G01.1 983.79 METABOLIC PANEL, COMPREHENSIVE 5. Dyslipidemia E78.5 272.4 LIPID PANEL 6. Pain of left heel M79.672 729.5 7. Plantar fasciitis, left M72.2 728.71   
8. Medication refill Z76.0 V68.1 amLODIPine (NORVASC) 5 mg tablet DM has been controlled. Due for update Low BP today--cut amlodipine to once daily. Update lab today Pt to call cardiology for f/u. Pt has eye exam later this month 
 
advised to check with his ortho office about someone seeing him for his heel/plantar fasciitis F/u 4 months

## 2018-11-07 LAB
CREAT UR-MCNC: 304 MG/DL (ref 30–125)
MICROALBUMIN UR-MCNC: 3.51 MG/DL (ref 0–3)
MICROALBUMIN/CREAT UR-RTO: 12 MG/G (ref 0–30)

## 2018-11-19 NOTE — PATIENT DISCUSSION
1.  DM Type II (Oral Medication) without sign of diabetic retinopathy and no blot heme on dilated retinal examination today OU No Macular Edema:  Discussed the pathophysiology of diabetes and its effect on the eye and risk of blindness. Stressed the importance of strong glucose control. Advised of importance of at least yearly dilated examinations but to contact us immediately for any problems or concerns. 2. Pseudophakia OU - H/o YAG Cap OU 3. Choroidal Nevus OS: Appears Benign and stable. Observe for changes. Followed by Dr. Gela Guthrie 4. BREE w/ PEK OU- Use ATs TID OU Routinely. 5.  Glaucoma Suspect OU (CD 0.55/0.70): Past work up negative. Patient is considered Low Risk. 6. PXE OUReturn for an appointment in 1 yr 27 with Dr. Lc Santo.

## 2018-12-17 DIAGNOSIS — Z76.0 MEDICATION REFILL: ICD-10-CM

## 2018-12-17 RX ORDER — LOSARTAN POTASSIUM 100 MG/1
100 TABLET ORAL DAILY
Qty: 90 TAB | Refills: 3 | Status: SHIPPED | OUTPATIENT
Start: 2018-12-17 | End: 2019-01-29 | Stop reason: SDUPTHER

## 2018-12-17 NOTE — TELEPHONE ENCOUNTER
PCP: Dominique Scheuermann, MD    Last appt: 7/31/2018  Future Appointments   Date Time Provider Rubens Morocho   3/5/2019 11:00 AM Bertha Chiang MD [facility]       Requested Prescriptions     Pending Prescriptions Disp Refills    losartan (COZAAR) 100 mg tablet 90 Tab 3     Sig: Take 1 Tab by mouth daily.

## 2019-01-09 ENCOUNTER — HOSPITAL ENCOUNTER (OUTPATIENT)
Dept: LAB | Age: 69
Discharge: HOME | End: 2019-01-09
Payer: MEDICARE

## 2019-01-09 DIAGNOSIS — Z12.5 SCREENING FOR MALIGNANT NEOPLASM OF PROSTATE: ICD-10-CM

## 2019-01-09 PROCEDURE — 84153 ASSAY OF PSA TOTAL: CPT

## 2019-01-09 PROCEDURE — 36415 COLL VENOUS BLD VENIPUNCTURE: CPT

## 2019-01-10 LAB — PSA SERPL-MCNC: 0.5 NG/ML (ref 0–4)

## 2019-01-29 ENCOUNTER — OFFICE VISIT (OUTPATIENT)
Dept: INTERNAL MEDICINE CLINIC | Age: 69
End: 2019-01-29

## 2019-01-29 VITALS
HEIGHT: 74 IN | TEMPERATURE: 96.7 F | HEART RATE: 78 BPM | RESPIRATION RATE: 12 BRPM | WEIGHT: 234.6 LBS | DIASTOLIC BLOOD PRESSURE: 62 MMHG | OXYGEN SATURATION: 97 % | SYSTOLIC BLOOD PRESSURE: 108 MMHG | BODY MASS INDEX: 30.11 KG/M2

## 2019-01-29 DIAGNOSIS — Z76.0 MEDICATION REFILL: ICD-10-CM

## 2019-01-29 DIAGNOSIS — E78.5 DYSLIPIDEMIA: Chronic | ICD-10-CM

## 2019-01-29 DIAGNOSIS — I11.9 BENIGN HYPERTENSIVE HEART DISEASE WITHOUT HEART FAILURE: Primary | Chronic | ICD-10-CM

## 2019-01-29 RX ORDER — LOSARTAN POTASSIUM 100 MG/1
100 TABLET ORAL DAILY
Qty: 90 TAB | Refills: 3 | Status: SHIPPED | OUTPATIENT
Start: 2019-01-29 | End: 2019-07-11 | Stop reason: SDUPTHER

## 2019-01-29 NOTE — PATIENT INSTRUCTIONS

## 2019-01-29 NOTE — PROGRESS NOTES
Shea Houston is a 76 y.o. male (: 1950) presenting to address: Chief Complaint Patient presents with  Diabetes  Hypertension Vitals:  
 19 0945 BP: 108/62 Pulse: 78 Resp: 12 Temp: 96.7 °F (35.9 °C) TempSrc: Oral  
SpO2: 97% Weight: 234 lb 9.6 oz (106.4 kg) Height: 6' 2\" (1.88 m) PainSc:   0 - No pain Hearing/Vision: No exam data present Learning Assessment:  
 
Learning Assessment 2015 PRIMARY LEARNER Patient HIGHEST LEVEL OF EDUCATION - PRIMARY LEARNER  SOME COLLEGE  
BARRIERS PRIMARY LEARNER NONE  
CO-LEARNER CAREGIVER No  
PRIMARY LANGUAGE ENGLISH  NEED -  
LEARNER PREFERENCE PRIMARY LISTENING  
LEARNING SPECIAL TOPICS -  
ANSWERED BY patient RELATIONSHIP SELF Depression Screening: PHQ over the last two weeks 2019 PHQ Not Done - Little interest or pleasure in doing things Not at all Feeling down, depressed, irritable, or hopeless Not at all Total Score PHQ 2 0 Trouble falling or staying asleep, or sleeping too much - Feeling tired or having little energy - Poor appetite, weight loss, or overeating - Feeling bad about yourself - or that you are a failure or have let yourself or your family down - Trouble concentrating on things such as school, work, reading, or watching TV - Moving or speaking so slowly that other people could have noticed; or the opposite being so fidgety that others notice - Thoughts of being better off dead, or hurting yourself in some way -  
PHQ 9 Score - How difficult have these problems made it for you to do your work, take care of your home and get along with others - Fall Risk Assessment:  
 
Fall Risk Assessment, last 12 mths 2019 Able to walk? Yes Fall in past 12 months? Yes Fall with injury? No  
Number of falls in past 12 months 3 Fall Risk Score 3 Abuse Screening:  
 
Abuse Screening Questionnaire 2017 Do you ever feel afraid of your partner? Jorge Alberto Madera Are you in a relationship with someone who physically or mentally threatens you? Jorge Alberto Madera Is it safe for you to go home? Steven Jaquez Coordination of Care Questionaire: 1. Have you been to the ER, urgent care clinic since your last visit? Hospitalized since your last visit? NO 
 
2. Have you seen or consulted any other health care providers outside of the [address] [place] [address] since your last visit? Include any pap smears or colon screening. NO Advanced Directive: 1. Do you have an Advanced Directive? YES 
 
2. Would you like information on Advanced Directives?  NO

## 2019-01-29 NOTE — PROGRESS NOTES
HISTORY OF PRESENT ILLNESS Shea Houston is a 76 y.o. male. Visit Vitals /62 Pulse 78 Temp 96.7 °F (35.9 °C) (Oral) Resp 12 Ht 6' 2\" (1.88 m) Wt 234 lb 9.6 oz (106.4 kg) SpO2 97% BMI 30.12 kg/m² Her to f/u on his BP. He was running low so we adjusted his meds last time (amlodipine from BID to QD) BP is better. He has only had one episode where he felt funny. Will be having an upper endoscopy in March at the [facility] Review of Systems Constitutional: Negative for chills and fever. Respiratory: Negative for shortness of breath. Cardiovascular: Positive for leg swelling (trace, depending on socks and footwear). Negative for chest pain and palpitations. Neurological: Negative for dizziness. Physical Exam  
Constitutional: He is oriented to person, place, and time. He appears well-developed and well-nourished. No distress. Cardiovascular: Normal rate. Pulmonary/Chest: Effort normal.  
Musculoskeletal: He exhibits no edema. Neurological: He is alert and oriented to person, place, and time. Skin: Skin is warm. He is not diaphoretic. Nursing note and vitals reviewed. ASSESSMENT and PLAN 
  ICD-10-CM ICD-9-CM 1. Hypertension I11.9 402.10   
2. Dyslipidemia E78.5 272.4 3. Medication refill Z76.0 V68.1 losartan (COZAAR) 100 mg tablet BP looks much better today Form for a new BP cuff provided as well Continue same F/u early March when due for regular f/u (will also need CBC and iron checked)

## 2019-01-31 ENCOUNTER — OFFICE VISIT (OUTPATIENT)
Dept: CARDIOLOGY CLINIC | Age: 69
End: 2019-01-31

## 2019-01-31 VITALS
WEIGHT: 236 LBS | DIASTOLIC BLOOD PRESSURE: 66 MMHG | SYSTOLIC BLOOD PRESSURE: 105 MMHG | BODY MASS INDEX: 30.29 KG/M2 | HEIGHT: 74 IN | HEART RATE: 85 BPM | OXYGEN SATURATION: 95 %

## 2019-01-31 DIAGNOSIS — I25.10 CORONARY ARTERY DISEASE DUE TO LIPID RICH PLAQUE: ICD-10-CM

## 2019-01-31 DIAGNOSIS — I10 ESSENTIAL HYPERTENSION WITH GOAL BLOOD PRESSURE LESS THAN 140/90: ICD-10-CM

## 2019-01-31 DIAGNOSIS — E78.00 PURE HYPERCHOLESTEROLEMIA: ICD-10-CM

## 2019-01-31 DIAGNOSIS — I11.9 HYPERTENSIVE HEART DISEASE WITHOUT HEART FAILURE: Primary | ICD-10-CM

## 2019-01-31 DIAGNOSIS — I25.83 CORONARY ARTERY DISEASE DUE TO LIPID RICH PLAQUE: ICD-10-CM

## 2019-01-31 NOTE — PROGRESS NOTES
[facility]    Mr. Hailey Torres  is a 76 y.o. gentleman with diabetes, hypertension, dyslipidemia and tobacco use. He has coronary artery disease as documented by cardiac catheterization in October of 2014. His anatomy is as follows:      LM - patent  LAD - 30% prox, 80% mid, 90% apical  D1 - 100%  RI - 95% ostial (failed PCI, calcified 1.75 - 2.0 mm vessel)  Cx - 30-40% prox  OM1 - subtotal  OM2 - 80% (3.5 x 18mm XIENCE 10/14)  RCA - 30% diffuse  RPDA - 100%  RPLV - 50%    Mr. Hailey Torres is here today for follow up appointment. No new symptoms to report since last visit. He has stable dyspnea on moderate exertion which has same for last several years. He denies PND or lower cavity swelling he denies any chest pain or chest tightness he has not used any nitroglycerin since last visit. He denies presyncope or syncope    Past Medical History:   Diagnosis Date    Agent orange exposure     Asbestos exposure     Autonomic dysfunction     abnormal tilt table 2/15    BPH     CAD (coronary artery disease)     S/P OM NOY in 2014    Choroidal nevus, left eye     Colonic polyp 08/28/2018    multiple.  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 Andrews Nab N/A 7/9/2018    COLONOSCOPY performed by Scotty Sue MD at [address] [place] [address] COLONOSCOPY N/A 8/28/2018    COLONOSCOPY performed by Pa Blas MD at [facility] HX CATARACT REMOVAL      7/14  bilat    HX CERVICAL LAMINECTOMY      HX COLONOSCOPY  07/09/2018    pt states have to redo d/t unclear imaging    HX HEART CATHETERIZATION  2014    Stent    HX LUMBAR LAMINECTOMY      1983    HX ORTHOPAEDIC Left 02/05/2018    knee replacement    HX TUMOR REMOVAL      3/13  wide excision right thigh sarcoma    HX TYMPANOMASTOIDECTOMY         Current Outpatient Medications   Medication Sig    losartan (COZAAR) 100 mg tablet Take 1 Tab by mouth daily.  acetaminophen (TYLENOL) 500 mg tablet 1,000 mg.    docusate sodium (COLACE) 100 mg capsule 100 mg.    amLODIPine (NORVASC) 5 mg tablet Take 1 Tab by mouth daily.  chlorthalidone (HYGROTEN) 25 mg tablet Take 0.5 Tabs by mouth daily.  glimepiride (AMARYL) 1 mg tablet TAKE 1 TABLET BY MOUTH DAILY BEFORE BREAKFAST    colchicine 0.6 mg tablet TAKE 2 TABLETS NOW AND 1 TABLET IN 6 HOURS IF NEEDED REPEAT IN 6 HOURS AS NEEDED    ofloxacin (FLOXIN) 0.3 % otic solution Administer 5 Drops in left ear daily. (Patient taking differently: Administer 5 Drops in left ear as needed.)    diclofenac (VOLTAREN) 1 % gel Apply 2 g to affected area four (4) times daily.  carboxymethylcellulose sodium (REFRESH OP) Apply 1 Drop to eye daily.  nitroglycerin (NITROSTAT) 0.4 mg SL tablet 0.4 mg by SubLINGual route every five (5) minutes as needed for Chest Pain. Up to 3 doses.  rosuvastatin (CRESTOR) 20 mg tablet Take 20 mg by mouth nightly.  ferrous sulfate 325 mg (65 mg iron) tablet Take  by mouth Daily (before breakfast).  clopidogrel (PLAVIX) 75 mg tab Take 1 Tab by mouth daily.  isosorbide mononitrate ER (IMDUR) 30 mg tablet Take 1 Tab by mouth daily.  fluticasone (FLONASE) 50 mcg/actuation nasal spray SHAKE LIQUID AND USE 2 SPRAYS IN EACH NOSTRIL DAILY    terazosin (HYTRIN) 2 mg capsule Take 1 Cap by mouth nightly.     traZODone (DESYREL) 50 mg tablet TAKE 1 TO 2 TABLETS BY MOUTH AT BEDTIME AS NEEDED    gabapentin (NEURONTIN) 300 mg capsule Take 300 mg by mouth two (2) times a day.  ZINC ACETATE PO Take 1,000 mg by mouth.  potassium chloride (KLOR-CON) 10 mEq tablet TAKE 1 TABLET BY MOUTH TWICE DAILY    sertraline (ZOLOFT) 100 mg tablet Take 2 Tabs by mouth daily.  raNITIdine (ZANTAC) 300 mg tablet TAKE 1 TABLET BY MOUTH ONCE DAILY    cholecalciferol (VITAMIN D3) 1,000 unit tablet Take  by mouth daily.  ascorbic acid, vitamin C, (VITAMIN C) 250 mg tablet Take  by mouth.  pravastatin (PRAVACHOL) 40 mg tablet Take 1 Tab by mouth nightly.  garlic 9,731 mg cap Take 1 Cap by mouth daily.  multivit-min-FA-lycopen-lutein 0.4-300-250 mg-mcg-mcg tab Take 1 Tab by mouth daily.  aspirin delayed-release 81 mg tablet Take 1 tablet by mouth daily. No current facility-administered medications for this visit. Allergies   Allergen Reactions    Beta Blocker [Beta-Blockers (Beta-Adrenergic Blocking Agts)] Other (comments)     symptomatic bradycardia    Codeine Other (comments)     Passed//fainted  patient doesn't recall reaction, occurred as a teenager    Darvocet A500 [Propoxyphene N-Acetaminophen] Other (comments)     throat swelling    Dexbrompheniramine-Pseudoephed Other (comments)     Facial swelling. 1980\"s    Lasix [Furosemide] Itching    Lipitor [Atorvastatin] Other (comments)    Sulfa (Sulfonamide Antibiotics) Hives       Family History:   Non contributory for premature coronary disease in first degree relatives. Social History:   He  reports that he quit smoking about 2 years ago. He has a 15.00 pack-year smoking history. he has never used smokeless tobacco.  He  reports that he does not drink alcohol.   [facility], 3 years    Physical:   Vitals:   BP: 105/66  HR: 85  Wt: 236 lb (107 kg)    Exam:   General: Older gentleman, no complaints, no distress.     Head/Neck: No JVD  Lungs:  No respiratory distress. Clear with good air movement. Heart:  Regular. No rubs or gallops. Abdomen: Bowel sounds present  Extremities: Intact pulses. No significant edema.     Neurological: Alert and oriented to person, place, time. No gross neurological deficit. Skin:  Warm and dry. Review of Data:   LABS:   Lab Results   Component Value Date/Time    WBC 7.4 12/03/2015 10:00 AM    HGB 15.3 12/03/2015 10:00 AM    HCT 47.2 12/03/2015 10:00 AM    PLATELET 945 72/89/7213 10:00 AM     Lab Results   Component Value Date/Time    Sodium 138 11/06/2018 12:01 PM    Potassium 3.9 11/06/2018 12:01 PM    Chloride 98 (L) 11/06/2018 12:01 PM    CO2 31 11/06/2018 12:01 PM    Anion gap 9 11/06/2018 12:01 PM    Glucose 147 (H) 11/06/2018 12:01 PM    BUN 19 (H) 11/06/2018 12:01 PM    Creatinine 1.49 (H) 11/06/2018 12:01 PM    BUN/Creatinine ratio 13 11/06/2018 12:01 PM    GFR est AA 57 (L) 11/06/2018 12:01 PM    GFR est non-AA 47 (L) 11/06/2018 12:01 PM    Calcium 9.1 11/06/2018 12:01 PM    Bilirubin, total 0.6 11/06/2018 12:01 PM    AST (SGOT) 17 11/06/2018 12:01 PM    Alk. phosphatase 83 11/06/2018 12:01 PM    Protein, total 7.3 11/06/2018 12:01 PM    Albumin 3.7 11/06/2018 12:01 PM    Globulin 3.6 11/06/2018 12:01 PM    A-G Ratio 1.0 11/06/2018 12:01 PM    ALT (SGPT) 26 11/06/2018 12:01 PM     Lab Results   Component Value Date/Time    Cholesterol, total 114 11/06/2018 12:01 PM    HDL Cholesterol 41 11/06/2018 12:01 PM    LDL, calculated 42.6 11/06/2018 12:01 PM    Triglyceride 152 (H) 11/06/2018 12:01 PM    CHOL/HDL Ratio 2.8 11/06/2018 12:01 PM     Lab Results   Component Value Date/Time    Hemoglobin A1c 6.2 (H) 11/06/2018 12:01 PM    Hemoglobin A1c (POC) 5.9 05/06/2013 11:17 AM    Hemoglobin A1c, External 6.1 06/01/2018     EKG:   Sinus rhythm, 78 beats per minute, right bundle branch block, nonspecific ST-T wave changes. TILT TABLE: February 2015:  Patient was initially placed in supine position.  Heart rate was between 65-70 beats per minute, sinus rhythm. Blood pressure was 113/61 mmHg, with maximum blood pressure of 116/63 mmHg. Patient was placed in 60-degree upright tilting position. About 5-7 minutes into upright tilting position at 70 degrees, patient started feeling like her heart rate speeding up and legs feeling heavy, along with some dizziness. Blood pressure slowly decreased up to 70/43 mmHg. Heart rate maximum noted was up from 65 to 81 beats per minute. There was no obvious tachycardia. At the time, patient was given IV fluid and then placed back supine position, with normalization of the blood pressure and resolution of his symptoms. Heart rhythm remained in sinus. Patient did have a drop in the systolic blood pressure more than 20 mmHg upon upright tilting position, without any significant increase in the heart rate. This suggests possible autonomic dysfunction. SUMMARY:  Upright tilt table testing with reproduction of symptoms. More than 20 mmHg drop in systolic blood pressure, without change in the heart rate during upright tilting position, associated with symptoms. This may represent autonomic dysfunction    ECHO: (01/18)  SUMMARY:  Left ventricle: Systolic function was normal. Ejection fraction was estimated   in the range of 55 % to 60 %. There was hypokinesis of the basal inferoseptal wall. Wall thickness was at the upper   limits of normal.  Right ventricle: The ventricle was dilated. Inferior vena cava, hepatic veins: The inferior vena cava was mildly dilated.   The respirophasic change in diameter was  less than 50%. No major VHD    STRESS TEST (EST, PHARM, NUC, ECHO etc): October 2014:  1. Small to medium sized area of mildly intense reversible defect involvement anterolateral wall. 2.  There is also a small area of reversible defect involving basilar inferior wall concerning for ischemic focus. 3.  Calculated ejection fraction of 56% without any wall motion abnormality. CATHETERIZATION: October 2014:  LM - patent  LAD - 30% prox, 80% mid, 90% apical  D1 - 100%  RI - 95% ostial (failed PCI, calcified 1.75 - 2.0 mm vessel)  Cx - 30-40% prox  OM1 - subtotal  OM2 - 80%  RCA - 30% diffuse  RPDA - 100%  RPLV - 50%    Impression / Plan:     Diabetes    Hypertension    Dyslipidemia    Coronary artery disease       Mr. Vianey Fernandez returns to the office for follow up. Coronary artery disease:    Mr. Vianey Fernandez had a cardiac catheterization in October, 2014 and required stent of the obtuse marginal branch. He is on appropriate medication with dual antiplatelet agent aspirin and Plavix. He is taking Amlodipine, Pravachol and isosorbide mononitrate. He has a history of beta blocker induced symptomatic bradycardia, that is why he is not on any beta blocker. No S/L since last visit. No CP currently  Continue same. Hypertension:  His blood pressure today is 105/66 mmHg. On 4 BP meds. His medication dose has been changed and reduced because of low blood pressure by primary care provider. Recommend to continue same at this time    Diabetes: This is being managed by primary care provider. Goal Hgb A1c less than 7 is recommended from cardiovascular standpoint. Last Hgb A1c  was 6.2. Hyperlipidemia:  He is on lipid lowering agent. Last lipid profile on file is 42. Continue same. RTC in 6 months or sooner if needed.

## 2019-02-13 RX ORDER — PRAVASTATIN SODIUM 40 MG/1
TABLET ORAL
Qty: 90 TAB | Refills: 0 | Status: SHIPPED | OUTPATIENT
Start: 2019-02-13 | End: 2019-11-24 | Stop reason: SDUPTHER

## 2019-03-05 ENCOUNTER — HOSPITAL ENCOUNTER (OUTPATIENT)
Dept: LAB | Age: 69
Discharge: HOME | End: 2019-03-05
Payer: MEDICARE

## 2019-03-05 ENCOUNTER — OFFICE VISIT (OUTPATIENT)
Dept: INTERNAL MEDICINE CLINIC | Age: 69
End: 2019-03-05

## 2019-03-05 VITALS
WEIGHT: 237 LBS | DIASTOLIC BLOOD PRESSURE: 74 MMHG | HEIGHT: 74 IN | BODY MASS INDEX: 30.42 KG/M2 | SYSTOLIC BLOOD PRESSURE: 131 MMHG | RESPIRATION RATE: 16 BRPM | OXYGEN SATURATION: 93 % | HEART RATE: 67 BPM | TEMPERATURE: 98 F

## 2019-03-05 DIAGNOSIS — R68.89 OTHER GENERAL SYMPTOMS AND SIGNS: ICD-10-CM

## 2019-03-05 DIAGNOSIS — I11.9 BENIGN HYPERTENSIVE HEART DISEASE WITHOUT HEART FAILURE: Chronic | ICD-10-CM

## 2019-03-05 DIAGNOSIS — Z76.0 MEDICATION REFILL: ICD-10-CM

## 2019-03-05 DIAGNOSIS — D64.9 ANEMIA, UNSPECIFIED TYPE: ICD-10-CM

## 2019-03-05 DIAGNOSIS — E11.40 TYPE 2 DIABETES MELLITUS WITH DIABETIC NEUROPATHY, WITHOUT LONG-TERM CURRENT USE OF INSULIN (HCC): Primary | ICD-10-CM

## 2019-03-05 DIAGNOSIS — E11.40 TYPE 2 DIABETES MELLITUS WITH DIABETIC NEUROPATHY, WITHOUT LONG-TERM CURRENT USE OF INSULIN (HCC): ICD-10-CM

## 2019-03-05 DIAGNOSIS — E78.5 DYSLIPIDEMIA: Chronic | ICD-10-CM

## 2019-03-05 LAB
ALBUMIN SERPL-MCNC: 3.9 G/DL (ref 3.4–5)
ALBUMIN/GLOB SERPL: 1.2 {RATIO} (ref 0.8–1.7)
ALP SERPL-CCNC: 84 U/L (ref 45–117)
ALT SERPL-CCNC: 32 U/L (ref 16–61)
ANION GAP SERPL CALC-SCNC: 6 MMOL/L (ref 3–18)
AST SERPL-CCNC: 21 U/L (ref 15–37)
BASOPHILS # BLD: 0 K/UL (ref 0–0.1)
BASOPHILS NFR BLD: 1 % (ref 0–2)
BILIRUB SERPL-MCNC: 0.4 MG/DL (ref 0.2–1)
BUN SERPL-MCNC: 18 MG/DL (ref 7–18)
BUN/CREAT SERPL: 15 (ref 12–20)
CALCIUM SERPL-MCNC: 9.1 MG/DL (ref 8.5–10.1)
CHLORIDE SERPL-SCNC: 99 MMOL/L (ref 100–108)
CHOLEST SERPL-MCNC: 146 MG/DL
CO2 SERPL-SCNC: 31 MMOL/L (ref 21–32)
CREAT SERPL-MCNC: 1.17 MG/DL (ref 0.6–1.3)
DIFFERENTIAL METHOD BLD: ABNORMAL
EOSINOPHIL # BLD: 0.3 K/UL (ref 0–0.4)
EOSINOPHIL NFR BLD: 3 % (ref 0–5)
ERYTHROCYTE [DISTWIDTH] IN BLOOD BY AUTOMATED COUNT: 14.9 % (ref 11.6–14.5)
GLOBULIN SER CALC-MCNC: 3.3 G/DL (ref 2–4)
GLUCOSE SERPL-MCNC: 101 MG/DL (ref 74–99)
HBA1C MFR BLD: 5.9 % (ref 4.2–5.6)
HCT VFR BLD AUTO: 42.3 % (ref 36–48)
HDLC SERPL-MCNC: 49 MG/DL (ref 40–60)
HDLC SERPL: 3 {RATIO} (ref 0–5)
HGB BLD-MCNC: 13.7 G/DL (ref 13–16)
LDLC SERPL CALC-MCNC: 64.2 MG/DL (ref 0–100)
LIPID PROFILE,FLP: ABNORMAL
LYMPHOCYTES # BLD: 1.6 K/UL (ref 0.9–3.6)
LYMPHOCYTES NFR BLD: 20 % (ref 21–52)
MCH RBC QN AUTO: 29.9 PG (ref 24–34)
MCHC RBC AUTO-ENTMCNC: 32.4 G/DL (ref 31–37)
MCV RBC AUTO: 92.4 FL (ref 74–97)
MONOCYTES # BLD: 0.5 K/UL (ref 0.05–1.2)
MONOCYTES NFR BLD: 7 % (ref 3–10)
NEUTS SEG # BLD: 5.5 K/UL (ref 1.8–8)
NEUTS SEG NFR BLD: 69 % (ref 40–73)
PLATELET # BLD AUTO: 187 K/UL (ref 135–420)
PMV BLD AUTO: 9.8 FL (ref 9.2–11.8)
POTASSIUM SERPL-SCNC: 3.6 MMOL/L (ref 3.5–5.5)
PROT SERPL-MCNC: 7.2 G/DL (ref 6.4–8.2)
RBC # BLD AUTO: 4.58 M/UL (ref 4.7–5.5)
SODIUM SERPL-SCNC: 136 MMOL/L (ref 136–145)
TRIGL SERPL-MCNC: 164 MG/DL (ref ?–150)
VLDLC SERPL CALC-MCNC: 32.8 MG/DL
WBC # BLD AUTO: 7.9 K/UL (ref 4.6–13.2)

## 2019-03-05 PROCEDURE — 36415 COLL VENOUS BLD VENIPUNCTURE: CPT

## 2019-03-05 PROCEDURE — 85025 COMPLETE CBC W/AUTO DIFF WBC: CPT

## 2019-03-05 PROCEDURE — 80053 COMPREHEN METABOLIC PANEL: CPT

## 2019-03-05 PROCEDURE — 83540 ASSAY OF IRON: CPT

## 2019-03-05 PROCEDURE — 83036 HEMOGLOBIN GLYCOSYLATED A1C: CPT

## 2019-03-05 PROCEDURE — 80061 LIPID PANEL: CPT

## 2019-03-05 PROCEDURE — 82607 VITAMIN B-12: CPT

## 2019-03-05 RX ORDER — GLIMEPIRIDE 1 MG/1
TABLET ORAL
Qty: 90 TAB | Refills: 4 | Status: SHIPPED | OUTPATIENT
Start: 2019-03-05 | End: 2020-03-23 | Stop reason: SDUPTHER

## 2019-03-05 NOTE — PROGRESS NOTES
HISTORY OF PRESENT ILLNESS  Jonathan Land is a 76 y.o. male. Visit Vitals  /74   Pulse 67   Temp 98 °F (36.7 °C) (Oral)   Resp 16   Ht 6' 2\" (1.88 m)   Wt 237 lb (107.5 kg)   SpO2 93%   BMI 30.43 kg/m²               Current Outpatient Medications:  pravastatin (PRAVACHOL) 40 mg tablet, TAKE 1 TABLET BY MOUTH EVERY EVENING  sertraline (ZOLOFT) 100 mg tablet, TAKE 2 TABLETS BY MOUTH DAILY  raNITIdine (ZANTAC) 300 mg tab, TAKE 1 TABLET BY MOUTH ONCE DAILY  gabapentin (NEURONTIN) 300 mg capsule, TAKE 1 CAPSULE BY MOUTH THREE TIMES DAILY FOR NEUROPATHIC PAIN  losartan (COZAAR) 100 mg tablet, Take 1 Tab by mouth daily. acetaminophen (TYLENOL) 500 mg tablet, 1,000 mg.  docusate sodium (COLACE) 100 mg capsule, 100 mg. amLODIPine (NORVASC) 5 mg tablet, Take 1 Tab by mouth daily. chlorthalidone (HYGROTEN) 25 mg tablet, Take 0.5 Tabs by mouth daily. glimepiride (AMARYL) 1 mg tablet, TAKE 1 TABLET BY MOUTH DAILY BEFORE BREAKFAST  colchicine 0.6 mg tablet, TAKE 2 TABLETS NOW AND 1 TABLET IN 6 HOURS IF NEEDED REPEAT IN 6 HOURS AS NEEDED  ofloxacin (FLOXIN) 0.3 % otic solution, Administer 5 Drops in left ear daily. (Patient taking differently: Administer 5 Drops in left ear as needed.)  diclofenac (VOLTAREN) 1 % gel, Apply 2 g to affected area four (4) times daily. carboxymethylcellulose sodium (REFRESH OP), Apply 1 Drop to eye daily. nitroglycerin (NITROSTAT) 0.4 mg SL tablet, 0.4 mg by SubLINGual route every five (5) minutes as needed for Chest Pain. Up to 3 doses. ferrous sulfate 325 mg (65 mg iron) tablet, Take  by mouth Daily (before breakfast). clopidogrel (PLAVIX) 75 mg tab, Take 1 Tab by mouth daily. isosorbide mononitrate ER (IMDUR) 30 mg tablet, Take 1 Tab by mouth daily. fluticasone (FLONASE) 50 mcg/actuation nasal spray, SHAKE LIQUID AND USE 2 SPRAYS IN EACH NOSTRIL DAILY  terazosin (HYTRIN) 2 mg capsule, Take 1 Cap by mouth nightly.   traZODone (DESYREL) 50 mg tablet, TAKE 1 TO 2 TABLETS BY MOUTH AT BEDTIME AS NEEDED  gabapentin (NEURONTIN) 300 mg capsule, Take 300 mg by mouth two (2) times a day. ZINC ACETATE PO, Take 1,000 mg by mouth.  potassium chloride (KLOR-CON) 10 mEq tablet, TAKE 1 TABLET BY MOUTH TWICE DAILY  raNITIdine (ZANTAC) 300 mg tablet, TAKE 1 TABLET BY MOUTH ONCE DAILY  cholecalciferol (VITAMIN D3) 1,000 unit tablet, Take  by mouth daily. ascorbic acid, vitamin C, (VITAMIN C) 250 mg tablet, Take  by mouth.  garlic 2,293 mg cap, Take 1 Cap by mouth daily. multivit-min-FA-lycopen-lutein 0.4-300-250 mg-mcg-mcg tab, Take 1 Tab by mouth daily. aspirin delayed-release 81 mg tablet, Take 1 tablet by mouth daily. No current facility-administered medications for this visit. Hypertension    The history is provided by the patient. This is a chronic problem. The current episode started more than 1 week ago. The problem has not changed since onset. Associated symptoms include headaches (off and on, ). Pertinent negatives include no chest pain and no dizziness. There are no associated agents to hypertension. Risk factors include obesity, a sedentary lifestyle, male gender, hypertension and diabetes mellitus. Diabetes   The history is provided by the patient. This is a chronic problem. The current episode started more than 1 week ago. The problem occurs daily. The problem has not changed since onset. Associated symptoms include headaches (off and on, ). Pertinent negatives include no chest pain. Exacerbated by: diet. The symptoms are relieved by medications (diet). Review of Systems   Constitutional: Negative for chills and fever. Cardiovascular: Negative for chest pain. Genitourinary: Positive for frequency. Neurological: Positive for headaches (off and on, ). Negative for dizziness. Endo/Heme/Allergies: Negative for polydipsia. Physical Exam   Constitutional: He is oriented to person, place, and time. He appears well-developed and well-nourished. No distress. Cardiovascular: Normal rate and regular rhythm. Pulmonary/Chest: Effort normal and breath sounds normal.   Musculoskeletal: He exhibits no edema. Neurological: He is alert and oriented to person, place, and time. Skin: Skin is warm and dry. He is not diaphoretic. Psychiatric: He has a normal mood and affect. Nursing note and vitals reviewed. ASSESSMENT and PLAN    ICD-10-CM ICD-9-CM    1. Type 2 diabetes mellitus with diabetic neuropathy, without long-term current use of insulin ([facility]) R61.48 753.19 METABOLIC PANEL, COMPREHENSIVE     357.2 HEMOGLOBIN A1C W/O EAG   2. Hypertension L32.7 618.17 METABOLIC PANEL, COMPREHENSIVE   3. Dyslipidemia E78.5 272.4 LIPID PANEL   4. Anemia, unspecified type D64.9 285.9 CBC WITH AUTOMATED DIFF      IRON PROFILE      VITAMIN B12 & FOLATE   5. Other general symptoms and signs  R68.89 780.99 VITAMIN B12 & FOLATE   6. Medication refill Z76.0 V68.1 glimepiride (AMARYL) 1 mg tablet       BP controlled    DM has been controlled    Update lab including iron check--the [facility] told him he was low on iron    Refills as noted    Discussed BMI/weight, lifestyle, diet and exercise. Discussed effect on blood pressure, blood sugar, and joints especially  Focus on limiting white carbs, portion control, and moving more.     F/u 4 months for HTN, DM, CHOL

## 2019-03-05 NOTE — PROGRESS NOTES
Rm:5    Chief Complaint   Patient presents with    Diabetes    Hypertension    Cholesterol Problem     Depression Screening:  3 most recent PHQ Screens 3/5/2019 1/29/2019 11/6/2018 2/27/2018 2/22/2018 7/11/2017 5/11/2017   PHQ Not Done - - - - - Active Diagnosis of Depression or Bipolar Disorder Active Diagnosis of Depression or Bipolar Disorder   Little interest or pleasure in doing things Not at all Not at all Nearly every day Not at all Not at all Nearly every day -   Feeling down, depressed, irritable, or hopeless Not at all Not at all Nearly every day Not at all Not at all Several days -   Total Score PHQ 2 0 0 6 0 0 4 -   Trouble falling or staying asleep, or sleeping too much - - Not at all - - - -   Feeling tired or having little energy - - Nearly every day - - - -   Poor appetite, weight loss, or overeating - - Several days - - - -   Feeling bad about yourself - or that you are a failure or have let yourself or your family down - - Several days - - - -   Trouble concentrating on things such as school, work, reading, or watching TV - - Not at all - - - -   Moving or speaking so slowly that other people could have noticed; or the opposite being so fidgety that others notice - - Not at all - - - -   Thoughts of being better off dead, or hurting yourself in some way - - Not at all - - - -   PHQ 9 Score - - 11 - - - -   How difficult have these problems made it for you to do your work, take care of your home and get along with others - - Not difficult at all - - - -       Learning Assessment:  Learning Assessment 4/13/2015 4/17/2014 12/12/2013   PRIMARY LEARNER Patient Patient -   HIGHEST LEVEL OF EDUCATION - PRIMARY LEARNER  SOME COLLEGE - SOME COLLEGE   BARRIERS PRIMARY LEARNER NONE - NONE   CO-LEARNER CAREGIVER No - -   [facility]    NEED - - No   LEARNER PREFERENCE PRIMARY LISTENING DEMONSTRATION PICTURES   LEARNING SPECIAL TOPICS - - none   ANSWERED BY patient - - RELATIONSHIP SELF - -       Abuse Screening:  Abuse Screening Questionnaire 11/13/2017 4/13/2015   Do you ever feel afraid of your partner? N N   Are you in a relationship with someone who physically or mentally threatens you? N N   Is it safe for you to go home? Y Y       Health Maintenance reviewed and discussed per provider: yes     Coordination of Care:    1. Have you been to the ER, urgent care clinic since your last visit? Hospitalized since your last visit? no    2. Have you seen or consulted any other health care providers outside of the [address] [place] [address] since your last visit? Include any pap smears or colon screening.  no

## 2019-03-06 ENCOUNTER — TELEPHONE (OUTPATIENT)
Dept: INTERNAL MEDICINE CLINIC | Age: 69
End: 2019-03-06

## 2019-03-06 LAB
FOLATE SERPL-MCNC: >20 NG/ML (ref 3.1–17.5)
IRON SATN MFR SERPL: 17 %
IRON SERPL-MCNC: 62 UG/DL (ref 50–175)
TIBC SERPL-MCNC: 357 UG/DL (ref 250–450)
VIT B12 SERPL-MCNC: 776 PG/ML (ref 211–911)

## 2019-03-06 NOTE — TELEPHONE ENCOUNTER
Jamey Loco from [employer] is requesting new diagnoses codes for the Vitamin b12 because medicare will not accept the old code to pay.  Please complete and fax to [phone]

## 2019-03-06 NOTE — TELEPHONE ENCOUNTER
Did he run both codes?   I received no stop from [facility] on this and I have not had any trouble with ordering this this way in the past

## 2019-03-07 NOTE — TELEPHONE ENCOUNTER
Spoke to Qasim Silva and stated he will send an email with recommendation: Reach out to billing to get billable diagnosis code or contact Annelise Pagan to get a form to send for an appeal. Qasim Silva stated the code is not billable under Medicare.

## 2019-03-08 NOTE — TELEPHONE ENCOUNTER
Lab order for Vitamin B12 reprinted and faxed to [facility] lab with other possible codes. Confirmation received.

## 2019-06-28 ENCOUNTER — TELEPHONE (OUTPATIENT)
Dept: INTERNAL MEDICINE CLINIC | Age: 69
End: 2019-06-28

## 2019-06-28 NOTE — TELEPHONE ENCOUNTER
Adam from [facility] calling because pt is experiencing lightheadedness and his BP is 70/48. Teddy Herrera states that pt has someone coming to pick him up. Informed Adam that pt needs to go to the nearest ER.

## 2019-07-11 ENCOUNTER — OFFICE VISIT (OUTPATIENT)
Dept: INTERNAL MEDICINE CLINIC | Age: 69
End: 2019-07-11

## 2019-07-11 ENCOUNTER — HOSPITAL ENCOUNTER (OUTPATIENT)
Dept: LAB | Age: 69
Discharge: HOME | End: 2019-07-11
Payer: MEDICARE

## 2019-07-11 VITALS
SYSTOLIC BLOOD PRESSURE: 132 MMHG | RESPIRATION RATE: 22 BRPM | HEART RATE: 65 BPM | BODY MASS INDEX: 31.06 KG/M2 | WEIGHT: 242 LBS | OXYGEN SATURATION: 97 % | TEMPERATURE: 96.4 F | DIASTOLIC BLOOD PRESSURE: 75 MMHG | HEIGHT: 74 IN

## 2019-07-11 DIAGNOSIS — G89.29 CHRONIC BILATERAL LOW BACK PAIN, WITH SCIATICA PRESENCE UNSPECIFIED: ICD-10-CM

## 2019-07-11 DIAGNOSIS — R29.898 WEAKNESS OF BOTH LEGS: ICD-10-CM

## 2019-07-11 DIAGNOSIS — Z76.0 MEDICATION REFILL: ICD-10-CM

## 2019-07-11 DIAGNOSIS — M54.5 CHRONIC BILATERAL LOW BACK PAIN, WITH SCIATICA PRESENCE UNSPECIFIED: ICD-10-CM

## 2019-07-11 DIAGNOSIS — I11.9 BENIGN HYPERTENSIVE HEART DISEASE WITHOUT HEART FAILURE: Chronic | ICD-10-CM

## 2019-07-11 DIAGNOSIS — M79.2 NEURALGIC PAIN: ICD-10-CM

## 2019-07-11 DIAGNOSIS — E11.40 TYPE 2 DIABETES MELLITUS WITH DIABETIC NEUROPATHY, WITHOUT LONG-TERM CURRENT USE OF INSULIN (HCC): Primary | ICD-10-CM

## 2019-07-11 LAB
ANION GAP SERPL CALC-SCNC: 6 MMOL/L (ref 3–18)
BUN SERPL-MCNC: 18 MG/DL (ref 7–18)
BUN/CREAT SERPL: 14 (ref 12–20)
CALCIUM SERPL-MCNC: 8.8 MG/DL (ref 8.5–10.1)
CHLORIDE SERPL-SCNC: 102 MMOL/L (ref 100–108)
CO2 SERPL-SCNC: 32 MMOL/L (ref 21–32)
CREAT SERPL-MCNC: 1.3 MG/DL (ref 0.6–1.3)
EST. AVERAGE GLUCOSE BLD GHB EST-MCNC: 117 MG/DL
GLUCOSE SERPL-MCNC: 80 MG/DL (ref 74–99)
HBA1C MFR BLD: 5.7 % (ref 4.2–5.6)
POTASSIUM SERPL-SCNC: 3.7 MMOL/L (ref 3.5–5.5)
SODIUM SERPL-SCNC: 140 MMOL/L (ref 136–145)

## 2019-07-11 PROCEDURE — 36415 COLL VENOUS BLD VENIPUNCTURE: CPT

## 2019-07-11 PROCEDURE — 83036 HEMOGLOBIN GLYCOSYLATED A1C: CPT

## 2019-07-11 PROCEDURE — 80048 BASIC METABOLIC PNL TOTAL CA: CPT

## 2019-07-11 RX ORDER — GABAPENTIN 300 MG/1
300 CAPSULE ORAL 3 TIMES DAILY
Qty: 90 CAP | Refills: 5 | Status: SHIPPED | OUTPATIENT
Start: 2019-07-11 | End: 2020-02-03

## 2019-07-11 RX ORDER — TRAMADOL HYDROCHLORIDE 50 MG/1
50 TABLET ORAL
Qty: 120 TAB | Refills: 5 | Status: SHIPPED | OUTPATIENT
Start: 2019-07-11 | End: 2019-07-11 | Stop reason: SDUPTHER

## 2019-07-11 RX ORDER — GABAPENTIN 300 MG/1
300 CAPSULE ORAL 3 TIMES DAILY
Qty: 90 CAP | Refills: 5 | Status: SHIPPED | OUTPATIENT
Start: 2019-07-11 | End: 2019-07-11 | Stop reason: SDUPTHER

## 2019-07-11 RX ORDER — TRAMADOL HYDROCHLORIDE 50 MG/1
50 TABLET ORAL
Qty: 120 TAB | Refills: 5 | Status: SHIPPED | OUTPATIENT
Start: 2019-07-11 | End: 2019-08-10

## 2019-07-11 RX ORDER — LOSARTAN POTASSIUM 100 MG/1
100 TABLET ORAL DAILY
Qty: 90 TAB | Refills: 4 | Status: SHIPPED | OUTPATIENT
Start: 2019-07-11 | End: 2020-10-01

## 2019-07-11 NOTE — PROGRESS NOTES
HISTORY OF PRESENT ILLNESS  Arleth Juarez is a 71 y.o. male. Visit Vitals  /75   Pulse 65   Temp 96.4 °F (35.8 °C) (Oral)   Resp 22   Ht 6' 2\" (1.88 m)   Wt 242 lb (109.8 kg)   SpO2 97%   BMI 31.07 kg/m²       [facility] doctor sent him to physical therapy for his back. Aqua-therapy at [facility]      Balance is off too. But he only has part of the quadricepts muscle on the right    He has fallen several times including in the bathroom getting out of the tub. So he had the tub removed and a walk in shower put in. Current Outpatient Medications:  losartan (COZAAR) 100 mg tablet, Take 1 Tab by mouth daily. gabapentin (NEURONTIN) 300 mg capsule, Take 1 Cap by mouth three (3) times daily. Max Daily Amount: 900 mg. Indications: Neuropathic Pain  traMADol (ULTRAM) 50 mg tablet, Take 1 Tab by mouth every six (6) hours as needed for Pain for up to 30 days. Max Daily Amount: 200 mg. Indications: Neuropathic Pain, Pain  traZODone (DESYREL) 50 mg tablet, TAKE 1 TO 2 TABLETS BY MOUTH AT BEDTIME AS NEEDED  glimepiride (AMARYL) 1 mg tablet, TAKE 1 TABLET BY MOUTH DAILY BEFORE BREAKFAST  pravastatin (PRAVACHOL) 40 mg tablet, TAKE 1 TABLET BY MOUTH EVERY EVENING  sertraline (ZOLOFT) 100 mg tablet, TAKE 2 TABLETS BY MOUTH DAILY  raNITIdine (ZANTAC) 300 mg tab, TAKE 1 TABLET BY MOUTH ONCE DAILY  acetaminophen (TYLENOL) 500 mg tablet, 1,000 mg.  docusate sodium (COLACE) 100 mg capsule, 100 mg. amLODIPine (NORVASC) 5 mg tablet, Take 1 Tab by mouth daily. colchicine 0.6 mg tablet, TAKE 2 TABLETS NOW AND 1 TABLET IN 6 HOURS IF NEEDED REPEAT IN 6 HOURS AS NEEDED  diclofenac (VOLTAREN) 1 % gel, Apply 2 g to affected area four (4) times daily. carboxymethylcellulose sodium (REFRESH OP), Apply 1 Drop to eye daily. nitroglycerin (NITROSTAT) 0.4 mg SL tablet, 0.4 mg by SubLINGual route every five (5) minutes as needed for Chest Pain. Up to 3 doses.   ferrous sulfate 325 mg (65 mg iron) tablet, Take  by mouth Daily (before breakfast). clopidogrel (PLAVIX) 75 mg tab, Take 1 Tab by mouth daily. isosorbide mononitrate ER (IMDUR) 30 mg tablet, Take 1 Tab by mouth daily. fluticasone (FLONASE) 50 mcg/actuation nasal spray, SHAKE LIQUID AND USE 2 SPRAYS IN EACH NOSTRIL DAILY  terazosin (HYTRIN) 2 mg capsule, Take 1 Cap by mouth nightly. ZINC ACETATE PO, Take 1,000 mg by mouth. cholecalciferol (VITAMIN D3) 1,000 unit tablet, Take  by mouth daily. ascorbic acid, vitamin C, (VITAMIN C) 250 mg tablet, Take  by mouth.  garlic 7,958 mg cap, Take 1 Cap by mouth daily. multivit-min-FA-lycopen-lutein 0.4-300-250 mg-mcg-mcg tab, Take 1 Tab by mouth daily. aspirin delayed-release 81 mg tablet, Take 1 tablet by mouth daily. chlorthalidone (HYGROTEN) 25 mg tablet, Take 0.5 Tabs by mouth daily. ofloxacin (FLOXIN) 0.3 % otic solution, Administer 5 Drops in left ear daily. (Patient taking differently: Administer 5 Drops in left ear as needed.)  potassium chloride (KLOR-CON) 10 mEq tablet, TAKE 1 TABLET BY MOUTH TWICE DAILY  raNITIdine (ZANTAC) 300 mg tablet, TAKE 1 TABLET BY MOUTH ONCE DAILY        He got his first Shingrix at the [facility]. He will get the second one in September. Hypertension    The history is provided by the patient (states it is a bit labile still. [facility] potassium and diuretic due to his BP being too low). This is a chronic problem. The current episode started more than 1 week ago. The problem has not changed since onset. Risk factors include hypertension, stress, male gender and a sedentary lifestyle. Back Pain    The history is provided by the patient. This is a chronic problem. The current episode started more than 1 week ago. The problem has not changed since onset. The pain is present in the lumbar spine. The symptoms are aggravated by certain positions. Associated symptoms comments: Leg muscles feel weak a times. . Risk factors include obesity and lack of exercise.    Diabetes   The history is provided by the patient. This is a chronic problem. The current episode started more than 1 week ago. The problem occurs daily. The problem has not changed since onset. Exacerbated by: diet. The symptoms are relieved by medications (diet). Review of Systems   Musculoskeletal: Positive for back pain. Physical Exam   Constitutional: He is oriented to person, place, and time. He appears well-developed and well-nourished. No distress. Cardiovascular: Normal rate and regular rhythm. Pulmonary/Chest: Effort normal.   Musculoskeletal: He exhibits no edema. Neurological: He is alert and oriented to person, place, and time. Diabetic foot exam:     Left Foot:   Visual Exam: callous - toe. Pressure point 4th distal toe   Pulse DP: 2+ (normal)   Filament test: reduced sensation    Vibratory sensation: absent      Right Foot:   Visual Exam: callous - toe. Pressure point on 4th toe   Pulse DP: 2+ (normal)   Filament test: reduced sensation    Vibratory sensation: diminished     Skin: Skin is warm and dry. He is not diaphoretic. Psychiatric: He has a normal mood and affect. Vitals reviewed. ASSESSMENT and PLAN    ICD-10-CM ICD-9-CM    1. Type 2 diabetes mellitus with diabetic neuropathy, without long-term current use of insulin ([facility]) E11.40 250.60  DIABETES FOOT EXAM     197.5 METABOLIC PANEL, BASIC      HEMOGLOBIN A1C W/O EAG   2. Hypertension D09.0 884.71 METABOLIC PANEL, BASIC   3. Chronic bilateral low back pain, with sciatica presence unspecified M54.5 724.2 REFERRAL TO PHYSICAL THERAPY    G89.29 338.29    4. Weakness of both legs R29.898 729.89 REFERRAL TO PHYSICAL THERAPY   5. Medication refill Z76.0 V68.1 losartan (COZAAR) 100 mg tablet      traMADol (ULTRAM) 50 mg tablet   6. Neuralgic pain M79.2 729.2 gabapentin (NEURONTIN) 300 mg capsule       BP borderline today--but he recently had some meds dropped.  Came down with rest    Update diabetic lab  Pt sees nephrology already and has a PCP at the [facility]    Refer to PT for his lower back    Refill gabapentin, After discussion, will refill tramadol--he can not take aspirin/NSAIDS. We have no other god options for this patient.     F/u November for [address], HTN, DM

## 2019-07-11 NOTE — PROGRESS NOTES
ROOM # 4    Anselmo Alejo presents today for   Chief Complaint   Patient presents with    Hypertension    Cholesterol Problem    Diabetes       Anselmo Alejo preferred language for health care discussion is english/other.     Is someone accompanying this pt? no    Is the patient using any DME equipment during [address]? no    Depression Screening:  3 most recent PHQ Screens 3/5/2019 1/29/2019 11/6/2018 2/27/2018 2/22/2018 7/11/2017 5/11/2017   PHQ Not Done - - - - - Active Diagnosis of Depression or Bipolar Disorder Active Diagnosis of Depression or Bipolar Disorder   Little interest or pleasure in doing things Not at all Not at all Nearly every day Not at all Not at all Nearly every day -   Feeling down, depressed, irritable, or hopeless Not at all Not at all Nearly every day Not at all Not at all Several days -   Total Score PHQ 2 0 0 6 0 0 4 -   Trouble falling or staying asleep, or sleeping too much - - Not at all - - - -   Feeling tired or having little energy - - Nearly every day - - - -   Poor appetite, weight loss, or overeating - - Several days - - - -   Feeling bad about yourself - or that you are a failure or have let yourself or your family down - - Several days - - - -   Trouble concentrating on things such as school, work, reading, or watching TV - - Not at all - - - -   Moving or speaking so slowly that other people could have noticed; or the opposite being so fidgety that others notice - - Not at all - - - -   Thoughts of being better off dead, or hurting yourself in some way - - Not at all - - - -   PHQ 9 Score - - 11 - - - -   How difficult have these problems made it for you to do your work, take care of your home and get along with others - - Not difficult at all - - - -       Learning Assessment:  Learning Assessment 4/13/2015 4/17/2014 12/12/2013   PRIMARY LEARNER Patient Patient -   HIGHEST LEVEL OF EDUCATION - PRIMARY LEARNER  [address] [place] [address] LEARNER NONE - NONE   CO-LEARNER CAREGIVER No - -   PRIMARY LANGUAGE ENGLISH ENGLISH ENGLISH    NEED - - No   LEARNER PREFERENCE PRIMARY LISTENING DEMONSTRATION PICTURES   LEARNING SPECIAL TOPICS - - none   ANSWERED BY patient - -   RELATIONSHIP SELF - -       Abuse Screening:  Abuse Screening Questionnaire 11/13/2017 4/13/2015   Do you ever feel afraid of your partner? N N   Are you in a relationship with someone who physically or mentally threatens you? N N   Is it safe for you to go home? Y Y       Fall Risk  Fall Risk Assessment, last 12 mths 3/5/2019 1/29/2019 11/6/2018 7/31/2018 7/12/2018 4/30/2018 2/27/2018   Able to walk? Yes Yes Yes Yes Yes Yes Yes   Fall in past 12 months? No Yes Yes No No Yes Yes   Fall with injury? - No No - - No No   Number of falls in past 12 months - 3 5 - - 1 5   Fall Risk Score - 3 5 - - 1 5       Health Maintenance reviewed and discussed per provider. Yes    John Macedo is due for   Health Maintenance Due   Topic Date Due    Shingrix Vaccine Age 50> (1 of 2) 03/29/2000    FOOT EXAM Q1  03/28/2019     Please order/place referral if appropriate. Advance Directive:  1. Do you have an advance directive in place? Patient Reply: yes    2. If not, would you like material regarding how to put one in place? Patient Reply: no    Coordination of Care:  1. Have you been to the ER, urgent care clinic since your last visit? Hospitalized since your last visit? no    2. Have you seen or consulted any other health care providers outside of the [address] [place] [address] since your last visit? Include any pap smears or colon screening.  [facility], kidney doctor

## 2019-07-15 ENCOUNTER — TELEPHONE (OUTPATIENT)
Dept: INTERNAL MEDICINE CLINIC | Age: 69
End: 2019-07-15

## 2019-07-15 NOTE — TELEPHONE ENCOUNTER
Prior auth request received for patient medication Tramadol. Prior auth request has submitted via cover my meds. Your information has been submitted to [facility]. To check for an updated outcome later, reopen this PA request from your dashboard. If [facility] has not responded to your request within 24 hours, contact [facility] at [phone].

## 2019-07-23 NOTE — TELEPHONE ENCOUNTER
N/A on July 15   Your PA request has been closed. Thank you for your ePA request for Tramadol (Ultram) IR 50mg. The quantity requested does not require prior authorization, as it does not exceed the standard allowance of 540 every 90 days. If a quantity greater than this is required, please contact us again.  Thank you

## 2019-09-10 ENCOUNTER — OFFICE VISIT (OUTPATIENT)
Dept: CARDIOLOGY CLINIC | Age: 69
End: 2019-09-10

## 2019-09-10 VITALS
DIASTOLIC BLOOD PRESSURE: 79 MMHG | HEIGHT: 74 IN | SYSTOLIC BLOOD PRESSURE: 156 MMHG | WEIGHT: 234 LBS | BODY MASS INDEX: 30.03 KG/M2 | OXYGEN SATURATION: 98 % | HEART RATE: 71 BPM

## 2019-09-10 DIAGNOSIS — I11.9 HYPERTENSIVE HEART DISEASE WITHOUT HEART FAILURE: Primary | ICD-10-CM

## 2019-09-10 DIAGNOSIS — I25.83 CORONARY ARTERY DISEASE DUE TO LIPID RICH PLAQUE: ICD-10-CM

## 2019-09-10 DIAGNOSIS — Z01.818 PRE-OP EVALUATION: ICD-10-CM

## 2019-09-10 DIAGNOSIS — I25.10 CORONARY ARTERY DISEASE DUE TO LIPID RICH PLAQUE: ICD-10-CM

## 2019-09-10 DIAGNOSIS — E78.00 PURE HYPERCHOLESTEROLEMIA: ICD-10-CM

## 2019-09-10 NOTE — PROGRESS NOTES
[facility]    Mr. Crispin Sauceda  is a 71 y.o. gentleman with diabetes, hypertension, dyslipidemia and tobacco use. He has coronary artery disease as documented by cardiac catheterization in October of 2014. His anatomy is as follows:      LM - patent  LAD - 30% prox, 80% mid, 90% apical  D1 - 100%  RI - 95% ostial (failed PCI, calcified 1.75 - 2.0 mm vessel)  Cx - 30-40% prox  OM1 - subtotal  OM2 - 80% (3.5 x 18mm XIENCE 10/14)  RCA - 30% diffuse  RPDA - 100%  RPLV - 50%    Mr. Crispin Sauceda is here today for follow up appointment. He is going through physical therapy and rehabilitation for his unsteady gait. He denies any chest pain or chest tightness. He denies any use of nitroglycerin since last visit. He is using 2 pillows at night. He denies any lower extremity swelling. He denies any palpitation, presyncope or syncope    Past Medical History:   Diagnosis Date    Agent orange exposure     Asbestos exposure     Autonomic dysfunction     abnormal tilt table 2/15    BPH     CAD (coronary artery disease)     S/P OM NOY in 2014    Choroidal nevus, left eye     Colonic polyp 08/28/2018    multiple.  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 Tavon Link N/A 7/9/2018    COLONOSCOPY performed by Curtis Ruelas MD at [address] COLONOSCOPY N/A 8/28/2018    COLONOSCOPY performed by Jose Nath MD at [address] HX CATARACT REMOVAL      7/14  bilat    HX CERVICAL LAMINECTOMY      HX COLONOSCOPY  07/09/2018    pt states have to redo d/t unclear imaging    HX HEART CATHETERIZATION  2014    Stent    HX LUMBAR LAMINECTOMY      1983    HX ORTHOPAEDIC Left 02/05/2018    knee replacement    HX TUMOR REMOVAL      3/13  wide excision right thigh sarcoma    HX TYMPANOMASTOIDECTOMY         Current Outpatient Medications   Medication Sig    losartan (COZAAR) 100 mg tablet Take 1 Tab by mouth daily.  gabapentin (NEURONTIN) 300 mg capsule Take 1 Cap by mouth three (3) times daily. Max Daily Amount: 900 mg. Indications: Neuropathic Pain    traZODone (DESYREL) 50 mg tablet TAKE 1 TO 2 TABLETS BY MOUTH AT BEDTIME AS NEEDED    glimepiride (AMARYL) 1 mg tablet TAKE 1 TABLET BY MOUTH DAILY BEFORE BREAKFAST    pravastatin (PRAVACHOL) 40 mg tablet TAKE 1 TABLET BY MOUTH EVERY EVENING    sertraline (ZOLOFT) 100 mg tablet TAKE 2 TABLETS BY MOUTH DAILY    raNITIdine (ZANTAC) 300 mg tab TAKE 1 TABLET BY MOUTH ONCE DAILY    acetaminophen (TYLENOL) 500 mg tablet 1,000 mg.    docusate sodium (COLACE) 100 mg capsule 100 mg.    amLODIPine (NORVASC) 5 mg tablet Take 1 Tab by mouth daily.  chlorthalidone (HYGROTEN) 25 mg tablet Take 0.5 Tabs by mouth daily.  colchicine 0.6 mg tablet TAKE 2 TABLETS NOW AND 1 TABLET IN 6 HOURS IF NEEDED REPEAT IN 6 HOURS AS NEEDED    ofloxacin (FLOXIN) 0.3 % otic solution Administer 5 Drops in left ear daily. (Patient taking differently: Administer 5 Drops in left ear as needed.)    diclofenac (VOLTAREN) 1 % gel Apply 2 g to affected area four (4) times daily.  carboxymethylcellulose sodium (REFRESH OP) Apply 1 Drop to eye daily.  nitroglycerin (NITROSTAT) 0.4 mg SL tablet 0.4 mg by SubLINGual route every five (5) minutes as needed for Chest Pain. Up to 3 doses.     ferrous sulfate 325 mg (65 mg iron) tablet Take  by mouth Daily (before breakfast).  clopidogrel (PLAVIX) 75 mg tab Take 1 Tab by mouth daily.  isosorbide mononitrate ER (IMDUR) 30 mg tablet Take 1 Tab by mouth daily.  fluticasone (FLONASE) 50 mcg/actuation nasal spray SHAKE LIQUID AND USE 2 SPRAYS IN EACH NOSTRIL DAILY    terazosin (HYTRIN) 2 mg capsule Take 1 Cap by mouth nightly.  ZINC ACETATE PO Take 1,000 mg by mouth.  potassium chloride (KLOR-CON) 10 mEq tablet TAKE 1 TABLET BY MOUTH TWICE DAILY    raNITIdine (ZANTAC) 300 mg tablet TAKE 1 TABLET BY MOUTH ONCE DAILY    cholecalciferol (VITAMIN D3) 1,000 unit tablet Take  by mouth daily.  ascorbic acid, vitamin C, (VITAMIN C) 250 mg tablet Take  by mouth.  garlic 3,734 mg cap Take 1 Cap by mouth daily.  multivit-min-FA-lycopen-lutein 0.4-300-250 mg-mcg-mcg tab Take 1 Tab by mouth daily.  aspirin delayed-release 81 mg tablet Take 1 tablet by mouth daily. No current facility-administered medications for this visit. Allergies   Allergen Reactions    Beta Blocker [Beta-Blockers (Beta-Adrenergic Blocking Agts)] Other (comments)     symptomatic bradycardia    Codeine Other (comments)     Passed//fainted  patient doesn't recall reaction, occurred as a teenager    Darvocet A500 [Propoxyphene N-Acetaminophen] Other (comments)     throat swelling    Dexbrompheniramine-Pseudoephed Other (comments)     Facial swelling. 1980\"s    Lasix [Furosemide] Itching    Lipitor [Atorvastatin] Other (comments)    Sulfa (Sulfonamide Antibiotics) Hives       Family History:   Non contributory for premature coronary disease in first degree relatives. Social History:   He  reports that he quit smoking about 2 years ago. He has a 15.00 pack-year smoking history. He has never used smokeless tobacco.  He  reports that he does not drink alcohol.   [facility], 3 years    Physical:   Vitals:   BP: 156/79  HR: 71  Wt: 234 lb (106.1 kg)    Exam:   General: Older gentleman, no complaints, no distress.     Head/Neck: No JVD  Lungs:  No respiratory distress. Clear with good air movement. Heart:  Regular. No rubs or gallops. Abdomen: Bowel sounds present  Extremities: Intact pulses. No significant edema.     Neurological: Alert and oriented to person, place, time. No gross neurological deficit. Skin:  Warm and dry. Upper extremity bruising. Review of Data:   LABS:   Lab Results   Component Value Date/Time    WBC 7.9 03/05/2019 11:39 AM    HGB 13.7 03/05/2019 11:39 AM    HCT 42.3 03/05/2019 11:39 AM    PLATELET 531 38/26/0297 11:39 AM     Lab Results   Component Value Date/Time    Sodium 140 07/11/2019 12:54 PM    Potassium 3.7 07/11/2019 12:54 PM    Chloride 102 07/11/2019 12:54 PM    CO2 32 07/11/2019 12:54 PM    Anion gap 6 07/11/2019 12:54 PM    Glucose 80 07/11/2019 12:54 PM    BUN 18 07/11/2019 12:54 PM    Creatinine 1.30 07/11/2019 12:54 PM    BUN/Creatinine ratio 14 07/11/2019 12:54 PM    GFR est AA >60 07/11/2019 12:54 PM    GFR est non-AA 55 (L) 07/11/2019 12:54 PM    Calcium 8.8 07/11/2019 12:54 PM    Bilirubin, total 0.4 03/05/2019 11:39 AM    AST (SGOT) 21 03/05/2019 11:39 AM    Alk. phosphatase 84 03/05/2019 11:39 AM    Protein, total 7.2 03/05/2019 11:39 AM    Albumin 3.9 03/05/2019 11:39 AM    Globulin 3.3 03/05/2019 11:39 AM    A-G Ratio 1.2 03/05/2019 11:39 AM    ALT (SGPT) 32 03/05/2019 11:39 AM     Lab Results   Component Value Date/Time    Cholesterol, total 146 03/05/2019 11:39 AM    HDL Cholesterol 49 03/05/2019 11:39 AM    LDL, calculated 64.2 03/05/2019 11:39 AM    Triglyceride 164 (H) 03/05/2019 11:39 AM    CHOL/HDL Ratio 3.0 03/05/2019 11:39 AM     Lab Results   Component Value Date/Time    Hemoglobin A1c 5.7 (H) 07/11/2019 12:54 PM    Hemoglobin A1c (POC) 5.9 05/06/2013 11:17 AM    Hemoglobin A1c, External 6.1 06/01/2018     EKG:   Sinus rhythm, 78 beats per minute, right bundle branch block, nonspecific ST-T wave changes. TILT TABLE: February 2015:  Patient was initially placed in supine position. Heart rate was between 65-70 beats per minute, sinus rhythm. Blood pressure was 113/61 mmHg, with maximum blood pressure of 116/63 mmHg. Patient was placed in 60-degree upright tilting position. About 5-7 minutes into upright tilting position at 70 degrees, patient started feeling like her heart rate speeding up and legs feeling heavy, along with some dizziness. Blood pressure slowly decreased up to 70/43 mmHg. Heart rate maximum noted was up from 65 to 81 beats per minute. There was no obvious tachycardia. At the time, patient was given IV fluid and then placed back supine position, with normalization of the blood pressure and resolution of his symptoms. Heart rhythm remained in sinus. Patient did have a drop in the systolic blood pressure more than 20 mmHg upon upright tilting position, without any significant increase in the heart rate. This suggests possible autonomic dysfunction. SUMMARY:  Upright tilt table testing with reproduction of symptoms. More than 20 mmHg drop in systolic blood pressure, without change in the heart rate during upright tilting position, associated with symptoms. This may represent autonomic dysfunction    ECHO: (01/18)  SUMMARY:  Left ventricle: Systolic function was normal. Ejection fraction was estimated   in the range of 55 % to 60 %. There was hypokinesis of the basal inferoseptal wall. Wall thickness was at the upper   limits of normal.  Right ventricle: The ventricle was dilated. Inferior vena cava, hepatic veins: The inferior vena cava was mildly dilated.   The respirophasic change in diameter was  less than 50%. No major VHD    STRESS TEST (EST, PHARM, NUC, ECHO etc): October 2014:  1. Small to medium sized area of mildly intense reversible defect involvement anterolateral wall.   2.  There is also a small area of reversible defect involving basilar inferior wall concerning for ischemic focus.   3.  Calculated ejection fraction of 56% without any wall motion abnormality. CATHETERIZATION: October 2014:  LM - patent  LAD - 30% prox, 80% mid, 90% apical  D1 - 100%  RI - 95% ostial (failed PCI, calcified 1.75 - 2.0 mm vessel)  Cx - 30-40% prox  OM1 - subtotal  OM2 - 80%  RCA - 30% diffuse  RPDA - 100%  RPLV - 50%    Impression / Plan:     Diabetes    Hypertension    Dyslipidemia    Coronary artery disease       Mr. Katie Rodriguez returns to the office for follow up. Coronary artery disease:    Mr. Katie Rodriguez had a cardiac catheterization in October, 2014 and required stent of the obtuse marginal branch. He is on aspirin and Plavix, isosorbide mononitrate and Pravachol. Continue same at this time. He has a history of beta blocker induced symptomatic bradycardia, that is why he is not on any beta blocker. No S/L since last visit. Continue same. Hypertension:  His blood pressure today is 156/70 9 mmHg. Continue with current antihypertensive. 2 antihypertensive has been stopped over last 6 months because of hypotension. Diabetes: This is being managed by primary care provider. Goal Hgb A1c less than 7 is recommended from cardiovascular standpoint. Last Hgb A1c  was 5.7    Hyperlipidemia:  He is on lipid lowering agent. Last lipid profile on file is 62. Continue same. RTC in 6 months or sooner if needed.

## 2019-11-11 ENCOUNTER — OFFICE VISIT (OUTPATIENT)
Dept: INTERNAL MEDICINE CLINIC | Age: 69
End: 2019-11-11

## 2019-11-11 ENCOUNTER — DOCUMENTATION ONLY (OUTPATIENT)
Dept: INTERNAL MEDICINE CLINIC | Age: 69
End: 2019-11-11

## 2019-11-11 ENCOUNTER — HOSPITAL ENCOUNTER (OUTPATIENT)
Dept: LAB | Age: 69
Discharge: HOME | End: 2019-11-11
Payer: MEDICARE

## 2019-11-11 VITALS
OXYGEN SATURATION: 99 % | HEART RATE: 65 BPM | SYSTOLIC BLOOD PRESSURE: 147 MMHG | HEIGHT: 74 IN | RESPIRATION RATE: 20 BRPM | WEIGHT: 237 LBS | TEMPERATURE: 97.8 F | BODY MASS INDEX: 30.42 KG/M2 | DIASTOLIC BLOOD PRESSURE: 77 MMHG

## 2019-11-11 DIAGNOSIS — D64.9 ANEMIA, UNSPECIFIED TYPE: ICD-10-CM

## 2019-11-11 DIAGNOSIS — Z00.00 MEDICARE ANNUAL WELLNESS VISIT, SUBSEQUENT: Primary | ICD-10-CM

## 2019-11-11 DIAGNOSIS — E11.40 TYPE 2 DIABETES MELLITUS WITH DIABETIC NEUROPATHY, WITHOUT LONG-TERM CURRENT USE OF INSULIN (HCC): ICD-10-CM

## 2019-11-11 DIAGNOSIS — E78.5 DYSLIPIDEMIA: Chronic | ICD-10-CM

## 2019-11-11 DIAGNOSIS — I11.9 BENIGN HYPERTENSIVE HEART DISEASE WITHOUT HEART FAILURE: ICD-10-CM

## 2019-11-11 DIAGNOSIS — J45.20 MILD INTERMITTENT ASTHMA WITHOUT COMPLICATION: ICD-10-CM

## 2019-11-11 PROBLEM — J45.909 ASTHMA: Status: ACTIVE | Noted: 2019-01-01

## 2019-11-11 LAB
ALBUMIN SERPL-MCNC: 4 G/DL (ref 3.4–5)
ALBUMIN/GLOB SERPL: 1.1 {RATIO} (ref 0.8–1.7)
ALP SERPL-CCNC: 94 U/L (ref 45–117)
ALT SERPL-CCNC: 30 U/L (ref 16–61)
ANION GAP SERPL CALC-SCNC: 4 MMOL/L (ref 3–18)
AST SERPL-CCNC: 15 U/L (ref 10–38)
BASOPHILS # BLD: 0 K/UL (ref 0–0.1)
BASOPHILS NFR BLD: 0 % (ref 0–2)
BILIRUB SERPL-MCNC: 0.5 MG/DL (ref 0.2–1)
BUN SERPL-MCNC: 31 MG/DL (ref 7–18)
BUN/CREAT SERPL: 22 (ref 12–20)
CALCIUM SERPL-MCNC: 9.5 MG/DL (ref 8.5–10.1)
CHLORIDE SERPL-SCNC: 102 MMOL/L (ref 100–111)
CO2 SERPL-SCNC: 32 MMOL/L (ref 21–32)
CREAT SERPL-MCNC: 1.42 MG/DL (ref 0.6–1.3)
DIFFERENTIAL METHOD BLD: NORMAL
EOSINOPHIL # BLD: 0.2 K/UL (ref 0–0.4)
EOSINOPHIL NFR BLD: 2 % (ref 0–5)
ERYTHROCYTE [DISTWIDTH] IN BLOOD BY AUTOMATED COUNT: 14.4 % (ref 11.6–14.5)
GLOBULIN SER CALC-MCNC: 3.8 G/DL (ref 2–4)
GLUCOSE SERPL-MCNC: 96 MG/DL (ref 74–99)
HBA1C MFR BLD: 6.1 % (ref 4.2–5.6)
HCT VFR BLD AUTO: 45.2 % (ref 36–48)
HGB BLD-MCNC: 14.4 G/DL (ref 13–16)
LYMPHOCYTES # BLD: 1.8 K/UL (ref 0.9–3.6)
LYMPHOCYTES NFR BLD: 22 % (ref 21–52)
MCH RBC QN AUTO: 30.3 PG (ref 24–34)
MCHC RBC AUTO-ENTMCNC: 31.9 G/DL (ref 31–37)
MCV RBC AUTO: 95.2 FL (ref 74–97)
MONOCYTES # BLD: 0.5 K/UL (ref 0.05–1.2)
MONOCYTES NFR BLD: 7 % (ref 3–10)
NEUTS SEG # BLD: 5.5 K/UL (ref 1.8–8)
NEUTS SEG NFR BLD: 69 % (ref 40–73)
PLATELET # BLD AUTO: 182 K/UL (ref 135–420)
PMV BLD AUTO: 10.2 FL (ref 9.2–11.8)
POTASSIUM SERPL-SCNC: 3.8 MMOL/L (ref 3.5–5.5)
PROT SERPL-MCNC: 7.8 G/DL (ref 6.4–8.2)
RBC # BLD AUTO: 4.75 M/UL (ref 4.7–5.5)
SODIUM SERPL-SCNC: 138 MMOL/L (ref 136–145)
WBC # BLD AUTO: 8 K/UL (ref 4.6–13.2)

## 2019-11-11 PROCEDURE — 80053 COMPREHEN METABOLIC PANEL: CPT

## 2019-11-11 PROCEDURE — 83540 ASSAY OF IRON: CPT

## 2019-11-11 PROCEDURE — 85025 COMPLETE CBC W/AUTO DIFF WBC: CPT

## 2019-11-11 PROCEDURE — 80061 LIPID PANEL: CPT

## 2019-11-11 PROCEDURE — 83036 HEMOGLOBIN GLYCOSYLATED A1C: CPT

## 2019-11-11 PROCEDURE — 36415 COLL VENOUS BLD VENIPUNCTURE: CPT

## 2019-11-11 RX ORDER — ALBUTEROL SULFATE 90 UG/1
2 AEROSOL, METERED RESPIRATORY (INHALATION)
COMMUNITY
Start: 2019-07-12

## 2019-11-11 RX ORDER — FLUTICASONE PROPIONATE AND SALMETEROL 100; 50 UG/1; UG/1
1 POWDER RESPIRATORY (INHALATION)
COMMUNITY
Start: 2019-09-19

## 2019-11-11 RX ORDER — NEOMYCIN SULFATE, POLYMYXIN B SULFATE, HYDROCORTISONE 3.5; 10000; 1 MG/ML; [USP'U]/ML; MG/ML
4 SOLUTION/ DROPS AURICULAR (OTIC)
COMMUNITY
Start: 2017-11-04 | End: 2021-11-11

## 2019-11-11 NOTE — PROGRESS NOTES
ROOM # 4    Rolando Schmitz presents today for   Chief Complaint   Patient presents with    Annual Wellness Visit    Hypertension    Diabetes    Cholesterol Problem       Rolando Schmitz preferred language for health care discussion is english/other.     Is someone accompanying this pt? no    Is the patient using any DME equipment during [address]? no    Depression Screening:  3 most recent PHQ Screens 3/5/2019 1/29/2019 11/6/2018 2/27/2018 2/22/2018 7/11/2017 5/11/2017   PHQ Not Done - - - - - Active Diagnosis of Depression or Bipolar Disorder Active Diagnosis of Depression or Bipolar Disorder   Little interest or pleasure in doing things Not at all Not at all Nearly every day Not at all Not at all Nearly every day -   Feeling down, depressed, irritable, or hopeless Not at all Not at all Nearly every day Not at all Not at all Several days -   Total Score PHQ 2 0 0 6 0 0 4 -   Trouble falling or staying asleep, or sleeping too much - - Not at all - - - -   Feeling tired or having little energy - - Nearly every day - - - -   Poor appetite, weight loss, or overeating - - Several days - - - -   Feeling bad about yourself - or that you are a failure or have let yourself or your family down - - Several days - - - -   Trouble concentrating on things such as school, work, reading, or watching TV - - Not at all - - - -   Moving or speaking so slowly that other people could have noticed; or the opposite being so fidgety that others notice - - Not at all - - - -   Thoughts of being better off dead, or hurting yourself in some way - - Not at all - - - -   PHQ 9 Score - - 11 - - - -   How difficult have these problems made it for you to do your work, take care of your home and get along with others - - Not difficult at all - - - -       Learning Assessment:  Learning Assessment 4/13/2015 4/17/2014 12/12/2013   PRIMARY LEARNER Patient Patient -   Vickbolivarmanuel Rojas BARRIERS PRIMARY LEARNER NONE - NONE   CO-LEARNER CAREGIVER No - -   PRIMARY LANGUAGE ENGLISH ENGLISH ENGLISH    NEED - - No   LEARNER PREFERENCE PRIMARY LISTENING DEMONSTRATION PICTURES   LEARNING SPECIAL TOPICS - - none   ANSWERED BY patient - -   RELATIONSHIP SELF - -       Abuse Screening:  Abuse Screening Questionnaire 11/13/2017 4/13/2015   Do you ever feel afraid of your partner? N N   Are you in a relationship with someone who physically or mentally threatens you? N N   Is it safe for you to go home? Y Y       Fall Risk  Fall Risk Assessment, last 12 mths 3/5/2019 1/29/2019 11/6/2018 7/31/2018 7/12/2018 4/30/2018 2/27/2018   Able to walk? Yes Yes Yes Yes Yes Yes Yes   Fall in past 12 months? No Yes Yes No No Yes Yes   Fall with injury? - No No - - No No   Number of falls in past 12 months - 3 5 - - 1 5   Fall Risk Score - 3 5 - - 1 5           Health Maintenance reviewed and discussed per provider. Yes    Jacob Vega is due for   Health Maintenance Due   Topic Date Due    Influenza Age 5 to Adult  08/01/2019    MICROALBUMIN Q1  11/06/2019    EYE EXAM RETINAL OR DILATED  11/09/2019    MEDICARE YEARLY EXAM  11/07/2019     Please order/place referral if appropriate. Advance Directive:  1. Do you have an advance directive in place? Patient Reply: yes  2. If not, would you like material regarding how to put one in place? Patient Reply: no    Coordination of Care:  1. Have you been to the ER, urgent care clinic since your last visit? Hospitalized since your last visit? no    2. Have you seen or consulted any other health care providers outside of the [facility] [place] [address] since your last visit? Include any pap smears or colon screening.  [facility]

## 2019-11-11 NOTE — PROGRESS NOTES
This is the Subsequent Medicare Annual Wellness Exam, performed 12 months or more after the Initial AWV or the last Subsequent AWV    I have reviewed the patient's medical history in detail and updated the computerized patient record. History     Patient Active Problem List   Diagnosis Code    Dyslipidemia E78.5    Coronary artery disease I25.10    Hypertension I11.9    Type 2 diabetes mellitus with nephropathy ([facility] [address] E11.21    Type 2 diabetes mellitus with diabetic neuropathy ([facility] [address] E11.40     Past Medical History:   Diagnosis Date    Agent orange exposure     Asbestos exposure     Autonomic dysfunction     abnormal tilt table 2/15    BPH     CAD (coronary artery disease)     S/P OM NOY in 2014    Choroidal nevus, left eye     Colonic polyp 08/28/2018    multiple.  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 Travis Hannah N/A 7/9/2018    COLONOSCOPY performed by Darrel Mccabe MD at [facility] N/A 8/28/2018    COLONOSCOPY performed by Darrel Mccabe MD at [address] [place] [address] HX CATARACT REMOVAL      7/14  bilat    HX CERVICAL LAMINECTOMY      HX COLONOSCOPY  07/09/2018    pt states have to redo d/t unclear imaging    HX HEART CATHETERIZATION  2014    Stent    HX LUMBAR LAMINECTOMY      1983    HX ORTHOPAEDIC Left 02/05/2018 knee replacement    HX TUMOR REMOVAL      3/13  wide excision right thigh sarcoma    HX TYMPANOMASTOIDECTOMY       Current Outpatient Medications   Medication Sig Dispense Refill    fluticasone propion-salmeterol (ADVAIR/WIXELA) 100-50 mcg/dose diskus inhaler Take 1 Puff by inhalation.  albuterol (PROVENTIL HFA, VENTOLIN HFA, PROAIR HFA) 90 mcg/actuation inhaler Take 2 Puffs by inhalation.  neomycin-polymyxin-hydrocortisone (CORTISPORIN) otic solution 4 Drops.  losartan (COZAAR) 100 mg tablet Take 1 Tab by mouth daily. 90 Tab 4    gabapentin (NEURONTIN) 300 mg capsule Take 1 Cap by mouth three (3) times daily. Max Daily Amount: 900 mg. Indications: Neuropathic Pain 90 Cap 5    traZODone (DESYREL) 50 mg tablet TAKE 1 TO 2 TABLETS BY MOUTH AT BEDTIME AS NEEDED 180 Tab 1    glimepiride (AMARYL) 1 mg tablet TAKE 1 TABLET BY MOUTH DAILY BEFORE BREAKFAST 90 Tab 4    pravastatin (PRAVACHOL) 40 mg tablet TAKE 1 TABLET BY MOUTH EVERY EVENING 90 Tab 0    sertraline (ZOLOFT) 100 mg tablet TAKE 2 TABLETS BY MOUTH DAILY 180 Tab 4    raNITIdine (ZANTAC) 300 mg tab TAKE 1 TABLET BY MOUTH ONCE DAILY 90 Tab 4    acetaminophen (TYLENOL) 500 mg tablet 1,000 mg.      amLODIPine (NORVASC) 5 mg tablet Take 1 Tab by mouth daily. 180 Tab 3    chlorthalidone (HYGROTEN) 25 mg tablet Take 0.5 Tabs by mouth daily. 90 Tab 0    colchicine 0.6 mg tablet TAKE 2 TABLETS NOW AND 1 TABLET IN 6 HOURS IF NEEDED REPEAT IN 6 HOURS AS NEEDED 15 Tab 5    diclofenac (VOLTAREN) 1 % gel Apply 2 g to affected area four (4) times daily.  nitroglycerin (NITROSTAT) 0.4 mg SL tablet 0.4 mg by SubLINGual route every five (5) minutes as needed for Chest Pain. Up to 3 doses.  ferrous sulfate 325 mg (65 mg iron) tablet Take  by mouth Daily (before breakfast).  clopidogrel (PLAVIX) 75 mg tab Take 1 Tab by mouth daily.  (Patient taking differently: Take 75 mg by mouth every other day.) 90 Tab 3    isosorbide mononitrate ER (IMDUR) 30 mg tablet Take 1 Tab by mouth daily. 90 Tab 3    fluticasone (FLONASE) 50 mcg/actuation nasal spray SHAKE LIQUID AND USE 2 SPRAYS IN EACH NOSTRIL DAILY 3 Bottle 6    terazosin (HYTRIN) 2 mg capsule Take 1 Cap by mouth nightly. 90 Cap 3    ZINC ACETATE PO Take 1,000 mg by mouth.  cholecalciferol (VITAMIN D3) 1,000 unit tablet Take  by mouth daily.  garlic 5,780 mg cap Take 1 Cap by mouth daily.  multivit-min-FA-lycopen-lutein 0.4-300-250 mg-mcg-mcg tab Take 1 Tab by mouth daily.  aspirin delayed-release 81 mg tablet Take 1 tablet by mouth daily. 90 tablet 5    docusate sodium (COLACE) 100 mg capsule 100 mg.      ofloxacin (FLOXIN) 0.3 % otic solution Administer 5 Drops in left ear daily. (Patient taking differently: Administer 5 Drops in left ear as needed.) 5 mL 0    carboxymethylcellulose sodium (REFRESH OP) Apply 1 Drop to eye daily.  potassium chloride (KLOR-CON) 10 mEq tablet TAKE 1 TABLET BY MOUTH TWICE DAILY 180 Tab 4    raNITIdine (ZANTAC) 300 mg tablet TAKE 1 TABLET BY MOUTH ONCE DAILY 90 Tab 1    ascorbic acid, vitamin C, (VITAMIN C) 250 mg tablet Take  by mouth. Allergies   Allergen Reactions    Beta Blocker [Beta-Blockers (Beta-Adrenergic Blocking Agts)] Other (comments)     symptomatic bradycardia    Codeine Other (comments)     Passed//fainted  patient doesn't recall reaction, occurred as a teenager    Darvocet A500 [Propoxyphene N-Acetaminophen] Other (comments)     throat swelling    Dexbrompheniramine-Pseudoephed Other (comments)     Facial swelling.  1980\"s    Lasix [Furosemide] Itching    Lipitor [Atorvastatin] Other (comments)    Sulfa (Sulfonamide Antibiotics) Hives       Family History   Problem Relation Age of Onset    Diabetes Father     Heart Disease Father         cardiomyopathydementia    Hypertension Father     Cancer Father         prostate    Parkinsonism Father     Dementia Father     High Cholesterol Father     Kidney Disease Father     Other Father         colon polyps    Headache Mother     Psychiatric Disorder Mother     Kidney Disease Mother     Hypertension Mother     High Cholesterol Mother     Other Mother         GERD/polymyalgia rheumaticacolon osiris    Heart Disease Mother     Cancer Sister      Social History     Tobacco Use    Smoking status: Former Smoker     Packs/day: 1.00     Years: 15.00     Pack years: 15.00     Last attempt to quit: 12/3/2016     Years since quittin.9    Smokeless tobacco: Never Used   Substance Use Topics    Alcohol use: No     Alcohol/week: 0.0 standard drinks       Depression Risk Factor Screening:     3 most recent PHQ Screens 2019   PHQ Not Done -   Little interest or pleasure in doing things Nearly every day   Feeling down, depressed, irritable, or hopeless Several days   Total Score PHQ 2 4   Trouble falling or staying asleep, or sleeping too much Not at all   Feeling tired or having little energy Nearly every day   Poor appetite, weight loss, or overeating Several days   Feeling bad about yourself - or that you are a failure or have let yourself or your family down Several days   Trouble concentrating on things such as school, work, reading, or watching TV Not at all   Moving or speaking so slowly that other people could have noticed; or the opposite being so fidgety that others notice Not at all   Thoughts of being better off dead, or hurting yourself in some way Not at all   PHQ 9 Score 9   How difficult have these problems made it for you to do your work, take care of your home and get along with others -       Alcohol Risk Factor Screening (MALE > 65): Do you average more 1 drink per night or more than 7 drinks a week: No    In the past three months have you have had more than 4 drinks containing alcohol on one occasion: No      Functional Ability and Level of Safety:   Hearing: The patient wears hearing aids. Activities of Daily Living:   The home contains: grab bars and took up loose carpeting. Added hand rails in the garage  Patient does total self care    Ambulation: with mild difficulty    Fall Risk:  Fall Risk Assessment, last 12 mths 11/11/2019   Able to walk? Yes   Fall in past 12 months? -   Fall with injury? -   Number of falls in past 12 months -   Fall Risk Score -       Abuse Screen:  Patient is not abused    Cognitive Screening   Has your family/caregiver stated any concerns about your memory: no  Cognitive Screening: Normal - Mini Cog Test    Patient Care Team   Patient Care Team:  Richard Rouse MD as PCP - General (Internal Medicine)  Richard Rouse MD as PCP - [facility]  Verónica Monaco MD as Consulting Provider (Ophthalmology)  Molly Neal MD as Consulting Provider (Gastroenterology)  Mecca Howard MD as Consulting Provider (Orthopedic Surgery)  Shyanne Wilson MD as Consulting Provider (Orthopedic Surgery)  Jabier Ramirez DPM as Consulting Provider (Podiatry)  Emeka Joya DO as Consulting Provider (Physical Medicine and Rehab)    Assessment/Plan   Education and counseling provided:  Are appropriate based on today's review and evaluation    Diagnoses and all orders for this visit:    1.  Medicare annual wellness visit, subsequent        Health Maintenance Due   Topic Date Due    MICROALBUMIN Q1  11/06/2019    EYE EXAM RETINAL OR DILATED  11/09/2019    MEDICARE YEARLY EXAM  11/07/2019

## 2019-11-11 NOTE — PROGRESS NOTES
HISTORY OF PRESENT ILLNESS  Anahi Coley is a 71 y.o. male. Visit Vitals  /77   Pulse 65   Temp 97.8 °F (36.6 °C) (Oral)   Resp 20   Ht 6' 2\" (1.88 m)   Wt 237 lb (107.5 kg)   SpO2 99%   BMI 30.43 kg/m²             Since last visit had one spell where his BP dropped. He has had several episodes of blood pressure dropping precipitously. He has had his meds adjusted since then. He is also going to PT including aquatherapy and is feeling stronger and with better balance. Back hurts less as well. Current Outpatient Medications:  fluticasone propion-salmeterol (ADVAIR/WIXELA) 100-50 mcg/dose diskus inhaler, Take 1 Puff by inhalation. albuterol (PROVENTIL HFA, VENTOLIN HFA, PROAIR HFA) 90 mcg/actuation inhaler, Take 2 Puffs by inhalation. neomycin-polymyxin-hydrocortisone (CORTISPORIN) otic solution, 4 Drops. losartan (COZAAR) 100 mg tablet, Take 1 Tab by mouth daily. gabapentin (NEURONTIN) 300 mg capsule, Take 1 Cap by mouth three (3) times daily. Max Daily Amount: 900 mg. Indications: Neuropathic Pain  traZODone (DESYREL) 50 mg tablet, TAKE 1 TO 2 TABLETS BY MOUTH AT BEDTIME AS NEEDED  glimepiride (AMARYL) 1 mg tablet, TAKE 1 TABLET BY MOUTH DAILY BEFORE BREAKFAST  pravastatin (PRAVACHOL) 40 mg tablet, TAKE 1 TABLET BY MOUTH EVERY EVENING  sertraline (ZOLOFT) 100 mg tablet, TAKE 2 TABLETS BY MOUTH DAILY  raNITIdine (ZANTAC) 300 mg tab, TAKE 1 TABLET BY MOUTH ONCE DAILY  acetaminophen (TYLENOL) 500 mg tablet, 1,000 mg. amLODIPine (NORVASC) 5 mg tablet, Take 1 Tab by mouth daily. chlorthalidone (HYGROTEN) 25 mg tablet, Take 0.5 Tabs by mouth daily. colchicine 0.6 mg tablet, TAKE 2 TABLETS NOW AND 1 TABLET IN 6 HOURS IF NEEDED REPEAT IN 6 HOURS AS NEEDED  diclofenac (VOLTAREN) 1 % gel, Apply 2 g to affected area four (4) times daily. nitroglycerin (NITROSTAT) 0.4 mg SL tablet, 0.4 mg by SubLINGual route every five (5) minutes as needed for Chest Pain. Up to 3 doses.   ferrous sulfate 325 mg (65 mg iron) tablet, Take  by mouth Daily (before breakfast). clopidogrel (PLAVIX) 75 mg tab, Take 1 Tab by mouth daily. (Patient taking differently: Take 75 mg by mouth every other day.)  isosorbide mononitrate ER (IMDUR) 30 mg tablet, Take 1 Tab by mouth daily. fluticasone (FLONASE) 50 mcg/actuation nasal spray, SHAKE LIQUID AND USE 2 SPRAYS IN EACH NOSTRIL DAILY  terazosin (HYTRIN) 2 mg capsule, Take 1 Cap by mouth nightly. ZINC ACETATE PO, Take 1,000 mg by mouth. cholecalciferol (VITAMIN D3) 1,000 unit tablet, Take  by mouth daily. garlic 6,931 mg cap, Take 1 Cap by mouth daily. multivit-min-FA-lycopen-lutein 0.4-300-250 mg-mcg-mcg tab, Take 1 Tab by mouth daily. aspirin delayed-release 81 mg tablet, Take 1 tablet by mouth daily. docusate sodium (COLACE) 100 mg capsule, 100 mg.  ofloxacin (FLOXIN) 0.3 % otic solution, Administer 5 Drops in left ear daily. (Patient taking differently: Administer 5 Drops in left ear as needed.)  carboxymethylcellulose sodium (REFRESH OP), Apply 1 Drop to eye daily. potassium chloride (KLOR-CON) 10 mEq tablet, TAKE 1 TABLET BY MOUTH TWICE DAILY  raNITIdine (ZANTAC) 300 mg tablet, TAKE 1 TABLET BY MOUTH ONCE DAILY  ascorbic acid, vitamin C, (VITAMIN C) 250 mg tablet, Take  by mouth. Hypertension    The history is provided by the patient. This is a chronic problem. The current episode started more than 1 week ago. The problem has not changed since onset. Pertinent negatives include no chest pain, no palpitations and no shortness of breath. There are no associated agents to hypertension. Risk factors include hypertension, male gender, dyslipidemia and diabetes mellitus. Diabetes   The history is provided by the patient. This is a chronic problem. The current episode started more than 1 week ago. The problem occurs daily. Pertinent negatives include no chest pain and no shortness of breath. Exacerbated by: diet. The symptoms are relieved by medications (diet). Cholesterol Problem   Pertinent negatives include no chest pain and no shortness of breath. Review of Systems   Constitutional: Negative for chills and fever. Occasional drops in BP   Respiratory: Negative for cough and shortness of breath. Cardiovascular: Negative for chest pain and palpitations. Genitourinary: Negative for frequency and urgency. Endo/Heme/Allergies: Negative for polydipsia. Physical Exam   Constitutional: He is oriented to person, place, and time. He appears well-developed and well-nourished. No distress. Cardiovascular: Normal rate and regular rhythm. Pulmonary/Chest: Effort normal and breath sounds normal.   Musculoskeletal: He exhibits no edema. Neurological: He is alert and oriented to person, place, and time. Skin: Skin is warm and dry. He is not diaphoretic. Psychiatric: He has a normal mood and affect. Nursing note and vitals reviewed. ASSESSMENT and PLAN    ICD-10-CM ICD-9-CM           2. Type 2 diabetes mellitus with diabetic neuropathy, without long-term current use of insulin ([facility]) V35.71 506.53 METABOLIC PANEL, COMPREHENSIVE     357.2 LIPID PANEL      HEMOGLOBIN A1C W/O EAG   3. Hypertension H67.0 733.29 METABOLIC PANEL, COMPREHENSIVE   4. Dyslipidemia E78.5 272.4 HEMOGLOBIN A1C W/O EAG   5. Anemia, unspecified type D64.9 285.9 CBC WITH AUTOMATED DIFF      IRON PROFILE   6. Mild intermittent asthma without complication P45.18 871.09        Update lab    BP borderline here today. But will not adjust meds as he has autonomic instability and drops his BP too much at times.     Continue same meds    F/u 6 months for DM, HTN, CHOL

## 2019-11-11 NOTE — PROGRESS NOTES
Call placed to podiatrist office requesting foot exam notes be faxed. Also called eye doctor for last eye exam notes.

## 2019-11-11 NOTE — PATIENT INSTRUCTIONS
Medicare Wellness Visit, Male    The best way to live healthy is to have a lifestyle where you eat a well-balanced diet, exercise regularly, limit alcohol use, and quit all forms of tobacco/nicotine, if applicable. Regular preventive services are another way to keep healthy. Preventive services (vaccines, screening tests, monitoring & exams) can help personalize your care plan, which helps you manage your own care. Screening tests can find health problems at the earliest stages, when they are easiest to treat. [facility] follows the current, evidence-based guidelines published by the [facility] ([facility]) when recommending preventive services for our patients. Because we follow these guidelines, sometimes recommendations change over time as research supports it. (For example, a prostate screening blood test is no longer routinely recommended for men with no symptoms). Of course, you and your doctor may decide to screen more often for some diseases, based on your risk and co-morbidities (chronic disease you are already diagnosed with). Preventive services for you include:  - Medicare offers their members a free annual wellness visit, which is time for you and your primary care provider to discuss and plan for your preventive service needs. Take advantage of this benefit every year!  -All adults over age 72 should receive the recommended pneumonia vaccines. Current USPSTF guidelines recommend a series of two vaccines for the best pneumonia protection.   -All adults should have a flu vaccine yearly and tetanus vaccine every 10 years.  -All adults age 48 and older should receive the shingles vaccines (series of two vaccines).        -All adults age 38-68 who are overweight should have a diabetes screening test once every three years.   -Other screening tests & preventive services for persons with diabetes include: an eye exam to screen for diabetic retinopathy, a kidney function test, a foot exam, and stricter control over your cholesterol.   -Cardiovascular screening for adults with routine risk involves an electrocardiogram (ECG) at intervals determined by the provider.   -Colorectal cancer screening should be done for adults age 54-65 with no increased risk factors for colorectal cancer. There are a number of acceptable methods of screening for this type of cancer. Each test has its own benefits and drawbacks. Discuss with your provider what is most appropriate for you during your annual wellness visit. The different tests include: colonoscopy (considered the best screening method), a fecal occult blood test, a fecal DNA test, and sigmoidoscopy.  -All adults born between [facility] should be screened once for Hepatitis C.  -An Abdominal Aortic Aneurysm (AAA) Screening is recommended for men age 73-68 who has ever smoked in their lifetime.      Here is a list of your current Health Maintenance items (your personalized list of preventive services) with a due date:  Health Maintenance Due   Topic Date Due    Albumin Urine Test  11/06/2019    Eye Exam  11/09/2019    Annual Well Visit  11/07/2019

## 2019-11-12 LAB
CHOLEST SERPL-MCNC: 201 MG/DL
HDLC SERPL-MCNC: 47 MG/DL (ref 40–60)
HDLC SERPL: 4.3 {RATIO} (ref 0–5)
IRON SATN MFR SERPL: 17 %
IRON SERPL-MCNC: 59 UG/DL (ref 50–175)
LDLC SERPL CALC-MCNC: 121.4 MG/DL (ref 0–100)
LIPID PROFILE,FLP: ABNORMAL
TIBC SERPL-MCNC: 352 UG/DL (ref 250–450)
TRIGL SERPL-MCNC: 163 MG/DL (ref ?–150)
VLDLC SERPL CALC-MCNC: 32.6 MG/DL

## 2019-11-18 NOTE — PATIENT DISCUSSION
1.  DM Type II (Oral Medication) without sign of diabetic retinopathy and no blot heme on dilated retinal examination today OU No Macular Edema:  Discussed the pathophysiology of diabetes and its effect on the eye and risk of blindness. Stressed the importance of strong glucose control. Advised of importance of at least yearly dilated examinations but to contact us immediately for any problems or concerns. 2. Pseudophakia OU - H/o YAG Cap OU 3. Choroidal Nevus OS: Appears Benign and stable. Observe for changes. Followed by Dr. Maryjane Marquez 4. BREE w/ PEK OU- Increase ATs to QID OU Routinely. 5.  Glaucoma Suspect OU (CD 0.55/0.70): Past work up negative. Patient is considered Low Risk. 6.  H/o PXE OULetter to PCP MRx deferred Return for an appointment in 1 yr 27 with Dr. Veronica Bhatti.

## 2019-11-25 RX ORDER — PRAVASTATIN SODIUM 40 MG/1
TABLET ORAL
Qty: 90 TAB | Refills: 0 | Status: SHIPPED | OUTPATIENT
Start: 2019-11-25 | End: 2020-04-02 | Stop reason: ALTCHOICE

## 2020-01-13 ENCOUNTER — OFFICE VISIT (OUTPATIENT)
Dept: INTERNAL MEDICINE CLINIC | Age: 70
End: 2020-01-13

## 2020-01-13 VITALS
BODY MASS INDEX: 29.77 KG/M2 | RESPIRATION RATE: 17 BRPM | WEIGHT: 232 LBS | OXYGEN SATURATION: 94 % | HEIGHT: 74 IN | TEMPERATURE: 97.9 F | SYSTOLIC BLOOD PRESSURE: 128 MMHG | DIASTOLIC BLOOD PRESSURE: 72 MMHG | HEART RATE: 83 BPM

## 2020-01-13 DIAGNOSIS — L03.114 CELLULITIS OF LEFT UPPER EXTREMITY: Primary | ICD-10-CM

## 2020-01-13 NOTE — PROGRESS NOTES
HISTORY OF PRESENT ILLNESS  Kiran Ellington is a 71 y.o. male. Visit Vitals  /72 (BP 1 Location: Left arm, BP Patient Position: Sitting)   Pulse 83   Temp 97.9 °F (36.6 °C) (Oral)   Resp 17   Ht 6' 2\" (1.88 m)   Wt 232 lb (105.2 kg)   SpO2 94%   BMI 29.79 kg/m²       Was seen at [facility] with left hand/arm cellulitis which had started about 3 days before at the base of his thumb    He was placed on Augmentin for 10 days. He reports significant improvement today. No open wound, but hand was hot, red, and some streaks up forearm. Incidentally saw his nephrologist at the [facility] last week. Was told his function has improved      He also saw Dr. 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 This is a new problem. The current episode started more than 1 week ago. The problem occurs daily. Review of Systems   Constitutional: Negative for chills and fever. Musculoskeletal:        Left hand swelling has greatly diminished. Redness has abated. Physical Exam  Vitals signs and nursing note reviewed. Constitutional:       General: He is not in acute distress. Appearance: He is well-developed. He is not diaphoretic. Cardiovascular:      Rate and Rhythm: Normal rate. Pulmonary:      Effort: Pulmonary effort is normal.   Musculoskeletal:        Hands:    Skin:     General: Skin is warm and dry. Neurological:      Mental Status: He is alert and oriented to person, place, and time. ASSESSMENT and PLAN    ICD-10-CM ICD-9-CM    1. Cellulitis of left upper extremity L03.114 682. 3        Arm looks improved. Would not be able to tell had he not told me of where the infection was.   He will finish the antibiotics    F/u as appointed in May

## 2020-01-13 NOTE — PROGRESS NOTES
ROOM # 5  Identified pt with two pt identifiers(name and ). Reviewed record in preparation for visit and have obtained necessary documentation. Chief Complaint   Patient presents with    Skin Infection     Urgent care f/u . Seen at [facility] on 1/10/20       Hi Mancini preferred language for health care discussion is english/other. Is the patient using any DME equipment during OV? Jhon Age is due for:  Health Maintenance Due   Topic    MICROALBUMIN Q1      Health Maintenance reviewed and discussed per provider  Please order/place referral if appropriate. Advance Directive:  1. Do you have an advance directive in place? Patient Reply: Ander     2. If not, would you like material regarding how to put one in place? NO    Coordination of Care:  1. Have you been to the ER, urgent care clinic since your last visit? Yes 1/10/20 @ [facility] [place] for cellulitis of left upper limb  Hospitalized since your last visit? NO    2. Have you seen or consulted any other health care providers outside of the [address] [place] [address] since your last visit? Include any pap smears or colon screening. NO    Patient is accompanied by self I have received verbal consent from [facility] to discuss any/all medical information while they are present in the room.     Learning Assessment:  Learning Assessment 2013   PRIMARY LEARNER Patient Patient -   HIGHEST LEVEL OF EDUCATION - PRIMARY LEARNER  SOME COLLEGE - SOME COLLEGE   BARRIERS PRIMARY LEARNER NONE - NONE   CO-LEARNER CAREGIVER No - -   PRIMARY LANGUAGE ENGLISH ENGLISH ENGLISH    NEED - - No   LEARNER PREFERENCE PRIMARY LISTENING DEMONSTRATION PICTURES   LEARNING SPECIAL TOPICS - - none   ANSWERED BY patient - -   RELATIONSHIP SELF - -     Depression Screening:  3 most recent [facility] Screens 2020 2019 3/5/2019 2019 2018 2018 2018   PHQ Not Done - - - - - - -   [place] interest or pleasure in doing things Not at all Nearly every day Not at all Not at all Nearly every day Not at all Not at all   Feeling down, depressed, irritable, or hopeless Not at all Several days Not at all Not at all Nearly every day Not at all Not at all   Total Score PHQ 2 0 4 0 0 6 0 0   Trouble falling or staying asleep, or sleeping too much - Not at all - - Not at all - -   Feeling tired or having little energy - Nearly every day - - Nearly every day - -   Poor appetite, weight loss, or overeating - Several days - - Several days - -   Feeling bad about yourself - or that you are a failure or have let yourself or your family down - Several days - - Several days - -   Trouble concentrating on things such as school, work, reading, or watching TV - Not at all - - Not at all - -   Moving or speaking so slowly that other people could have noticed; or the opposite being so fidgety that others notice - Not at all - - Not at all - -   Thoughts of being better off dead, or hurting yourself in some way - Not at all - - Not at all - -   PHQ 9 Score - 9 - - 11 - -   How difficult have these problems made it for you to do your work, take care of your home and get along with others - - - - Not difficult at all - -     Abuse Screening:  Abuse Screening Questionnaire 11/13/2017 4/13/2015   Do you ever feel afraid of your partner? N N   Are you in a relationship with someone who physically or mentally threatens you? N N   Is it safe for you to go home? Y Y     Fall Risk  Fall Risk Assessment, last 12 mths 11/11/2019 3/5/2019 1/29/2019 11/6/2018 7/31/2018 7/12/2018 4/30/2018   Able to walk? Yes Yes Yes Yes Yes Yes Yes   Fall in past 12 months? - No Yes Yes No No Yes   Fall with injury?  - - No No - - No   Number of falls in past 12 months - - 3 5 - - 1   Fall Risk Score - - 3 5 - - 1

## 2020-02-03 DIAGNOSIS — M79.2 NEURALGIC PAIN: ICD-10-CM

## 2020-02-03 RX ORDER — GABAPENTIN 300 MG/1
CAPSULE ORAL
Qty: 90 CAP | Refills: 5 | Status: SHIPPED | OUTPATIENT
Start: 2020-02-03 | End: 2020-08-05

## 2020-02-03 RX ORDER — TRAMADOL HYDROCHLORIDE 50 MG/1
TABLET ORAL
Qty: 120 TAB | Refills: 5 | Status: SHIPPED | OUTPATIENT
Start: 2020-02-03 | End: 2020-10-13

## 2020-02-24 ENCOUNTER — OFFICE VISIT (OUTPATIENT)
Dept: INTERNAL MEDICINE CLINIC | Age: 70
End: 2020-02-24

## 2020-02-24 VITALS
RESPIRATION RATE: 22 BRPM | TEMPERATURE: 97.6 F | BODY MASS INDEX: 30.16 KG/M2 | HEIGHT: 74 IN | HEART RATE: 75 BPM | DIASTOLIC BLOOD PRESSURE: 73 MMHG | SYSTOLIC BLOOD PRESSURE: 136 MMHG | OXYGEN SATURATION: 94 % | WEIGHT: 235 LBS

## 2020-02-24 DIAGNOSIS — I11.9 BENIGN HYPERTENSIVE HEART DISEASE WITHOUT HEART FAILURE: ICD-10-CM

## 2020-02-24 DIAGNOSIS — Z01.818 PRE-OP EXAM: Primary | ICD-10-CM

## 2020-02-24 DIAGNOSIS — M17.11 PRIMARY OSTEOARTHRITIS OF RIGHT KNEE: ICD-10-CM

## 2020-02-24 DIAGNOSIS — E11.40 TYPE 2 DIABETES MELLITUS WITH DIABETIC NEUROPATHY, WITHOUT LONG-TERM CURRENT USE OF INSULIN (HCC): ICD-10-CM

## 2020-02-24 RX ORDER — FAMOTIDINE 20 MG/1
20 TABLET, FILM COATED ORAL 2 TIMES DAILY
COMMUNITY

## 2020-02-24 RX ORDER — PHENOL/SODIUM PHENOLATE
20 AEROSOL, SPRAY (ML) MUCOUS MEMBRANE DAILY
COMMUNITY
End: 2020-02-24

## 2020-02-24 NOTE — PROGRESS NOTES
ROOM # 5    Mike Gibbons presents today for No chief complaint on file. Mike Gibbons preferred language for health care discussion is english/other.     Is someone accompanying this pt? no    Is the patient using any DME equipment during [address]? no    Depression Screening:  3 most recent [facility] Screens 2/24/2020 1/13/2020 11/11/2019 3/5/2019 1/29/2019 11/6/2018 2/27/2018   PHQ Not Done - - - - - - -   Little interest or pleasure in doing things Not at all Not at all Nearly every day Not at all Not at all Nearly every day Not at all   Feeling down, depressed, irritable, or hopeless Not at all Not at all Several days Not at all Not at all Nearly every day Not at all   Total Score PHQ 2 0 0 4 0 0 6 0   Trouble falling or staying asleep, or sleeping too much - - Not at all - - Not at all -   Feeling tired or having little energy - - Nearly every day - - Nearly every day -   Poor appetite, weight loss, or overeating - - Several days - - Several days -   Feeling bad about yourself - or that you are a failure or have let yourself or your family down - - Several days - - Several days -   Trouble concentrating on things such as school, work, reading, or watching TV - - Not at all - - Not at all -   Moving or speaking so slowly that other people could have noticed; or the opposite being so fidgety that others notice - - Not at all - - Not at all -   Thoughts of being better off dead, or hurting yourself in some way - - Not at all - - Not at all -   PHQ 9 Score - - 9 - - 11 -   How difficult have these problems made it for you to do your work, take care of your home and get along with others - - - - - Not difficult at all -       Learning Assessment:  Learning Assessment 4/13/2015 4/17/2014 12/12/2013   PRIMARY LEARNER Patient Patient -   HIGHEST LEVEL OF EDUCATION - PRIMARY LEARNER  SOME COLLEGE - SOME COLLEGE   BARRIERS PRIMARY LEARNER NONE - NONE   CO-LEARNER CAREGIVER No - -   PRIMARY LANGUAGE ENGLISH ENGLISH ENGLISH    NEED - - No   LEARNER PREFERENCE PRIMARY LISTENING DEMONSTRATION PICTURES   LEARNING SPECIAL TOPICS - - none   ANSWERED BY patient - -   RELATIONSHIP SELF - -       Abuse Screening:  Abuse Screening Questionnaire 11/13/2017 4/13/2015   Do you ever feel afraid of your partner? N N   Are you in a relationship with someone who physically or mentally threatens you? N N   Is it safe for you to go home? Y Y       Fall Risk  Fall Risk Assessment, last 12 mths 11/11/2019 3/5/2019 1/29/2019 11/6/2018 7/31/2018 7/12/2018 4/30/2018   Able to walk? Yes Yes Yes Yes Yes Yes Yes   Fall in past 12 months? - No Yes Yes No No Yes   Fall with injury? - - No No - - No   Number of falls in past 12 months - - 3 5 - - 1   Fall Risk Score - - 3 5 - - 1           Health Maintenance reviewed and discussed per provider. Yes    Fadumo Booker is due for   Health Maintenance Due   Topic Date Due    MICROALBUMIN Q1  11/06/2019     Please order/place referral if appropriate. Advance Directive:  1. Do you have an advance directive in place? Patient Reply: yes    2. If not, would you like material regarding how to put one in place? Patient Reply: no    Coordination of Care:  1. Have you been to the ER, urgent care clinic since your last visit? Hospitalized since your last visit? no    2. Have you seen or consulted any other health care providers outside of the [address] [place] [address] since your last visit? Include any pap smears or colon screening.  [facility]

## 2020-02-24 NOTE — PROGRESS NOTES
HISTORY OF PRESENT ILLNESS  Ana Bergeron is a 71 y.o. male. Visit Vitals  /73 (BP 1 Location: Right arm, BP Patient Position: Sitting)   Pulse 75   Temp 97.6 °F (36.4 °C) (Oral)   Resp 22   Ht 6' 2\" (1.88 m)   Wt 235 lb (106.6 kg)   SpO2 94%   BMI 30.17 kg/m²       Here for pre-op for right TKR. Dr. 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 He has been going to PT and aquatherapy and feels his thigh is much stronger and he is ready to go forward. No recent cardiac or respiratory sxs. Past Medical History:  No date: Agent orange exposure  No date: Asbestos exposure  09/2019: Asthma      Comment:  told mild. 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 Mauricio Bailon  7/9/2018: COLONOSCOPY; N/A      Comment:  COLONOSCOPY performed by Lukas Comer MD at [facility]                ENDOSCOPY  8/28/2018: COLONOSCOPY; N/A      Comment:  COLONOSCOPY performed by Marcel Mackenzie MD at [facility]                ENDOSCOPY  No date: HX CATARACT REMOVAL      Comment:  7/14  bilat  No date: HX CERVICAL LAMINECTOMY  07/09/2018: HX COLONOSCOPY      Comment:  pt states have to redo d/t unclear imaging  10/01/2019: HX ENDOSCOPY      Comment:  EGD. At [facility]. No significant pathological findings  2014: HX HEART CATHETERIZATION      Comment:  Stent  No date: HX LUMBAR LAMINECTOMY      Comment:  1983 02/05/2018: HX ORTHOPAEDIC; Left      Comment:  knee replacement  No date: HX TUMOR REMOVAL      Comment:  3/13  wide excision right thigh sarcoma  No date: HX TYMPANOMASTOIDECTOMY  Current Outpatient Medications:  Omeprazole delayed release (PRILOSEC D/R) 20 mg tablet, Take 20 mg by mouth daily. traMADol (ULTRAM) 50 mg tablet, TAKE 1 TABLET BY MOUTH EVERY 6 HOURS AS NEEDED FOR PAIN FOR UP TO 30 DAYS. MAX DAILY AMOUNT: 200 MG  gabapentin (NEURONTIN) 300 mg capsule, TAKE 1 CAPSULE BY MOUTH THREE TIMES DAILY. MAX DAILY AMOUNT: 900 MG.  pravastatin (PRAVACHOL) 40 mg tablet, TAKE 1 TABLET BY MOUTH EVERY EVENING  traZODone (DESYREL) 50 mg tablet, TAKE 1 TO 2 TABLETS BY MOUTH AT BEDTIME AS NEEDED  fluticasone propion-salmeterol (ADVAIR/WIXELA) 100-50 mcg/dose diskus inhaler, Take 1 Puff by inhalation. albuterol (PROVENTIL HFA, VENTOLIN HFA, PROAIR HFA) 90 mcg/actuation inhaler, Take 2 Puffs by inhalation. neomycin-polymyxin-hydrocortisone (CORTISPORIN) otic solution, 4 Drops. losartan (COZAAR) 100 mg tablet, Take 1 Tab by mouth daily. glimepiride (AMARYL) 1 mg tablet, TAKE 1 TABLET BY MOUTH DAILY BEFORE BREAKFAST  sertraline (ZOLOFT) 100 mg tablet, TAKE 2 TABLETS BY MOUTH DAILY  acetaminophen (TYLENOL) 500 mg tablet, 1,000 mg.  docusate sodium (COLACE) 100 mg capsule, 100 mg. amLODIPine (NORVASC) 5 mg tablet, Take 1 Tab by mouth daily. chlorthalidone (HYGROTEN) 25 mg tablet, Take 0.5 Tabs by mouth daily.   colchicine 0.6 mg tablet, TAKE 2 TABLETS NOW AND 1 TABLET IN 6 HOURS IF NEEDED REPEAT IN 6 HOURS AS NEEDED  ofloxacin (FLOXIN) 0.3 % otic solution, Administer 5 Drops in left ear daily. (Patient taking differently: Administer 5 Drops in left ear as needed.)  diclofenac (VOLTAREN) 1 % gel, Apply 2 g to affected area four (4) times daily. carboxymethylcellulose sodium (REFRESH OP), Apply 1 Drop to eye daily. nitroglycerin (NITROSTAT) 0.4 mg SL tablet, 0.4 mg by SubLINGual route every five (5) minutes as needed for Chest Pain. Up to 3 doses. ferrous sulfate 325 mg (65 mg iron) tablet, Take  by mouth Daily (before breakfast). clopidogrel (PLAVIX) 75 mg tab, Take 1 Tab by mouth daily. (Patient taking differently: Take 75 mg by mouth every other day.)  isosorbide mononitrate ER (IMDUR) 30 mg tablet, Take 1 Tab by mouth daily. fluticasone (FLONASE) 50 mcg/actuation nasal spray, SHAKE LIQUID AND USE 2 SPRAYS IN EACH NOSTRIL DAILY  terazosin (HYTRIN) 2 mg capsule, Take 1 Cap by mouth nightly. ZINC ACETATE PO, Take 1,000 mg by mouth.  potassium chloride (KLOR-CON) 10 mEq tablet, TAKE 1 TABLET BY MOUTH TWICE DAILY  cholecalciferol (VITAMIN D3) 1,000 unit tablet, Take  by mouth daily. ascorbic acid, vitamin C, (VITAMIN C) 250 mg tablet, Take  by mouth.  garlic 4,400 mg cap, Take 1 Cap by mouth daily. multivit-min-FA-lycopen-lutein 0.4-300-250 mg-mcg-mcg tab, Take 1 Tab by mouth daily. aspirin delayed-release 81 mg tablet, Take 1 tablet by mouth daily. raNITIdine (ZANTAC) 300 mg tab, TAKE 1 TABLET BY MOUTH ONCE DAILY  raNITIdine (ZANTAC) 300 mg tablet, TAKE 1 TABLET BY MOUTH ONCE DAILY    No current facility-administered medications for this visit.          Allergies and Intolerances:    -- Beta Blocker (Beta-Blockers (Beta-Adrenergic Blocking Agts)) -- Other (comments)    --  symptomatic bradycardia   -- Codeine -- Other (comments)    --  Passed//fainted             patient doesn't recall reaction, occurred as a             teenager   -- Darvocet A500 (Propoxyphene N-Acetaminophen) -- Other (comments)    --  throat swelling   -- Dexbrompheniramine-Pseudoephed -- Other (comments)    --  Facial swelling.  1980\"s   -- Lasix (Furosemide) -- Itching   -- Lipitor (Atorvastatin) -- Other (comments)   -- Sulfa (Sulfonamide Antibiotics) -- Hives    Family History:   Review of patient's family history indicates:  Problem: Diabetes      Relation: Father          Age of Onset: (Not Specified)  Problem: Heart Disease      Relation: Father          Age of Onset: (Not Specified)          Comment: cardiomyopathydementia  Problem: Hypertension      Relation: Father          Age of Onset: (Not Specified)  Problem: Cancer      Relation: Father          Age of Onset: (Not Specified)          Comment: prostate  Problem: Parkinsonism      Relation: Father          Age of Onset: (Not Specified)  Problem: Dementia      Relation: Father          Age of Onset: (Not Specified)  Problem: High Cholesterol      Relation: Father          Age of Onset: (Not Specified)  Problem: Kidney Disease      Relation: Father          Age of Onset: (Not Specified)  Problem: Other      Relation: Father          Age of Onset: (Not Specified)          Comment: colon polyps  Problem: Headache      Relation: Mother          Age of Onset: (Not Specified)  Problem: Psychiatric Disorder      Relation: Mother          Age of Onset: (Not Specified)  Problem: Kidney Disease      Relation: Mother          Age of Onset: (Not Specified)  Problem: Hypertension      Relation: Mother          Age of Onset: (Not Specified)  Problem: High Cholesterol      Relation: Mother          Age of Onset: (Not Specified)  Problem: Other      Relation: Mother          Age of Onset: (Not Specified)          Comment: GERD/polymyalgia rheumaticacolon osiris  Problem: Heart Disease      Relation: Mother          Age of Onset: (Not Specified)  Problem: Cancer      Relation: Sister          Age of Onset: (Not Specified)      Social History:   He  reports that he quit smoking about 3 years ago. He has a 15.00 pack-year smoking history. He has never used smokeless tobacco.  He  reports no history of alcohol use. Review of Systems   Constitutional: Negative for chills, fever and weight loss. HENT: Negative for congestion and sore throat. Respiratory: Negative for cough and shortness of breath. Cardiovascular: Negative for chest pain and palpitations. Gastrointestinal: Negative for diarrhea, nausea and vomiting. Genitourinary: Negative for dysuria, frequency and urgency. Neurological: Negative for dizziness and headaches. Physical Exam  Vitals signs and nursing note reviewed. Constitutional:       General: He is not in acute distress. Appearance: He is well-developed. He is not diaphoretic. HENT:      Right Ear: There is impacted cerumen. Left Ear: Tympanic membrane, ear canal and external ear normal.      Nose: No congestion. Mouth/Throat:      Mouth: Mucous membranes are moist.   Eyes:      Conjunctiva/sclera: Conjunctivae normal.   Cardiovascular:      Rate and Rhythm: Normal rate and regular rhythm. Pulmonary:      Effort: Pulmonary effort is normal.      Breath sounds: Normal breath sounds. Musculoskeletal:      Right lower leg: No edema. Left lower leg: No edema. Lymphadenopathy:      Cervical: No cervical adenopathy. Skin:     General: Skin is warm and dry. Neurological:      Mental Status: He is alert and oriented to person, place, and time. Psychiatric:         Mood and Affect: Mood normal.         Behavior: Behavior normal.         ASSESSMENT and PLAN    ICD-10-CM ICD-9-CM    1. Pre-op exam Z01.818 V72.84    2. Primary osteoarthritis of right knee M17.11 715.16    3. Hypertension I11.9 402.10    4. Type 2 diabetes mellitus with diabetic neuropathy, without long-term current use of insulin ([facility]) E11.40 250.60      357.2          BP controlled    BS controlled    EKG--RBBB, old.  No new changes    Lab reviewed and all OK    To stop plavix at least 5 days prior to surgery    Cleared for planned surgery.  Relative risk is small    F/u as appointed in May

## 2020-03-18 ENCOUNTER — TELEPHONE (OUTPATIENT)
Dept: INTERNAL MEDICINE CLINIC | Age: 70
End: 2020-03-18

## 2020-03-18 NOTE — TELEPHONE ENCOUNTER
Received call from Lexi Tang that he was doing a home visit and pt's BP reading was 90/55 and steady dropping. Lexi Tang also stated that pt recently had a knee replacement surgery on 3/6/20 and had a fall on Monday 3/16/20 which he believes was due to low BP. Lexi Tang was advised to instruct pt to go the the ER. Boy verbalized understanding and had no further questions or concerns.

## 2020-03-23 ENCOUNTER — OFFICE VISIT (OUTPATIENT)
Dept: INTERNAL MEDICINE CLINIC | Age: 70
End: 2020-03-23

## 2020-03-23 VITALS
RESPIRATION RATE: 22 BRPM | BODY MASS INDEX: 28.49 KG/M2 | TEMPERATURE: 96.5 F | HEIGHT: 74 IN | SYSTOLIC BLOOD PRESSURE: 129 MMHG | WEIGHT: 222 LBS | HEART RATE: 78 BPM | OXYGEN SATURATION: 97 % | DIASTOLIC BLOOD PRESSURE: 64 MMHG

## 2020-03-23 DIAGNOSIS — E11.40 TYPE 2 DIABETES MELLITUS WITH DIABETIC NEUROPATHY, WITHOUT LONG-TERM CURRENT USE OF INSULIN (HCC): ICD-10-CM

## 2020-03-23 DIAGNOSIS — R55 PRE-SYNCOPE: Primary | ICD-10-CM

## 2020-03-23 DIAGNOSIS — Z76.0 MEDICATION REFILL: ICD-10-CM

## 2020-03-23 RX ORDER — OXYCODONE HYDROCHLORIDE 5 MG/1
TABLET ORAL
COMMUNITY
Start: 2020-03-08 | End: 2020-12-18

## 2020-03-23 RX ORDER — GLIMEPIRIDE 1 MG/1
TABLET ORAL
Qty: 90 TAB | Refills: 4 | Status: SHIPPED | OUTPATIENT
Start: 2020-03-23 | End: 2020-12-18

## 2020-03-23 RX ORDER — ASPIRIN 325 MG
325 TABLET, DELAYED RELEASE (ENTERIC COATED) ORAL 2 TIMES DAILY
COMMUNITY
Start: 2020-03-06 | End: 2020-05-21 | Stop reason: ALTCHOICE

## 2020-03-23 NOTE — PROGRESS NOTES
HISTORY OF PRESENT ILLNESS  Kaia Dolan is a 71 y.o. male. Visit Vitals  /64 (BP 1 Location: Right arm, BP Patient Position: Sitting)   Pulse 78   Temp 96.5 °F (35.8 °C) (Oral)   Resp 22   Ht 6' 2\" (1.88 m)   Wt 222 lb (100.7 kg)   SpO2 97%   BMI 28.50 kg/m²       He is feeling better since the ER visit for a fell and near syncope. He was mildly dehydrated  BP has been good since 3/18/20    Right knee is healing. He does have home PT. He would like to go to outpatient PT    He eased himself down after falling into the wall. Has some arm bruising abut otherwise feels [facility] Follow Up   The history is provided by the patient. This is a new problem. Fall         Review of Systems   Constitutional: Negative. Respiratory: Negative. Cardiovascular: Negative. Musculoskeletal:        Right knee pain. Physical Exam  Vitals signs and nursing note reviewed. Constitutional:       General: He is not in acute distress. Appearance: Normal appearance. He is well-developed. He is not diaphoretic. Cardiovascular:      Rate and Rhythm: Normal rate and regular rhythm. Pulmonary:      Effort: Pulmonary effort is normal.      Breath sounds: Normal breath sounds. Musculoskeletal:      Comments: Right knee is healing   Skin:     General: Skin is warm and dry. Neurological:      Mental Status: He is alert and oriented to person, place, and time. Psychiatric:         Mood and Affect: Mood normal.         Behavior: Behavior normal.         ASSESSMENT and PLAN    ICD-10-CM ICD-9-CM    1. Pre-syncope R55 780.2    2. Type 2 diabetes mellitus with diabetic neuropathy, without long-term current use of insulin ([facility]) E11.40 250.60      357.2    3. Medication refill Z76.0 V68.1 glimepiride (AMARYL) 1 mg tablet       Available hospital/ER record reviewed    Doing much better today    VS look good    Has f/u with cardiology    Incidental refill as noted.      F/u as appointed in May

## 2020-03-23 NOTE — PROGRESS NOTES
Pt here to f/u an ER visit    He had right TKR on 3/6/20 and has been having some problems ever since. On 3/18/20 he was seen at the ER with orthostatic hypotension and near syncope. He had some cough and subjective fevers.  He had a slightly elevated troponin so was in observation for several hours with repeat testing negative for a cardiac event

## 2020-03-23 NOTE — PROGRESS NOTES
ROOM # 6    Amadeo Soulier presents today for No chief complaint on file. Amadeo Soulier preferred language for health care discussion is english/other. Is someone accompanying this pt? Sherren Pickles    Is the patient using any DME equipment during OV?  wheelchair    Depression Screening:  3 most recent [facility] Screens 3/23/2020 2/24/2020 1/13/2020 11/11/2019 3/5/2019 1/29/2019 11/6/2018   PHQ Not Done - - - - - - -   Little interest or pleasure in doing things Not at all Not at all Not at all Nearly every day Not at all Not at all Nearly every day   Feeling down, depressed, irritable, or hopeless Not at all Not at all Not at all Several days Not at all Not at all Nearly every day   Total Score PHQ 2 0 0 0 4 0 0 6   Trouble falling or staying asleep, or sleeping too much - - - Not at all - - Not at all   Feeling tired or having little energy - - - Nearly every day - - Nearly every day   Poor appetite, weight loss, or overeating - - - Several days - - Several days   Feeling bad about yourself - or that you are a failure or have let yourself or your family down - - - Several days - - Several days   Trouble concentrating on things such as school, work, reading, or watching TV - - - Not at all - - Not at all   Moving or speaking so slowly that other people could have noticed; or the opposite being so fidgety that others notice - - - Not at all - - Not at all   Thoughts of being better off dead, or hurting yourself in some way - - - Not at all - - Not at all   PHQ 9 Score - - - 9 - - 11   How difficult have these problems made it for you to do your work, take care of your home and get along with others - - - - - - Not difficult at all       Learning Assessment:  Learning Assessment 4/13/2015 4/17/2014 12/12/2013   PRIMARY LEARNER Patient Patient -   HIGHEST LEVEL OF EDUCATION - PRIMARY LEARNER  SOME COLLEGE - SOME COLLEGE   BARRIERS PRIMARY LEARNER NONE - NONE   CO-LEARNER CAREGIVER No - -   PRIMARY LANGUAGE ENGLISH ENGLISH ENGLISH    NEED - - No   LEARNER PREFERENCE PRIMARY LISTENING DEMONSTRATION PICTURES   LEARNING SPECIAL TOPICS - - none   ANSWERED BY patient - -   RELATIONSHIP SELF - -       Abuse Screening:  Abuse Screening Questionnaire 11/13/2017 4/13/2015   Do you ever feel afraid of your partner? N N   Are you in a relationship with someone who physically or mentally threatens you? N N   Is it safe for you to go home? Y Y       Fall Risk  Fall Risk Assessment, last 12 mths 11/11/2019 3/5/2019 1/29/2019 11/6/2018 7/31/2018 7/12/2018 4/30/2018   Able to walk? Yes Yes Yes Yes Yes Yes Yes   Fall in past 12 months? - No Yes Yes No No Yes   Fall with injury? - - No No - - No   Number of falls in past 12 months - - 3 5 - - 1   Fall Risk Score - - 3 5 - - 1           Health Maintenance reviewed and discussed per provider. Yes    Geraline Solid is due for   Health Maintenance Due   Topic Date Due    AAA Screening 73-67 YO Male Smoking Patients  03/29/2015    MICROALBUMIN Q1  11/06/2019     Please order/place referral if appropriate. Advance Directive:  1. Do you have an advance directive in place? Patient Reply: yes    2. If not, would you like material regarding how to put one in place? Patient Reply: yes    Coordination of Care:  1. Have you been to the ER, urgent care clinic since your last visit? Hospitalized since your last visit? yes    2. Have you seen or consulted any other health care providers outside of the [address] [place] [address] since your last visit? Include any pap smears or colon screening.  no

## 2020-04-02 ENCOUNTER — OFFICE VISIT (OUTPATIENT)
Dept: CARDIOLOGY CLINIC | Age: 70
End: 2020-04-02

## 2020-04-02 VITALS
TEMPERATURE: 98.2 F | WEIGHT: 222 LBS | BODY MASS INDEX: 28.5 KG/M2 | OXYGEN SATURATION: 97 % | SYSTOLIC BLOOD PRESSURE: 115 MMHG | HEART RATE: 92 BPM | DIASTOLIC BLOOD PRESSURE: 67 MMHG

## 2020-04-02 DIAGNOSIS — I25.10 CORONARY ARTERY DISEASE DUE TO LIPID RICH PLAQUE: Primary | ICD-10-CM

## 2020-04-02 DIAGNOSIS — E78.00 PURE HYPERCHOLESTEROLEMIA: ICD-10-CM

## 2020-04-02 DIAGNOSIS — I10 ESSENTIAL HYPERTENSION WITH GOAL BLOOD PRESSURE LESS THAN 140/90: ICD-10-CM

## 2020-04-02 DIAGNOSIS — I25.83 CORONARY ARTERY DISEASE DUE TO LIPID RICH PLAQUE: Primary | ICD-10-CM

## 2020-04-02 RX ORDER — ATORVASTATIN CALCIUM 80 MG/1
80 TABLET, FILM COATED ORAL DAILY
Qty: 90 TAB | Refills: 3 | Status: SHIPPED | OUTPATIENT
Start: 2020-04-02 | End: 2020-06-04

## 2020-04-02 NOTE — PROGRESS NOTES
[facility]    Mr. Evan Phoenix  is a 79 y.o. gentleman with diabetes, hypertension, dyslipidemia and tobacco use. He has coronary artery disease as documented by cardiac catheterization in October of 2014. His anatomy is as follows:      LM - patent  LAD - 30% prox, 80% mid, 90% apical  D1 - 100%  RI - 95% ostial (failed PCI, calcified 1.75 - 2.0 mm vessel)  Cx - 30-40% prox  OM1 - subtotal  OM2 - 80% (3.5 x 18mm XIENCE 10/14)  RCA - 30% diffuse  RPDA - 100%  RPLV - 50%    Mr. Evan Phoenix is here today for follow up appointment. Patient underwent right knee replacement surgery about a month ago. Unfortunately patient had a mechanical fall resulting into knee wound. He is due to see orthopedic team for this problem. He denies any palpitation, presyncope or syncope. He denies any use of nitroglycerin since last time. He denies any chest pain or chest tightness. He tells me that he does not take any water pill anymore because of low BP in the past.  He does not remember the need for medication he is taking    Past Medical History:   Diagnosis Date    Agent orange exposure     Asbestos exposure     Asthma 09/2019    told mild.  Autonomic dysfunction     abnormal tilt table 2/15    BPH     CAD (coronary artery disease)     S/P OM NOY in 2014    Choroidal nevus, left eye     Colonic polyp 08/28/2018    multiple.  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 Ed Wilson 7/9/2018    COLONOSCOPY performed by Xiang Rain MD at [facility] N/A 8/28/2018    COLONOSCOPY performed by Xiang Rain MD at [address] [place] [address] HX CATARACT REMOVAL      7/14  bilat    HX CERVICAL LAMINECTOMY      HX COLONOSCOPY  07/09/2018    pt states have to redo d/t unclear imaging    HX ENDOSCOPY  10/01/2019    EGD. At [facility]. No significant pathological findings    HX HEART CATHETERIZATION  2014    Stent    HX KNEE REPLACEMENT Right 03/06/2020    Dr. Juanita Mcdermott HX ORTHOPAEDIC Left 02/05/2018    knee replacement    HX TUMOR REMOVAL      3/13  wide excision right thigh sarcoma    HX TYMPANOMASTOIDECTOMY         Current Outpatient Medications   Medication Sig    oxyCODONE IR (ROXICODONE) 5 mg immediate release tablet TAKE 1 TABLET BY MOUTH EVERY 4 HOURS AS NEEDED FOR UP TO 10 DAYS    glimepiride (AMARYL) 1 mg tablet TAKE 1 TABLET BY MOUTH DAILY BEFORE BREAKFAST    aspirin delayed-release 325 mg tablet Take 325 mg by mouth two (2) times a day.  famotidine (PEPCID) 20 mg tablet Take 20 mg by mouth two (2) times a day.  gabapentin (NEURONTIN) 300 mg capsule TAKE 1 CAPSULE BY MOUTH THREE TIMES DAILY. MAX DAILY AMOUNT: 900 MG.  pravastatin (PRAVACHOL) 40 mg tablet TAKE 1 TABLET BY MOUTH EVERY EVENING    traZODone (DESYREL) 50 mg tablet TAKE 1 TO 2 TABLETS BY MOUTH AT BEDTIME AS NEEDED    fluticasone propion-salmeterol (ADVAIR/WIXELA) 100-50 mcg/dose diskus inhaler Take 1 Puff by inhalation.  albuterol (PROVENTIL HFA, VENTOLIN HFA, PROAIR HFA) 90 mcg/actuation inhaler Take 2 Puffs by inhalation.  neomycin-polymyxin-hydrocortisone (CORTISPORIN) otic solution 4 Drops.  losartan (COZAAR) 100 mg tablet Take 1 Tab by mouth daily.     sertraline (ZOLOFT) 100 mg tablet TAKE 2 TABLETS BY MOUTH DAILY    acetaminophen (TYLENOL) 500 mg tablet 1,000 mg.    docusate sodium (COLACE) 100 mg capsule 100 mg.    amLODIPine (NORVASC) 5 mg tablet Take 1 Tab by mouth daily.  chlorthalidone (HYGROTEN) 25 mg tablet Take 0.5 Tabs by mouth daily.  colchicine 0.6 mg tablet TAKE 2 TABLETS NOW AND 1 TABLET IN 6 HOURS IF NEEDED REPEAT IN 6 HOURS AS NEEDED    ofloxacin (FLOXIN) 0.3 % otic solution Administer 5 Drops in left ear daily. (Patient taking differently: Administer 5 Drops in left ear as needed.)    diclofenac (VOLTAREN) 1 % gel Apply 2 g to affected area four (4) times daily.  carboxymethylcellulose sodium (REFRESH OP) Apply 1 Drop to eye daily.  nitroglycerin (NITROSTAT) 0.4 mg SL tablet 0.4 mg by SubLINGual route every five (5) minutes as needed for Chest Pain. Up to 3 doses.  ferrous sulfate 325 mg (65 mg iron) tablet Take  by mouth Daily (before breakfast).  clopidogrel (PLAVIX) 75 mg tab Take 1 Tab by mouth daily. (Patient taking differently: Take 75 mg by mouth every other day.)    isosorbide mononitrate ER (IMDUR) 30 mg tablet Take 1 Tab by mouth daily.  fluticasone (FLONASE) 50 mcg/actuation nasal spray SHAKE LIQUID AND USE 2 SPRAYS IN EACH NOSTRIL DAILY    terazosin (HYTRIN) 2 mg capsule Take 1 Cap by mouth nightly.  ZINC ACETATE PO Take 1,000 mg by mouth.  potassium chloride (KLOR-CON) 10 mEq tablet TAKE 1 TABLET BY MOUTH TWICE DAILY    cholecalciferol (VITAMIN D3) 1,000 unit tablet Take  by mouth daily.  ascorbic acid, vitamin C, (VITAMIN C) 250 mg tablet Take  by mouth.  garlic 2,113 mg cap Take 1 Cap by mouth daily.  multivit-min-FA-lycopen-lutein 0.4-300-250 mg-mcg-mcg tab Take 1 Tab by mouth daily.  aspirin delayed-release 81 mg tablet Take 1 tablet by mouth daily. No current facility-administered medications for this visit.         Allergies   Allergen Reactions    Beta Blocker [Beta-Blockers (Beta-Adrenergic Blocking Agts)] Other (comments) symptomatic bradycardia    Codeine Other (comments)     Passed//fainted  patient doesn't recall reaction, occurred as a teenager    Darvocet A500 [Propoxyphene N-Acetaminophen] Other (comments)     throat swelling    Dexbrompheniramine-Pseudoephed Other (comments)     Facial swelling. 1980\"s    Lasix [Furosemide] Itching    Lipitor [Atorvastatin] Other (comments)    Sulfa (Sulfonamide Antibiotics) Hives       Family History:   Non contributory for premature coronary disease in first degree relatives. Social History:   He  reports that he quit smoking about 3 years ago. He has a 15.00 pack-year smoking history. He has never used smokeless tobacco.  He  reports no history of alcohol use. [facility], 3 years    Physical:   Vitals:   BP: 115/67  HR: 92  Wt: 222 lb (100.7 kg)    Exam:   General: Older gentleman, no complaints, no distress.     Head/Neck: No JVD  Lungs:  No respiratory distress. Clear with good air movement. Heart:  Regular. No rubs or gallops. Abdomen: Bowel sounds present  Extremities: Intact pulses. No significant edema.     Neurological: Alert and oriented to person, place, time. No gross neurological deficit. Skin:  Warm and dry. Upper extremity bruising.      Review of Data:   LABS:   Lab Results   Component Value Date/Time    WBC 8.0 11/11/2019 12:48 PM    HGB 14.4 11/11/2019 12:48 PM    HCT 45.2 11/11/2019 12:48 PM    PLATELET 888 89/32/6132 12:48 PM     Lab Results   Component Value Date/Time    Sodium 138 11/11/2019 12:48 PM    Potassium 3.8 11/11/2019 12:48 PM    Chloride 102 11/11/2019 12:48 PM    CO2 32 11/11/2019 12:48 PM    Anion gap 4 11/11/2019 12:48 PM    Glucose 96 11/11/2019 12:48 PM    BUN 31 (H) 11/11/2019 12:48 PM    Creatinine 1.42 (H) 11/11/2019 12:48 PM    BUN/Creatinine ratio 22 (H) 11/11/2019 12:48 PM    GFR est AA 60 (L) 11/11/2019 12:48 PM    GFR est non-AA 49 (L) 11/11/2019 12:48 PM    Calcium 9.5 11/11/2019 12:48 PM    Bilirubin, total 0.5 11/11/2019 12:48 PM    AST (SGOT) 15 11/11/2019 12:48 PM    Alk. phosphatase 94 11/11/2019 12:48 PM    Protein, total 7.8 11/11/2019 12:48 PM    Albumin 4.0 11/11/2019 12:48 PM    Globulin 3.8 11/11/2019 12:48 PM    A-G Ratio 1.1 11/11/2019 12:48 PM    ALT (SGPT) 30 11/11/2019 12:48 PM     Lab Results   Component Value Date/Time    Cholesterol, total 201 (H) 11/11/2019 12:48 PM    HDL Cholesterol 47 11/11/2019 12:48 PM    LDL, calculated 121.4 (H) 11/11/2019 12:48 PM    Triglyceride 163 (H) 11/11/2019 12:48 PM    CHOL/HDL Ratio 4.3 11/11/2019 12:48 PM     Lab Results   Component Value Date/Time    Hemoglobin A1c 6.1 (H) 11/11/2019 12:48 PM    Hemoglobin A1c (POC) 5.9 05/06/2013 11:17 AM    Hemoglobin A1c, External 6.1 06/01/2018     EKG:   Sinus rhythm, 78 beats per minute, right bundle branch block, nonspecific ST-T wave changes. TILT TABLE: February 2015:  Patient was initially placed in supine position. Heart rate was between 65-70 beats per minute, sinus rhythm. Blood pressure was 113/61 mmHg, with maximum blood pressure of 116/63 mmHg. Patient was placed in 60-degree upright tilting position. About 5-7 minutes into upright tilting position at 70 degrees, patient started feeling like her heart rate speeding up and legs feeling heavy, along with some dizziness. Blood pressure slowly decreased up to 70/43 mmHg. Heart rate maximum noted was up from 65 to 81 beats per minute. There was no obvious tachycardia. At the time, patient was given IV fluid and then placed back supine position, with normalization of the blood pressure and resolution of his symptoms. Heart rhythm remained in sinus. Patient did have a drop in the systolic blood pressure more than 20 mmHg upon upright tilting position, without any significant increase in the heart rate. This suggests possible autonomic dysfunction. SUMMARY:  Upright tilt table testing with reproduction of symptoms.  More than 20 mmHg drop in systolic blood pressure, without change in the heart rate during upright tilting position, associated with symptoms. This may represent autonomic dysfunction    ECHO: (01/18)  SUMMARY:  Left ventricle: Systolic function was normal. Ejection fraction was estimated   in the range of 55 % to 60 %. There was hypokinesis of the basal inferoseptal wall. Wall thickness was at the upper   limits of normal.  Right ventricle: The ventricle was dilated. Inferior vena cava, hepatic veins: The inferior vena cava was mildly dilated.   The respirophasic change in diameter was  less than 50%. No major VHD    STRESS TEST (EST, PHARM, NUC, ECHO etc): October 2014:  1. Small to medium sized area of mildly intense reversible defect involvement anterolateral wall. 2.  There is also a small area of reversible defect involving basilar inferior wall concerning for ischemic focus. 3.  Calculated ejection fraction of 56% without any wall motion abnormality. CATHETERIZATION: October 2014:  LM - patent  LAD - 30% prox, 80% mid, 90% apical  D1 - 100%  RI - 95% ostial (failed PCI, calcified 1.75 - 2.0 mm vessel)  Cx - 30-40% prox  OM1 - subtotal  OM2 - 80%  RCA - 30% diffuse  RPDA - 100%  RPLV - 50%    Impression / Plan:     Diabetes    Hypertension    Dyslipidemia    Coronary artery disease       Mr. Alexander Vela returns to the office for follow up. Coronary artery disease:    Mr. Alexander Vela had a cardiac catheterization in October, 2014 and required stent of the obtuse marginal branch. He is on aspirin and Plavix, isosorbide mononitrate and Pravachol. He has a history of beta blocker induced symptomatic bradycardia, that is why he is not on any beta blocker. No S/L since last visit. Continue same. Hypertension:  His blood pressure today is 115/67 mmHg. Continue with current antihypertensive. Have asked patient to go home and call me with a list of medication he is taking. Diabetes: This is being managed by primary care provider. Goal Hgb A1c less than 7 is recommended from cardiovascular standpoint. Last Hgb A1c  was 6.1    Hyperlipidemia:  He is on lipid lowering agent. He is on Pravachol 40 mg daily. His LDL has increased from 64 to 120. In the past he used to be on Lipitor. He is not sure why it was changed to Pravachol. He did not have any side effect. Today I am going to change him from pravastatin to high intensity atorvastatin 80 mg daily. RTC in 6 months or sooner if needed.

## 2020-04-20 NOTE — TELEPHONE ENCOUNTER
Contacted pt at Counts include 234 beds at the [facility] number. Two patient Identifiers confirmed. Advised pt per Dr Rolanda Richards. Pt verbalized understanding.

## 2020-04-20 NOTE — TELEPHONE ENCOUNTER
Contacted pt at [facility] number. Two patient Identifiers confirmed. Pt stated he is having leg cramps since taking the Lipitor. Advised I would consult with Dr Nikia Merino and advise. Pt verbalized understanding.

## 2020-04-29 DIAGNOSIS — Z76.0 MEDICATION REFILL: ICD-10-CM

## 2020-04-29 RX ORDER — SERTRALINE HYDROCHLORIDE 100 MG/1
TABLET, FILM COATED ORAL
Qty: 180 TAB | Refills: 4 | Status: SHIPPED | OUTPATIENT
Start: 2020-04-29 | End: 2020-12-18

## 2020-05-19 ENCOUNTER — TELEPHONE (OUTPATIENT)
Dept: CARDIOLOGY CLINIC | Age: 70
End: 2020-05-19

## 2020-05-20 NOTE — TELEPHONE ENCOUNTER
Pt's daughter calls to report presyncopal episodes at patient's PT appointment, PT is reporting BP of 80/40 at appointments so patient has not been able to participate in therapy. Patient made appointment for virtual visit with Dr. Monico Nageotte.

## 2020-05-21 ENCOUNTER — VIRTUAL VISIT (OUTPATIENT)
Dept: CARDIOLOGY CLINIC | Age: 70
End: 2020-05-21

## 2020-05-21 VITALS
BODY MASS INDEX: 27.34 KG/M2 | HEIGHT: 74 IN | WEIGHT: 213 LBS | SYSTOLIC BLOOD PRESSURE: 140 MMHG | DIASTOLIC BLOOD PRESSURE: 75 MMHG | HEART RATE: 105 BPM

## 2020-05-21 DIAGNOSIS — E78.00 PURE HYPERCHOLESTEROLEMIA: ICD-10-CM

## 2020-05-21 DIAGNOSIS — I11.9 HYPERTENSIVE HEART DISEASE WITHOUT HEART FAILURE: ICD-10-CM

## 2020-05-21 DIAGNOSIS — I25.83 CORONARY ARTERY DISEASE DUE TO LIPID RICH PLAQUE: Primary | ICD-10-CM

## 2020-05-21 DIAGNOSIS — I10 ESSENTIAL HYPERTENSION WITH GOAL BLOOD PRESSURE LESS THAN 140/90: ICD-10-CM

## 2020-05-21 DIAGNOSIS — I25.10 CORONARY ARTERY DISEASE DUE TO LIPID RICH PLAQUE: Primary | ICD-10-CM

## 2020-05-21 RX ORDER — METOPROLOL SUCCINATE 25 MG/1
25 TABLET, EXTENDED RELEASE ORAL DAILY
Qty: 30 TAB | Refills: 6 | Status: SHIPPED | OUTPATIENT
Start: 2020-05-21 | End: 2020-11-23

## 2020-05-21 NOTE — PROGRESS NOTES
[facility]    Mr. Nora Serrato  is a 79 y.o. gentleman with diabetes, hypertension, dyslipidemia and tobacco use. He has coronary artery disease as documented by cardiac catheterization in October of 2014. His anatomy is as follows:      LM - patent  LAD - 30% prox, 80% mid, 90% apical  D1 - 100%  RI - 95% ostial (failed PCI, calcified 1.75 - 2.0 mm vessel)  Cx - 30-40% prox  OM1 - subtotal  OM2 - 80% (3.5 x 18mm XIENCE 10/14)  RCA - 30% diffuse  RPDA - 100%  RPLV - 50%    Patient was seen today in the clinic as a virtual care visit because of COVID-19 situation. Last week patient had a episode of dizziness and his blood pressure was as low as 95 systolic. He also has been having some fatigue and tiredness and some muscle cramps. He thinks that it may be because of Lipitor. Over last 1 week his blood pressure has improved and blood pressure has gotten better. He denies any chest pain or chest tightness. He denies any nitroglycerin use. He denies any palpitation. He denies any lower extremity swelling    Past Medical History:   Diagnosis Date    Agent orange exposure     Asbestos exposure     Asthma 09/2019    told mild.  Autonomic dysfunction     abnormal tilt table 2/15    BPH     CAD (coronary artery disease)     S/P OM NOY in 2014    Choroidal nevus, left eye     Colonic polyp 08/28/2018    multiple.  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 Madina Brandt 7/9/2018    COLONOSCOPY performed by Alpha Cheadle, MD at [facility] N/A 8/28/2018    COLONOSCOPY performed by Alpha Cheadle, MD at [address] HX CATARACT REMOVAL      7/14  bilat    HX CERVICAL LAMINECTOMY      HX COLONOSCOPY  07/09/2018    pt states have to redo d/t unclear imaging    HX ENDOSCOPY  10/01/2019    EGD. At [facility]. No significant pathological findings    HX HEART CATHETERIZATION  2014    Stent    HX KNEE REPLACEMENT Right 03/06/2020    Dr. Yasmin Aceves HX ORTHOPAEDIC Left 02/05/2018    knee replacement    HX TUMOR REMOVAL      3/13  wide excision right thigh sarcoma    HX TYMPANOMASTOIDECTOMY         Current Outpatient Medications   Medication Sig    metoprolol succinate (TOPROL-XL) 25 mg XL tablet Take 1 Tab by mouth daily.  sertraline (ZOLOFT) 100 mg tablet TAKE 2 TABLETS BY MOUTH DAILY    atorvastatin (LIPITOR) 80 mg tablet Take 1 Tab by mouth daily.  oxyCODONE IR (ROXICODONE) 5 mg immediate release tablet TAKE 1 TABLET BY MOUTH EVERY 4 HOURS AS NEEDED FOR UP TO 10 DAYS    glimepiride (AMARYL) 1 mg tablet TAKE 1 TABLET BY MOUTH DAILY BEFORE BREAKFAST    famotidine (PEPCID) 20 mg tablet Take 20 mg by mouth two (2) times a day.  gabapentin (NEURONTIN) 300 mg capsule TAKE 1 CAPSULE BY MOUTH THREE TIMES DAILY. MAX DAILY AMOUNT: 900 MG.  traZODone (DESYREL) 50 mg tablet TAKE 1 TO 2 TABLETS BY MOUTH AT BEDTIME AS NEEDED    fluticasone propion-salmeterol (ADVAIR/WIXELA) 100-50 mcg/dose diskus inhaler Take 1 Puff by inhalation.  albuterol (PROVENTIL HFA, VENTOLIN HFA, PROAIR HFA) 90 mcg/actuation inhaler Take 2 Puffs by inhalation.  neomycin-polymyxin-hydrocortisone (CORTISPORIN) otic solution 4 Drops.  losartan (COZAAR) 100 mg tablet Take 1 Tab by mouth daily.     acetaminophen (TYLENOL) 500 mg tablet 1,000 mg.    docusate sodium (COLACE) 100 mg capsule 100 mg.  chlorthalidone (HYGROTEN) 25 mg tablet Take 0.5 Tabs by mouth daily.  colchicine 0.6 mg tablet TAKE 2 TABLETS NOW AND 1 TABLET IN 6 HOURS IF NEEDED REPEAT IN 6 HOURS AS NEEDED    ofloxacin (FLOXIN) 0.3 % otic solution Administer 5 Drops in left ear daily. (Patient taking differently: Administer 5 Drops in left ear as needed.)    diclofenac (VOLTAREN) 1 % gel Apply 2 g to affected area four (4) times daily.  carboxymethylcellulose sodium (REFRESH OP) Apply 1 Drop to eye daily.  nitroglycerin (NITROSTAT) 0.4 mg SL tablet 0.4 mg by SubLINGual route every five (5) minutes as needed for Chest Pain. Up to 3 doses.  ferrous sulfate 325 mg (65 mg iron) tablet Take  by mouth Daily (before breakfast).  clopidogrel (PLAVIX) 75 mg tab Take 1 Tab by mouth daily. (Patient taking differently: Take 75 mg by mouth every other day.)    isosorbide mononitrate ER (IMDUR) 30 mg tablet Take 1 Tab by mouth daily.  fluticasone (FLONASE) 50 mcg/actuation nasal spray SHAKE LIQUID AND USE 2 SPRAYS IN EACH NOSTRIL DAILY    terazosin (HYTRIN) 2 mg capsule Take 1 Cap by mouth nightly.  ZINC ACETATE PO Take 1,000 mg by mouth.  potassium chloride (KLOR-CON) 10 mEq tablet TAKE 1 TABLET BY MOUTH TWICE DAILY    cholecalciferol (VITAMIN D3) 1,000 unit tablet Take  by mouth daily.  ascorbic acid, vitamin C, (VITAMIN C) 250 mg tablet Take  by mouth.  garlic 7,611 mg cap Take 1 Cap by mouth daily.  multivit-min-FA-lycopen-lutein 0.4-300-250 mg-mcg-mcg tab Take 1 Tab by mouth daily.  aspirin delayed-release 81 mg tablet Take 1 tablet by mouth daily. No current facility-administered medications for this visit.         Allergies   Allergen Reactions    Beta Blocker [Beta-Blockers (Beta-Adrenergic Blocking Agts)] Other (comments)     symptomatic bradycardia    Codeine Other (comments) Passed//fainted  patient doesn't recall reaction, occurred as a teenager    Darvocet A500 [Propoxyphene N-Acetaminophen] Other (comments)     throat swelling    Dexbrompheniramine-Pseudoephed Other (comments)     Facial swelling. 1980\"s    Lasix [Furosemide] Itching    Lipitor [Atorvastatin] Other (comments)    Sulfa (Sulfonamide Antibiotics) Hives       Family History:   Non contributory for premature coronary disease in first degree relatives. Social History:   He  reports that he quit smoking about 3 years ago. He has a 15.00 pack-year smoking history. He has never used smokeless tobacco.  He  reports no history of alcohol use.   [address] [place], 3 years    Physical:   Vitals:   BP: 140/75  HR: (!) 105  Wt: 213 lb (96.6 kg)      Objective:   Vital Signs: (As obtained by patient/caregiver at home)  Visit Vitals  /75 (BP 1 Location: Left arm, BP Patient Position: Sitting)   Pulse (!) 105   Ht 6' 2\" (1.88 m)   Wt 213 lb (96.6 kg)   BMI 27.35 kg/m²        Constitutional: [x] Appears well-developed and well-nourished [x] No apparent distress    Mental status: [x] Alert and awake  [x] Oriented to person/place/time [x] Able to follow commands    Eyes:   EOM    [x]  Normal      Sclera  [x]  Normal             Discharge [x]  None visible     HENT: [x] Normocephalic, atraumatic   [x] Mouth/Throat: Mucous membranes are moist  External Ears [x] Normal    Neck: [x] No visualized mass  Pulmonary/Chest: [x] Respiratory effort normal   [x] No visualized signs of difficulty breathing or respiratory distress  Musculoskeletal:   [x] Normal gait with no signs of ataxia         [x] Normal range of motion of neck  Neurological:        [x] No Facial Asymmetry (Cranial nerve 7 motor function) (limited exam due to video visit)          [x] No gaze palsy   Skin:        [x] No significant exanthematous lesions or discoloration noted on facial skin    Psychiatric:       [x] Normal Affect        [x] No Hallucinations    Other pertinent observable physical exam findings:-      Review of Data:   LABS:   Lab Results   Component Value Date/Time    WBC 8.0 11/11/2019 12:48 PM    HGB 14.4 11/11/2019 12:48 PM    HCT 45.2 11/11/2019 12:48 PM    PLATELET 346 64/91/2335 12:48 PM     Lab Results   Component Value Date/Time    Sodium 138 11/11/2019 12:48 PM    Potassium 3.8 11/11/2019 12:48 PM    Chloride 102 11/11/2019 12:48 PM    CO2 32 11/11/2019 12:48 PM    Anion gap 4 11/11/2019 12:48 PM    Glucose 96 11/11/2019 12:48 PM    BUN 31 (H) 11/11/2019 12:48 PM    Creatinine 1.42 (H) 11/11/2019 12:48 PM    BUN/Creatinine ratio 22 (H) 11/11/2019 12:48 PM    GFR est AA 60 (L) 11/11/2019 12:48 PM    GFR est non-AA 49 (L) 11/11/2019 12:48 PM    Calcium 9.5 11/11/2019 12:48 PM    Bilirubin, total 0.5 11/11/2019 12:48 PM    AST (SGOT) 15 11/11/2019 12:48 PM    Alk. phosphatase 94 11/11/2019 12:48 PM    Protein, total 7.8 11/11/2019 12:48 PM    Albumin 4.0 11/11/2019 12:48 PM    Globulin 3.8 11/11/2019 12:48 PM    A-G Ratio 1.1 11/11/2019 12:48 PM    ALT (SGPT) 30 11/11/2019 12:48 PM     Lab Results   Component Value Date/Time    Cholesterol, total 201 (H) 11/11/2019 12:48 PM    HDL Cholesterol 47 11/11/2019 12:48 PM    LDL, calculated 121.4 (H) 11/11/2019 12:48 PM    Triglyceride 163 (H) 11/11/2019 12:48 PM    CHOL/HDL Ratio 4.3 11/11/2019 12:48 PM     Lab Results   Component Value Date/Time    Hemoglobin A1c 6.1 (H) 11/11/2019 12:48 PM    Hemoglobin A1c (POC) 5.9 05/06/2013 11:17 AM    Hemoglobin A1c, External 6.1 06/01/2018     EKG:   Sinus rhythm, 78 beats per minute, right bundle branch block, nonspecific ST-T wave changes. TILT TABLE: February 2015:  Patient was initially placed in supine position. Heart rate was between 65-70 beats per minute, sinus rhythm. Blood pressure was 113/61 mmHg, with maximum blood pressure of 116/63 mmHg. Patient was placed in 60-degree upright tilting position.  About 5-7 minutes into upright tilting position at 70 degrees, patient started feeling like her heart rate speeding up and legs feeling heavy, along with some dizziness. Blood pressure slowly decreased up to 70/43 mmHg. Heart rate maximum noted was up from 65 to 81 beats per minute. There was no obvious tachycardia. At the time, patient was given IV fluid and then placed back supine position, with normalization of the blood pressure and resolution of his symptoms. Heart rhythm remained in sinus. Patient did have a drop in the systolic blood pressure more than 20 mmHg upon upright tilting position, without any significant increase in the heart rate. This suggests possible autonomic dysfunction. SUMMARY:  Upright tilt table testing with reproduction of symptoms. More than 20 mmHg drop in systolic blood pressure, without change in the heart rate during upright tilting position, associated with symptoms. This may represent autonomic dysfunction    ECHO: (01/18)  SUMMARY:  Left ventricle: Systolic function was normal. Ejection fraction was estimated   in the range of 55 % to 60 %. There was hypokinesis of the basal inferoseptal wall. Wall thickness was at the upper   limits of normal.  Right ventricle: The ventricle was dilated. Inferior vena cava, hepatic veins: The inferior vena cava was mildly dilated.   The respirophasic change in diameter was  less than 50%. No major VHD    STRESS TEST (EST, PHARM, NUC, ECHO etc): October 2014:  1. Small to medium sized area of mildly intense reversible defect involvement anterolateral wall. 2.  There is also a small area of reversible defect involving basilar inferior wall concerning for ischemic focus. 3.  Calculated ejection fraction of 56% without any wall motion abnormality.      CATHETERIZATION: October 2014:  LM - patent  LAD - 30% prox, 80% mid, 90% apical  D1 - 100%  RI - 95% ostial (failed PCI, calcified 1.75 - 2.0 mm vessel)  Cx - 30-40% prox  OM1 - subtotal  OM2 - 80%  RCA - 30% diffuse  RPDA - 100%  RPLV - 50%    Impression / Plan:     Diabetes    Hypertension    Dyslipidemia    Coronary artery disease       Mr. Ninfa Ferguson returns to the office for follow up. Coronary artery disease:    Mr. Ninfa Ferguson had a cardiac catheterization in October, 2014 and required stent of the obtuse marginal branch. He is on aspirin and Plavix, isosorbide mononitrate and Lipitor. He has a history of beta blocker induced symptomatic bradycardia, that is why he is not on any beta blocker. However his heart rate over last couple years has remained stable. As his heart rate is now running between 90100, I recommend that I try Toprol-XL 25 mg daily. He does not remember having any side effect or any reaction to the medication in the past  No S/L since last visit. Hypertension:  His blood pressure today is 140/75 at home. He tells me that he is taking Imdur, losartan, amlodipine. He is not taking chlorthalidone and it was stopped by PCP in September. We will like to confirm that with PCP. I am going to stop amlodipine today and start him on Toprol as mentioned above. He will keep checking his blood pressure on a daily basis    Diabetes: This is being managed by primary care provider. Goal Hgb A1c less than 7 is recommended from cardiovascular standpoint. Last Hgb A1c  was 6.1    Hyperlipidemia:  He is on lipid lowering agent. He is on Pravachol 40 mg daily. His LDL has increased from 64 to 120. He was switched from Pravachol to Lipitor because of increasing LDL. Since then he has been having some muscle cramps. I have asked patient to stop taking Lipitor for next 2 weeks and see if there is any improvement of the symptoms. After that he will start that again to see if the symptoms and side effects recur. If so then I would switch it to high dose of Pravachol which she was taking before      We discussed the expected course, resolution and complications of the diagnosis(es) in detail.   Medication risks, benefits, costs, interactions, and alternatives were discussed as indicated. I advised him to contact the office if his condition worsens, changes or fails to improve as anticipated. He expressed understanding with the diagnosis(es) and plan. Lindsey Locke is a 79 y.o. male being evaluated by a video visit encounter for concerns as above. A caregiver was present when appropriate. Due to this being a TeleHealth encounter (During [facility] public health emergency), evaluation of the following organ systems was limited: Vitals/Constitutional/EENT/Resp/CV/GI//MS/Neuro/Skin/Heme-Lymph-Imm. Pursuant to the emergency declaration under the [facility], [facility] waiver authority and the [employer] Act, this Virtual  Visit was conducted, with patient's (and/or legal guardian's) consent, to reduce the patient's risk of exposure to COVID-19 and provide necessary medical care. Consent: Lindsey Locke, who was seen by synchronous (real-time) audio-video technology, and/or his healthcare decision maker, is aware that this patient-initiated, Telehealth encounter on 5/21/2020 is a billable service, with coverage as determined by his insurance carrier. He is aware that he may receive a bill and has provided verbal consent to proceed: Yes. I spent at least 15 minutes with this established patient, and >50% of the time was spent counseling and/or coordinating care regarding cardiac problem  [phone] [id number] were provided through a video synchronous discussion virtually to substitute for in-person clinic visit. Patient and provider were located at their individual homes. Larry Betancourt MD  RTC in 6 months or sooner if needed.

## 2020-05-21 NOTE — PROGRESS NOTES
Deep Lim presents today for   Chief Complaint   Patient presents with    Dizziness    Hypotension       Deep Lim preferred language for health care discussion is english/other. Is someone accompanying this pt? Y, daughter    Is the patient using any DME equipment during OV? n    Depression Screening:  3 most recent PHQ Screens 4/2/2020   PHQ Not Done -   Little interest or pleasure in doing things Not at all   Feeling down, depressed, irritable, or hopeless Not at all   Total Score PHQ 2 0   Trouble falling or staying asleep, or sleeping too much -   Feeling tired or having little energy -   Poor appetite, weight loss, or overeating -   Feeling bad about yourself - or that you are a failure or have let yourself or your family down -   Trouble concentrating on things such as school, work, reading, or watching TV -   Moving or speaking so slowly that other people could have noticed; or the opposite being so fidgety that others notice -   Thoughts of being better off dead, or hurting yourself in some way -   PHQ 9 Score -   How difficult have these problems made it for you to do your work, take care of your home and get along with others -       Learning Assessment:  Learning Assessment 4/13/2015   PRIMARY LEARNER Patient   HIGHEST LEVEL OF EDUCATION - PRIMARY LEARNER  SOME COLLEGE   BARRIERS PRIMARY LEARNER NONE   CO-LEARNER CAREGIVER No   PRIMARY LANGUAGE ENGLISH    NEED -   LEARNER PREFERENCE PRIMARY LISTENING   LEARNING SPECIAL TOPICS -   ANSWERED BY patient   RELATIONSHIP SELF       Abuse Screening:  Abuse Screening Questionnaire 11/13/2017   Do you ever feel afraid of your partner? N   Are you in a relationship with someone who physically or mentally threatens you? N   Is it safe for you to go home? Y       Fall Risk  Fall Risk Assessment, last 12 mths 4/2/2020   Able to walk? Yes   Fall in past 12 months?  No   Fall with injury? -   Number of falls in past 12 months - Fall Risk Score -       Pt currently taking Anticoagulant therapy? y    Coordination of Care:  1. Have you been to the ER, urgent care clinic since your last visit? Hospitalized since your last visit? n    2. Have you seen or consulted any other health care providers outside of the [address] [place] [address] since your last visit? Include any pap smears or colon screening.  n

## 2020-06-04 ENCOUNTER — HOSPITAL ENCOUNTER (OUTPATIENT)
Dept: LAB | Age: 70
Discharge: HOME | End: 2020-06-04
Payer: MEDICARE

## 2020-06-04 ENCOUNTER — OFFICE VISIT (OUTPATIENT)
Dept: INTERNAL MEDICINE CLINIC | Age: 70
End: 2020-06-04

## 2020-06-04 VITALS
SYSTOLIC BLOOD PRESSURE: 148 MMHG | OXYGEN SATURATION: 98 % | BODY MASS INDEX: 27.21 KG/M2 | HEIGHT: 74 IN | RESPIRATION RATE: 20 BRPM | HEART RATE: 64 BPM | TEMPERATURE: 98.7 F | WEIGHT: 212 LBS | DIASTOLIC BLOOD PRESSURE: 79 MMHG

## 2020-06-04 DIAGNOSIS — E11.40 TYPE 2 DIABETES MELLITUS WITH DIABETIC NEUROPATHY, WITHOUT LONG-TERM CURRENT USE OF INSULIN (HCC): ICD-10-CM

## 2020-06-04 DIAGNOSIS — E78.5 DYSLIPIDEMIA: ICD-10-CM

## 2020-06-04 DIAGNOSIS — I11.9 BENIGN HYPERTENSIVE HEART DISEASE WITHOUT HEART FAILURE: ICD-10-CM

## 2020-06-04 DIAGNOSIS — E11.40 TYPE 2 DIABETES MELLITUS WITH DIABETIC NEUROPATHY, WITHOUT LONG-TERM CURRENT USE OF INSULIN (HCC): Primary | ICD-10-CM

## 2020-06-04 DIAGNOSIS — Z79.899 MEDICATION MANAGEMENT: ICD-10-CM

## 2020-06-04 LAB
ALBUMIN SERPL-MCNC: 3.2 G/DL (ref 3.4–5)
ALBUMIN/GLOB SERPL: 0.8 {RATIO} (ref 0.8–1.7)
ALP SERPL-CCNC: 109 U/L (ref 45–117)
ALT SERPL-CCNC: 16 U/L (ref 16–61)
ANION GAP SERPL CALC-SCNC: 7 MMOL/L (ref 3–18)
AST SERPL-CCNC: 13 U/L (ref 10–38)
BILIRUB SERPL-MCNC: 0.3 MG/DL (ref 0.2–1)
BUN SERPL-MCNC: 14 MG/DL (ref 7–18)
BUN/CREAT SERPL: 12 (ref 12–20)
CALCIUM SERPL-MCNC: 9.1 MG/DL (ref 8.5–10.1)
CHLORIDE SERPL-SCNC: 105 MMOL/L (ref 100–111)
CHOLEST SERPL-MCNC: 127 MG/DL
CO2 SERPL-SCNC: 31 MMOL/L (ref 21–32)
CREAT SERPL-MCNC: 1.15 MG/DL (ref 0.6–1.3)
CREAT UR-MCNC: 244 MG/DL (ref 30–125)
GLOBULIN SER CALC-MCNC: 4.1 G/DL (ref 2–4)
GLUCOSE SERPL-MCNC: 78 MG/DL (ref 74–99)
HBA1C MFR BLD: 5.5 % (ref 4.2–5.6)
HDLC SERPL-MCNC: 34 MG/DL (ref 40–60)
HDLC SERPL: 3.7 {RATIO} (ref 0–5)
LDLC SERPL CALC-MCNC: 46.6 MG/DL (ref 0–100)
LIPID PROFILE,FLP: ABNORMAL
MICROALBUMIN UR-MCNC: 2.11 MG/DL (ref 0–3)
MICROALBUMIN/CREAT UR-RTO: 9 MG/G (ref 0–30)
POTASSIUM SERPL-SCNC: 3.7 MMOL/L (ref 3.5–5.5)
PROT SERPL-MCNC: 7.3 G/DL (ref 6.4–8.2)
SODIUM SERPL-SCNC: 143 MMOL/L (ref 136–145)
TRIGL SERPL-MCNC: 232 MG/DL (ref ?–150)
VLDLC SERPL CALC-MCNC: 46.4 MG/DL

## 2020-06-04 PROCEDURE — 82043 UR ALBUMIN QUANTITATIVE: CPT

## 2020-06-04 PROCEDURE — 80061 LIPID PANEL: CPT

## 2020-06-04 PROCEDURE — 36415 COLL VENOUS BLD VENIPUNCTURE: CPT

## 2020-06-04 PROCEDURE — 80053 COMPREHEN METABOLIC PANEL: CPT

## 2020-06-04 PROCEDURE — 83036 HEMOGLOBIN GLYCOSYLATED A1C: CPT

## 2020-06-04 RX ORDER — EZETIMIBE 10 MG/1
10 TABLET ORAL DAILY
Qty: 30 TAB | Refills: 5 | Status: SHIPPED | OUTPATIENT
Start: 2020-06-04 | End: 2020-11-16

## 2020-06-04 NOTE — PROGRESS NOTES
HISTORY OF PRESENT ILLNESS  Mariah Saul is a 79 y.o. male. Visit Vitals  /79 (BP 1 Location: Right arm, BP Patient Position: Sitting)   Pulse 64   Temp 98.7 °F (37.1 °C) (Oral)   Resp 20   Ht 6' 2\" (1.88 m)   Wt 212 lb (96.2 kg)   SpO2 98%   BMI 27.22 kg/m²       Since last visit he had another near-syncopal episode  He saw Dr. Concetta Beckman who stopped his amlodipine and started back on metoprolol  He also had his lipitor stopped due to muscle weakness (this happened once in the past)    He has since restarted his pravastatin. His home BP readings are back up some        Current Outpatient Medications:  ezetimibe (ZETIA) 10 mg tablet, Take 1 Tab by mouth daily. metoprolol succinate (TOPROL-XL) 25 mg XL tablet, Take 1 Tab by mouth daily. sertraline (ZOLOFT) 100 mg tablet, TAKE 2 TABLETS BY MOUTH DAILY  glimepiride (AMARYL) 1 mg tablet, TAKE 1 TABLET BY MOUTH DAILY BEFORE BREAKFAST  famotidine (PEPCID) 20 mg tablet, Take 20 mg by mouth two (2) times a day.  gabapentin (NEURONTIN) 300 mg capsule, TAKE 1 CAPSULE BY MOUTH THREE TIMES DAILY. MAX DAILY AMOUNT: 900 MG.  traZODone (DESYREL) 50 mg tablet, TAKE 1 TO 2 TABLETS BY MOUTH AT BEDTIME AS NEEDED  fluticasone propion-salmeterol (ADVAIR/WIXELA) 100-50 mcg/dose diskus inhaler, Take 1 Puff by inhalation. albuterol (PROVENTIL HFA, VENTOLIN HFA, PROAIR HFA) 90 mcg/actuation inhaler, Take 2 Puffs by inhalation. neomycin-polymyxin-hydrocortisone (CORTISPORIN) otic solution, 4 Drops. losartan (COZAAR) 100 mg tablet, Take 1 Tab by mouth daily. acetaminophen (TYLENOL) 500 mg tablet, 1,000 mg.  docusate sodium (COLACE) 100 mg capsule, 100 mg.  colchicine 0.6 mg tablet, TAKE 2 TABLETS NOW AND 1 TABLET IN 6 HOURS IF NEEDED REPEAT IN 6 HOURS AS NEEDED  ofloxacin (FLOXIN) 0.3 % otic solution, Administer 5 Drops in left ear daily.  (Patient taking differently: Administer 5 Drops in left ear as needed.)  diclofenac (VOLTAREN) 1 % gel, Apply 2 g to affected area four (4) times daily. carboxymethylcellulose sodium (REFRESH OP), Apply 1 Drop to eye daily. nitroglycerin (NITROSTAT) 0.4 mg SL tablet, 0.4 mg by SubLINGual route every five (5) minutes as needed for Chest Pain. Up to 3 doses. ferrous sulfate 325 mg (65 mg iron) tablet, Take  by mouth Daily (before breakfast). clopidogrel (PLAVIX) 75 mg tab, Take 1 Tab by mouth daily. (Patient taking differently: Take 75 mg by mouth every other day.)  isosorbide mononitrate ER (IMDUR) 30 mg tablet, Take 1 Tab by mouth daily. fluticasone (FLONASE) 50 mcg/actuation nasal spray, SHAKE LIQUID AND USE 2 SPRAYS IN EACH NOSTRIL DAILY  terazosin (HYTRIN) 2 mg capsule, Take 1 Cap by mouth nightly. ZINC ACETATE PO, Take 1,000 mg by mouth.  potassium chloride (KLOR-CON) 10 mEq tablet, TAKE 1 TABLET BY MOUTH TWICE DAILY  cholecalciferol (VITAMIN D3) 1,000 unit tablet, Take  by mouth daily. ascorbic acid, vitamin C, (VITAMIN C) 250 mg tablet, Take  by mouth.  garlic 0,504 mg cap, Take 1 Cap by mouth daily. multivit-min-FA-lycopen-lutein 0.4-300-250 mg-mcg-mcg tab, Take 1 Tab by mouth daily. aspirin delayed-release 81 mg tablet, Take 1 tablet by mouth daily. oxyCODONE IR (ROXICODONE) 5 mg immediate release tablet, TAKE 1 TABLET BY MOUTH EVERY 4 HOURS AS NEEDED FOR UP TO 10 DAYS          Hypertension    The history is provided by the patient. This is a chronic problem. The current episode started more than 1 week ago. The problem has not changed since onset. Associated symptoms include malaise/fatigue. Pertinent negatives include no palpitations. There are no associated agents to hypertension. Risk factors include a sedentary lifestyle, male gender, dyslipidemia and diabetes mellitus. Diabetes   The history is provided by the patient. This is a chronic problem. The current episode started more than 1 week ago. The problem occurs daily. The problem has not changed since onset. Exacerbated by: diet.  The symptoms are relieved by medications (diet). The treatment provided significant relief. Review of Systems   Constitutional: Positive for malaise/fatigue. Negative for chills and fever. HENT: Negative for congestion and sore throat. Respiratory: Negative for cough. Cardiovascular: Negative for palpitations. Genitourinary: Negative for frequency and urgency. Neurological: Positive for weakness. Endo/Heme/Allergies: Negative for polydipsia. Physical Exam  Vitals signs and nursing note reviewed. Constitutional:       General: He is not in acute distress. Appearance: He is well-developed. He is not diaphoretic. HENT:      Head: Normocephalic and atraumatic. Cardiovascular:      Rate and Rhythm: Normal rate and regular rhythm. Pulmonary:      Effort: Pulmonary effort is normal.      Breath sounds: Normal breath sounds. Musculoskeletal:      Comments: No obvious muscle wasting   Skin:     General: Skin is warm and dry. Neurological:      Mental Status: He is alert and oriented to person, place, and time. Psychiatric:         Mood and Affect: Mood normal.         Behavior: Behavior normal.         ASSESSMENT and PLAN    ICD-10-CM ICD-9-CM    1. Type 2 diabetes mellitus with diabetic neuropathy, without long-term current use of insulin ([facility]) V87.96 000.54 METABOLIC PANEL, COMPREHENSIVE     357.2 LIPID PANEL      HEMOGLOBIN A1C W/O EAG      MICROALBUMIN, UR, RAND W/ MICROALB/CREAT RATIO   2. Hypertension O31.9 047.48 METABOLIC PANEL, COMPREHENSIVE      LIPID PANEL   3. Dyslipidemia E78.5 272.4 LIPID PANEL      ezetimibe (ZETIA) 10 mg tablet   4. Medication management Z79.899 V58.69        BP up some but not much different from when last spoke to Dr. Chikis Stoddard. Continue same for now  There was a tilt table done 2014 that suggested autonomic neuropathy. Lab updated today    Muscle weakness on lipitor. Now back on pravastatin. As per Dr. Chikis Stoddard , pt will add CoQ10 to his routine.   Will add zetia to the pravastatin to boost it with less likelihood of myalgia    F/u 5 months for MWV, HTN, DM, chol

## 2020-06-04 NOTE — PROGRESS NOTES
Byron Alvarado is a 79 y.o. male (: 1950) presenting to address:    Chief Complaint   Patient presents with    Hypertension    Cholesterol Problem    Diabetes       Vitals:    20 1503   BP: 148/79   Pulse: 64   Resp: 20   Temp: 98.7 °F (37.1 °C)   TempSrc: Oral   SpO2: 98%   Weight: 212 lb (96.2 kg)   Height: 6' 2\" (1.88 m)   PainSc:   6   PainLoc: Generalized       Hearing/Vision:   No exam data present    Learning Assessment:     Learning Assessment 2015   PRIMARY LEARNER Patient   HIGHEST LEVEL OF EDUCATION - PRIMARY LEARNER  SOME COLLEGE   BARRIERS PRIMARY LEARNER NONE   CO-LEARNER CAREGIVER No   PRIMARY LANGUAGE ENGLISH    NEED -   LEARNER PREFERENCE PRIMARY LISTENING   LEARNING SPECIAL TOPICS -   ANSWERED BY patient   RELATIONSHIP SELF     Depression Screening:     3 most recent PHQ Screens 2020   PHQ Not Done -   Little interest or pleasure in doing things Nearly every day   Feeling down, depressed, irritable, or hopeless Several days   Total Score PHQ 2 4   Trouble falling or staying asleep, or sleeping too much -   Feeling tired or having little energy -   Poor appetite, weight loss, or overeating -   Feeling bad about yourself - or that you are a failure or have let yourself or your family down -   Trouble concentrating on things such as school, work, reading, or watching TV -   Moving or speaking so slowly that other people could have noticed; or the opposite being so fidgety that others notice -   Thoughts of being better off dead, or hurting yourself in some way -   PHQ 9 Score -   How difficult have these problems made it for you to do your work, take care of your home and get along with others -     Fall Risk Assessment:     Fall Risk Assessment, last 12 mths 2020   Able to walk? Yes   Fall in past 12 months?  No   Fall with injury? -   Number of falls in past 12 months -   Fall Risk Score -     Abuse Screening:     Abuse Screening Questionnaire 2017 Do you ever feel afraid of your partner? N   Are you in a relationship with someone who physically or mentally threatens you? N   Is it safe for you to go home? Y     Coordination of Care Questionaire:   1. Have you been to the ER, urgent care clinic since your last visit? Hospitalized since your last visit? NO    2. Have you seen or consulted any other health care providers outside of the [address] [place] [address] since your last visit? Include any pap smears or colon screening. YES orthopedics, cardiology    Advanced Directive:   1. Do you have an Advanced Directive? NO    2. Would you like information on Advanced Directives?  NO

## 2020-06-08 ENCOUNTER — TELEPHONE (OUTPATIENT)
Dept: CARDIOLOGY CLINIC | Age: 70
End: 2020-06-08

## 2020-06-08 NOTE — TELEPHONE ENCOUNTER
Daughter calls to report patient's BP has been more stable and no episodes of hypotension. Daughter is concerned that pulse is a little labile. Daughter to upload BP logs to My chart and monitor for 2 more weeks.

## 2020-10-01 DIAGNOSIS — Z76.0 MEDICATION REFILL: ICD-10-CM

## 2020-10-01 RX ORDER — LOSARTAN POTASSIUM 100 MG/1
TABLET ORAL
Qty: 90 TAB | Refills: 4 | Status: SHIPPED | OUTPATIENT
Start: 2020-10-01 | End: 2021-03-22 | Stop reason: SDUPTHER

## 2020-10-12 DIAGNOSIS — M79.2 NEURALGIC PAIN: ICD-10-CM

## 2020-10-13 RX ORDER — TRAMADOL HYDROCHLORIDE 50 MG/1
TABLET ORAL
Qty: 120 TAB | Refills: 5 | Status: SHIPPED | OUTPATIENT
Start: 2020-10-13 | End: 2021-05-09

## 2020-10-16 ENCOUNTER — OFFICE VISIT (OUTPATIENT)
Dept: CARDIOLOGY CLINIC | Age: 70
End: 2020-10-16
Payer: MEDICARE

## 2020-10-16 VITALS — SYSTOLIC BLOOD PRESSURE: 165 MMHG | HEART RATE: 59 BPM | OXYGEN SATURATION: 96 % | DIASTOLIC BLOOD PRESSURE: 71 MMHG

## 2020-10-16 DIAGNOSIS — I10 ESSENTIAL HYPERTENSION WITH GOAL BLOOD PRESSURE LESS THAN 140/90: ICD-10-CM

## 2020-10-16 DIAGNOSIS — I25.10 CORONARY ARTERY DISEASE DUE TO LIPID RICH PLAQUE: Primary | ICD-10-CM

## 2020-10-16 DIAGNOSIS — I11.9 HYPERTENSIVE HEART DISEASE WITHOUT HEART FAILURE: ICD-10-CM

## 2020-10-16 DIAGNOSIS — I25.83 CORONARY ARTERY DISEASE DUE TO LIPID RICH PLAQUE: Primary | ICD-10-CM

## 2020-10-16 PROCEDURE — G8753 SYS BP > OR = 140: HCPCS | Performed by: INTERNAL MEDICINE

## 2020-10-16 PROCEDURE — G8428 CUR MEDS NOT DOCUMENT: HCPCS | Performed by: INTERNAL MEDICINE

## 2020-10-16 PROCEDURE — G8536 NO DOC ELDER MAL SCRN: HCPCS | Performed by: INTERNAL MEDICINE

## 2020-10-16 PROCEDURE — 99214 OFFICE O/P EST MOD 30 MIN: CPT | Performed by: INTERNAL MEDICINE

## 2020-10-16 PROCEDURE — G8510 SCR DEP NEG, NO PLAN REQD: HCPCS | Performed by: INTERNAL MEDICINE

## 2020-10-16 PROCEDURE — G8754 DIAS BP LESS 90: HCPCS | Performed by: INTERNAL MEDICINE

## 2020-10-16 PROCEDURE — G8419 CALC BMI OUT NRM PARAM NOF/U: HCPCS | Performed by: INTERNAL MEDICINE

## 2020-10-16 RX ORDER — ISOSORBIDE MONONITRATE 60 MG/1
60 TABLET, EXTENDED RELEASE ORAL
Qty: 90 TAB | Refills: 3 | Status: SHIPPED | OUTPATIENT
Start: 2020-10-16 | End: 2021-10-05

## 2020-10-16 NOTE — PROGRESS NOTES
Debby Delatorre presents today for   Chief Complaint   Patient presents with    Follow-up       Debby Delatorre preferred language for health care discussion is english/other. Is someone accompanying this pt? no    Is the patient using any DME equipment during [address]? Yes cane    Depression Screening:  3 most recent PHQ Screens 10/16/2020   PHQ Not Done -   Little interest or pleasure in doing things Not at all   Feeling down, depressed, irritable, or hopeless Not at all   Total Score PHQ 2 0   Trouble falling or staying asleep, or sleeping too much -   Feeling tired or having little energy -   Poor appetite, weight loss, or overeating -   Feeling bad about yourself - or that you are a failure or have let yourself or your family down -   Trouble concentrating on things such as school, work, reading, or watching TV -   Moving or speaking so slowly that other people could have noticed; or the opposite being so fidgety that others notice -   Thoughts of being better off dead, or hurting yourself in some way -   PHQ 9 Score -   How difficult have these problems made it for you to do your work, take care of your home and get along with others -       Learning Assessment:  Learning Assessment 4/13/2015   PRIMARY LEARNER Patient   HIGHEST LEVEL OF EDUCATION - PRIMARY LEARNER  SOME COLLEGE   BARRIERS PRIMARY LEARNER NONE   CO-LEARNER CAREGIVER No   PRIMARY LANGUAGE ENGLISH    NEED -   LEARNER PREFERENCE PRIMARY LISTENING   LEARNING SPECIAL TOPICS -   ANSWERED BY patient   RELATIONSHIP SELF       Abuse Screening:  Abuse Screening Questionnaire 11/13/2017   Do you ever feel afraid of your partner? N   Are you in a relationship with someone who physically or mentally threatens you? N   Is it safe for you to go home? Y       Fall Risk  Fall Risk Assessment, last 12 mths 10/16/2020   Able to walk? Yes   Fall in past 12 months?  No   Fall with injury? -   Number of falls in past 12 months -   Fall Risk Score -       Pt currently taking Anticoagulant therapy? no    Coordination of Care:  1. Have you been to the ER, urgent care clinic since your last visit? Hospitalized since your last visit? no    2. Have you seen or consulted any other health care providers outside of the [address] [place] [address] since your last visit? Include any pap smears or colon screening.  no

## 2020-10-16 NOTE — LETTER
10/16/20 Patient: Ruy Smoker YOB: 1950 Date of Visit: 10/16/2020 MD Woody Samuel [address] [facility] [id number] VIA In Basket Dear Aide Hamilton MD, Thank you for referring Mr. Gala New to [facility] AT [address] [place] [address] for evaluation. My notes for this consultation are attached. If you have questions, please do not hesitate to call me. I look forward to following your patient along with you. Sincerely, Zeinab Mesa MD

## 2020-10-16 NOTE — PROGRESS NOTES
[facility]    Mr. Kiko Gregg  is a 79 y.o. gentleman with diabetes, hypertension, dyslipidemia and tobacco use. He has coronary artery disease as documented by cardiac catheterization in October of 2014. His anatomy is as follows:      LM - patent  LAD - 30% prox, 80% mid, 90% apical  D1 - 100%  RI - 95% ostial (failed PCI, calcified 1.75 - 2.0 mm vessel)  Cx - 30-40% prox  OM1 - subtotal  OM2 - 80% (3.5 x 18mm XIENCE 10/14)  RCA - 30% diffuse  RPDA - 100%  RPLV - 50%    Mr. Kiko Gregg is here today for follow up appointment. His blood pressure at home slightly has been running elevated. He is taking all his medications regularly. He has not used any nitroglycerin. He denies any unstable coronary symptoms or decompensated heart failure. He continues to have a significant back pain. Past Medical History:   Diagnosis Date    Agent orange exposure     Asbestos exposure     Asthma 09/2019    told mild.  Autonomic dysfunction     abnormal tilt table 2/15    BPH     CAD (coronary artery disease)     S/P OM NOY in 2014    Choroidal nevus, left eye     Colonic polyp 08/28/2018    multiple.  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 Bailee Reyes N/A 7/9/2018    COLONOSCOPY performed by Lynsey Jose MD at [facility] COLONOSCOPY N/A 8/28/2018    COLONOSCOPY performed by Milly Samuels MD at [facility] HX CATARACT REMOVAL      7/14  bilat    HX CERVICAL LAMINECTOMY      HX COLONOSCOPY  07/09/2018    pt states have to redo d/t unclear imaging    HX ENDOSCOPY  10/01/2019    [facility]. No [facility] pathological findings    HX HEART CATHETERIZATION  2014    Stent    HX KNEE REPLACEMENT Right 03/06/2020    Dr. Héctor Louise HX ORTHOPAEDIC Left 02/05/2018    knee replacement    HX TUMOR REMOVAL      3/13  wide excision right thigh sarcoma    HX TYMPANOMASTOIDECTOMY         Current Outpatient Medications   Medication Sig    isosorbide mononitrate ER (IMDUR) 60 mg CR tablet Take 1 Tab by mouth every morning.  traMADoL (ULTRAM) 50 mg tablet TAKE 1 TABLET BY MOUTH EVERY 6 HOURS AS NEEDED FOR PAIN. MAX DAILY AMOUNT: 200 MG    losartan (COZAAR) 100 mg tablet TAKE 1 TABLET BY MOUTH DAILY    gabapentin (NEURONTIN) 300 mg capsule TAKE 1 CAPSULE BY MOUTH THREE TIMES DAILY. MAX DAILY AMOUNT: 900 MG    ezetimibe (ZETIA) 10 mg tablet Take 1 Tab by mouth daily.  metoprolol succinate (TOPROL-XL) 25 mg XL tablet Take 1 Tab by mouth daily.  sertraline (ZOLOFT) 100 mg tablet TAKE 2 TABLETS BY MOUTH DAILY    oxyCODONE IR (ROXICODONE) 5 mg immediate release tablet TAKE 1 TABLET BY MOUTH EVERY 4 HOURS AS NEEDED FOR UP TO 10 DAYS    glimepiride (AMARYL) 1 mg tablet TAKE 1 TABLET BY MOUTH DAILY BEFORE BREAKFAST    famotidine (PEPCID) 20 mg tablet Take 20 mg by mouth two (2) times a day.  traZODone (DESYREL) 50 mg tablet TAKE 1 TO 2 TABLETS BY MOUTH AT BEDTIME AS NEEDED    fluticasone propion-salmeterol (ADVAIR/WIXELA) 100-50 mcg/dose diskus inhaler Take 1 Puff by inhalation.  albuterol (PROVENTIL HFA, VENTOLIN HFA, PROAIR HFA) 90 mcg/actuation inhaler Take 2 Puffs by inhalation.  neomycin-polymyxin-hydrocortisone (CORTISPORIN) otic solution 4 Drops.     acetaminophen (TYLENOL) 500 mg tablet 1,000 mg.    docusate sodium (COLACE) 100 mg capsule 100 mg.  colchicine 0.6 mg tablet TAKE 2 TABLETS NOW AND 1 TABLET IN 6 HOURS IF NEEDED REPEAT IN 6 HOURS AS NEEDED    ofloxacin (FLOXIN) 0.3 % otic solution Administer 5 Drops in left ear daily. (Patient taking differently: Administer 5 Drops in left ear as needed.)    diclofenac (VOLTAREN) 1 % gel Apply 2 g to affected area four (4) times daily.  carboxymethylcellulose sodium (REFRESH OP) Apply 1 Drop to eye daily.  nitroglycerin (NITROSTAT) 0.4 mg SL tablet 0.4 mg by SubLINGual route every five (5) minutes as needed for Chest Pain. Up to 3 doses.  ferrous sulfate 325 mg (65 mg iron) tablet Take  by mouth Daily (before breakfast).  clopidogrel (PLAVIX) 75 mg tab Take 1 Tab by mouth daily. (Patient taking differently: Take 75 mg by mouth every other day.)    fluticasone (FLONASE) 50 mcg/actuation nasal spray SHAKE LIQUID AND USE 2 SPRAYS IN EACH NOSTRIL DAILY    terazosin (HYTRIN) 2 mg capsule Take 1 Cap by mouth nightly.  ZINC ACETATE PO Take 1,000 mg by mouth.  potassium chloride (KLOR-CON) 10 mEq tablet TAKE 1 TABLET BY MOUTH TWICE DAILY    cholecalciferol (VITAMIN D3) 1,000 unit tablet Take  by mouth daily.  ascorbic acid, vitamin C, (VITAMIN C) 250 mg tablet Take  by mouth.  garlic 4,413 mg cap Take 1 Cap by mouth daily.  multivit-min-FA-lycopen-lutein 0.4-300-250 mg-mcg-mcg tab Take 1 Tab by mouth daily.  aspirin delayed-release 81 mg tablet Take 1 tablet by mouth daily. No current facility-administered medications for this visit.         Allergies   Allergen Reactions    Beta Blocker [Beta-Blockers (Beta-Adrenergic Blocking Agts)] Other (comments)     symptomatic bradycardia    Codeine Other (comments)     Passed//fainted  patient doesn't recall reaction, occurred as a teenager    Darvocet A500 [Propoxyphene N-Acetaminophen] Other (comments)     throat swelling    Dexbrompheniramine-Pseudoephed Other (comments)     Facial swelling. 1980\"s    Lasix [Furosemide] Itching    Lipitor [Atorvastatin] Other (comments)    Sulfa (Sulfonamide Antibiotics) Hives       Family History:   Non contributory for premature coronary disease in first degree relatives. Social History:   He  reports that he quit smoking about 3 years ago. He has a 15.00 pack-year smoking history. He has never used smokeless tobacco.  He  reports no history of alcohol use. [facility] [place], 3 years    Physical:   Vitals:   BP: (!) 165/71  HR: (!) 59  Wt:      Exam:   General: Older gentleman, no complaints, no distress.     Head/Neck: No JVD  Lungs:  No respiratory distress. Clear with good air movement. Heart:  Regular. No rubs or gallops. Abdomen: Bowel sounds present  Extremities: Intact pulses. No significant edema.     Neurological: Alert and oriented to person, place, time. No gross neurological deficit. Skin:  Warm and dry. Review of Data:   LABS:   Lab Results   Component Value Date/Time    WBC 8.0 11/11/2019 12:48 PM    HGB 14.4 11/11/2019 12:48 PM    HCT 45.2 11/11/2019 12:48 PM    PLATELET 569 37/55/3568 12:48 PM     Lab Results   Component Value Date/Time    Sodium 143 06/04/2020 04:05 PM    Potassium 3.7 06/04/2020 04:05 PM    Chloride 105 06/04/2020 04:05 PM    CO2 31 06/04/2020 04:05 PM    Anion gap 7 06/04/2020 04:05 PM    Glucose 78 06/04/2020 04:05 PM    BUN 14 06/04/2020 04:05 PM    Creatinine 1.15 06/04/2020 04:05 PM    BUN/Creatinine ratio 12 06/04/2020 04:05 PM    GFR est AA >60 06/04/2020 04:05 PM    GFR est non-AA >60 06/04/2020 04:05 PM    Calcium 9.1 06/04/2020 04:05 PM    Bilirubin, total 0.3 06/04/2020 04:05 PM    Alk.  phosphatase 109 06/04/2020 04:05 PM    Protein, total 7.3 06/04/2020 04:05 PM    Albumin 3.2 (L) 06/04/2020 04:05 PM    Globulin 4.1 (H) 06/04/2020 04:05 PM    A-G Ratio 0.8 06/04/2020 04:05 PM    ALT (SGPT) 16 06/04/2020 04:05 PM     Lab Results   Component Value Date/Time    Cholesterol, total 127 06/04/2020 04:05 PM    HDL Cholesterol 34 (L) 06/04/2020 04:05 PM    LDL, calculated 46.6 06/04/2020 04:05 PM    Triglyceride 232 (H) 06/04/2020 04:05 PM    CHOL/HDL Ratio 3.7 06/04/2020 04:05 PM     Lab Results   Component Value Date/Time    Hemoglobin A1c 5.5 06/04/2020 04:05 PM    Hemoglobin A1c (POC) 5.9 05/06/2013 11:17 AM    Hemoglobin A1c, External 6.1 06/01/2018     EKG:   Sinus rhythm, 78 beats per minute, right bundle branch block, nonspecific ST-T wave changes. TILT TABLE: February 2015:  Patient was initially placed in supine position. Heart rate was between 65-70 beats per minute, sinus rhythm. Blood pressure was 113/61 mmHg, with maximum blood pressure of 116/63 mmHg. Patient was placed in 60-degree upright tilting position. About 5-7 minutes into upright tilting position at 70 degrees, patient started feeling like her heart rate speeding up and legs feeling heavy, along with some dizziness. Blood pressure slowly decreased up to 70/43 mmHg. Heart rate maximum noted was up from 65 to 81 beats per minute. There was no obvious tachycardia. At the time, patient was given IV fluid and then placed back supine position, with normalization of the blood pressure and resolution of his symptoms. Heart rhythm remained in sinus. Patient did have a drop in the systolic blood pressure more than 20 mmHg upon upright tilting position, without any significant increase in the heart rate. This suggests possible autonomic dysfunction. SUMMARY:  Upright tilt table testing with reproduction of symptoms. More than 20 mmHg drop in systolic blood pressure, without change in the heart rate during upright tilting position, associated with symptoms. This may represent autonomic dysfunction    ECHO: (01/18)  SUMMARY:  Left ventricle: Systolic function was normal. Ejection fraction was estimated   in the range of 55 % to 60 %. There was hypokinesis of the basal inferoseptal wall.  Alpha High thickness was at the upper   limits of normal.  Right ventricle: The ventricle was dilated. Inferior vena cava, hepatic veins: The inferior vena cava was mildly dilated.   The respirophasic change in diameter was  less than 50%. No major VHD    STRESS TEST (EST, PHARM, NUC, ECHO etc): October 2014:  1. Small to medium sized area of mildly intense reversible defect involvement anterolateral wall. 2.  There is also a small area of reversible defect involving basilar inferior wall concerning for ischemic focus. 3.  Calculated ejection fraction of 56% without any wall motion abnormality. CATHETERIZATION: October 2014:  LM - patent  LAD - 30% prox, 80% mid, 90% apical  D1 - 100%  RI - 95% ostial (failed PCI, calcified 1.75 - 2.0 mm vessel)  Cx - 30-40% prox  OM1 - subtotal  OM2 - 80%  RCA - 30% diffuse  RPDA - 100%  RPLV - 50%    Impression / Plan:     Diabetes    Hypertension    Dyslipidemia    Coronary artery disease       Mr. Kianna Kumar returns to the office for follow up. Coronary artery disease:    Mr. Kianna Kumar had a cardiac catheterization in October, 2014 and required stent of the obtuse marginal branch. He is on aspirin and Plavix, isosorbide mononitrate and Lipitor. He is on Toprol-XL 25 mg daily. Heart rate is 59. Blood pressure is slightly elevated. Will increase Imdur to 60 mg daily    Hypertension: Blood pressure is slightly running high. Will increase Imdur to 60 mg daily. Continue rest of the medication    Diabetes: This is being managed by primary care provider. Goal Hgb A1c less than 7 is recommended from cardiovascular standpoint. Last Hgb A1c  was 5.5    Hyperlipidemia:    Number to tolerate Pravachol and atorvastatin because of significant myalgia. Last LDL 46. Currently on Zetia. Continue same      RTC in 6 months or sooner if needed.

## 2020-11-11 ENCOUNTER — OFFICE VISIT (OUTPATIENT)
Dept: INTERNAL MEDICINE CLINIC | Age: 70
End: 2020-11-11
Payer: MEDICARE

## 2020-11-11 ENCOUNTER — HOSPITAL ENCOUNTER (OUTPATIENT)
Dept: LAB | Age: 70
Discharge: HOME | End: 2020-11-11
Payer: MEDICARE

## 2020-11-11 VITALS
HEART RATE: 56 BPM | BODY MASS INDEX: 26.56 KG/M2 | DIASTOLIC BLOOD PRESSURE: 81 MMHG | OXYGEN SATURATION: 99 % | HEIGHT: 74 IN | RESPIRATION RATE: 22 BRPM | WEIGHT: 207 LBS | SYSTOLIC BLOOD PRESSURE: 169 MMHG | TEMPERATURE: 97.5 F

## 2020-11-11 DIAGNOSIS — E78.5 DYSLIPIDEMIA: Chronic | ICD-10-CM

## 2020-11-11 DIAGNOSIS — E11.21 TYPE 2 DIABETES MELLITUS WITH NEPHROPATHY (HCC): ICD-10-CM

## 2020-11-11 DIAGNOSIS — Z00.00 MEDICARE ANNUAL WELLNESS VISIT, SUBSEQUENT: Primary | ICD-10-CM

## 2020-11-11 DIAGNOSIS — I11.9 BENIGN HYPERTENSIVE HEART DISEASE WITHOUT HEART FAILURE: Chronic | ICD-10-CM

## 2020-11-11 DIAGNOSIS — Z23 NEEDS FLU SHOT: ICD-10-CM

## 2020-11-11 DIAGNOSIS — I25.119 ATHEROSCLEROSIS OF NATIVE CORONARY ARTERY OF NATIVE HEART WITH ANGINA PECTORIS (HCC): ICD-10-CM

## 2020-11-11 PROBLEM — I25.118 CORONARY ARTERY DISEASE WITH STABLE ANGINA PECTORIS (HCC): Status: ACTIVE | Noted: 2020-11-11

## 2020-11-11 LAB
ALBUMIN SERPL-MCNC: 3.5 G/DL (ref 3.4–5)
ALBUMIN/GLOB SERPL: 0.9 {RATIO} (ref 0.8–1.7)
ALP SERPL-CCNC: 119 U/L (ref 45–117)
ALT SERPL-CCNC: 21 U/L (ref 16–61)
ANION GAP SERPL CALC-SCNC: 3 MMOL/L (ref 3–18)
AST SERPL-CCNC: 16 U/L (ref 10–38)
BILIRUB SERPL-MCNC: 0.4 MG/DL (ref 0.2–1)
BUN SERPL-MCNC: 19 MG/DL (ref 7–18)
BUN/CREAT SERPL: 16 (ref 12–20)
CALCIUM SERPL-MCNC: 9.1 MG/DL (ref 8.5–10.1)
CHLORIDE SERPL-SCNC: 104 MMOL/L (ref 100–111)
CHOLEST SERPL-MCNC: 90 MG/DL
CO2 SERPL-SCNC: 32 MMOL/L (ref 21–32)
CREAT SERPL-MCNC: 1.2 MG/DL (ref 0.6–1.3)
GLOBULIN SER CALC-MCNC: 4 G/DL (ref 2–4)
GLUCOSE SERPL-MCNC: 120 MG/DL (ref 74–99)
HBA1C MFR BLD: 5.7 % (ref 4.2–5.6)
HDLC SERPL-MCNC: 35 MG/DL (ref 40–60)
HDLC SERPL: 2.6 {RATIO} (ref 0–5)
LDLC SERPL CALC-MCNC: 14 MG/DL (ref 0–100)
LIPID PROFILE,FLP: ABNORMAL
POTASSIUM SERPL-SCNC: 3.9 MMOL/L (ref 3.5–5.5)
PROT SERPL-MCNC: 7.5 G/DL (ref 6.4–8.2)
SODIUM SERPL-SCNC: 139 MMOL/L (ref 136–145)
TRIGL SERPL-MCNC: 205 MG/DL (ref ?–150)
VLDLC SERPL CALC-MCNC: 41 MG/DL

## 2020-11-11 PROCEDURE — G8432 DEP SCR NOT DOC, RNG: HCPCS | Performed by: INTERNAL MEDICINE

## 2020-11-11 PROCEDURE — G8427 DOCREV CUR MEDS BY ELIG CLIN: HCPCS | Performed by: INTERNAL MEDICINE

## 2020-11-11 PROCEDURE — 3017F COLORECTAL CA SCREEN DOC REV: CPT | Performed by: INTERNAL MEDICINE

## 2020-11-11 PROCEDURE — 3044F HG A1C LEVEL LT 7.0%: CPT | Performed by: INTERNAL MEDICINE

## 2020-11-11 PROCEDURE — G0439 PPPS, SUBSEQ VISIT: HCPCS | Performed by: INTERNAL MEDICINE

## 2020-11-11 PROCEDURE — 99214 OFFICE O/P EST MOD 30 MIN: CPT | Performed by: INTERNAL MEDICINE

## 2020-11-11 PROCEDURE — G8419 CALC BMI OUT NRM PARAM NOF/U: HCPCS | Performed by: INTERNAL MEDICINE

## 2020-11-11 PROCEDURE — 80053 COMPREHEN METABOLIC PANEL: CPT

## 2020-11-11 PROCEDURE — G8536 NO DOC ELDER MAL SCRN: HCPCS | Performed by: INTERNAL MEDICINE

## 2020-11-11 PROCEDURE — 90694 VACC AIIV4 NO PRSRV 0.5ML IM: CPT

## 2020-11-11 PROCEDURE — 2022F DILAT RTA XM EVC RTNOPTHY: CPT | Performed by: INTERNAL MEDICINE

## 2020-11-11 PROCEDURE — 1101F PT FALLS ASSESS-DOCD LE1/YR: CPT | Performed by: INTERNAL MEDICINE

## 2020-11-11 PROCEDURE — 80061 LIPID PANEL: CPT

## 2020-11-11 PROCEDURE — 36415 COLL VENOUS BLD VENIPUNCTURE: CPT

## 2020-11-11 PROCEDURE — 83036 HEMOGLOBIN GLYCOSYLATED A1C: CPT

## 2020-11-11 PROCEDURE — G8753 SYS BP > OR = 140: HCPCS | Performed by: INTERNAL MEDICINE

## 2020-11-11 PROCEDURE — G8754 DIAS BP LESS 90: HCPCS | Performed by: INTERNAL MEDICINE

## 2020-11-11 RX ORDER — ROSUVASTATIN CALCIUM 20 MG/1
TABLET, COATED ORAL
COMMUNITY
End: 2021-09-16

## 2020-11-11 NOTE — PROGRESS NOTES
Influenza immunization administered left deltoid  11/11/2020 by Meryl Zuniga LPN with pt's consent. Patient tolerated procedure well. No reactions noted.

## 2020-11-11 NOTE — PROGRESS NOTES
ROOM # 6    Debby Delatorre presents today for No chief complaint on file. Debby Delatorre preferred language for health care discussion is english/other. Is someone accompanying this pt? no    Is the patient using any DME equipment during [address]?  cane    Depression Screening:  3 most recent PHQ Screens 10/16/2020 6/4/2020 4/2/2020 3/23/2020 2/24/2020 1/13/2020 11/11/2019   PHQ Not Done - - - - - - -   Little interest or pleasure in doing things Not at all Nearly every day Not at all Not at all Not at all Not at all Nearly every day   Feeling down, depressed, irritable, or hopeless Not at all Several days Not at all Not at all Not at all Not at all Several days   Total Score PHQ 2 0 4 0 0 0 0 4   Trouble falling or staying asleep, or sleeping too much - - - - - - Not at all   Feeling tired or having little energy - - - - - - Nearly every day   Poor appetite, weight loss, or overeating - - - - - - Several days   Feeling bad about yourself - or that you are a failure or have let yourself or your family down - - - - - - Several days   Trouble concentrating on things such as school, work, reading, or watching TV - - - - - - Not at all   Moving or speaking so slowly that other people could have noticed; or the opposite being so fidgety that others notice - - - - - - Not at all   Thoughts of being better off dead, or hurting yourself in some way - - - - - - Not at all   PHQ 9 Score - - - - - - 9   How difficult have these problems made it for you to do your work, take care of your home and get along with others - - - - - - -       Learning Assessment:  Learning Assessment 4/13/2015 4/17/2014 12/12/2013   PRIMARY LEARNER Patient Patient -   HIGHEST LEVEL OF EDUCATION - PRIMARY LEARNER  SOME COLLEGE - SOME COLLEGE   BARRIERS PRIMARY LEARNER NONE - NONE   CO-LEARNER CAREGIVER No - -   PRIMARY LANGUAGE ENGLISH ENGLISH ENGLISH    NEED - - No   LEARNER PREFERENCE PRIMARY LISTENING DEMONSTRATION PICTURES LEARNING SPECIAL TOPICS - - none   ANSWERED BY patient - -   RELATIONSHIP SELF - -       Abuse Screening:  Abuse Screening Questionnaire 11/13/2017 4/13/2015   Do you ever feel afraid of your partner? N N   Are you in a relationship with someone who physically or mentally threatens you? N N   Is it safe for you to go home? Y Y       Fall Risk  Fall Risk Assessment, last 12 mths 10/16/2020 4/2/2020 3/23/2020 11/11/2019 3/5/2019 1/29/2019 11/6/2018   Able to walk? Yes Yes Yes Yes Yes Yes Yes   Fall in past 12 months? No No Yes - No Yes Yes   Fall with injury? - - Yes - - No No   Number of falls in past 12 months - - 8 or more - - 3 5   Fall Risk Score - - 9 - - 3 5           Health Maintenance reviewed and discussed per provider. Yes    Ofelia Cherry is due for   Health Maintenance Due   Topic Date Due    AAA Screening 73-69 YO Male Smoking Patients  03/29/2015    Flu Vaccine (1) 09/01/2020    Foot Exam Q1  10/10/2020    Medicare Yearly Exam  11/11/2020     Please order/place referral if appropriate. Advance Directive:  1. Do you have an advance directive in place? Patient Reply: no    2. If not, would you like material regarding how to put one in place? Patient Reply: no    Coordination of Care:  1. Have you been to the ER, urgent care clinic since your last visit? Hospitalized since your last visit? no    2. Have you seen or consulted any other health care providers outside of the [address] [place] [address] since your last visit? Include any pap smears or colon screening.  Dr. Gage Tomlinson

## 2020-11-11 NOTE — PROGRESS NOTES
The following is a separate encounter visit:  [address] [facility] ROSIE Shay is a 79 y.o. male. BP (!) 169/81 (BP 1 Location: Right arm, BP Patient Position: Sitting)   Pulse (!) 56   Temp 97.5 °F (36.4 °C) (Temporal)   Resp 22   Ht 6' 2\" (1.88 m)   Wt 207 lb (93.9 kg)   SpO2 99%   BMI 26.58 kg/m²               Current Outpatient Medications:  isosorbide mononitrate ER (IMDUR) 60 mg CR tablet, Take 1 Tab by mouth every morning. traMADoL (ULTRAM) 50 mg tablet, TAKE 1 TABLET BY MOUTH EVERY 6 HOURS AS NEEDED FOR PAIN. MAX DAILY AMOUNT: 200 MG  losartan (COZAAR) 100 mg tablet, TAKE 1 TABLET BY MOUTH DAILY  gabapentin (NEURONTIN) 300 mg capsule, TAKE 1 CAPSULE BY MOUTH THREE TIMES DAILY. MAX DAILY AMOUNT: 900 MG  ezetimibe (ZETIA) 10 mg tablet, Take 1 Tab by mouth daily. metoprolol succinate (TOPROL-XL) 25 mg XL tablet, Take 1 Tab by mouth daily. sertraline (ZOLOFT) 100 mg tablet, TAKE 2 TABLETS BY MOUTH DAILY  oxyCODONE IR (ROXICODONE) 5 mg immediate release tablet, TAKE 1 TABLET BY MOUTH EVERY 4 HOURS AS NEEDED FOR UP TO 10 DAYS  glimepiride (AMARYL) 1 mg tablet, TAKE 1 TABLET BY MOUTH DAILY BEFORE BREAKFAST  famotidine (PEPCID) 20 mg tablet, Take 20 mg by mouth two (2) times a day. traZODone (DESYREL) 50 mg tablet, TAKE 1 TO 2 TABLETS BY MOUTH AT BEDTIME AS NEEDED  fluticasone propion-salmeterol (ADVAIR/WIXELA) 100-50 mcg/dose diskus inhaler, Take 1 Puff by inhalation. albuterol (PROVENTIL HFA, VENTOLIN HFA, PROAIR HFA) 90 mcg/actuation inhaler, Take 2 Puffs by inhalation. neomycin-polymyxin-hydrocortisone (CORTISPORIN) otic solution, 4 Drops. acetaminophen (TYLENOL) 500 mg tablet, 1,000 mg.  docusate sodium (COLACE) 100 mg capsule, 100 mg.  colchicine 0.6 mg tablet, TAKE 2 TABLETS NOW AND 1 TABLET IN 6 HOURS IF NEEDED REPEAT IN 6 HOURS AS NEEDED  ofloxacin (FLOXIN) 0.3 % otic solution, Administer 5 Drops in left ear daily.  (Patient taking differently: Administer 5 Drops in left ear as needed.)  diclofenac (VOLTAREN) 1 % gel, Apply 2 g to affected area four (4) times daily. carboxymethylcellulose sodium (REFRESH OP), Apply 1 Drop to eye daily. nitroglycerin (NITROSTAT) 0.4 mg SL tablet, 0.4 mg by SubLINGual route every five (5) minutes as needed for Chest Pain. Up to 3 doses. ferrous sulfate 325 mg (65 mg iron) tablet, Take  by mouth Daily (before breakfast). clopidogrel (PLAVIX) 75 mg tab, Take 1 Tab by mouth daily. (Patient taking differently: Take 75 mg by mouth every other day.)  fluticasone (FLONASE) 50 mcg/actuation nasal spray, SHAKE LIQUID AND USE 2 SPRAYS IN EACH NOSTRIL DAILY  terazosin (HYTRIN) 2 mg capsule, Take 1 Cap by mouth nightly. ZINC ACETATE PO, Take 1,000 mg by mouth.  potassium chloride (KLOR-CON) 10 mEq tablet, TAKE 1 TABLET BY MOUTH TWICE DAILY  cholecalciferol (VITAMIN D3) 1,000 unit tablet, Take  by mouth daily. ascorbic acid, vitamin C, (VITAMIN C) 250 mg tablet, Take  by mouth.  garlic 7,741 mg cap, Take 1 Cap by mouth daily. multivit-min-FA-lycopen-lutein 0.4-300-250 mg-mcg-mcg tab, Take 1 Tab by mouth daily. aspirin delayed-release 81 mg tablet, Take 1 tablet by mouth daily. Hypertension    The history is provided by the patient. This is a chronic problem. The current episode started more than 1 week ago. The problem has not changed since onset. Pertinent negatives include no chest pain, no palpitations, no headaches, no dizziness and no shortness of breath. Risk factors include a sedentary lifestyle, male gender, hypertension, dyslipidemia and diabetes mellitus. Diabetes   The history is provided by the patient. This is a chronic problem. The current episode started more than 1 week ago. The problem occurs daily. The problem has not changed since onset. Pertinent negatives include no chest pain, no headaches and no shortness of breath. Exacerbated by: diet. The symptoms are relieved by medications (diet).        Review of Systems   Constitutional: Negative for fever. Respiratory: Negative for cough and shortness of breath. Cardiovascular: Negative for chest pain and palpitations. Neurological: Negative for dizziness and headaches. Physical Exam  Vitals signs and nursing note reviewed. Constitutional:       General: He is not in acute distress. Appearance: Normal appearance. He is well-developed. He is not diaphoretic. Cardiovascular:      Rate and Rhythm: Normal rate and regular rhythm. Pulmonary:      Effort: Pulmonary effort is normal.      Breath sounds: Normal breath sounds. Musculoskeletal:      Comments: Neither knee is hot or tender   Skin:     General: Skin is warm and dry. Neurological:      Mental Status: He is alert and oriented to person, place, and time. Comments: Diabetic foot exam:     Left Foot:   Visual Exam: normal    Pulse DP: 1+ (weak)   Filament test: reduced sensation    Vibratory sensation: absent      Right Foot:   Visual Exam: normal    Pulse DP: 1+ (weak)   Filament test: reduced sensation    Vibratory sensation: absent     Psychiatric:         Mood and Affect: Mood normal.         Behavior: Behavior normal.         ASSESSMENT and PLAN    ICD-10-CM ICD-9-CM                  3. Type 2 diabetes mellitus with nephropathy (HCC)  L99.32 759.87 METABOLIC PANEL, COMPREHENSIVE     583.81 HEMOGLOBIN A1C W/O EAG      LIPID PANEL       DIABETES FOOT EXAM   4. Dyslipidemia  E78.5 272.4 LIPID PANEL   5. Hypertension  W91.9 171.45 METABOLIC PANEL, COMPREHENSIVE      LIPID PANEL   6. Atherosclerosis of native coronary artery of native heart with angina pector[facility]  I25.119 414.01      413.9            BP up some. He took his meds late and is in pain today    DM has been controlled    Update lab    Joint pain. Suggested trial CBD. Also horse liniment as a topical might help. Continue tramadol prn. He has used up his allotment for PT. The pool closed where he was going for aquatic therapy.  These things have set him back    F/u 6 months        This is the Subsequent Medicare Annual Wellness Exam, performed 12 months or more after the Initial AWV or the last Subsequent AWV    I have reviewed the patient's medical history in detail and updated the computerized patient record. Depression Risk Factor Screening:     3 most recent PHQ Screens 10/16/2020   PHQ Not Done -   Little interest or pleasure in doing things Not at all   Feeling down, depressed, irritable, or hopeless Not at all   Total Score PHQ 2 0   Trouble falling or staying asleep, or sleeping too much -   Feeling tired or having little energy -   Poor appetite, weight loss, or overeating -   Feeling bad about yourself - or that you are a failure or have let yourself or your family down -   Trouble concentrating on things such as school, work, reading, or watching TV -   Moving or speaking so slowly that other people could have noticed; or the opposite being so fidgety that others notice -   Thoughts of being better off dead, or hurting yourself in some way -   PHQ 9 Score -   How difficult have these problems made it for you to do your work, take care of your home and get along with others -       Alcohol Risk Screen   Do you average more than 1 drink per night or more than 7 drinks a week: No    In the past three months have you have had more than 4 drinks containing alcohol on one occasion: No        Functional Ability and Level of Safety:   Hearing: The patient wears hearing aids. Activities of Daily Living: The home contains: handrails and grab bars. Walk in shower  Patient does total self care     Ambulation: with difficulty, uses a cane     Fall Risk:  Fall Risk Assessment, last 12 mths 10/16/2020   Able to walk? Yes   Fall in past 12 months?  No   Fall with injury? -   Number of falls in past 12 months -   Fall Risk Score -     Abuse Screen:  Patient is not abused       Cognitive Screening   Has your family/caregiver stated any concerns about your memory: no     Cognitive Screening: Normal - Animal Naming Test    Assessment/Plan   Education and counseling provided:  Are appropriate based on today's review and evaluation    Diagnoses and all orders for this visit:    1. Medicare annual wellness visit, subsequent    2. Needs flu shot  -     FLU (FLUAD QUAD INFLUENZA VACCINE,QUAD,ADJUVANTED)          Health Maintenance Due     Health Maintenance Due   Topic Date Due    AAA Screening 73-69 YO Male Smoking Patients  03/29/2015    Flu Vaccine (1) 09/01/2020    Foot Exam Q1  10/10/2020    Medicare Yearly Exam  11/11/2020       Patient Care Team   Patient Care Team:  Burton Henson MD as PCP - General (Internal Medicine)  Burton Henson MD as PCP - REHABILITATION [facility] Empaneled Provider  Bibiana Gotti MD as Consulting Provider (Ophthalmology)  Fito Longo MD as Consulting Provider (Gastroenterology)  Renee De La Rosa MD as Consulting Provider (Orthopedic Surgery)  Gino Dumont MD as Consulting Provider (Orthopedic Surgery)  Carin Schumacher DPM as Consulting Provider (Podiatry)  Kuldeep Juarez DO as Consulting Provider (Physical Medicine and Rehabilitation)    History     Patient Active Problem List   Diagnosis Code    Dyslipidemia E78.5    Coronary artery disease I25.10    Hypertension I11.9    Type 2 diabetes mellitus with nephropathy ([address] E11.21    Type 2 diabetes mellitus with diabetic neuropathy ([facility] E11.40    Asthma J45.909     Past Medical History:   Diagnosis Date    Agent orange exposure     Asbestos exposure     Asthma 09/2019    told mild.  Autonomic dysfunction     abnormal tilt table 2/15    BPH     CAD (coronary artery disease)     S/P OM NOY in 2014    Choroidal nevus, left eye     Colonic polyp 08/28/2018    multiple.  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 Deandre Rucker N/A 7/9/2018    COLONOSCOPY performed by Anita Cantu [facility] at [facility] N/A 8/28/2018    COLONOSCOPY performed by Anita Cantu MD at [address] HX CATARACT REMOVAL      7/14  bilat    HX CERVICAL LAMINECTOMY      HX COLONOSCOPY  07/09/2018    pt states have to redo d/t unclear imaging    HX ENDOSCOPY  10/01/2019    EGD. At [facility]. No significant pathological findings    HX HEART CATHETERIZATION  2014    Stent    HX KNEE REPLACEMENT Right 03/06/2020    Dr. Flora Trujillo HX ORTHOPAEDIC Left 02/05/2018    knee replacement    HX TUMOR REMOVAL      3/13  wide excision right thigh sarcoma    HX TYMPANOMASTOIDECTOMY       Current Outpatient Medications   Medication Sig Dispense Refill    rosuvastatin (CRESTOR) 20 mg tablet rosuvastatin 20 mg tablet      isosorbide mononitrate ER (IMDUR) 60 mg CR tablet Take 1 Tab by mouth every morning. 90 Tab 3    traMADoL (ULTRAM) 50 mg tablet TAKE 1 TABLET BY MOUTH EVERY 6 HOURS AS NEEDED FOR PAIN. MAX DAILY AMOUNT: 200  Tab 5    losartan (COZAAR) 100 mg tablet TAKE 1 TABLET BY MOUTH DAILY 90 Tab 4    gabapentin (NEURONTIN) 300 mg capsule TAKE 1 CAPSULE BY MOUTH THREE TIMES DAILY. MAX DAILY AMOUNT: 900 MG 90 Cap 5    ezetimibe (ZETIA) 10 mg tablet Take 1 Tab by mouth daily. 30 Tab 5    metoprolol succinate (TOPROL-XL) 25 mg XL tablet Take 1 Tab by mouth daily.  30 Tab 6    sertraline (ZOLOFT) 100 mg tablet TAKE 2 TABLETS BY MOUTH DAILY (Patient taking differently: 150 mg.) 180 Tab 4    famotidine (PEPCID) 20 mg tablet Take 20 mg by mouth two (2) times a day.  traZODone (DESYREL) 50 mg tablet TAKE 1 TO 2 TABLETS BY MOUTH AT BEDTIME AS NEEDED 180 Tab 5    fluticasone propion-salmeterol (ADVAIR/WIXELA) 100-50 mcg/dose diskus inhaler Take 1 Puff by inhalation.  albuterol (PROVENTIL HFA, VENTOLIN HFA, PROAIR HFA) 90 mcg/actuation inhaler Take 2 Puffs by inhalation.  acetaminophen (TYLENOL) 500 mg tablet 1,000 mg.      docusate sodium (COLACE) 100 mg capsule 100 mg.  colchicine 0.6 mg tablet TAKE 2 TABLETS NOW AND 1 TABLET IN 6 HOURS IF NEEDED REPEAT IN 6 HOURS AS NEEDED 15 Tab 5    diclofenac (VOLTAREN) 1 % gel Apply 2 g to affected area four (4) times daily.  carboxymethylcellulose sodium (REFRESH OP) Apply 1 Drop to eye daily.  ferrous sulfate 325 mg (65 mg iron) tablet Take  by mouth Daily (before breakfast).  clopidogrel (PLAVIX) 75 mg tab Take 1 Tab by mouth daily. (Patient taking differently: Take 75 mg by mouth every other day.) 90 Tab 3    fluticasone (FLONASE) 50 mcg/actuation nasal spray SHAKE LIQUID AND USE 2 SPRAYS IN EACH NOSTRIL DAILY 3 Bottle 6    terazosin (HYTRIN) 2 mg capsule Take 1 Cap by mouth nightly. 90 Cap 3    cholecalciferol (VITAMIN D3) 1,000 unit tablet Take  by mouth daily.  ascorbic acid, vitamin C, (VITAMIN C) 250 mg tablet Take  by mouth.  aspirin delayed-release 81 mg tablet Take 1 tablet by mouth daily. 90 tablet 5    oxyCODONE IR (ROXICODONE) 5 mg immediate release tablet TAKE 1 TABLET BY MOUTH EVERY 4 HOURS AS NEEDED FOR UP TO 10 DAYS      glimepiride (AMARYL) 1 mg tablet TAKE 1 TABLET BY MOUTH DAILY BEFORE BREAKFAST 90 Tab 4    neomycin-polymyxin-hydrocortisone (CORTISPORIN) otic solution 4 Drops.  ofloxacin (FLOXIN) 0.3 % otic solution Administer 5 Drops in left ear daily.  (Patient taking differently: Administer 5 Drops in left ear as needed.) 5 mL 0    nitroglycerin (NITROSTAT) 0.4 mg SL tablet 0.4 mg by SubLINGual route every five (5) minutes as needed for Chest Pain. Up to 3 doses.  ZINC ACETATE PO Take 1,000 mg by mouth.  potassium chloride (KLOR-CON) 10 mEq tablet TAKE 1 TABLET BY MOUTH TWICE DAILY 359 Tab 4    garlic 4,968 mg cap Take 1 Cap by mouth daily.  multivit-min-FA-lycopen-lutein 0.4-300-250 mg-mcg-mcg tab Take 1 Tab by mouth daily. Allergies   Allergen Reactions    Beta Blocker [Beta-Blockers (Beta-Adrenergic Blocking Agts)] Other (comments)     symptomatic bradycardia    Codeine Other (comments)     Passed//fainted  patient doesn't recall reaction, occurred as a teenager    Darvocet A500 [Propoxyphene N-Acetaminophen] Other (comments)     throat swelling    Dexbrompheniramine-Pseudoephed Other (comments)     Facial swelling.  1980\"s    Lasix [Furosemide] Itching    Lipitor [Atorvastatin] Other (comments)    Sulfa (Sulfonamide Antibiotics) Hives       Family History   Problem Relation Age of Onset    Diabetes Father     Heart Disease Father         cardiomyopathydementia    Hypertension Father     Cancer Father         prostate    Parkinsonism Father     Dementia Father     High Cholesterol Father     Kidney Disease Father     Other Father         colon polyps    Headache Mother     Psychiatric Disorder Mother     Kidney Disease Mother     Hypertension Mother     High Cholesterol Mother     Other Mother         GERD/polymyalgia rheumaticacolon osiris    Heart Disease Mother     Cancer Sister      Social History     Tobacco Use    Smoking status: Former Smoker     Packs/day: 1.00     Years: 15.00     Pack years: 15.00     Last attempt to quit: 12/3/2016     Years since quitting: 3.9    Smokeless tobacco: Never Used   Substance Use Topics    Alcohol use: No     Alcohol/week: 0.0 standard drinks

## 2020-11-11 NOTE — PATIENT INSTRUCTIONS
Medicare Wellness Visit, Male The best way to live healthy is to have a lifestyle where you eat a well-balanced diet, exercise regularly, limit alcohol use, and quit all forms of tobacco/nicotine, if applicable. Regular preventive services are another way to keep healthy. Preventive services (vaccines, screening tests, monitoring & exams) can help personalize your care plan, which helps you manage your own care. Screening tests can find health problems at the earliest stages, when they are easiest to treat. [facility] follows the current, evidence-based guidelines published by the [facility] ([facility]) when recommending preventive services for our patients. Because we follow these guidelines, sometimes recommendations change over time as research supports it. (For example, a prostate screening blood test is no longer routinely recommended for men with no symptoms). Of course, you and your doctor may decide to screen more often for some diseases, based on your risk and co-morbidities (chronic disease you are already diagnosed with). Preventive services for you include: - Medicare offers their members a free annual wellness visit, which is time for you and your primary care provider to discuss and plan for your preventive service needs. Take advantage of this benefit every year! 
-All adults over age 72 should receive the recommended pneumonia vaccines. Current USPSTF guidelines recommend a series of two vaccines for the best pneumonia protection.  
-All adults should have a flu vaccine yearly and tetanus vaccine every 10 years. 
-All adults age 48 and older should receive the shingles vaccines (series of two vaccines).       
-All adults age 38-68 who are overweight should have a diabetes screening test once every three years.  
-Other screening tests & preventive services for persons with diabetes include: an eye exam to screen for diabetic retinopathy, a kidney function test, a foot exam, and stricter control over your cholesterol.  
-Cardiovascular screening for adults with routine risk involves an electrocardiogram (ECG) at intervals determined by the provider.  
-Colorectal cancer screening should be done for adults age 54-65 with no increased risk factors for colorectal cancer. There are a number of acceptable methods of screening for this type of cancer. Each test has its own benefits and drawbacks. Discuss with your provider what is most appropriate for you during your annual wellness visit. The different tests include: colonoscopy (considered the best screening method), a fecal occult blood test, a fecal DNA test, and sigmoidoscopy. 
-All adults born between [facility] should be screened once for Hepatitis C. 
-An Abdominal Aortic Aneurysm (AAA) Screening is recommended for men age 73-68 who has ever smoked in their lifetime. Here is a list of your current Health Maintenance items (your personalized list of preventive services) with a due date: 
Health Maintenance Due Topic Date Due  
 AAA Screening  03/29/2015  Yearly Flu Vaccine (1) 09/01/2020 [facility] Diabetic Foot Care  10/10/2020 [facility] Annual Well Visit  11/11/2020

## 2020-11-16 DIAGNOSIS — E78.5 DYSLIPIDEMIA: ICD-10-CM

## 2020-11-16 RX ORDER — EZETIMIBE 10 MG/1
TABLET ORAL
Qty: 30 TAB | Refills: 5 | Status: SHIPPED | OUTPATIENT
Start: 2020-11-16 | End: 2021-05-18

## 2020-11-17 NOTE — PATIENT DISCUSSION
1.  DM Type II (Diet Controlled) without sign of diabetic retinopathy and no blot heme on dilated retinal examination today OU No Macular Edema:  Discussed the pathophysiology of diabetes and its effect on the eye and risk of blindness. Stressed the importance of strong glucose control. Advised of importance of at least yearly dilated examinations but to contact us immediately for any problems or concerns. 2. Choroidal Nevus OS: Appears Benign and stable. Observe for changes. Followed by Dr. Perfecto Alexandra 3. BREE w/ PEK OU- No relief from ATs alone. Begin Restasis BID OU (erx). Recommend ATs QID OU routinely 4. Pseudophakia OU - H/o YAG Cap OU 5.  Glaucoma Suspect OU (CD 0.55/0.70): Past w/u negative. IOP stable. Patient is considered Low Risk. Condition was discussed with patient and patient understands. Will continue to monitor patient for any progression in condition. Patient was advised to call us with any problems questions or concerns. 6.  H/o PXE OU Patient defers the refraction at today's visitReturn for an appointment in 3 months 10 (check ks on Restasis) with Dr. Deinse Saucedo.

## 2020-11-23 RX ORDER — METOPROLOL SUCCINATE 25 MG/1
TABLET, EXTENDED RELEASE ORAL
Qty: 30 TAB | Refills: 6 | Status: SHIPPED | OUTPATIENT
Start: 2020-11-23 | End: 2021-03-22 | Stop reason: SDUPTHER

## 2020-12-18 ENCOUNTER — OFFICE VISIT (OUTPATIENT)
Dept: INTERNAL MEDICINE CLINIC | Age: 70
End: 2020-12-18

## 2020-12-18 DIAGNOSIS — Z13.6 ENCOUNTER FOR SCREENING FOR ABDOMINAL AORTIC ANEURYSM (AAA) IN PATIENT 50 YEARS OF AGE OR OLDER WITH HISTORY OF SMOKING: Primary | ICD-10-CM

## 2020-12-18 DIAGNOSIS — Z87.891 ENCOUNTER FOR SCREENING FOR ABDOMINAL AORTIC ANEURYSM (AAA) IN PATIENT 50 YEARS OF AGE OR OLDER WITH HISTORY OF SMOKING: Primary | ICD-10-CM

## 2020-12-18 DIAGNOSIS — Z76.0 MEDICATION REFILL: ICD-10-CM

## 2020-12-18 RX ORDER — SERTRALINE HYDROCHLORIDE 100 MG/1
150 TABLET, FILM COATED ORAL DAILY
Qty: 135 TAB | Refills: 0
Start: 2020-12-18 | End: 2021-08-04

## 2020-12-18 NOTE — PROGRESS NOTES
Pt was told to come for AAA screening. He is not sure by whom. He is a former smoker. Has no sxs. Has not had any scans available to us since 2013    He also meets the criteria for low dose CT for lung cancer screening---but he has had a recent chest CT at [facility] on 3/18/20. Will order u/s for AAA screening. Pt did not need OV for this order.  See as appointed in the spring

## 2020-12-20 DIAGNOSIS — Z76.0 MEDICATION REFILL: ICD-10-CM

## 2020-12-20 RX ORDER — TRAZODONE HYDROCHLORIDE 50 MG/1
TABLET ORAL
Qty: 180 TAB | Refills: 5 | Status: SHIPPED | OUTPATIENT
Start: 2020-12-20 | End: 2022-07-11 | Stop reason: SDUPTHER

## 2020-12-21 ENCOUNTER — TRANSCRIBE ORDER (OUTPATIENT)
Dept: SCHEDULING | Age: 70
End: 2020-12-21

## 2021-02-09 RX ORDER — PRAVASTATIN SODIUM 40 MG/1
TABLET ORAL
Qty: 90 TAB | Refills: 0 | Status: SHIPPED | OUTPATIENT
Start: 2021-02-09 | End: 2021-05-17

## 2021-03-05 ENCOUNTER — TELEPHONE (OUTPATIENT)
Dept: INTERNAL MEDICINE CLINIC | Age: 71
End: 2021-03-05

## 2021-03-05 NOTE — TELEPHONE ENCOUNTER
His blood pressure had been doing well. What has changed?   He can double his metoprolol XL to 50 mg if his BP remains over 150 AND I would like him to make an appt in the next 2 weeks for a recheck of this

## 2021-03-05 NOTE — TELEPHONE ENCOUNTER
Diana Ford from [facility] ( Dr. Danni Ortiz )  Office called and stated patient took his b/p medication this morning and is feeling ok. B/p  1. 198/80  2. 207/76  3. 167/89    He was in the office for spinal injection but it was not given due to blood pressure and she said he wasn't in that much pain. Office wanted to inform you. Wally Carrington

## 2021-03-08 NOTE — TELEPHONE ENCOUNTER
Spoke with patient and  2 patient identifiers confirmed. Advised patient of information below. Patient understood and had no further questions. Patient states nothing has changed.  Appt was made

## 2021-03-22 ENCOUNTER — TRANSCRIBE ORDER (OUTPATIENT)
Dept: SCHEDULING | Age: 71
End: 2021-03-22

## 2021-03-22 ENCOUNTER — HOSPITAL ENCOUNTER (OUTPATIENT)
Dept: LAB | Age: 71
Discharge: HOME | End: 2021-03-22
Payer: MEDICARE

## 2021-03-22 ENCOUNTER — OFFICE VISIT (OUTPATIENT)
Dept: INTERNAL MEDICINE CLINIC | Age: 71
End: 2021-03-22
Payer: MEDICARE

## 2021-03-22 VITALS
DIASTOLIC BLOOD PRESSURE: 77 MMHG | OXYGEN SATURATION: 94 % | HEART RATE: 71 BPM | RESPIRATION RATE: 18 BRPM | TEMPERATURE: 98.3 F | HEIGHT: 74 IN | WEIGHT: 217 LBS | SYSTOLIC BLOOD PRESSURE: 158 MMHG | BODY MASS INDEX: 27.85 KG/M2

## 2021-03-22 DIAGNOSIS — F33.41 RECURRENT MAJOR DEPRESSIVE DISORDER, IN PARTIAL REMISSION (HCC): ICD-10-CM

## 2021-03-22 DIAGNOSIS — I11.9 BENIGN HYPERTENSIVE HEART DISEASE WITHOUT HEART FAILURE: Primary | ICD-10-CM

## 2021-03-22 DIAGNOSIS — E11.21 TYPE 2 DIABETES MELLITUS WITH NEPHROPATHY (HCC): ICD-10-CM

## 2021-03-22 DIAGNOSIS — I11.9 BENIGN HYPERTENSIVE HEART DISEASE WITHOUT HEART FAILURE: ICD-10-CM

## 2021-03-22 DIAGNOSIS — E78.5 DYSLIPIDEMIA: ICD-10-CM

## 2021-03-22 DIAGNOSIS — Z76.0 MEDICATION REFILL: ICD-10-CM

## 2021-03-22 DIAGNOSIS — I25.119 ATHEROSCLEROSIS OF NATIVE CORONARY ARTERY OF NATIVE HEART WITH ANGINA PECTORIS (HCC): ICD-10-CM

## 2021-03-22 LAB
ALBUMIN SERPL-MCNC: 3.7 G/DL (ref 3.4–5)
ALBUMIN/GLOB SERPL: 1.1 {RATIO} (ref 0.8–1.7)
ALP SERPL-CCNC: 112 U/L (ref 45–117)
ALT SERPL-CCNC: 24 U/L (ref 16–61)
ANION GAP SERPL CALC-SCNC: 3 MMOL/L (ref 3–18)
AST SERPL-CCNC: 19 U/L (ref 10–38)
BILIRUB SERPL-MCNC: 0.4 MG/DL (ref 0.2–1)
BUN SERPL-MCNC: 13 MG/DL (ref 7–18)
BUN/CREAT SERPL: 13 (ref 12–20)
CALCIUM SERPL-MCNC: 9 MG/DL (ref 8.5–10.1)
CHLORIDE SERPL-SCNC: 103 MMOL/L (ref 100–111)
CHOLEST SERPL-MCNC: 132 MG/DL
CO2 SERPL-SCNC: 34 MMOL/L (ref 21–32)
CREAT SERPL-MCNC: 1 MG/DL (ref 0.6–1.3)
GLOBULIN SER CALC-MCNC: 3.3 G/DL (ref 2–4)
GLUCOSE SERPL-MCNC: 116 MG/DL (ref 74–99)
HBA1C MFR BLD: 5.8 % (ref 4.2–5.6)
HDLC SERPL-MCNC: 36 MG/DL (ref 40–60)
HDLC SERPL: 3.7 {RATIO} (ref 0–5)
LDLC SERPL CALC-MCNC: 46.2 MG/DL (ref 0–100)
LIPID PROFILE,FLP: ABNORMAL
POTASSIUM SERPL-SCNC: 3.9 MMOL/L (ref 3.5–5.5)
PROT SERPL-MCNC: 7 G/DL (ref 6.4–8.2)
SODIUM SERPL-SCNC: 140 MMOL/L (ref 136–145)
TRIGL SERPL-MCNC: 249 MG/DL (ref ?–150)
VLDLC SERPL CALC-MCNC: 49.8 MG/DL

## 2021-03-22 PROCEDURE — 80053 COMPREHEN METABOLIC PANEL: CPT

## 2021-03-22 PROCEDURE — 3017F COLORECTAL CA SCREEN DOC REV: CPT | Performed by: INTERNAL MEDICINE

## 2021-03-22 PROCEDURE — G8536 NO DOC ELDER MAL SCRN: HCPCS | Performed by: INTERNAL MEDICINE

## 2021-03-22 PROCEDURE — 1101F PT FALLS ASSESS-DOCD LE1/YR: CPT | Performed by: INTERNAL MEDICINE

## 2021-03-22 PROCEDURE — 2022F DILAT RTA XM EVC RTNOPTHY: CPT | Performed by: INTERNAL MEDICINE

## 2021-03-22 PROCEDURE — G8419 CALC BMI OUT NRM PARAM NOF/U: HCPCS | Performed by: INTERNAL MEDICINE

## 2021-03-22 PROCEDURE — G8427 DOCREV CUR MEDS BY ELIG CLIN: HCPCS | Performed by: INTERNAL MEDICINE

## 2021-03-22 PROCEDURE — G8510 SCR DEP NEG, NO PLAN REQD: HCPCS | Performed by: INTERNAL MEDICINE

## 2021-03-22 PROCEDURE — 3046F HEMOGLOBIN A1C LEVEL >9.0%: CPT | Performed by: INTERNAL MEDICINE

## 2021-03-22 PROCEDURE — 36415 COLL VENOUS BLD VENIPUNCTURE: CPT

## 2021-03-22 PROCEDURE — 80061 LIPID PANEL: CPT

## 2021-03-22 PROCEDURE — 83036 HEMOGLOBIN GLYCOSYLATED A1C: CPT

## 2021-03-22 PROCEDURE — 99214 OFFICE O/P EST MOD 30 MIN: CPT | Performed by: INTERNAL MEDICINE

## 2021-03-22 RX ORDER — METOPROLOL SUCCINATE 50 MG/1
50 TABLET, EXTENDED RELEASE ORAL DAILY
Qty: 30 TAB | Refills: 0
Start: 2021-03-22 | End: 2021-05-11 | Stop reason: SDUPTHER

## 2021-03-22 RX ORDER — AMLODIPINE BESYLATE 2.5 MG/1
2.5 TABLET ORAL DAILY
Qty: 90 TAB | Refills: 1 | Status: SHIPPED | OUTPATIENT
Start: 2021-03-22 | End: 2021-09-14

## 2021-03-22 RX ORDER — CYCLOBENZAPRINE HCL 5 MG
TABLET ORAL
COMMUNITY
Start: 2021-02-23

## 2021-03-22 RX ORDER — LOSARTAN POTASSIUM 100 MG/1
TABLET ORAL
Qty: 90 TAB | Refills: 4 | Status: SHIPPED | OUTPATIENT
Start: 2021-03-22 | End: 2022-05-11 | Stop reason: SDUPTHER

## 2021-03-22 NOTE — PROGRESS NOTES
ROOM # 5  Identified pt with two pt identifiers(name and ). Reviewed record in preparation for visit and have obtained necessary documentation. Chief Complaint   Patient presents with    Hypertension      Wanda Macdonald preferred language for health care discussion is english/other. Is the patient using any DME equipment during OV? Cyndy Mike is due for:  Health Maintenance Due   Topic    COVID-19 Vaccine (1)    AAA Screening 73-67 YO Male Smoking Patients      Health Maintenance reviewed and discussed per provider  Please order/place referral if appropriate. Advance Directive:  1. Do you have an advance directive in place? Patient Reply: NO    2. If not, would you like material regarding how to put one in place? NO    Coordination of Care:  1. Have you been to the ER, urgent care clinic since your last visit? Hospitalized since your last visit? NO    2. Have you seen or consulted any other health care providers outside of the [address] [place] [address] since your last visit? Include any pap smears or colon screening. NO    Patient is accompanied by self I have received verbal consent from Wanda Macdonald to discuss any/all medical information while they are present in the room.     Learning Assessment:  Learning Assessment 2013   PRIMARY LEARNER Patient Patient -   HIGHEST LEVEL OF EDUCATION - PRIMARY LEARNER  SOME COLLEGE - SOME COLLEGE   BARRIERS PRIMARY LEARNER NONE - NONE   CO-LEARNER CAREGIVER No - -   PRIMARY LANGUAGE ENGLISH ENGLISH ENGLISH    NEED - - No   LEARNER PREFERENCE PRIMARY LISTENING DEMONSTRATION PICTURES   LEARNING SPECIAL TOPICS - - none   ANSWERED BY patient - -   RELATIONSHIP SELF - -     Depression Screening:  3 most recent [facility] 36 Screens 3/22/2021 10/16/2020 2020 2020 3/23/2020 2020 2020   PHQ Not Done - - - - - - -   Little interest or pleasure in doing things Not at all Not at all Nearly every day Not at all Not at all Not at all Not at all   Feeling down, depressed, irritable, or hopeless Not at all Not at all Several days Not at all Not at all Not at all Not at all   Total Score PHQ 2 0 0 4 0 0 0 0   Trouble falling or staying asleep, or sleeping too much - - - - - - -   Feeling tired or having little energy - - - - - - -   Poor appetite, weight loss, or overeating - - - - - - -   Feeling bad about yourself - or that you are a failure or have let yourself or your family down - - - - - - -   Trouble concentrating on things such as school, work, reading, or watching TV - - - - - - -   Moving or speaking so slowly that other people could have noticed; or the opposite being so fidgety that others notice - - - - - - -   Thoughts of being better off dead, or hurting yourself in some way - - - - - - -   PHQ 9 Score - - - - - - -   How difficult have these problems made it for you to do your work, take care of your home and get along with others - - - - - - -     Abuse Screening:  Abuse Screening Questionnaire 11/13/2017 4/13/2015   Do you ever feel afraid of your partner? N N   Are you in a relationship with someone who physically or mentally threatens you? N N   Is it safe for you to go home? Y Y     Fall Risk  Fall Risk Assessment, last 12 mths 10/16/2020 4/2/2020 3/23/2020 11/11/2019 3/5/2019 1/29/2019 11/6/2018   Able to walk? Yes Yes Yes Yes Yes Yes Yes   Fall in past 12 months? No No Yes - No Yes Yes   Number of falls in past 12 months - - 8 or more - - 3 5   Fall with injury? - - 1 - - 0 0     Recent Travel Screening and Travel History documentation     Travel Screening     Question   Response    In the last month, have you been in contact with someone who was confirmed or suspected to have Coronavirus / COVID-19? No / Unsure    Have you had a COVID-19 viral test in the last 14 days? No    Do you have any of the following new or worsening symptoms?   None of these    Have you traveled internationally or domestically in the last month?   No      Travel History   Travel since 02/22/21     No documented travel since 02/22/21

## 2021-03-22 NOTE — PROGRESS NOTES
HISTORY OF PRESENT ILLNESS  Alejandro Martell is a 79 y.o. male. BP (!) 158/77   Pulse 71   Temp 98.3 °F (36.8 °C) (Temporal)   Resp 18   Ht 6' 2\" (1.88 m)   Wt 217 lb (98.4 kg)   SpO2 94%   BMI 27.86 kg/m²     Reports his back is better since he was placed on flexeril        He is here to f/u on his BP. He had been at Dr. Antunez Grand office and his BP was high. We doubled his metoprolol to 50 mg  Today it is better but still high    Hypertension   The history is provided by the patient. This is a chronic problem. The current episode started more than 1 week ago. Pertinent negatives include no chest pain, no palpitations, no headaches, no dizziness and no shortness of breath. Risk factors include salty diet, dyslipidemia, diabetes mellitus, hypertension and male gender. Diabetes  The history is provided by the patient. This is a chronic problem. The current episode started more than 1 week ago. The problem occurs daily. Pertinent negatives include no chest pain, no headaches and no shortness of breath. Review of Systems   Constitutional: Negative for chills and fever. Respiratory: Negative for shortness of breath. Cardiovascular: Negative for chest pain and palpitations. Musculoskeletal: Positive for back pain. Neurological: Negative for dizziness and headaches. Physical Exam  Vitals signs and nursing note reviewed. Constitutional:       General: He is not in acute distress. Appearance: Normal appearance. He is well-developed. He is not diaphoretic. HENT:      Head: Normocephalic and atraumatic. Cardiovascular:      Rate and Rhythm: Normal rate and regular rhythm. Pulmonary:      Effort: Pulmonary effort is normal.      Breath sounds: Normal breath sounds. Musculoskeletal:      Right lower leg: No edema. Left lower leg: No edema. Skin:     General: Skin is warm and dry. Neurological:      General: No focal deficit present.       Mental Status: He is alert and oriented to person, place, and time. Psychiatric:         Mood and Affect: Mood normal.         Behavior: Behavior normal.         ASSESSMENT and PLAN    ICD-10-CM ICD-9-CM    1. Hypertension  F41.2 299.49 METABOLIC PANEL, COMPREHENSIVE      amLODIPine (NORVASC) 2.5 mg tablet   2. Type 2 diabetes mellitus with nephropathy (HCC)  V70.13 271.59 METABOLIC PANEL, COMPREHENSIVE     583.81 LIPID PANEL      HEMOGLOBIN A1C W/O EAG   3. Atherosclerosis of native coronary artery of native heart with angina pectoris ([facility] [address]  I25.119 414.01      413.9    4. Dyslipidemia  E78.5 272.4 LIPID PANEL   5. Medication refill  Z76.0 V68.1 losartan (COZAAR) 100 mg tablet   6. Recurrent major depressive disorder, in partial remission ([facility])  F33.41 296.35        BP still high  Will add back low dose amlodipine (he got hypotensive on this in the past--krystal on arising). This may have been a vasovagal episode    update lab today    Form for a BP cuff completed.  Arm circumference is 11 inches    F/u as appt in May for BP recheck

## 2021-03-25 ENCOUNTER — TELEPHONE (OUTPATIENT)
Dept: CARDIOLOGY CLINIC | Age: 71
End: 2021-03-25

## 2021-03-25 NOTE — TELEPHONE ENCOUNTER
pts daughter ,Pilar Villasenor, called regarding adjustments to pts BP medication by PCP (devon). Per kenisha, pt needs to come in to see dr Nina Simmonds. Pt is scheduled for appt 10 am 3/25/21. Pt and daughter aware and understands appt time/date.

## 2021-03-26 ENCOUNTER — OFFICE VISIT (OUTPATIENT)
Dept: CARDIOLOGY CLINIC | Age: 71
End: 2021-03-26
Payer: MEDICARE

## 2021-03-26 VITALS
HEIGHT: 74 IN | HEART RATE: 64 BPM | RESPIRATION RATE: 16 BRPM | DIASTOLIC BLOOD PRESSURE: 74 MMHG | OXYGEN SATURATION: 98 % | WEIGHT: 215.4 LBS | SYSTOLIC BLOOD PRESSURE: 147 MMHG | TEMPERATURE: 98.1 F | BODY MASS INDEX: 27.64 KG/M2

## 2021-03-26 DIAGNOSIS — I25.10 CORONARY ARTERY DISEASE DUE TO LIPID RICH PLAQUE: Primary | ICD-10-CM

## 2021-03-26 DIAGNOSIS — I25.83 CORONARY ARTERY DISEASE DUE TO LIPID RICH PLAQUE: Primary | ICD-10-CM

## 2021-03-26 DIAGNOSIS — I10 ESSENTIAL HYPERTENSION WITH GOAL BLOOD PRESSURE LESS THAN 140/90: ICD-10-CM

## 2021-03-26 DIAGNOSIS — E78.00 PURE HYPERCHOLESTEROLEMIA: ICD-10-CM

## 2021-03-26 PROCEDURE — G8536 NO DOC ELDER MAL SCRN: HCPCS | Performed by: INTERNAL MEDICINE

## 2021-03-26 PROCEDURE — G8432 DEP SCR NOT DOC, RNG: HCPCS | Performed by: INTERNAL MEDICINE

## 2021-03-26 PROCEDURE — G8428 CUR MEDS NOT DOCUMENT: HCPCS | Performed by: INTERNAL MEDICINE

## 2021-03-26 PROCEDURE — 3017F COLORECTAL CA SCREEN DOC REV: CPT | Performed by: INTERNAL MEDICINE

## 2021-03-26 PROCEDURE — 99214 OFFICE O/P EST MOD 30 MIN: CPT | Performed by: INTERNAL MEDICINE

## 2021-03-26 PROCEDURE — 1101F PT FALLS ASSESS-DOCD LE1/YR: CPT | Performed by: INTERNAL MEDICINE

## 2021-03-26 PROCEDURE — G8753 SYS BP > OR = 140: HCPCS | Performed by: INTERNAL MEDICINE

## 2021-03-26 PROCEDURE — G8754 DIAS BP LESS 90: HCPCS | Performed by: INTERNAL MEDICINE

## 2021-03-26 PROCEDURE — G8419 CALC BMI OUT NRM PARAM NOF/U: HCPCS | Performed by: INTERNAL MEDICINE

## 2021-03-26 NOTE — PROGRESS NOTES
Byron Alvarado presents today for   Chief Complaint   Patient presents with    Follow-up       Byron Alvarado preferred language for health care discussion is english/other. Personal Protective Equipment:   Personal Protective Equipment was used including: mask-surgical and hands-gloves. Patient was placed on no precaution(s). Patient was masked. Is someone accompanying this pt? No    Is the patient using any DME equipment during OV? Yes; Cane    Depression Screening:  3 most recent PHQ Screens 3/22/2021   PHQ Not Done -   Little interest or pleasure in doing things Not at all   Feeling down, depressed, irritable, or hopeless Not at all   Total Score PHQ 2 0   Trouble falling or staying asleep, or sleeping too much -   Feeling tired or having little energy -   Poor appetite, weight loss, or overeating -   Feeling bad about yourself - or that you are a failure or have let yourself or your family down -   Trouble concentrating on things such as school, work, reading, or watching TV -   Moving or speaking so slowly that other people could have noticed; or the opposite being so fidgety that others notice -   Thoughts of being better off dead, or hurting yourself in some way -   PHQ 9 Score -   How difficult have these problems made it for you to do your work, take care of your home and get along with others -       Learning Assessment:  Learning Assessment 4/13/2015   PRIMARY LEARNER Patient   HIGHEST LEVEL OF EDUCATION - PRIMARY LEARNER  SOME COLLEGE   BARRIERS PRIMARY LEARNER NONE   CO-LEARNER CAREGIVER No   PRIMARY LANGUAGE ENGLISH    NEED -   LEARNER PREFERENCE PRIMARY LISTENING   LEARNING SPECIAL TOPICS -   ANSWERED BY patient   RELATIONSHIP SELF       Abuse Screening:  Abuse Screening Questionnaire 11/13/2017   Do you ever feel afraid of your partner? N   Are you in a relationship with someone who physically or mentally threatens you? N   Is it safe for you to go home?  Y       Fall Risk  Fall Risk Assessment, last 12 mths 10/16/2020   Able to walk? Yes   Fall in past 12 months? No   Number of falls in past 12 months -   Fall with injury? -       Pt currently taking Anticoagulant therapy? No    Coordination of Care:  1. Have you been to the ER, urgent care clinic since your last visit? Hospitalized since your last visit? No    2. Have you seen or consulted any other health care providers outside of the [address] [place] [address] since your last visit? Include any pap smears or colon screening.  No

## 2021-03-26 NOTE — PROGRESS NOTES
[facility]    Mr. Whitney Leos  is a 79 y.o. gentleman with diabetes, hypertension, dyslipidemia and tobacco use. He has coronary artery disease as documented by cardiac catheterization in October of 2014. His anatomy is as follows:      LM - patent  LAD - 30% prox, 80% mid, 90% apical  D1 - 100%  RI - 95% ostial (failed PCI, calcified 1.75 - 2.0 mm vessel)  Cx - 30-40% prox  OM1 - subtotal  OM2 - 80% (3.5 x 18mm XIENCE 10/14)  RCA - 30% diffuse  RPDA - 100%  RPLV - 50%    Mr. Whitney Leos is here today for follow up appointment. Patient recently went to pain management clinic for injection for his pain management. At the time his blood pressure was 004 systolic. He was on significant pain at that time. He was seen by PCP and his Toprol dose was increased and he was also started on amlodipine. Since then he has been doing okay. He denies any symptom related to high blood pressure. He denies nausea, headache, visual changes. He is taking his medications regularly. Denies any use of nitroglycerin since last visit    Past Medical History:   Diagnosis Date    Agent orange exposure     Asbestos exposure     Asthma 09/2019    told mild.  Autonomic dysfunction     abnormal tilt table 2/15    BPH     CAD (coronary artery disease)     S/P OM NOY in 2014    Choroidal nevus, left eye     Colonic polyp 08/28/2018    multiple.  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 Cipriano Motta 7/9/2018    COLONOSCOPY performed by Gage Rivera MD at [facility] N/A 8/28/2018    COLONOSCOPY performed by Gage Rivera MD at [address] [place] [address] HX CATARACT REMOVAL      7/14  bilat    HX CERVICAL LAMINECTOMY      HX COLONOSCOPY  07/09/2018    pt states have to redo d/t unclear imaging    HX ENDOSCOPY  10/01/2019    EGD. At [facility]. No significant pathological findings    HX HEART CATHETERIZATION  2014    Stent    HX KNEE REPLACEMENT Right 03/06/2020    Dr. Caro Hem HX ORTHOPAEDIC Left 02/05/2018    knee replacement    HX TUMOR REMOVAL      3/13  wide excision right thigh sarcoma    HX TYMPANOMASTOIDECTOMY         Current Outpatient Medications   Medication Sig    losartan (COZAAR) 100 mg tablet TAKE 1 TABLET BY MOUTH DAILY    cyclobenzaprine (FLEXERIL) 5 mg tablet Take 1-2 PO QHS.  metoprolol succinate (TOPROL-XL) 50 mg XL tablet Take 1 Tab by mouth daily. Indications: high blood pressure    amLODIPine (NORVASC) 2.5 mg tablet Take 1 Tab by mouth daily. Indications: high blood pressure    pravastatin (PRAVACHOL) 40 mg tablet TAKE 1 TABLET BY MOUTH EVERY EVENING    gabapentin (NEURONTIN) 300 mg capsule TAKE 1 CAPSULE BY MOUTH THREE TIMES DAILY. MAX DAILY AMOUNT: 900 MG    traZODone (DESYREL) 50 mg tablet TAKE 1 TO 2 TABLETS BY MOUTH AT BEDTIME AS NEEDED    sertraline (ZOLOFT) 100 mg tablet Take 1.5 Tabs by mouth daily.  ezetimibe (ZETIA) 10 mg tablet TAKE 1 TABLET BY MOUTH DAILY    rosuvastatin (CRESTOR) 20 mg tablet rosuvastatin 20 mg tablet    isosorbide mononitrate ER (IMDUR) 60 mg CR tablet Take 1 Tab by mouth every morning.  famotidine (PEPCID) 20 mg tablet Take 20 mg by mouth two (2) times a day.     fluticasone propion-salmeterol (ADVAIR/WIXELA) 100-50 mcg/dose diskus inhaler Take 1 Puff by inhalation.  albuterol (PROVENTIL HFA, VENTOLIN HFA, PROAIR HFA) 90 mcg/actuation inhaler Take 2 Puffs by inhalation.  neomycin-polymyxin-hydrocortisone (CORTISPORIN) otic solution 4 Drops.  acetaminophen (TYLENOL) 500 mg tablet 1,000 mg.  colchicine 0.6 mg tablet TAKE 2 TABLETS NOW AND 1 TABLET IN 6 HOURS IF NEEDED REPEAT IN 6 HOURS AS NEEDED    ofloxacin (FLOXIN) 0.3 % otic solution Administer 5 Drops in left ear daily. (Patient taking differently: Administer 5 Drops in left ear as needed.)    diclofenac (VOLTAREN) 1 % gel Apply 2 g to affected area four (4) times daily.  carboxymethylcellulose sodium (REFRESH OP) Apply 1 Drop to eye daily.  nitroglycerin (NITROSTAT) 0.4 mg SL tablet 0.4 mg by SubLINGual route every five (5) minutes as needed for Chest Pain. Up to 3 doses.  ferrous sulfate 325 mg (65 mg iron) tablet Take  by mouth Daily (before breakfast).  clopidogrel (PLAVIX) 75 mg tab Take 1 Tab by mouth daily. (Patient taking differently: Take 75 mg by mouth every other day.)    fluticasone (FLONASE) 50 mcg/actuation nasal spray SHAKE LIQUID AND USE 2 SPRAYS IN EACH NOSTRIL DAILY    terazosin (HYTRIN) 2 mg capsule Take 1 Cap by mouth nightly.  ZINC ACETATE PO Take 1,000 mg by mouth.  potassium chloride (KLOR-CON) 10 mEq tablet TAKE 1 TABLET BY MOUTH TWICE DAILY    cholecalciferol (VITAMIN D3) 1,000 unit tablet Take  by mouth daily.  ascorbic acid, vitamin C, (VITAMIN C) 250 mg tablet Take  by mouth.  garlic 6,600 mg cap Take 1 Cap by mouth daily.  multivit-min-FA-lycopen-lutein 0.4-300-250 mg-mcg-mcg tab Take 1 Tab by mouth daily.  aspirin delayed-release 81 mg tablet Take 1 tablet by mouth daily. No current facility-administered medications for this visit.         Allergies   Allergen Reactions    Beta Blocker [Beta-Blockers (Beta-Adrenergic Blocking Agts)] Other (comments)     symptomatic bradycardia    Codeine Other (comments) Passed//fainted  patient doesn't recall reaction, occurred as a teenager    Darvocet A500 [Propoxyphene N-Acetaminophen] Other (comments)     throat swelling    Dexbrompheniramine-Pseudoephed Other (comments)     Facial swelling. 1980\"s    Lasix [Furosemide] Itching    Lipitor [Atorvastatin] Other (comments)    Sulfa (Sulfonamide Antibiotics) Hives       Family History:   Non contributory for premature coronary disease in first degree relatives. Social History:   He  reports that he quit smoking about 4 years ago. His smoking use included cigarettes. He has a 75.00 pack-year smoking history. He has never used smokeless tobacco.  He  reports no history of alcohol use. [address] years    Physical:   Vitals:   BP: (!) 147/74  HR: 64  Wt: 215 lb 6.4 oz (97.7 kg)    Exam:   General: Older gentleman, no complaints, no distress.     Head/Neck: No JVD  Lungs:  No respiratory distress. Clear with good air movement. Heart:  Regular. No rubs or gallops. Abdomen: Bowel sounds present  Extremities: Intact pulses. No significant edema.     Neurological: Alert and oriented to person, place, time. No gross neurological deficit. Skin:  Warm and dry. Review of Data:   LABS:   Lab Results   Component Value Date/Time    WBC 8.0 11/11/2019 12:48 PM    HGB 14.4 11/11/2019 12:48 PM    HCT 45.2 11/11/2019 12:48 PM    PLATELET 809 81/76/9919 12:48 PM     Lab Results   Component Value Date/Time    Sodium 140 03/22/2021 02:55 PM    Potassium 3.9 03/22/2021 02:55 PM    Chloride 103 03/22/2021 02:55 PM    CO2 34 (H) 03/22/2021 02:55 PM    Anion gap 3 03/22/2021 02:55 PM    Glucose 116 (H) 03/22/2021 02:55 PM    BUN 13 03/22/2021 02:55 PM    Creatinine 1.00 03/22/2021 02:55 PM    BUN/Creatinine ratio 13 03/22/2021 02:55 PM    GFR est AA >60 03/22/2021 02:55 PM    GFR est non-AA >60 03/22/2021 02:55 PM    Calcium 9.0 03/22/2021 02:55 PM    Bilirubin, total 0.4 03/22/2021 02:55 PM    Alk.  phosphatase 112 03/22/2021 02:55 PM    Protein, total 7.0 03/22/2021 02:55 PM    Albumin 3.7 03/22/2021 02:55 PM    Globulin 3.3 03/22/2021 02:55 PM    A-G Ratio 1.1 03/22/2021 02:55 PM    ALT (SGPT) 24 03/22/2021 02:55 PM     Lab Results   Component Value Date/Time    Cholesterol, total 132 03/22/2021 02:55 PM    HDL Cholesterol 36 (L) 03/22/2021 02:55 PM    LDL, calculated 46.2 03/22/2021 02:55 PM    Triglyceride 249 (H) 03/22/2021 02:55 PM    CHOL/HDL Ratio 3.7 03/22/2021 02:55 PM     Lab Results   Component Value Date/Time    Hemoglobin A1c 5.8 (H) 03/22/2021 02:55 PM    Hemoglobin A1c (POC) 5.9 05/06/2013 11:17 AM    Hemoglobin A1c, External 6.1 06/01/2018     EKG:   Sinus rhythm, 78 beats per minute, right bundle branch block, nonspecific ST-T wave changes. TILT TABLE: February 2015:  Patient was initially placed in supine position. Heart rate was between 65-70 beats per minute, sinus rhythm. Blood pressure was 113/61 mmHg, with maximum blood pressure of 116/63 mmHg. Patient was placed in 60-degree upright tilting position. About 5-7 minutes into upright tilting position at 70 degrees, patient started feeling like her heart rate speeding up and legs feeling heavy, along with some dizziness. Blood pressure slowly decreased up to 70/43 mmHg. Heart rate maximum noted was up from 65 to 81 beats per minute. There was no obvious tachycardia. At the time, patient was given IV fluid and then placed back supine position, with normalization of the blood pressure and resolution of his symptoms. Heart rhythm remained in sinus. Patient did have a drop in the systolic blood pressure more than 20 mmHg upon upright tilting position, without any significant increase in the heart rate. This suggests possible autonomic dysfunction. SUMMARY:  Upright tilt table testing with reproduction of symptoms.  More than 20 mmHg drop in systolic blood pressure, without change in the heart rate during upright tilting position, associated with symptoms. This may represent autonomic dysfunction    ECHO: (01/18)  SUMMARY:  Left ventricle: Systolic function was normal. Ejection fraction was estimated   in the range of 55 % to 60 %. There was hypokinesis of the basal inferoseptal wall. Wall thickness was at the upper   limits of normal.  Right ventricle: The ventricle was dilated. Inferior vena cava, hepatic veins: The inferior vena cava was mildly dilated.   The respirophasic change in diameter was  less than 50%. No major VHD    STRESS TEST (EST, PHARM, NUC, ECHO etc): October 2014:  1. Small to medium sized area of mildly intense reversible defect involvement anterolateral wall. 2.  There is also a small area of reversible defect involving basilar inferior wall concerning for ischemic focus. 3.  Calculated ejection fraction of 56% without any wall motion abnormality. CATHETERIZATION: October 2014:  LM - patent  LAD - 30% prox, 80% mid, 90% apical  D1 - 100%  RI - 95% ostial (failed PCI, calcified 1.75 - 2.0 mm vessel)  Cx - 30-40% prox  OM1 - subtotal  OM2 - 80%  RCA - 30% diffuse  RPDA - 100%  RPLV - 50%    Impression / Plan:     Diabetes    Hypertension    Dyslipidemia    Coronary artery disease       Mr. Whitney Leos returns to the office for follow up. Coronary artery disease:    Mr. Whitney Leos had a cardiac catheterization in October, 2014 and required stent of the obtuse marginal branch. He is on aspirin and Plavix, isosorbide mononitrate and Lipitor. He is on Toprol-XL 50 mg daily. Heart rate is XT 4    Hypertension: Blood pressure is 147/74. Dose of Toprol was increased recently by PCP. He also has been started amlodipine 2.5 mg [place]. Continue current medications. He will continue to keep checking blood pressure at home regularly    Diabetes: This is being managed by primary care provider. Goal Hgb A1c less than 7 is recommended from cardiovascular standpoint.   Last Hgb A1c  was 5.8    Hyperlipidemia:    Number to tolerate Pravachol and atorvastatin because of significant myalgia. Last LDL 46. Currently on Zetia. Continue same      RTC in 6 months or sooner if needed.

## 2021-05-11 ENCOUNTER — OFFICE VISIT (OUTPATIENT)
Dept: INTERNAL MEDICINE CLINIC | Age: 71
End: 2021-05-11
Payer: MEDICARE

## 2021-05-11 ENCOUNTER — HOSPITAL ENCOUNTER (OUTPATIENT)
Dept: LAB | Age: 71
Discharge: HOME | End: 2021-05-11
Payer: MEDICARE

## 2021-05-11 VITALS
WEIGHT: 217.8 LBS | TEMPERATURE: 97.7 F | OXYGEN SATURATION: 96 % | SYSTOLIC BLOOD PRESSURE: 133 MMHG | RESPIRATION RATE: 17 BRPM | BODY MASS INDEX: 27.95 KG/M2 | DIASTOLIC BLOOD PRESSURE: 71 MMHG | HEIGHT: 74 IN | HEART RATE: 54 BPM

## 2021-05-11 DIAGNOSIS — E78.5 DYSLIPIDEMIA: ICD-10-CM

## 2021-05-11 DIAGNOSIS — I11.9 BENIGN HYPERTENSIVE HEART DISEASE WITHOUT HEART FAILURE: Primary | ICD-10-CM

## 2021-05-11 DIAGNOSIS — E11.21 TYPE 2 DIABETES MELLITUS WITH NEPHROPATHY (HCC): ICD-10-CM

## 2021-05-11 DIAGNOSIS — Z87.891 PERSONAL HISTORY OF SMOKING: ICD-10-CM

## 2021-05-11 DIAGNOSIS — Z13.6 ENCOUNTER FOR ABDOMINAL AORTIC ANEURYSM (AAA) SCREENING: ICD-10-CM

## 2021-05-11 LAB
CREAT UR-MCNC: 144 MG/DL (ref 30–125)
MICROALBUMIN UR-MCNC: 4.7 MG/DL (ref 0–3)
MICROALBUMIN/CREAT UR-RTO: 33 MG/G (ref 0–30)

## 2021-05-11 PROCEDURE — 1101F PT FALLS ASSESS-DOCD LE1/YR: CPT | Performed by: INTERNAL MEDICINE

## 2021-05-11 PROCEDURE — 99213 OFFICE O/P EST LOW 20 MIN: CPT | Performed by: INTERNAL MEDICINE

## 2021-05-11 PROCEDURE — 3044F HG A1C LEVEL LT 7.0%: CPT | Performed by: INTERNAL MEDICINE

## 2021-05-11 PROCEDURE — G8752 SYS BP LESS 140: HCPCS | Performed by: INTERNAL MEDICINE

## 2021-05-11 PROCEDURE — G8419 CALC BMI OUT NRM PARAM NOF/U: HCPCS | Performed by: INTERNAL MEDICINE

## 2021-05-11 PROCEDURE — G8427 DOCREV CUR MEDS BY ELIG CLIN: HCPCS | Performed by: INTERNAL MEDICINE

## 2021-05-11 PROCEDURE — G8536 NO DOC ELDER MAL SCRN: HCPCS | Performed by: INTERNAL MEDICINE

## 2021-05-11 PROCEDURE — G8432 DEP SCR NOT DOC, RNG: HCPCS | Performed by: INTERNAL MEDICINE

## 2021-05-11 PROCEDURE — G8754 DIAS BP LESS 90: HCPCS | Performed by: INTERNAL MEDICINE

## 2021-05-11 PROCEDURE — 82570 ASSAY OF URINE CREATININE: CPT

## 2021-05-11 PROCEDURE — 2022F DILAT RTA XM EVC RTNOPTHY: CPT | Performed by: INTERNAL MEDICINE

## 2021-05-11 PROCEDURE — 3017F COLORECTAL CA SCREEN DOC REV: CPT | Performed by: INTERNAL MEDICINE

## 2021-05-11 RX ORDER — METOPROLOL SUCCINATE 50 MG/1
50 TABLET, EXTENDED RELEASE ORAL DAILY
Qty: 90 TAB | Refills: 4 | Status: SHIPPED | OUTPATIENT
Start: 2021-05-11 | End: 2021-11-11

## 2021-05-11 NOTE — PROGRESS NOTES
Reviewed record in preparation for visit and have obtained necessary documentation. Erven Fabry is a 70 y.o.  male presents today for office visit for   Chief Complaint   Patient presents with    Hypertension    Cholesterol Problem    Diabetes   . Pt is not fasting. Patient is accompanied by self. Pt is in Room # R [address] preferred language for health care discussion is english/other. Is the patient using any DME equipment during OV? YES    Advance Directive:  1. Do you have an advance directive in place? Patient Reply: Meg Rashid    2. If not, would you like material regarding how to put one in place? NO    Coordination of Care:  1. Have you been to the ER, urgent care clinic since your last visit? Hospitalized since your last visit? No    2. Have you seen or consulted any other health care providers outside of the [address] [place] [address] since your last visit? Include any pap smears or colon screening. YES, Dr. Kai Pena 4/2021    Upcoming Appts  Yes Aquatic Therapy 3 times a week    VORB: No orders of the defined types were placed in this encounter.  Viv Goss MD/Pippa Anderson LPN    Erven Fabry is due for:   Health Maintenance Due   Topic    COVID-19 Vaccine (1)    AAA Screening 73-69 YO Male Smoking Patients     MICROALBUMIN Q1      Health Maintenance reviewed and discussed per provider  Please order/place referral if appropriate. Requested Prescriptions     Pending Prescriptions Disp Refills    metoprolol succinate (TOPROL-XL) 50 mg XL tablet 30 Tab 0     Sig: Take 1 Tab by mouth daily.  Indications: high blood pressure       Learning Assessment:  Learning Assessment 4/13/2015 4/17/2014 12/12/2013   PRIMARY LEARNER Patient Patient -   HIGHEST LEVEL OF EDUCATION - PRIMARY LEARNER  SOME COLLEGE - SOME COLLEGE   BARRIERS PRIMARY LEARNER NONE - NONE   CO-LEARNER CAREGIVER No - -   PRIMARY LANGUAGE ENGLISH ENGLISH ENGLISH    NEED - - No   LEARNER PREFERENCE PRIMARY LISTENING DEMONSTRATION PICTURES   LEARNING SPECIAL TOPICS - - none   ANSWERED BY patient - -   RELATIONSHIP SELF - -     Depression Screening:  3 most recent PHQ Screens 3/22/2021 10/16/2020 6/4/2020 4/2/2020 3/23/2020 2/24/2020 1/13/2020   PHQ Not Done - - - - - - -   Little interest or pleasure in doing things Not at all Not at all Nearly every day Not at all Not at all Not at all Not at all   Feeling down, depressed, irritable, or hopeless Not at all Not at all Several days Not at all Not at all Not at all Not at all   Total Score PHQ 2 0 0 4 0 0 0 0   Trouble falling or staying asleep, or sleeping too much - - - - - - -   Feeling tired or having little energy - - - - - - -   Poor appetite, weight loss, or overeating - - - - - - -   Feeling bad about yourself - or that you are a failure or have let yourself or your family down - - - - - - -   Trouble concentrating on things such as school, work, reading, or watching TV - - - - - - -   Moving or speaking so slowly that other people could have noticed; or the opposite being so fidgety that others notice - - - - - - -   Thoughts of being better off dead, or hurting yourself in some way - - - - - - -   PHQ 9 Score - - - - - - -   How difficult have these problems made it for you to do your work, take care of your home and get along with others - - - - - - -     Abuse Screening:  Abuse Screening Questionnaire 11/13/2017 4/13/2015   Do you ever feel afraid of your partner? N N   Are you in a relationship with someone who physically or mentally threatens you? N N   Is it safe for you to go home? Y Y     Fall Risk  Fall Risk Assessment, last 12 mths 3/26/2021 10/16/2020 4/2/2020 3/23/2020 11/11/2019 3/5/2019 1/29/2019   Able to walk? Yes Yes Yes Yes Yes Yes Yes   Fall in past 12 months? 0 No No Yes - No Yes   Do you feel unsteady?  0 - - - - - -   Are you worried about falling 0 - - - - - -   Number of falls in past 12 months - - - 8 or more - - 3 Fall with injury? - - - 1 - - 0     Recent Travel Screening and Travel History documentation     Travel Screening      No screening recorded since 05/10/21 0000      Travel History   Travel since 04/11/21     No documented travel since 04/11/21

## 2021-05-11 NOTE — PROGRESS NOTES
HISTORY OF PRESENT ILLNESS  Reyna Shi is a 70 y.o. male. /71 (BP 1 Location: Left upper arm, BP Patient Position: Sitting, BP Cuff Size: Adult)   Pulse (!) 54   Temp 97.7 °F (36.5 °C) (Temporal)   Resp 17   Ht 6' 2\" (1.88 m)   Wt 217 lb 12.8 oz (98.8 kg)   SpO2 96%   BMI 27.96 kg/m²     Here for BP recheck after adjusting meds in March      Back in PT for aquatherapy  Feeling better. Feeling stronger. More relaxed  Therapist is working on his strength and balance. States he is feeling the best he had in a long time. Hypertension   The history is provided by the patient. This is a chronic problem. The current episode started more than 1 week ago. The problem has been rapidly improving. Pertinent negatives include no chest pain, no palpitations, no headaches, no dizziness and no shortness of breath. Review of Systems   Constitutional: Negative for chills and fever. Respiratory: Negative for shortness of breath. Cardiovascular: Negative for chest pain and palpitations. Musculoskeletal: Positive for back pain (improving back pain). Negative for joint pain. Neurological: Negative for dizziness and headaches. Physical Exam  Vitals signs and nursing note reviewed. Constitutional:       General: He is not in acute distress. Appearance: Normal appearance. He is well-developed. He is not diaphoretic. Cardiovascular:      Rate and Rhythm: Normal rate and regular rhythm. Pulmonary:      Effort: Pulmonary effort is normal.      Breath sounds: Normal breath sounds. Musculoskeletal:      Right lower leg: No edema. Left lower leg: No edema. Skin:     General: Skin is warm and dry. Neurological:      Mental Status: He is alert and oriented to person, place, and time. Psychiatric:         Mood and Affect: Mood normal.         Behavior: Behavior normal.         ASSESSMENT and PLAN    ICD-10-CM ICD-9-CM    1. Hypertension  I11.9 402.10    2.  Type 2 diabetes mellitus with nephropathy ([facility] [address]  E11.21 250.40      583.81    3. Dyslipidemia  E78.5 272.4    4. Encounter for abdominal aortic aneurysm (AAA) screening  Z13.6 V81.2 MICROALBUMIN, UR, RAND W/ MICROALB/CREAT RATIO      US EXAM SCREENING AAA   5. Personal history of smoking  Z87. 891 V15.82 MICROALBUMIN, UR, RAND W/ MICROALB/CREAT RATIO      US EXAM SCREENING AAA       BP controlled    reviewed recent lab with him--looks good.     F/u here 5 months for MWV, HTN, DM chol

## 2021-05-17 RX ORDER — PRAVASTATIN SODIUM 40 MG/1
TABLET ORAL
Qty: 90 TAB | Refills: 0 | Status: SHIPPED | OUTPATIENT
Start: 2021-05-17 | End: 2021-08-17

## 2021-05-18 DIAGNOSIS — E78.5 DYSLIPIDEMIA: ICD-10-CM

## 2021-05-18 RX ORDER — EZETIMIBE 10 MG/1
TABLET ORAL
Qty: 30 TAB | Refills: 5 | Status: SHIPPED | OUTPATIENT
Start: 2021-05-18 | End: 2021-06-15

## 2021-06-01 ENCOUNTER — HOSPITAL ENCOUNTER (OUTPATIENT)
Dept: ULTRASOUND IMAGING | Age: 71
Discharge: HOME | End: 2021-06-01
Attending: INTERNAL MEDICINE
Payer: MEDICARE

## 2021-06-01 DIAGNOSIS — Z13.6 ENCOUNTER FOR ABDOMINAL AORTIC ANEURYSM (AAA) SCREENING: ICD-10-CM

## 2021-06-01 DIAGNOSIS — Z87.891 PERSONAL HISTORY OF SMOKING: ICD-10-CM

## 2021-06-01 PROCEDURE — 76706 US ABDL AORTA SCREEN AAA: CPT

## 2021-06-07 RX ORDER — METOPROLOL SUCCINATE 25 MG/1
TABLET, EXTENDED RELEASE ORAL
Qty: 30 TABLET | Refills: 6 | Status: SHIPPED | OUTPATIENT
Start: 2021-06-07

## 2021-06-15 DIAGNOSIS — E78.5 DYSLIPIDEMIA: ICD-10-CM

## 2021-06-15 RX ORDER — EZETIMIBE 10 MG/1
TABLET ORAL
Qty: 30 TABLET | Refills: 5 | Status: SHIPPED | OUTPATIENT
Start: 2021-06-15 | End: 2021-11-11 | Stop reason: SDUPTHER

## 2021-08-09 ENCOUNTER — TELEPHONE (OUTPATIENT)
Dept: INTERNAL MEDICINE CLINIC | Age: 71
End: 2021-08-09

## 2021-08-09 NOTE — TELEPHONE ENCOUNTER
Received paperwork from [facility] Aldana requesting sleep study information, chart notes and physician order form for a FDA cleared sleep appliance. Pt states he did request this service. Informed of PCP note, cannot complete without dentist or oral surgeon. Pt states he has not seen a dentist/oral surgeon in over 10 years due to having a full set of dentures.  Pt states ASD should have contact [facility] for old records, however is inquiring what he needs to do for approval.

## 2021-08-09 NOTE — TELEPHONE ENCOUNTER
Needs to return to sleep medicine. But he stills need input from an oral surgeon on whether this could be done (given he is edentulous)  I know nothing about the making or fitting of such an appliance.

## 2021-08-10 NOTE — TELEPHONE ENCOUNTER
S/w the pt and informed of the 's note. Pt states ASD followed up with his previous Sleep Medicine MDs. No further concerns were voiced at this time.

## 2021-08-10 NOTE — TELEPHONE ENCOUNTER
I have reviewed the form again and will trash it. I have NOT evaluated him for an oral appliance and will not sign any form unless recommended by an oral surgeon or dentist first.  Or he can return to his sleep doctor for an evaluation and that person can order it.

## 2021-08-17 RX ORDER — PRAVASTATIN SODIUM 40 MG/1
TABLET ORAL
Qty: 90 TABLET | Refills: 0 | Status: SHIPPED | OUTPATIENT
Start: 2021-08-17 | End: 2021-11-11 | Stop reason: SDUPTHER

## 2021-09-10 DIAGNOSIS — M79.2 NEURALGIC PAIN: ICD-10-CM

## 2021-09-10 RX ORDER — GABAPENTIN 300 MG/1
CAPSULE ORAL
Qty: 90 CAPSULE | Refills: 1 | Status: SHIPPED | OUTPATIENT
Start: 2021-09-10 | End: 2021-10-17

## 2021-09-14 DIAGNOSIS — I11.9 BENIGN HYPERTENSIVE HEART DISEASE WITHOUT HEART FAILURE: ICD-10-CM

## 2021-09-14 RX ORDER — AMLODIPINE BESYLATE 2.5 MG/1
TABLET ORAL
Qty: 90 TABLET | Refills: 1 | Status: SHIPPED | OUTPATIENT
Start: 2021-09-14 | End: 2022-07-03

## 2021-09-16 ENCOUNTER — OFFICE VISIT (OUTPATIENT)
Dept: CARDIOLOGY CLINIC | Age: 71
End: 2021-09-16
Payer: MEDICARE

## 2021-09-16 VITALS
SYSTOLIC BLOOD PRESSURE: 128 MMHG | DIASTOLIC BLOOD PRESSURE: 73 MMHG | TEMPERATURE: 97.7 F | BODY MASS INDEX: 29.12 KG/M2 | RESPIRATION RATE: 16 BRPM | WEIGHT: 226.8 LBS | OXYGEN SATURATION: 97 % | HEART RATE: 70 BPM

## 2021-09-16 DIAGNOSIS — I25.119 ATHEROSCLEROSIS OF NATIVE CORONARY ARTERY OF NATIVE HEART WITH ANGINA PECTORIS (HCC): Primary | ICD-10-CM

## 2021-09-16 PROCEDURE — 3017F COLORECTAL CA SCREEN DOC REV: CPT | Performed by: INTERNAL MEDICINE

## 2021-09-16 PROCEDURE — G8419 CALC BMI OUT NRM PARAM NOF/U: HCPCS | Performed by: INTERNAL MEDICINE

## 2021-09-16 PROCEDURE — G8752 SYS BP LESS 140: HCPCS | Performed by: INTERNAL MEDICINE

## 2021-09-16 PROCEDURE — 1101F PT FALLS ASSESS-DOCD LE1/YR: CPT | Performed by: INTERNAL MEDICINE

## 2021-09-16 PROCEDURE — G8536 NO DOC ELDER MAL SCRN: HCPCS | Performed by: INTERNAL MEDICINE

## 2021-09-16 PROCEDURE — G8432 DEP SCR NOT DOC, RNG: HCPCS | Performed by: INTERNAL MEDICINE

## 2021-09-16 PROCEDURE — 99214 OFFICE O/P EST MOD 30 MIN: CPT | Performed by: INTERNAL MEDICINE

## 2021-09-16 PROCEDURE — G8754 DIAS BP LESS 90: HCPCS | Performed by: INTERNAL MEDICINE

## 2021-09-16 PROCEDURE — G8427 DOCREV CUR MEDS BY ELIG CLIN: HCPCS | Performed by: INTERNAL MEDICINE

## 2021-09-16 NOTE — PROGRESS NOTES
Gael Atwood presents today for No chief complaint on file. Gael Atwood preferred language for health care discussion is english/other. Personal Protective Equipment:   Personal Protective Equipment was used including: mask-surgical and hands-gloves. Patient was placed on no precaution(s). Patient was masked. Precautions:   Patient currently on None  Patient currently roomed with door closed    Is someone accompanying this pt? no  Is the patient using any DME equipment during [address]? no    Depression Screening:  3 most recent PHQ Screens 3/22/2021   PHQ Not Done -   Little interest or pleasure in doing things Not at all   Feeling down, depressed, irritable, or hopeless Not at all   Total Score PHQ 2 0   Trouble falling or staying asleep, or sleeping too much -   Feeling tired or having little energy -   Poor appetite, weight loss, or overeating -   Feeling bad about yourself - or that you are a failure or have let yourself or your family down -   Trouble concentrating on things such as school, work, reading, or watching TV -   Moving or speaking so slowly that other people could have noticed; or the opposite being so fidgety that others notice -   Thoughts of being better off dead, or hurting yourself in some way -   PHQ 9 Score -   How difficult have these problems made it for you to do your work, take care of your home and get along with others -       Learning Assessment:  Learning Assessment 4/13/2015   PRIMARY LEARNER Patient   HIGHEST LEVEL OF EDUCATION - PRIMARY LEARNER  SOME COLLEGE   BARRIERS PRIMARY LEARNER NONE   CO-LEARNER CAREGIVER No   PRIMARY LANGUAGE ENGLISH    NEED -   LEARNER PREFERENCE PRIMARY LISTENING   LEARNING SPECIAL TOPICS -   ANSWERED BY patient   RELATIONSHIP SELF       Abuse Screening:  Abuse Screening Questionnaire 11/13/2017   Do you ever feel afraid of your partner? N   Are you in a relationship with someone who physically or mentally threatens you?  Macho Lemus Is it safe for you to go home? Y       Fall Risk  Fall Risk Assessment, last 12 mths 3/26/2021   Able to walk? Yes   Fall in past 12 months? 0   Do you feel unsteady? 0   Are you worried about falling 0   Number of falls in past 12 months -   Fall with injury? -       Pt currently taking Anticoagulant therapy? no    Coordination of Care:  1. Have you been to the ER, urgent care clinic since your last visit? Hospitalized since your last visit? no    2. Have you seen or consulted any other health care providers outside of the [address] [place] [address] since your last visit? Include any pap smears or colon screening.  no

## 2021-09-16 NOTE — PROGRESS NOTES
[facility]    Mr. Emelia Adam  is a 70 y.o. gentleman with diabetes, hypertension, dyslipidemia and tobacco use. He has coronary artery disease as documented by cardiac catheterization in October of 2014. His anatomy is as follows:      LM - patent  LAD - 30% prox, 80% mid, 90% apical  D1 - 100%  RI - 95% ostial (failed PCI, calcified 1.75 - 2.0 mm vessel)  Cx - 30-40% prox  OM1 - subtotal  OM2 - 80% (3.5 x 18mm XIENCE 10/14)  RCA - 30% diffuse  RPDA - 100%  RPLV - 50%    Mr. Emelia Adam is here today for follow up appointment. He has started doing some water aerobic exercise. He has been feeling stronger since he is started doing some exercise. He denies any chest pain or chest tightness concerning for angina. He denies any palpitation, presyncope or syncope. He is taking all his medication as prescribed. Denies any use of nitroglycerin since last visit    Past Medical History:   Diagnosis Date    Agent orange exposure     Asbestos exposure     Asthma 09/2019    told mild.  Autonomic dysfunction     abnormal tilt table 2/15    BPH     CAD (coronary artery disease)     S/P OM NOY in 2014    Choroidal nevus, left eye     Colonic polyp 08/28/2018    multiple.  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 Abdirashid Kelly N/A 7/9/2018 COLONOSCOPY performed by Gloria Brooks MD at [facility]. N/A 8/28/2018    COLONOSCOPY performed by Gloria Brooks MD at [address] [place] [address] HX CATARACT REMOVAL      7/14  bilat    HX CERVICAL LAMINECTOMY      HX COLONOSCOPY  07/09/2018    pt states have to redo d/t unclear imaging    HX ENDOSCOPY  10/01/2019    EGD. At [facility]. No significant pathological findings    HX HEART CATHETERIZATION  2014    Stent    HX KNEE REPLACEMENT Right 03/06/2020    Dr. Alona Dunlap HX ORTHOPAEDIC Left 02/05/2018    knee replacement    HX TUMOR REMOVAL      3/13  wide excision right thigh sarcoma    HX TYMPANOMASTOIDECTOMY         Current Outpatient Medications   Medication Sig    amLODIPine (NORVASC) 2.5 mg tablet TAKE 1 TABLET BY MOUTH DAILY FOR HIGH BLOOD PRESSURE    gabapentin (NEURONTIN) 300 mg capsule TAKE 1 CAPSULE BY MOUTH THREE TIMES DAILY. MAX DAILY AMOUNT: 900 MG    pravastatin (PRAVACHOL) 40 mg tablet TAKE 1 TABLET BY MOUTH EVERY EVENING    sertraline (ZOLOFT) 100 mg tablet TAKE 2 TABLETS BY MOUTH DAILY    ezetimibe (ZETIA) 10 mg tablet TAKE 1 TABLET BY MOUTH DAILY    metoprolol succinate (TOPROL-XL) 25 mg XL tablet TAKE 1 TABLET BY MOUTH DAILY    metoprolol succinate (TOPROL-XL) 50 mg XL tablet Take 1 Tab by mouth daily. Indications: high blood pressure    losartan (COZAAR) 100 mg tablet TAKE 1 TABLET BY MOUTH DAILY    cyclobenzaprine (FLEXERIL) 5 mg tablet Take 1-2 PO QHS.  traZODone (DESYREL) 50 mg tablet TAKE 1 TO 2 TABLETS BY MOUTH AT BEDTIME AS NEEDED    isosorbide mononitrate ER (IMDUR) 60 mg CR tablet Take 1 Tab by mouth every morning.  famotidine (PEPCID) 20 mg tablet Take 20 mg by mouth two (2) times a day.  fluticasone propion-salmeterol (ADVAIR/WIXELA) 100-50 mcg/dose diskus inhaler Take 1 Puff by inhalation.  albuterol (PROVENTIL HFA, VENTOLIN HFA, PROAIR HFA) 90 mcg/actuation inhaler Take 2 Puffs by inhalation.     neomycin-polymyxin-hydrocortisone (CORTISPORIN) otic solution 4 Drops.  acetaminophen (TYLENOL) 500 mg tablet 1,000 mg.  colchicine 0.6 mg tablet TAKE 2 TABLETS NOW AND 1 TABLET IN 6 HOURS IF NEEDED REPEAT IN 6 HOURS AS NEEDED    ofloxacin (FLOXIN) 0.3 % otic solution Administer 5 Drops in left ear daily. (Patient taking differently: Administer 5 Drops in left ear as needed.)    diclofenac (VOLTAREN) 1 % gel Apply 2 g to affected area four (4) times daily.  carboxymethylcellulose sodium (REFRESH OP) Apply 1 Drop to eye daily.  nitroglycerin (NITROSTAT) 0.4 mg SL tablet 0.4 mg by SubLINGual route every five (5) minutes as needed for Chest Pain. Up to 3 doses.  ferrous sulfate 325 mg (65 mg iron) tablet Take  by mouth Daily (before breakfast).  clopidogrel (PLAVIX) 75 mg tab Take 1 Tab by mouth daily.  fluticasone (FLONASE) 50 mcg/actuation nasal spray SHAKE LIQUID AND USE 2 SPRAYS IN EACH NOSTRIL DAILY    terazosin (HYTRIN) 2 mg capsule Take 1 Cap by mouth nightly.  ZINC ACETATE PO Take 1,000 mg by mouth.  potassium chloride (KLOR-CON) 10 mEq tablet TAKE 1 TABLET BY MOUTH TWICE DAILY    cholecalciferol (VITAMIN D3) 1,000 unit tablet Take  by mouth daily.  ascorbic acid, vitamin C, (VITAMIN C) 250 mg tablet Take  by mouth.  garlic 7,710 mg cap Take 1 Cap by mouth daily.  multivit-min-FA-lycopen-lutein 0.4-300-250 mg-mcg-mcg tab Take 1 Tab by mouth daily.  aspirin delayed-release 81 mg tablet Take 1 tablet by mouth daily. No current facility-administered medications for this visit.        Allergies   Allergen Reactions    Beta Blocker [Beta-Blockers (Beta-Adrenergic Blocking Agts)] Other (comments)     symptomatic bradycardia    Codeine Other (comments)     Passed//fainted  patient doesn't recall reaction, occurred as a teenager    Darvocet A500 [Propoxyphene N-Acetaminophen] Other (comments)     throat swelling    Dexbrompheniramine-Pseudoephed Other (comments) Facial swelling. 1980\"s    Lasix [Furosemide] Itching    Lipitor [Atorvastatin] Other (comments)    Sulfa (Sulfonamide Antibiotics) Hives       Family History:   Non contributory for premature coronary disease in first degree relatives. Social History:   He  reports that he quit smoking about 4 years ago. His smoking use included cigarettes. He has a 75.00 pack-year smoking history. He has never used smokeless tobacco.  He  reports no history of alcohol use. [facility], 3 years    Physical:   Vitals:   BP: 128/73  HR: 70  Wt: 102.9 kg (226 lb 12.8 oz)    Exam:   General: Older gentleman, no complaints, no distress.     Head/Neck: No JVD  Lungs:  No respiratory distress. Clear with good air movement. Heart:  Regular. No rubs or gallops. Abdomen: Bowel sounds present  Extremities: Intact pulses. No significant edema.     Neurological: Alert and oriented to person, place, time. No gross neurological deficit. Skin:  Warm and dry. Review of Data:   LABS:   Lab Results   Component Value Date/Time    WBC 8.0 11/11/2019 12:48 PM    HGB 14.4 11/11/2019 12:48 PM    HCT 45.2 11/11/2019 12:48 PM    PLATELET 220 60/65/2426 12:48 PM     Lab Results   Component Value Date/Time    Sodium 140 03/22/2021 02:55 PM    Potassium 3.9 03/22/2021 02:55 PM    Chloride 103 03/22/2021 02:55 PM    CO2 34 (H) 03/22/2021 02:55 PM    Anion gap 3 03/22/2021 02:55 PM    Glucose 116 (H) 03/22/2021 02:55 PM    BUN 13 03/22/2021 02:55 PM    Creatinine 1.00 03/22/2021 02:55 PM    BUN/Creatinine ratio 13 03/22/2021 02:55 PM    GFR est AA >60 03/22/2021 02:55 PM    GFR est non-AA >60 03/22/2021 02:55 PM    Calcium 9.0 03/22/2021 02:55 PM    Bilirubin, total 0.4 03/22/2021 02:55 PM    Alk.  phosphatase 112 03/22/2021 02:55 PM    Protein, total 7.0 03/22/2021 02:55 PM    Albumin 3.7 03/22/2021 02:55 PM    Globulin 3.3 03/22/2021 02:55 PM    A-G Ratio 1.1 03/22/2021 02:55 PM    ALT (SGPT) 24 03/22/2021 02:55 PM     Lab Results   Component Value Date/Time    Cholesterol, total 132 03/22/2021 02:55 PM    HDL Cholesterol 36 (L) 03/22/2021 02:55 PM    LDL, calculated 46.2 03/22/2021 02:55 PM    Triglyceride 249 (H) 03/22/2021 02:55 PM    CHOL/HDL Ratio 3.7 03/22/2021 02:55 PM     Lab Results   Component Value Date/Time    Hemoglobin A1c 5.8 (H) 03/22/2021 02:55 PM    Hemoglobin A1c (POC) 5.9 05/06/2013 11:17 AM    Hemoglobin A1c, External 6.1 06/01/2018 12:00 AM     EKG:   Sinus rhythm, 78 beats per minute, right bundle branch block, nonspecific ST-T wave changes. TILT TABLE: February 2015:  Patient was initially placed in supine position. Heart rate was between 65-70 beats per minute, sinus rhythm. Blood pressure was 113/61 mmHg, with maximum blood pressure of 116/63 mmHg. Patient was placed in 60-degree upright tilting position. About 5-7 minutes into upright tilting position at 70 degrees, patient started feeling like her heart rate speeding up and legs feeling heavy, along with some dizziness. Blood pressure slowly decreased up to 70/43 mmHg. Heart rate maximum noted was up from 65 to 81 beats per minute. There was no obvious tachycardia. At the time, patient was given IV fluid and then placed back supine position, with normalization of the blood pressure and resolution of his symptoms. Heart rhythm remained in sinus. Patient did have a drop in the systolic blood pressure more than 20 mmHg upon upright tilting position, without any significant increase in the heart rate. This suggests possible autonomic dysfunction. SUMMARY:  Upright tilt table testing with reproduction of symptoms. More than 20 mmHg drop in systolic blood pressure, without change in the heart rate during upright tilting position, associated with symptoms. This may represent autonomic dysfunction    ECHO: (01/18)  SUMMARY:  Left ventricle: Systolic function was normal. Ejection fraction was estimated   in the range of 55 % to 60 %.  There was hypokinesis of the basal inferoseptal wall. Wall thickness was at the upper   limits of normal.  Right ventricle: The ventricle was dilated. Inferior vena cava, hepatic veins: The inferior vena cava was mildly dilated.   The respirophasic change in diameter was  less than 50%. No major VHD    STRESS TEST (EST, PHARM, NUC, ECHO etc): October 2014:  1. Small to medium sized area of mildly intense reversible defect involvement anterolateral wall. 2.  There is also a small area of reversible defect involving basilar inferior wall concerning for ischemic focus. 3.  Calculated ejection fraction of 56% without any wall motion abnormality. CATHETERIZATION: October 2014:  LM - patent  LAD - 30% prox, 80% mid, 90% apical  D1 - 100%  RI - 95% ostial (failed PCI, calcified 1.75 - 2.0 mm vessel)  Cx - 30-40% prox  OM1 - subtotal  OM2 - 80%  RCA - 30% diffuse  RPDA - 100%  RPLV - 50%    Impression / Plan:     Diabetes    Hypertension    Dyslipidemia    Coronary artery disease     Mr. Iva Castillo returns to the office for follow up. Coronary artery disease:    Mr. Iva Castillo had a cardiac catheterization in October, 2014 and required stent of the obtuse marginal branch. He is on aspirin and Plavix, isosorbide mononitrate and Pravachol  Continue beta-blocker    Hypertension: Blood pressure is 128/73. Enma Da Silva Continue beta-blocker, amlodipine and Imdur. Diabetes: This is being managed by primary care provider. Goal Hgb A1c less than 7 is recommended from cardiovascular standpoint. Last Hgb A1c  was 5.8    Hyperlipidemia:    Unable to tolerate  atorvastatin because of significant myalgia. Last LDL 46. Currently on Zetia. Continue pravachol. TOlerating well. RTC in 6 months or sooner if needed.

## 2021-09-16 NOTE — LETTER
9/16/2021    Patient: Remedios Cortes   YOB: 1950   Date of Visit: 9/16/2021     Eduardo Bradford MD  [address] [place] [address]    Dear Eduardo Bradford MD,      Thank you for referring Mr. Nasrin Barger to C[facility]IO SPECIALIST AT [facility] for evaluation. My notes for this consultation are attached. If you have questions, please do not hesitate to call me. I look forward to following your patient along with you.       Sincerely,    Mimi Neville MD

## 2021-10-05 RX ORDER — ISOSORBIDE MONONITRATE 60 MG/1
TABLET, EXTENDED RELEASE ORAL
Qty: 90 TABLET | Refills: 3 | Status: SHIPPED | OUTPATIENT
Start: 2021-10-05

## 2021-10-16 DIAGNOSIS — M79.2 NEURALGIC PAIN: ICD-10-CM

## 2021-10-17 RX ORDER — GABAPENTIN 300 MG/1
CAPSULE ORAL
Qty: 90 CAPSULE | Refills: 5 | Status: SHIPPED | OUTPATIENT
Start: 2021-10-17 | End: 2022-06-12

## 2021-10-28 NOTE — PATIENT DISCUSSION
1.  DM Type II (Oral Medication) without sign of diabetic retinopathy and no blot heme on dilated retinal examination today OU No Macular Edema:  Discussed the pathophysiology of diabetes and its effect on the eye and risk of blindness. Stressed the importance of strong glucose control. Advised of importance of at least yearly dilated examinations but to contact us immediately for any problems or concerns. 2. Choroidal Nevus OS: Appears Benign and stable. Observe for changes. Followed by Ephraim Roberto. 3.  BREE w/ PEK OU- Continue Restasis BID OU. Recommend ATs QID OU routinely 4. Pseudophakia OU - H/o YAG Cap OU 5.  Glaucoma Suspect OU (CD 0.55/0.70): Past w/u negative. IOP stable. Patient is considered Low Risk. Condition was discussed with patient and patient understands. Will continue to monitor patient for any progression in condition. Patient was advised to call us with any problems questions or concerns. 6.  H/o PXE OU Patient defers the refraction at today's visitReturn for an appointment in 6 months 10 (check ks on Restasis) with Dr. Randi Ash.

## 2021-11-11 ENCOUNTER — HOSPITAL ENCOUNTER (OUTPATIENT)
Dept: LAB | Age: 71
Discharge: HOME | End: 2021-11-11
Payer: MEDICARE

## 2021-11-11 ENCOUNTER — OFFICE VISIT (OUTPATIENT)
Dept: INTERNAL MEDICINE CLINIC | Age: 71
End: 2021-11-11
Payer: MEDICARE

## 2021-11-11 VITALS
TEMPERATURE: 97 F | WEIGHT: 228 LBS | BODY MASS INDEX: 29.26 KG/M2 | HEIGHT: 74 IN | HEART RATE: 63 BPM | RESPIRATION RATE: 20 BRPM | SYSTOLIC BLOOD PRESSURE: 134 MMHG | OXYGEN SATURATION: 97 % | DIASTOLIC BLOOD PRESSURE: 70 MMHG

## 2021-11-11 DIAGNOSIS — I11.9 BENIGN HYPERTENSIVE HEART DISEASE WITHOUT HEART FAILURE: ICD-10-CM

## 2021-11-11 DIAGNOSIS — Z23 NEEDS FLU SHOT: ICD-10-CM

## 2021-11-11 DIAGNOSIS — E78.5 DYSLIPIDEMIA: ICD-10-CM

## 2021-11-11 DIAGNOSIS — E11.21 TYPE 2 DIABETES MELLITUS WITH NEPHROPATHY (HCC): ICD-10-CM

## 2021-11-11 DIAGNOSIS — Z00.00 MEDICARE ANNUAL WELLNESS VISIT, SUBSEQUENT: Primary | ICD-10-CM

## 2021-11-11 LAB
ALBUMIN SERPL-MCNC: 3.6 G/DL (ref 3.4–5)
ALBUMIN/GLOB SERPL: 1 {RATIO} (ref 0.8–1.7)
ALP SERPL-CCNC: 109 U/L (ref 45–117)
ALT SERPL-CCNC: 30 U/L (ref 16–61)
ANION GAP SERPL CALC-SCNC: 2 MMOL/L (ref 3–18)
AST SERPL-CCNC: 20 U/L (ref 10–38)
BILIRUB SERPL-MCNC: 0.4 MG/DL (ref 0.2–1)
BUN SERPL-MCNC: 18 MG/DL (ref 7–18)
BUN/CREAT SERPL: 17 (ref 12–20)
CALCIUM SERPL-MCNC: 9.5 MG/DL (ref 8.5–10.1)
CHLORIDE SERPL-SCNC: 104 MMOL/L (ref 100–111)
CHOLEST SERPL-MCNC: 125 MG/DL
CO2 SERPL-SCNC: 31 MMOL/L (ref 21–32)
CREAT SERPL-MCNC: 1.07 MG/DL (ref 0.6–1.3)
GLOBULIN SER CALC-MCNC: 3.5 G/DL (ref 2–4)
GLUCOSE SERPL-MCNC: 177 MG/DL (ref 74–99)
HBA1C MFR BLD: 6.5 % (ref 4.2–5.6)
HDLC SERPL-MCNC: 35 MG/DL (ref 40–60)
HDLC SERPL: 3.6 {RATIO} (ref 0–5)
LDLC SERPL CALC-MCNC: 34 MG/DL (ref 0–100)
LIPID PROFILE,FLP: ABNORMAL
POTASSIUM SERPL-SCNC: 4 MMOL/L (ref 3.5–5.5)
PROT SERPL-MCNC: 7.1 G/DL (ref 6.4–8.2)
SODIUM SERPL-SCNC: 137 MMOL/L (ref 136–145)
TRIGL SERPL-MCNC: 280 MG/DL (ref ?–150)
VLDLC SERPL CALC-MCNC: 56 MG/DL

## 2021-11-11 PROCEDURE — G8752 SYS BP LESS 140: HCPCS | Performed by: INTERNAL MEDICINE

## 2021-11-11 PROCEDURE — 80061 LIPID PANEL: CPT

## 2021-11-11 PROCEDURE — 99214 OFFICE O/P EST MOD 30 MIN: CPT | Performed by: INTERNAL MEDICINE

## 2021-11-11 PROCEDURE — 80053 COMPREHEN METABOLIC PANEL: CPT

## 2021-11-11 PROCEDURE — G8510 SCR DEP NEG, NO PLAN REQD: HCPCS | Performed by: INTERNAL MEDICINE

## 2021-11-11 PROCEDURE — G0439 PPPS, SUBSEQ VISIT: HCPCS | Performed by: INTERNAL MEDICINE

## 2021-11-11 PROCEDURE — G8754 DIAS BP LESS 90: HCPCS | Performed by: INTERNAL MEDICINE

## 2021-11-11 PROCEDURE — 36415 COLL VENOUS BLD VENIPUNCTURE: CPT

## 2021-11-11 PROCEDURE — 2022F DILAT RTA XM EVC RTNOPTHY: CPT | Performed by: INTERNAL MEDICINE

## 2021-11-11 PROCEDURE — G8419 CALC BMI OUT NRM PARAM NOF/U: HCPCS | Performed by: INTERNAL MEDICINE

## 2021-11-11 PROCEDURE — 83036 HEMOGLOBIN GLYCOSYLATED A1C: CPT

## 2021-11-11 PROCEDURE — G0008 ADMIN INFLUENZA VIRUS VAC: HCPCS | Performed by: INTERNAL MEDICINE

## 2021-11-11 PROCEDURE — 90694 VACC AIIV4 NO PRSRV 0.5ML IM: CPT | Performed by: INTERNAL MEDICINE

## 2021-11-11 PROCEDURE — G8427 DOCREV CUR MEDS BY ELIG CLIN: HCPCS | Performed by: INTERNAL MEDICINE

## 2021-11-11 PROCEDURE — 1101F PT FALLS ASSESS-DOCD LE1/YR: CPT | Performed by: INTERNAL MEDICINE

## 2021-11-11 PROCEDURE — G8536 NO DOC ELDER MAL SCRN: HCPCS | Performed by: INTERNAL MEDICINE

## 2021-11-11 PROCEDURE — 3017F COLORECTAL CA SCREEN DOC REV: CPT | Performed by: INTERNAL MEDICINE

## 2021-11-11 PROCEDURE — 3044F HG A1C LEVEL LT 7.0%: CPT | Performed by: INTERNAL MEDICINE

## 2021-11-11 RX ORDER — UREA 10 %
100 LOTION (ML) TOPICAL DAILY
COMMUNITY

## 2021-11-11 RX ORDER — EZETIMIBE 10 MG/1
10 TABLET ORAL DAILY
Qty: 90 TABLET | Refills: 3 | Status: SHIPPED | OUTPATIENT
Start: 2021-11-11

## 2021-11-11 RX ORDER — PRAVASTATIN SODIUM 40 MG/1
40 TABLET ORAL EVERY EVENING
Qty: 90 TABLET | Refills: 3 | Status: SHIPPED | OUTPATIENT
Start: 2021-11-11

## 2021-11-11 NOTE — PROGRESS NOTES
This is the Subsequent Medicare Annual Wellness Exam, performed 12 months or more after the Initial AWV or the last Subsequent AWV    I have reviewed the patient's medical history in detail and updated the computerized patient record. /70 (BP 1 Location: Right upper arm, BP Patient Position: Sitting, BP Cuff Size: Adult)   Pulse 63   Temp 97 °F (36.1 °C) (Temporal)   Resp 20   Ht 6' 2\" (1.88 m)   Wt 228 lb (103.4 kg)   SpO2 97%   BMI 29.27 kg/m²         Assessment/Plan   Education and counseling provided:  Are appropriate based on today's review and evaluation    1. Medicare annual wellness visit, subsequent  2. Needs flu shot  -     FLU (FLUAD QUAD INFLUENZA VACCINE,QUAD,ADJUVANTED)       Depression Risk Factor Screening     3 most recent PHQ Screens 11/11/2021   PHQ Not Done -   Little interest or pleasure in doing things Not at all   Feeling down, depressed, irritable, or hopeless Not at all   Total Score PHQ 2 0   Trouble falling or staying asleep, or sleeping too much -   Feeling tired or having little energy -   Poor appetite, weight loss, or overeating -   Feeling bad about yourself - or that you are a failure or have let yourself or your family down -   Trouble concentrating on things such as school, work, reading, or watching TV -   Moving or speaking so slowly that other people could have noticed; or the opposite being so fidgety that others notice -   Thoughts of being better off dead, or hurting yourself in some way -   PHQ 9 Score -   How difficult have these problems made it for you to do your work, take care of your home and get along with others -       Alcohol Risk Screen    Do you average more than 1 drink per night or more than 7 drinks a week: No    In the past three months have you have had more than 4 drinks containing alcohol on one occasion: No        Functional Ability and Level of Safety    Hearing: The patient wears hearing aids. Activities of Daily Living:   The home contains: handrails, grab bars and walk in shower  Patient does total self care      Ambulation: with mild difficulty     Fall Risk:  Fall Risk Assessment, last 12 mths 11/11/2021   Able to walk? Yes   Fall in past 12 months? 1   Do you feel unsteady? -   Are you worried about falling -   Number of falls in past 12 months 1   Fall with injury? 0      Abuse Screen:  Patient is not abused       Cognitive Screening    Has your family/caregiver stated any concerns about your memory: no     Cognitive Screening: Normal - Mini Cog Test    Health Maintenance Due     Health Maintenance Due   Topic Date Due    Low dose CT lung screening  Never done    Flu Vaccine (1) 09/01/2021    COVID-19 Vaccine (3 - Booster for [employer] series) 10/21/2021    Foot Exam Q1  11/11/2021    Eye Exam Retinal or Dilated  11/18/2021       Patient Care Team   Patient Care Team:  Levon Boas, MD as PCP - General (Internal Medicine)  Levon Boas, MD as PCP - [address] [place], [place] [address]led Provider  Tee Haque MD as Consulting Provider (Ophthalmology)  Ashley Beckett MD as Consulting Provider (Gastroenterology)  Yuridia Costa MD as Consulting Provider (Orthopedic Surgery)  Gilberto Bey MD as Consulting Provider (Orthopedic Surgery)  Nacho Burr DPM as Consulting Provider (Podiatry)  Roberto Larson DO as Consulting Provider (Physical Medicine and Rehabilitation)  Reina Saldivar MD (Otolaryngology)    History     Patient Active Problem List   Diagnosis Code    Dyslipidemia E78.5    Coronary artery disease I25.10    Hypertension I11.9    Type 2 diabetes mellitus with nephropathy ([address] E11.21    Type 2 diabetes mellitus with diabetic neuropathy ([address] E11.40    Asthma J45.909    Coronary artery disease with stable angina pectoris ([address] I25.118     Past Medical History:   Diagnosis Date    Agent orange exposure     Asbestos exposure     Asthma 09/2019    told mild.     Autonomic dysfunction     abnormal tilt table 2/15    BPH     CAD (coronary artery disease)     S/P OM NOY in 2014    Cholesteatoma 2020    left ear    Choroidal nevus, left eye     Colonic polyp 08/28/2018    multiple. 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 Gricelda Lamas N/A 7/9/2018    COLONOSCOPY performed by Alexi Gamble MD at [facility] N/A 8/28/2018    COLONOSCOPY performed by Alexi Gamble MD at [facility] HX CATARACT REMOVAL      7/14  bilat    HX CERVICAL LAMINECTOMY      HX COLONOSCOPY  07/09/2018    pt states have to redo d/t unclear imaging    HX ENDOSCOPY  10/01/2019    EGD. [facility]. No significant pathological findings    HX HEART CATHETERIZATION  2014    Stent    HX KNEE REPLACEMENT Right 03/06/2020    Dr. Rita Rodriguez HX ORTHOPAEDIC Left 02/05/2018    knee replacement    HX TUMOR REMOVAL      3/13  wide excision right thigh sarcoma    HX TYMPANOMASTOIDECTOMY       Current Outpatient Medications   Medication Sig Dispense Refill    cyanocobalamin (Vitamin B-12) 100 mcg tablet Take 100 mcg by mouth daily.  gabapentin (NEURONTIN) 300 mg capsule TAKE 1 CAPSULE BY MOUTH THREE TIMES DAILY.  MAX DAILY AMOUNT: 900 MG 90 Capsule 5    isosorbide mononitrate ER (IMDUR) 60 mg CR tablet TAKE 1 TABLET BY MOUTH EVERY MORNING 90 Tablet 3    amLODIPine (NORVASC) 2.5 mg tablet TAKE 1 TABLET BY MOUTH DAILY FOR HIGH BLOOD PRESSURE 90 Tablet 1    pravastatin (PRAVACHOL) 40 mg tablet TAKE 1 TABLET BY MOUTH EVERY EVENING 90 Tablet 0    sertraline (ZOLOFT) 100 mg tablet TAKE 2 TABLETS BY MOUTH DAILY 180 Tablet 1    ezetimibe (ZETIA) 10 mg tablet TAKE 1 TABLET BY MOUTH DAILY 30 Tablet 5    metoprolol succinate (TOPROL-XL) 25 mg XL tablet TAKE 1 TABLET BY MOUTH DAILY 30 Tablet 6    losartan (COZAAR) 100 mg tablet TAKE 1 TABLET BY MOUTH DAILY 90 Tab 4    cyclobenzaprine (FLEXERIL) 5 mg tablet Take 1-2 PO QHS.  traZODone (DESYREL) 50 mg tablet TAKE 1 TO 2 TABLETS BY MOUTH AT BEDTIME AS NEEDED 180 Tab 5    famotidine (PEPCID) 20 mg tablet Take 20 mg by mouth two (2) times a day.  fluticasone propion-salmeterol (ADVAIR/WIXELA) 100-50 mcg/dose diskus inhaler Take 1 Puff by inhalation.  albuterol (PROVENTIL HFA, VENTOLIN HFA, PROAIR HFA) 90 mcg/actuation inhaler Take 2 Puffs by inhalation.  neomycin-polymyxin-hydrocortisone (CORTISPORIN) otic solution 4 Drops.  acetaminophen (TYLENOL) 500 mg tablet 1,000 mg.  colchicine 0.6 mg tablet TAKE 2 TABLETS NOW AND 1 TABLET IN 6 HOURS IF NEEDED REPEAT IN 6 HOURS AS NEEDED 15 Tab 5    ofloxacin (FLOXIN) 0.3 % otic solution Administer 5 Drops in left ear daily. (Patient taking differently: Administer 5 Drops in left ear as needed.) 5 mL 0    diclofenac (VOLTAREN) 1 % gel Apply 2 g to affected area four (4) times daily.  carboxymethylcellulose sodium (REFRESH OP) Apply 1 Drop to eye daily.  nitroglycerin (NITROSTAT) 0.4 mg SL tablet 0.4 mg by SubLINGual route every five (5) minutes as needed for Chest Pain. Up to 3 doses.  ferrous sulfate 325 mg (65 mg iron) tablet Take  by mouth Daily (before breakfast).  clopidogrel (PLAVIX) 75 mg tab Take 1 Tab by mouth daily.  90 Tab 3    fluticasone (FLONASE) 50 mcg/actuation nasal spray SHAKE LIQUID AND USE 2 SPRAYS IN EACH NOSTRIL DAILY 3 Bottle 6    terazosin (HYTRIN) 2 mg capsule Take 1 Cap by mouth nightly. 90 Cap 3    ZINC ACETATE PO Take 1,000 mg by mouth.  cholecalciferol (VITAMIN D3) 1,000 unit tablet Take  by mouth daily.  ascorbic acid, vitamin C, (VITAMIN C) 250 mg tablet Take  by mouth.  garlic 3,868 mg cap Take 1 Cap by mouth daily.  multivit-min-FA-lycopen-lutein 0.4-300-250 mg-mcg-mcg tab Take 1 Tab by mouth daily.  aspirin delayed-release 81 mg tablet Take 1 tablet by mouth daily. 90 tablet 5    metoprolol succinate (TOPROL-XL) 50 mg XL tablet Take 1 Tab by mouth daily. Indications: high blood pressure 90 Tab 4    potassium chloride (KLOR-CON) 10 mEq tablet TAKE 1 TABLET BY MOUTH TWICE DAILY (Patient not taking: Reported on 11/11/2021) 180 Tab 4     Allergies   Allergen Reactions    Beta Blocker [Beta-Blockers (Beta-Adrenergic Blocking Agts)] Other (comments)     symptomatic bradycardia    Codeine Other (comments)     Passed//fainted  patient doesn't recall reaction, occurred as a teenager    Darvocet A500 [Propoxyphene N-Acetaminophen] Other (comments)     throat swelling    Dexbrompheniramine-Pseudoephed Other (comments)     Facial swelling.  1980\"s    Lasix [Furosemide] Itching    Lipitor [Atorvastatin] Other (comments)    Sulfa (Sulfonamide Antibiotics) Hives       Family History   Problem Relation Age of Onset    Diabetes Father     Heart Disease Father         cardiomyopathydementia    Hypertension Father     Cancer Father         prostate    Parkinsonism Father     Dementia Father     High Cholesterol Father     Kidney Disease Father     Other Father         colon polyps    Headache Mother     Psychiatric Disorder Mother     Kidney Disease Mother     Hypertension Mother     High Cholesterol Mother     Other Mother         GERD/polymyalgia rheumaticacolon osiris    Heart Disease Mother     Cancer Sister      Social History     Tobacco Use    Smoking status: Former Smoker     Packs/day: 1.50     Years: 50.00     Pack years: 75.00     Types: Cigarettes     Quit date: 12/3/2016     Years since quittin.9    Smokeless tobacco: Never Used   Substance Use Topics    Alcohol use: No     Alcohol/week: 0.0 standard drinks         Shena Hylton MD

## 2021-11-11 NOTE — PATIENT INSTRUCTIONS
Medicare Wellness Visit, Male    The best way to live healthy is to have a lifestyle where you eat a well-balanced diet, exercise regularly, limit alcohol use, and quit all forms of tobacco/nicotine, if applicable. Regular preventive services are another way to keep healthy. Preventive services (vaccines, screening tests, monitoring & exams) can help personalize your care plan, which helps you manage your own care. Screening tests can find health problems at the earliest stages, when they are easiest to treat. [facility] follows the current, evidence-based guidelines published by the [facility] Sana ([facility]) when recommending preventive services for our patients. Because we follow these guidelines, sometimes recommendations change over time as research supports it. (For example, a prostate screening blood test is no longer routinely recommended for men with no symptoms). Of course, you and your doctor may decide to screen more often for some diseases, based on your risk and co-morbidities (chronic disease you are already diagnosed with). Preventive services for you include:  - Medicare offers their members a free annual wellness visit, which is time for you and your primary care provider to discuss and plan for your preventive service needs. Take advantage of this benefit every year!  -All adults over age 72 should receive the recommended pneumonia vaccines. Current USPSTF guidelines recommend a series of two vaccines for the best pneumonia protection.   -All adults should have a flu vaccine yearly and tetanus vaccine every 10 years.  -All adults age 48 and older should receive the shingles vaccines (series of two vaccines).        -All adults age 38-68 who are overweight should have a diabetes screening test once every three years.   -Other screening tests & preventive services for persons with diabetes include: an eye exam to screen for diabetic retinopathy, a kidney function test, a foot exam, and stricter control over your cholesterol.   -Cardiovascular screening for adults with routine risk involves an electrocardiogram (ECG) at intervals determined by the provider.   -Colorectal cancer screening should be done for adults age 54-65 with no increased risk factors for colorectal cancer. There are a number of acceptable methods of screening for this type of cancer. Each test has its own benefits and drawbacks. Discuss with your provider what is most appropriate for you during your annual wellness visit. The different tests include: colonoscopy (considered the best screening method), a fecal occult blood test, a fecal DNA test, and sigmoidoscopy.  -All adults born between [facility] should be screened once for Hepatitis C.  -An Abdominal Aortic Aneurysm (AAA) Screening is recommended for men age 73-68 who has ever smoked in their lifetime.      Here is a list of your current Health Maintenance items (your personalized list of preventive services) with a due date:  Health Maintenance Due   Topic Date Due    Smoker or Former Smoker - [address]  Never done    Yearly Flu Vaccine (1) 09/01/2021    COVID-19 Vaccine (3 - Booster for [employer] series) 10/21/2021    Diabetic Foot Care  11/11/2021    Eye Exam  11/18/2021

## 2021-11-11 NOTE — PROGRESS NOTES
HISTORY OF PRESENT ILLNESS  Sourav Becker is a 70 y.o. male. /70 (BP 1 Location: Right upper arm, BP Patient Position: Sitting, BP Cuff Size: Adult)   Pulse 63   Temp 97 °F (36.1 °C) (Temporal)   Resp 20   Ht 6' 2\" (1.88 m)   Wt 228 lb (103.4 kg)   SpO2 97%   BMI 29.27 kg/m²     Has seen his ophthalmologist, Dr. Bernard Norton for his back    Saw Dr Drake Smith, podiatry            Diabetes  The history is provided by the patient. This is a chronic problem. The current episode started more than 1 week ago. The problem occurs daily. The problem has not changed since onset. Pertinent negatives include no chest pain, no headaches and no shortness of breath. Cholesterol Problem  The history is provided by the patient. This is a chronic problem. The current episode started more than 1 week ago. Pertinent negatives include no chest pain, no headaches and no shortness of breath. Hypertension   The history is provided by the patient. This is a chronic problem. The current episode started more than 1 week ago. Pertinent negatives include no chest pain, no palpitations, no headaches, no dizziness and no shortness of breath. Review of Systems   Constitutional: Negative for chills and fever. Respiratory: Negative for shortness of breath. Cardiovascular: Negative for chest pain and palpitations. Musculoskeletal: Positive for back pain and joint pain. Neurological: Negative for dizziness and headaches. Physical Exam  Vitals and nursing note reviewed. Constitutional:       General: He is not in acute distress. Appearance: Normal appearance. He is well-developed. He is not diaphoretic. HENT:      Head: Normocephalic and atraumatic. Cardiovascular:      Rate and Rhythm: Normal rate and regular rhythm. Pulmonary:      Effort: Pulmonary effort is normal.      Breath sounds: Normal breath sounds. Musculoskeletal:      Right lower leg: No edema.       Left lower leg: No edema.   Skin:     General: Skin is warm and dry. Neurological:      General: No focal deficit present. Mental Status: He is alert and oriented to person, place, and time. Comments: Diabetic foot exam:     Left Foot:   Visual Exam: callous - great toes   Pulse DP: 1+ (weak)   Filament test: normal sensation    Vibratory sensation: diminished      Right Foot:   Visual Exam: callous - great toe   Pulse DP: 2+ (normal)   Filament test: normal sensation    Vibratory sensation: diminished     Psychiatric:         Mood and Affect: Mood normal.         Behavior: Behavior normal.         ASSESSMENT and PLAN    ICD-10-CM ICD-9-CM                  3. Type 2 diabetes mellitus with nephropathy (HCC)  O32.02 405.92 METABOLIC PANEL, COMPREHENSIVE     583.81 LIPID PANEL      HEMOGLOBIN A1C W/O EAG   4. Hypertension  T28.7 046.95 METABOLIC PANEL, COMPREHENSIVE   5.  Dyslipidemia  E78.5 272.4 ezetimibe (ZETIA) 10 mg tablet      LIPID PANEL         BP controlled    DM has been controlled    Update lab    F/u 6 months for HTN, DM, chol

## 2021-11-11 NOTE — PROGRESS NOTES
Full Ct chest was done March 2020. Pt could not remember the date when seen in office earlier today. This was obtained via Gerhardt Freed from [facility] after pt left the visit. Will discuss at next visit f/u LDCT for lung cancer screening.

## 2021-11-11 NOTE — PROGRESS NOTES
Alejandro Martell is a 70 y.o. male (: 1950) presenting to address:    Chief Complaint   Patient presents with    Annual Wellness Visit       Vitals:    21 1357   BP: 134/70   Pulse: 63   Resp: 20   Temp: 97 °F (36.1 °C)   TempSrc: Temporal   SpO2: 97%   Weight: 228 lb (103.4 kg)   Height: 6' 2\" (1.88 m)   PainSc:   0 - No pain       Hearing/Vision:   No exam data present    Learning Assessment:     Learning Assessment 2021   PRIMARY LEARNER Patient   HIGHEST LEVEL OF EDUCATION - PRIMARY LEARNER  SOME COLLEGE   BARRIERS PRIMARY LEARNER NONE   CO-LEARNER CAREGIVER No   PRIMARY LANGUAGE ENGLISH    NEED -   LEARNER PREFERENCE PRIMARY DEMONSTRATION   LEARNING SPECIAL TOPICS -   ANSWERED BY patient    RELATIONSHIP SELF     Depression Screening:     3 most recent PHQ Screens 2021   PHQ Not Done -   Little interest or pleasure in doing things Not at all   Feeling down, depressed, irritable, or hopeless Not at all   Total Score PHQ 2 0   Trouble falling or staying asleep, or sleeping too much -   Feeling tired or having little energy -   Poor appetite, weight loss, or overeating -   Feeling bad about yourself - or that you are a failure or have let yourself or your family down -   Trouble concentrating on things such as school, work, reading, or watching TV -   Moving or speaking so slowly that other people could have noticed; or the opposite being so fidgety that others notice -   Thoughts of being better off dead, or hurting yourself in some way -   PHQ 9 Score -   How difficult have these problems made it for you to do your work, take care of your home and get along with others -     Fall Risk Assessment:     Fall Risk Assessment, last 12 mths 2021   Able to walk? Yes   Fall in past 12 months? 1   Do you feel unsteady? -   Are you worried about falling -   Number of falls in past 12 months 1   Fall with injury?  0     Abuse Screening:     Abuse Screening Questionnaire 11/11/2021   Do you ever feel afraid of your partner? N   Are you in a relationship with someone who physically or mentally threatens you? N   Is it safe for you to go home? Y     Coordination of Care Questionaire:   1. Have you been to the ER, urgent care clinic since your last visit? Hospitalized since your last visit? NO    2. Have you seen or consulted any other health care providers outside of the [address] [place] [address] since your last visit? Include any pap smears or colon screening. YES    Advanced Directive:   1. Do you have an Advanced Directive? NO    2. Would you like information on Advanced Directives?  NO

## 2021-11-11 NOTE — PROGRESS NOTES
Fluad Immunization/s administered 11/11/2021 by Mildred Hay with consent. Patient tolerated procedure well. No reactions noted.

## 2021-12-04 DIAGNOSIS — M79.2 NEURALGIC PAIN: ICD-10-CM

## 2021-12-05 RX ORDER — TRAMADOL HYDROCHLORIDE 50 MG/1
TABLET ORAL
Qty: 120 TABLET | Refills: 5 | Status: SHIPPED | OUTPATIENT
Start: 2021-12-05 | End: 2022-01-04

## 2022-01-04 LAB
CREATININE, EXTERNAL: 1.1
HBA1C MFR BLD HPLC: 7 %
LDL-C, EXTERNAL: 42
MICROALBUMIN UR TEST STR-MCNC: 11.5 MG/DL
MICROALBUMIN/CREAT RATIO, EXTERNAL: <30

## 2022-01-25 DIAGNOSIS — I11.9 BENIGN HYPERTENSIVE HEART DISEASE WITHOUT HEART FAILURE: Primary | ICD-10-CM

## 2022-01-25 RX ORDER — TERAZOSIN 2 MG/1
2 CAPSULE ORAL
Qty: 90 CAPSULE | Refills: 3 | Status: SHIPPED | OUTPATIENT
Start: 2022-01-25

## 2022-01-25 NOTE — TELEPHONE ENCOUNTER
Pharmacy fax request for refill. Last OV 11/11/21, next scheduled 5/11/22. Requested Prescriptions     Pending Prescriptions Disp Refills    terazosin (HYTRIN) 2 mg capsule 90 Capsule 3     Sig: Take 1 Capsule by mouth nightly.

## 2022-03-17 ENCOUNTER — OFFICE VISIT (OUTPATIENT)
Dept: CARDIOLOGY CLINIC | Age: 72
End: 2022-03-17
Payer: MEDICARE

## 2022-03-17 VITALS
TEMPERATURE: 98.6 F | WEIGHT: 229 LBS | RESPIRATION RATE: 18 BRPM | HEART RATE: 67 BPM | BODY MASS INDEX: 29.4 KG/M2 | SYSTOLIC BLOOD PRESSURE: 132 MMHG | OXYGEN SATURATION: 96 % | DIASTOLIC BLOOD PRESSURE: 66 MMHG

## 2022-03-17 DIAGNOSIS — I25.83 CORONARY ARTERY DISEASE DUE TO LIPID RICH PLAQUE: Primary | ICD-10-CM

## 2022-03-17 DIAGNOSIS — I25.10 CORONARY ARTERY DISEASE DUE TO LIPID RICH PLAQUE: Primary | ICD-10-CM

## 2022-03-17 DIAGNOSIS — I10 ESSENTIAL HYPERTENSION WITH GOAL BLOOD PRESSURE LESS THAN 140/90: ICD-10-CM

## 2022-03-17 DIAGNOSIS — E78.00 PURE HYPERCHOLESTEROLEMIA: ICD-10-CM

## 2022-03-17 DIAGNOSIS — R06.00 DYSPNEA, UNSPECIFIED TYPE: ICD-10-CM

## 2022-03-17 PROCEDURE — 1101F PT FALLS ASSESS-DOCD LE1/YR: CPT | Performed by: INTERNAL MEDICINE

## 2022-03-17 PROCEDURE — G8510 SCR DEP NEG, NO PLAN REQD: HCPCS | Performed by: INTERNAL MEDICINE

## 2022-03-17 PROCEDURE — G8428 CUR MEDS NOT DOCUMENT: HCPCS | Performed by: INTERNAL MEDICINE

## 2022-03-17 PROCEDURE — G8752 SYS BP LESS 140: HCPCS | Performed by: INTERNAL MEDICINE

## 2022-03-17 PROCEDURE — G8419 CALC BMI OUT NRM PARAM NOF/U: HCPCS | Performed by: INTERNAL MEDICINE

## 2022-03-17 PROCEDURE — G8536 NO DOC ELDER MAL SCRN: HCPCS | Performed by: INTERNAL MEDICINE

## 2022-03-17 PROCEDURE — 3017F COLORECTAL CA SCREEN DOC REV: CPT | Performed by: INTERNAL MEDICINE

## 2022-03-17 PROCEDURE — 99214 OFFICE O/P EST MOD 30 MIN: CPT | Performed by: INTERNAL MEDICINE

## 2022-03-17 PROCEDURE — G8754 DIAS BP LESS 90: HCPCS | Performed by: INTERNAL MEDICINE

## 2022-03-17 NOTE — PROGRESS NOTES
[facility]    Mr. Bradford Han  is a 70 y.o. gentleman with diabetes, hypertension, dyslipidemia and tobacco use. He has coronary artery disease as documented by cardiac catheterization in October of 2014. His anatomy is as follows:      LM - patent  LAD - 30% prox, 80% mid, 90% apical  D1 - 100%  RI - 95% ostial (failed PCI, calcified 1.75 - 2.0 mm vessel)  Cx - 30-40% prox  OM1 - subtotal  OM2 - 80% (3.5 x 18mm XIENCE 10/14)  RCA - 30% diffuse  RPDA - 100%  RPLV - 50%    Mr. Bradford Han is here today for follow up appointment. He denies any hospital admission or ER visits since last time. He denies any chest pain or chest tightness. Denies any use of nitroglycerin since last visit. Overall he has no cardiac complaint. He is performing exercise 3 times a week. He also mentions me that he has been feeling slight shortness of breath with prolonged walk. Denies any edema    Past Medical History:   Diagnosis Date    Agent orange exposure     Asbestos exposure     Asthma 09/2019    told mild.  Autonomic dysfunction     abnormal tilt table 2/15    BPH     CAD (coronary artery disease)     S/P OM NOY in 2014    Cholesteatoma 2020    left ear    Choroidal nevus, left eye     Colonic polyp 08/28/2018    multiple.  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 Jessica Holden N/A 7/9/2018    COLONOSCOPY performed by Lakia De La Torre MD at [facility] N/A 8/28/2018    COLONOSCOPY performed by Lakia De La Torre MD at [address] [place] [address] HX CATARACT REMOVAL      7/14  bilat    HX CERVICAL LAMINECTOMY      HX COLONOSCOPY  07/09/2018    pt states have to redo d/t unclear imaging    HX ENDOSCOPY  10/01/2019    EGD. At [facility]. No significant pathological findings    HX HEART CATHETERIZATION  2014    Stent    HX KNEE REPLACEMENT Right 03/06/2020    Dr. Barbara Kilgore HX ORTHOPAEDIC Left 02/05/2018    knee replacement    HX TUMOR REMOVAL      3/13  wide excision right thigh sarcoma    HX TYMPANOMASTOIDECTOMY         Current Outpatient Medications   Medication Sig    terazosin (HYTRIN) 2 mg capsule Take 1 Capsule by mouth nightly.  cyanocobalamin (Vitamin B-12) 100 mcg tablet Take 100 mcg by mouth daily.  pravastatin (PRAVACHOL) 40 mg tablet Take 1 Tablet by mouth every evening.  ezetimibe (ZETIA) 10 mg tablet Take 1 Tablet by mouth daily.  gabapentin (NEURONTIN) 300 mg capsule TAKE 1 CAPSULE BY MOUTH THREE TIMES DAILY. MAX DAILY AMOUNT: 900 MG    isosorbide mononitrate ER (IMDUR) 60 mg CR tablet TAKE 1 TABLET BY MOUTH EVERY MORNING    amLODIPine (NORVASC) 2.5 mg tablet TAKE 1 TABLET BY MOUTH DAILY FOR HIGH BLOOD PRESSURE    sertraline (ZOLOFT) 100 mg tablet TAKE 2 TABLETS BY MOUTH DAILY    metoprolol succinate (TOPROL-XL) 25 mg XL tablet TAKE 1 TABLET BY MOUTH DAILY    losartan (COZAAR) 100 mg tablet TAKE 1 TABLET BY MOUTH DAILY    cyclobenzaprine (FLEXERIL) 5 mg tablet Take 1-2 PO QHS.  traZODone (DESYREL) 50 mg tablet TAKE 1 TO 2 TABLETS BY MOUTH AT BEDTIME AS NEEDED    famotidine (PEPCID) 20 mg tablet Take 20 mg by mouth two (2) times a day.  fluticasone propion-salmeterol (ADVAIR/WIXELA) 100-50 mcg/dose diskus inhaler Take 1 Puff by inhalation.     albuterol (PROVENTIL HFA, VENTOLIN HFA, PROAIR HFA) 90 mcg/actuation inhaler Take 2 Puffs by inhalation.  acetaminophen (TYLENOL) 500 mg tablet 1,000 mg.  colchicine 0.6 mg tablet TAKE 2 TABLETS NOW AND 1 TABLET IN 6 HOURS IF NEEDED REPEAT IN 6 HOURS AS NEEDED    diclofenac (VOLTAREN) 1 % gel Apply 2 g to affected area four (4) times daily.  carboxymethylcellulose sodium (REFRESH OP) Apply 1 Drop to eye daily.  nitroglycerin (NITROSTAT) 0.4 mg SL tablet 0.4 mg by SubLINGual route every five (5) minutes as needed for Chest Pain. Up to 3 doses.  ferrous sulfate 325 mg (65 mg iron) tablet Take  by mouth Daily (before breakfast).  clopidogrel (PLAVIX) 75 mg tab Take 1 Tab by mouth daily.  fluticasone (FLONASE) 50 mcg/actuation nasal spray SHAKE LIQUID AND USE 2 SPRAYS IN EACH NOSTRIL DAILY    ZINC ACETATE PO Take 1,000 mg by mouth.  cholecalciferol (VITAMIN D3) 1,000 unit tablet Take  by mouth daily.  ascorbic acid, vitamin C, (VITAMIN C) 250 mg tablet Take  by mouth.  garlic 7,368 mg cap Take 1 Cap by mouth daily.  multivit-min-FA-lycopen-lutein 0.4-300-250 mg-mcg-mcg tab Take 1 Tab by mouth daily.  aspirin delayed-release 81 mg tablet Take 1 tablet by mouth daily. No current facility-administered medications for this visit. Allergies   Allergen Reactions    Beta Blocker [Beta-Blockers (Beta-Adrenergic Blocking Agts)] Other (comments)     symptomatic bradycardia    Codeine Other (comments)     Passed//fainted  patient doesn't recall reaction, occurred as a teenager    Darvocet A500 [Propoxyphene N-Acetaminophen] Other (comments)     throat swelling    Dexbrompheniramine-Pseudoephed Other (comments)     Facial swelling. 1980\"s    Lasix [Furosemide] Itching    Lipitor [Atorvastatin] Other (comments)    Sulfa (Sulfonamide Antibiotics) Hives       Family History:   Non contributory for premature coronary disease in first degree relatives.       Social History:   He  reports that he quit smoking about 5 years ago. His smoking use included cigarettes. He has a 75.00 pack-year smoking history. He has never used smokeless tobacco.  He  reports no history of alcohol use. [address], 3 years    Physical:   Vitals:   BP: 132/66  HR: 67  Wt: 103.9 kg (229 lb)    Exam:   General: Older gentleman, no complaints, no distress.     Head/Neck: No JVD  Lungs:  No respiratory distress. Clear with good air movement. Heart:  Regular. No rubs or gallops. Abdomen: Bowel sounds present  Extremities: Intact pulses. No significant edema.         Review of Data:   LABS:   Lab Results   Component Value Date/Time    WBC 8.0 11/11/2019 12:48 PM    HGB 14.4 11/11/2019 12:48 PM    HCT 45.2 11/11/2019 12:48 PM    PLATELET 467 95/21/7525 12:48 PM     Lab Results   Component Value Date/Time    Sodium 137 11/11/2021 03:07 PM    Potassium 4.0 11/11/2021 03:07 PM    Chloride 104 11/11/2021 03:07 PM    CO2 31 11/11/2021 03:07 PM    Anion gap 2 (L) 11/11/2021 03:07 PM    Glucose 177 (H) 11/11/2021 03:07 PM    BUN 18 11/11/2021 03:07 PM    Creatinine 1.07 11/11/2021 03:07 PM    BUN/Creatinine ratio 17 11/11/2021 03:07 PM    GFR est AA >60 11/11/2021 03:07 PM    GFR est non-AA >60 11/11/2021 03:07 PM    Calcium 9.5 11/11/2021 03:07 PM    Bilirubin, total 0.4 11/11/2021 03:07 PM    Alk.  phosphatase 109 11/11/2021 03:07 PM    Protein, total 7.1 11/11/2021 03:07 PM    Albumin 3.6 11/11/2021 03:07 PM    Globulin 3.5 11/11/2021 03:07 PM    A-G Ratio 1.0 11/11/2021 03:07 PM    ALT (SGPT) 30 11/11/2021 03:07 PM     Lab Results   Component Value Date/Time    Cholesterol, total 125 11/11/2021 03:07 PM    HDL Cholesterol 35 (L) 11/11/2021 03:07 PM    LDL, calculated 34 11/11/2021 03:07 PM    Triglyceride 280 (H) 11/11/2021 03:07 PM    CHOL/HDL Ratio 3.6 11/11/2021 03:07 PM     Lab Results   Component Value Date/Time    Hemoglobin A1c 6.5 (H) 11/11/2021 03:07 PM    Hemoglobin A1c (POC) 5.9 05/06/2013 11:17 AM    Hemoglobin A1c, External 6.1 06/01/2018 12:00 AM     EKG:   Sinus rhythm, 78 beats per minute, right bundle branch block, nonspecific ST-T wave changes. TILT TABLE: February 2015:  Patient was initially placed in supine position. Heart rate was between 65-70 beats per minute, sinus rhythm. Blood pressure was 113/61 mmHg, with maximum blood pressure of 116/63 mmHg. Patient was placed in 60-degree upright tilting position. About 5-7 minutes into upright tilting position at 70 degrees, patient started feeling like her heart rate speeding up and legs feeling heavy, along with some dizziness. Blood pressure slowly decreased up to 70/43 mmHg. Heart rate maximum noted was up from 65 to 81 beats per minute. There was no obvious tachycardia. At the time, patient was given IV fluid and then placed back supine position, with normalization of the blood pressure and resolution of his symptoms. Heart rhythm remained in sinus. Patient did have a drop in the systolic blood pressure more than 20 mmHg upon upright tilting position, without any significant increase in the heart rate. This suggests possible autonomic dysfunction. SUMMARY:  Upright tilt table testing with reproduction of symptoms. More than 20 mmHg drop in systolic blood pressure, without change in the heart rate during upright tilting position, associated with symptoms. This may represent autonomic dysfunction    ECHO: (01/18)  Left ventricle: Systolic function was normal. Ejection fraction was estimated   in the range of 55 % to 60 %. There was hypokinesis of the basal inferoseptal wall. Wall thickness was at the upper limits of normal.  Right ventricle: The ventricle was dilated. Inferior vena cava, hepatic veins: The inferior vena cava was mildly dilated.   The respirophasic change in diameter was  less than 50%. No major VHD    STRESS TEST (EST, PHARM, NUC, ECHO etc): October 2014:  1.   Small to medium sized area of mildly intense reversible defect involvement anterolateral wall. 2.  There is also a small area of reversible defect involving basilar inferior wall concerning for ischemic focus. 3.  Calculated ejection fraction of 56% without any wall motion abnormality. CATHETERIZATION: October 2014:  LM - patent  LAD - 30% prox, 80% mid, 90% apical  D1 - 100%  RI - 95% ostial (failed PCI, calcified 1.75 - 2.0 mm vessel)  Cx - 30-40% prox  OM1 - subtotal  OM2 - 80%  RCA - 30% diffuse  RPDA - 100%  RPLV - 50%    Impression / Plan:     Diabetes    Hypertension    Dyslipidemia    Coronary artery disease     Mr. Bobby Jeffers returns to the office for follow up. Coronary artery disease:    Mr. Bobby Jeffers had a cardiac catheterization in October, 2014 and required stent of the obtuse marginal branch. He is on aspirin and Plavix, isosorbide mononitrate and Pravachol  Continue beta-blocker    We will check echo with some dyspnea on exertion to make sure he does not have any ischemic cardiomyopathy. Will rule out any hypertensive cardiovascular heart disease    Hypertension: Blood pressure is 132/66. Ant Amend Continue beta-blocker, and losartan amlodipine and Imdur. Diabetes: This is being managed by primary care provider. Goal Hgb A1c less than 7 is recommended from cardiovascular standpoint. Last Hgb A1c  was 6.5    Hyperlipidemia:    Unable to tolerate  atorvastatin because of significant myalgia. Last LDL 46. Currently on Zetia. Continue pravachol. TOlerating well. RTC in 6 months or sooner if needed.

## 2022-03-17 NOTE — PROGRESS NOTES
Identified pt with two pt identifiers(name and ). Reviewed record in preparation for visit and have obtained necessary documentation. Sofia Bush presents today for   Chief Complaint   Patient presents with    Follow-up     6m                   Sofia Bush preferred language for health care discussion is english/other. Personal Protective Equipment:   Personal Protective Equipment was used including: mask-surgical and hands-gloves. Patient was placed on no precaution(s). Patient was masked. Precautions:   Patient currently on None  Patient currently roomed with door closed.     Is someone accompanying this pt? no    Is the patient using any DME equipment during [address]? no    Depression Screening:  3 most recent PHQ Screens 3/17/2022   PHQ Not Done -   Little interest or pleasure in doing things Not at all   Feeling down, depressed, irritable, or hopeless Not at all   Total Score PHQ 2 0   Trouble falling or staying asleep, or sleeping too much -   Feeling tired or having little energy -   Poor appetite, weight loss, or overeating -   Feeling bad about yourself - or that you are a failure or have let yourself or your family down -   Trouble concentrating on things such as school, work, reading, or watching TV -   Moving or speaking so slowly that other people could have noticed; or the opposite being so fidgety that others notice -   Thoughts of being better off dead, or hurting yourself in some way -   PHQ 9 Score -   How difficult have these problems made it for you to do your work, take care of your home and get along with others -       Learning Assessment:  Learning Assessment 2021   PRIMARY LEARNER Patient   HIGHEST LEVEL OF EDUCATION - PRIMARY LEARNER  SOME COLLEGE   BARRIERS PRIMARY LEARNER NONE   CO-LEARNER CAREGIVER No   PRIMARY LANGUAGE ENGLISH    NEED -   LEARNER PREFERENCE PRIMARY DEMONSTRATION   LEARNING SPECIAL TOPICS -   ANSWERED BY patient    RELATIONSHIP SELF       Abuse Screening:  Abuse Screening Questionnaire 11/11/2021   Do you ever feel afraid of your partner? N   Are you in a relationship with someone who physically or mentally threatens you? N   Is it safe for you to go home? Y       Fall Risk  Fall Risk Assessment, last 12 mths 3/17/2022   Able to walk? Yes   Fall in past 12 months? -   Do you feel unsteady? -   Are you worried about falling -   Number of falls in past 12 months -   Fall with injury? -       Pt currently taking Anticoagulant therapy? no  Pt currently taking Antiplatelet therapy? yes    Coordination of Care:  1. Have you been to the ER, urgent care clinic since your last visit? Hospitalized since your last visit? no    2. Have you seen or consulted any other health care providers outside of the [address] [place] [address] since your last visit? Include any pap smears or colon screening. no      Please see Red banners under Allergies and Med Rec to remove outside inquires. All correct information has been verified with patient and added to chart.      Medication's patient's would liked removed has been marked not taking to be removed per Verbal order and read back per Javi Cheng MD

## 2022-03-18 PROBLEM — E11.40 TYPE 2 DIABETES MELLITUS WITH DIABETIC NEUROPATHY (HCC): Status: ACTIVE | Noted: 2018-03-28

## 2022-03-19 PROBLEM — J45.909 ASTHMA: Status: ACTIVE | Noted: 2019-01-01

## 2022-03-19 PROBLEM — I25.118 CORONARY ARTERY DISEASE WITH STABLE ANGINA PECTORIS (HCC): Status: ACTIVE | Noted: 2020-11-11

## 2022-03-19 PROBLEM — E11.21 TYPE 2 DIABETES MELLITUS WITH NEPHROPATHY (HCC): Status: ACTIVE | Noted: 2018-01-24

## 2022-04-26 NOTE — PATIENT DISCUSSION
(CD 0.55/0.70): Past w/u negative. IOP stable. Patient is considered Low Risk. Condition was discussed with patient and patient understands. Will continue to monitor patient for any progression in condition. Patient was advised to call us with any problems questions or concerns.

## 2022-05-11 ENCOUNTER — HOSPITAL ENCOUNTER (OUTPATIENT)
Dept: LAB | Age: 72
Discharge: HOME | End: 2022-05-11
Payer: MEDICARE

## 2022-05-11 ENCOUNTER — OFFICE VISIT (OUTPATIENT)
Dept: INTERNAL MEDICINE CLINIC | Age: 72
End: 2022-05-11
Payer: MEDICARE

## 2022-05-11 VITALS
SYSTOLIC BLOOD PRESSURE: 135 MMHG | TEMPERATURE: 97.9 F | WEIGHT: 231 LBS | RESPIRATION RATE: 18 BRPM | OXYGEN SATURATION: 96 % | BODY MASS INDEX: 29.65 KG/M2 | HEART RATE: 59 BPM | HEIGHT: 74 IN | DIASTOLIC BLOOD PRESSURE: 79 MMHG

## 2022-05-11 DIAGNOSIS — E11.21 TYPE 2 DIABETES MELLITUS WITH NEPHROPATHY (HCC): ICD-10-CM

## 2022-05-11 DIAGNOSIS — F33.41 RECURRENT MAJOR DEPRESSIVE DISORDER, IN PARTIAL REMISSION (HCC): ICD-10-CM

## 2022-05-11 DIAGNOSIS — I11.9 BENIGN HYPERTENSIVE HEART DISEASE WITHOUT HEART FAILURE: ICD-10-CM

## 2022-05-11 DIAGNOSIS — I25.119 ATHEROSCLEROSIS OF NATIVE CORONARY ARTERY OF NATIVE HEART WITH ANGINA PECTORIS (HCC): ICD-10-CM

## 2022-05-11 DIAGNOSIS — Z76.0 MEDICATION REFILL: ICD-10-CM

## 2022-05-11 DIAGNOSIS — I11.9 BENIGN HYPERTENSIVE HEART DISEASE WITHOUT HEART FAILURE: Primary | ICD-10-CM

## 2022-05-11 LAB
ALBUMIN SERPL-MCNC: 3.9 G/DL (ref 3.4–5)
ALBUMIN/GLOB SERPL: 1.1 {RATIO} (ref 0.8–1.7)
ALP SERPL-CCNC: 103 U/L (ref 45–117)
ALT SERPL-CCNC: 38 U/L (ref 16–61)
ANION GAP SERPL CALC-SCNC: 4 MMOL/L (ref 3–18)
AST SERPL-CCNC: 28 U/L (ref 10–38)
BILIRUB SERPL-MCNC: 0.5 MG/DL (ref 0.2–1)
BUN SERPL-MCNC: 11 MG/DL (ref 7–18)
BUN/CREAT SERPL: 11 (ref 12–20)
CALCIUM SERPL-MCNC: 9.1 MG/DL (ref 8.5–10.1)
CHLORIDE SERPL-SCNC: 102 MMOL/L (ref 100–111)
CO2 SERPL-SCNC: 32 MMOL/L (ref 21–32)
CREAT SERPL-MCNC: 1.04 MG/DL (ref 0.6–1.3)
GLOBULIN SER CALC-MCNC: 3.4 G/DL (ref 2–4)
GLUCOSE SERPL-MCNC: 162 MG/DL (ref 74–99)
HBA1C MFR BLD: 7.5 % (ref 4.2–5.6)
POTASSIUM SERPL-SCNC: 3.8 MMOL/L (ref 3.5–5.5)
PROT SERPL-MCNC: 7.3 G/DL (ref 6.4–8.2)
SODIUM SERPL-SCNC: 138 MMOL/L (ref 136–145)

## 2022-05-11 PROCEDURE — 80053 COMPREHEN METABOLIC PANEL: CPT

## 2022-05-11 PROCEDURE — G8511 SCR DEP POS, NO PLAN DOC RNG: HCPCS | Performed by: INTERNAL MEDICINE

## 2022-05-11 PROCEDURE — 3046F HEMOGLOBIN A1C LEVEL >9.0%: CPT | Performed by: INTERNAL MEDICINE

## 2022-05-11 PROCEDURE — 99214 OFFICE O/P EST MOD 30 MIN: CPT | Performed by: INTERNAL MEDICINE

## 2022-05-11 PROCEDURE — 2022F DILAT RTA XM EVC RTNOPTHY: CPT | Performed by: INTERNAL MEDICINE

## 2022-05-11 PROCEDURE — G8752 SYS BP LESS 140: HCPCS | Performed by: INTERNAL MEDICINE

## 2022-05-11 PROCEDURE — 36415 COLL VENOUS BLD VENIPUNCTURE: CPT

## 2022-05-11 PROCEDURE — 1101F PT FALLS ASSESS-DOCD LE1/YR: CPT | Performed by: INTERNAL MEDICINE

## 2022-05-11 PROCEDURE — G8536 NO DOC ELDER MAL SCRN: HCPCS | Performed by: INTERNAL MEDICINE

## 2022-05-11 PROCEDURE — 83036 HEMOGLOBIN GLYCOSYLATED A1C: CPT

## 2022-05-11 PROCEDURE — G8419 CALC BMI OUT NRM PARAM NOF/U: HCPCS | Performed by: INTERNAL MEDICINE

## 2022-05-11 PROCEDURE — 3017F COLORECTAL CA SCREEN DOC REV: CPT | Performed by: INTERNAL MEDICINE

## 2022-05-11 PROCEDURE — G8427 DOCREV CUR MEDS BY ELIG CLIN: HCPCS | Performed by: INTERNAL MEDICINE

## 2022-05-11 PROCEDURE — G8754 DIAS BP LESS 90: HCPCS | Performed by: INTERNAL MEDICINE

## 2022-05-11 RX ORDER — SERTRALINE HYDROCHLORIDE 100 MG/1
100 TABLET, FILM COATED ORAL DAILY
Qty: 90 TABLET | Refills: 1
Start: 2022-05-11

## 2022-05-11 RX ORDER — LOSARTAN POTASSIUM 100 MG/1
TABLET ORAL
Qty: 90 TABLET | Refills: 4 | Status: SHIPPED | OUTPATIENT
Start: 2022-05-11

## 2022-05-11 RX ORDER — TRAMADOL HYDROCHLORIDE 50 MG/1
50 TABLET ORAL
COMMUNITY
End: 2022-06-12

## 2022-05-11 NOTE — PROGRESS NOTES
HISTORY OF PRESENT ILLNESS  Aurea Kirkland is a 67 y.o. male. /79 (BP 1 Location: Right arm, BP Patient Position: Sitting, BP Cuff Size: Large adult)   Pulse (!) 59   Temp 97.9 °F (36.6 °C) (Temporal)   Resp 18   Ht 6' 2\" (1.88 m)   Wt 231 lb (104.8 kg)   SpO2 96%   BMI 29.66 kg/m²     States he feels well today  \"better than I have in years\"      Goes to [facility] and had lab there is January  His HBA1c continues to creep up. He does not check as regularly as he should but it was 244 this morning. He had his zoloft cut in November and he feels it is not working as well. More anxiety and gets angrier    Hypertension   The history is provided by the patient. This is a chronic problem. The current episode started more than 1 week ago. Pertinent negatives include no chest pain, no palpitations, no headaches, no dizziness and no shortness of breath. Risk factors include dyslipidemia and diabetes mellitus. Diabetes  The history is provided by the patient. This is a chronic problem. Pertinent negatives include no chest pain, no headaches and no shortness of breath. Cholesterol Problem  The history is provided by the patient. This is a chronic problem. Pertinent negatives include no chest pain, no headaches and no shortness of breath. Review of Systems   Constitutional: Negative for chills and fever. Respiratory: Negative for shortness of breath. Cardiovascular: Negative for chest pain, palpitations and leg swelling. Neurological: Negative for dizziness and headaches. Physical Exam  Vitals and nursing note reviewed. Constitutional:       General: He is not in acute distress. Appearance: Normal appearance. He is well-developed. He is not diaphoretic. HENT:      Head: Normocephalic and atraumatic. Cardiovascular:      Rate and Rhythm: Normal rate and regular rhythm. Pulmonary:      Effort: Pulmonary effort is normal.      Breath sounds: Normal breath sounds. Musculoskeletal:      Right lower leg: No edema. Left lower leg: No edema. Skin:     General: Skin is warm and dry. Neurological:      Mental Status: He is alert and oriented to person, place, and time. Psychiatric:         Mood and Affect: Mood normal.         Behavior: Behavior normal.         ASSESSMENT and PLAN    ICD-10-CM ICD-9-CM    1. Hypertension  I11.9 402.10 losartan (COZAAR) 100 mg tablet      METABOLIC PANEL, COMPREHENSIVE         2. Type 2 diabetes mellitus with nephropathy (HCC)  E11.21 250.40 empagliflozin (JARDIANCE) 10 mg tablet     803.74 METABOLIC PANEL, COMPREHENSIVE      HEMOGLOBIN A1C W/O EAG   3. Atherosclerosis of native coronary artery of native heart with angina pectoris ([facility]  I25.119 414.01      413.9    4. Recurrent major depressive disorder, in partial remission ([facility]  F33.41 296.35    5. Medication refill  Z76.0 V68.1 losartan (COZAAR) 100 mg tablet         . PHQ 9 Score: 11 (5/11/2022  1:18 PM)  pt has psychiatrist at [facility] and is on medication  agree that he should go back up to 100 mg daily (he had been as high as 200 mg, was cut down to 50 and does not feel as well mentally)    Update some lab here that is needed. Will update chart with [facility] labs as well    Discussed diabetes. As  HBA1c is creeping upward, recommend Jardiance given it is also renally an cardiac protective. discussed LDCT scan--he gets regular scars at [facility] for his h/o cancer.  So no need to proceed this year    F/u 6 months for MWV, HTN, DM,

## 2022-05-11 NOTE — PROGRESS NOTES
1. \"Have you been to the ER, urgent care clinic since your last visit? Hospitalized since your last visit? \" No    2. \"Have you seen or consulted any other health care providers outside of the [address] [place] [address] since your last visit? \" [facility] 01/2022     3. For patients aged 39-70: Has the patient had a colonoscopy / FIT/ Cologuard? Yes - no Care Gap present      If the patient is female:    4. For patients aged 41-77: Has the patient had a mammogram within the past 2 years? NA - based on age or sex      11. For patients aged 21-65: Has the patient had a pap smear?  NA - based on age or sex

## 2022-05-15 ENCOUNTER — PATIENT MESSAGE (OUTPATIENT)
Dept: INTERNAL MEDICINE CLINIC | Age: 72
End: 2022-05-15

## 2022-05-16 NOTE — TELEPHONE ENCOUNTER
Regi Jimenez 5/16/2022 10:33 AM EDT      ----- Message -----  From: Darryl Glasgow  Sent: 5/15/2022 6:52 PM EDT  To: [facility] Nurse Pool  Subject: Empagliflozin [Jardiance]     [employer] will not pay for this medication without prior written approval.

## 2022-06-12 DIAGNOSIS — M79.2 NEURALGIC PAIN: ICD-10-CM

## 2022-06-12 RX ORDER — GABAPENTIN 300 MG/1
CAPSULE ORAL
Qty: 90 CAPSULE | Refills: 5 | Status: SHIPPED | OUTPATIENT
Start: 2022-06-12

## 2022-06-12 RX ORDER — TRAMADOL HYDROCHLORIDE 50 MG/1
TABLET ORAL
Qty: 120 TABLET | Refills: 5 | Status: SHIPPED | OUTPATIENT
Start: 2022-06-12 | End: 2022-07-12

## 2022-07-03 DIAGNOSIS — I11.9 BENIGN HYPERTENSIVE HEART DISEASE WITHOUT HEART FAILURE: ICD-10-CM

## 2022-07-03 RX ORDER — AMLODIPINE BESYLATE 2.5 MG/1
TABLET ORAL
Qty: 90 TABLET | Refills: 1 | Status: SHIPPED | OUTPATIENT
Start: 2022-07-03

## 2022-07-11 ENCOUNTER — PATIENT MESSAGE (OUTPATIENT)
Dept: INTERNAL MEDICINE CLINIC | Age: 72
End: 2022-07-11

## 2022-07-11 DIAGNOSIS — Z76.0 MEDICATION REFILL: ICD-10-CM

## 2022-07-11 RX ORDER — TRAZODONE HYDROCHLORIDE 50 MG/1
100 TABLET ORAL
Qty: 180 TABLET | Refills: 3 | Status: SHIPPED | OUTPATIENT
Start: 2022-07-11

## 2022-09-19 ENCOUNTER — TELEPHONE (OUTPATIENT)
Dept: CARDIOLOGY CLINIC | Age: 72
End: 2022-09-19

## 2022-09-20 ENCOUNTER — OFFICE VISIT (OUTPATIENT)
Dept: CARDIOLOGY CLINIC | Age: 72
End: 2022-09-20

## 2022-09-20 VITALS
SYSTOLIC BLOOD PRESSURE: 126 MMHG | BODY MASS INDEX: 28.12 KG/M2 | OXYGEN SATURATION: 98 % | TEMPERATURE: 97.1 F | DIASTOLIC BLOOD PRESSURE: 84 MMHG | WEIGHT: 219 LBS | HEART RATE: 61 BPM

## 2022-09-20 DIAGNOSIS — I10 ESSENTIAL HYPERTENSION WITH GOAL BLOOD PRESSURE LESS THAN 140/90: Primary | ICD-10-CM

## 2022-09-20 PROCEDURE — 93000 ELECTROCARDIOGRAM COMPLETE: CPT | Performed by: INTERNAL MEDICINE

## 2022-09-20 PROCEDURE — G8752 SYS BP LESS 140: HCPCS | Performed by: INTERNAL MEDICINE

## 2022-09-20 PROCEDURE — 3017F COLORECTAL CA SCREEN DOC REV: CPT | Performed by: INTERNAL MEDICINE

## 2022-09-20 PROCEDURE — G9717 DOC PT DX DEP/BP F/U NT REQ: HCPCS | Performed by: INTERNAL MEDICINE

## 2022-09-20 PROCEDURE — 1101F PT FALLS ASSESS-DOCD LE1/YR: CPT | Performed by: INTERNAL MEDICINE

## 2022-09-20 PROCEDURE — G8754 DIAS BP LESS 90: HCPCS | Performed by: INTERNAL MEDICINE

## 2022-09-20 PROCEDURE — G8536 NO DOC ELDER MAL SCRN: HCPCS | Performed by: INTERNAL MEDICINE

## 2022-09-20 PROCEDURE — 99214 OFFICE O/P EST MOD 30 MIN: CPT | Performed by: INTERNAL MEDICINE

## 2022-09-20 PROCEDURE — G8427 DOCREV CUR MEDS BY ELIG CLIN: HCPCS | Performed by: INTERNAL MEDICINE

## 2022-09-20 PROCEDURE — G8417 CALC BMI ABV UP PARAM F/U: HCPCS | Performed by: INTERNAL MEDICINE

## 2022-09-20 PROCEDURE — 1123F ACP DISCUSS/DSCN MKR DOCD: CPT | Performed by: INTERNAL MEDICINE

## 2022-09-20 NOTE — PROGRESS NOTES
[facility]    Mr. Prisca Guerrero  is a 67 y.o. gentleman with diabetes, hypertension, dyslipidemia and tobacco use. He has coronary artery disease as documented by cardiac catheterization in October of 2014. His anatomy is as follows:      LM - patent  LAD - 30% prox, 80% mid, 90% apical  D1 - 100%  RI - 95% ostial (failed PCI, calcified 1.75 - 2.0 mm vessel)  Cx - 30-40% prox  OM1 - subtotal  OM2 - 80% (3.5 x 18mm XIENCE 10/14)  RCA - 30% diffuse  RPDA - 100%  RPLV - 50%    Mr. Prisca Guerrero is here today for follow up appointment for his ischemic cardiomyopathy. He had echocardiogram done this morning. He denies any hospital admission or ER visits since last time. He denies any chest pain or chest tightness. Denies any use of nitroglycerin since last visit. Overall he has no cardiac complaint. Past Medical History:   Diagnosis Date    Agent orange exposure     Asbestos exposure     Asthma 09/2019    told mild. Autonomic dysfunction     abnormal tilt table 2/15    BPH     CAD (coronary artery disease)     S/P OM NOY in 2014    Cholesteatoma 2020    left ear    Choroidal nevus, left eye     Chronic pain 2008    Colonic polyp 08/28/2018    multiple.  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 Tomer London COLONOSCOPY N/A 7/9/2018    COLONOSCOPY performed by Los Harley MD at [facility] ENDOSCOPY    COLONOSCOPY N/A 8/28/2018    COLONOSCOPY performed by Los Harley MD at [facility] ENDOSCOPY    HX CATARACT REMOVAL      7/14  bilat    HX CERVICAL LAMINECTOMY      HX COLONOSCOPY  07/09/2018    pt states have to redo d/t unclear imaging    HX ENDOSCOPY  10/01/2019    EGD. At [address] [place] [address]. No significant pathological findings    HX GI  2019    [facility]    HX HEART CATHETERIZATION  2014    Stent    HX KNEE REPLACEMENT Right 03/06/2020    Dr. Meli Grace    HX ORTHOPAEDIC Left 02/05/2018    knee replacement    HX TUMOR REMOVAL      3/13  wide excision right thigh sarcoma    HX TYMPANOMASTOIDECTOMY         Current Outpatient Medications   Medication Sig    Jardiance 10 mg tablet TAKE 1 TABLET BY MOUTH DAILY    amLODIPine (NORVASC) 2.5 mg tablet TAKE 1 TABLET BY MOUTH DAILY FOR HIGH BLOOD PRESSURE    losartan (COZAAR) 100 mg tablet TAKE 1 TABLET BY MOUTH DAILY    pravastatin (PRAVACHOL) 40 mg tablet Take 1 Tablet by mouth every evening.    ezetimibe (ZETIA) 10 mg tablet Take 1 Tablet by mouth daily. isosorbide mononitrate ER (IMDUR) 60 mg CR tablet TAKE 1 TABLET BY MOUTH EVERY MORNING    metoprolol succinate (TOPROL-XL) 25 mg XL tablet TAKE 1 TABLET BY MOUTH DAILY    famotidine (PEPCID) 20 mg tablet Take 20 mg by mouth two (2) times a day. nitroglycerin (NITROSTAT) 0.4 mg SL tablet 0.4 mg by SubLINGual route every five (5) minutes as needed for Chest Pain. Up to 3 doses. clopidogrel (PLAVIX) 75 mg tab Take 1 Tab by mouth daily. aspirin delayed-release 81 mg tablet Take 1 tablet by mouth daily. traZODone (DESYREL) 50 mg tablet Take 2 Tablets by mouth nightly.    gabapentin (NEURONTIN) 300 mg capsule TAKE 1 CAPSULE BY MOUTH THREE TIMES DAILY. MAX DAILY AMOUNT: 900 MG    sertraline (ZOLOFT) 100 mg tablet Take 1 Tablet by mouth daily.     terazosin (HYTRIN) 2 mg capsule Take 1 Capsule by mouth nightly. cyanocobalamin (VITAMIN B12) 100 mcg tablet Take 100 mcg by mouth daily. cyclobenzaprine (FLEXERIL) 5 mg tablet Take one to two tablets by mouth at bedtime. Indications: muscle spasm    fluticasone propion-salmeterol (ADVAIR/WIXELA) 100-50 mcg/dose diskus inhaler Take 1 Puff by inhalation. albuterol (PROVENTIL HFA, VENTOLIN HFA, PROAIR HFA) 90 mcg/actuation inhaler Take 2 Puffs by inhalation. acetaminophen (TYLENOL) 500 mg tablet Take 1,000 mg by mouth nightly as needed for Pain. colchicine 0.6 mg tablet TAKE 2 TABLETS NOW AND 1 TABLET IN 6 HOURS IF NEEDED REPEAT IN 6 HOURS AS NEEDED    diclofenac (VOLTAREN) 1 % gel Apply 2 g to affected area four (4) times daily. carboxymethylcellulose sodium (REFRESH OP) Apply 1 Drop to eye daily. ferrous sulfate 325 mg (65 mg iron) tablet Take 65 mg by mouth Daily (before breakfast). fluticasone (FLONASE) 50 mcg/actuation nasal spray SHAKE LIQUID AND USE 2 SPRAYS IN EACH NOSTRIL DAILY    ZINC ACETATE PO Take 1,000 mg by mouth. cholecalciferol (VITAMIN D3) 1,000 unit tablet Take 1,000 Units by mouth daily. ascorbic acid, vitamin C, (VITAMIN C) 250 mg tablet Take 250 mg by mouth daily. garlic 6,198 mg cap Take 1 Cap by mouth daily. multivit-min-FA-lycopen-lutein 0.4-300-250 mg-mcg-mcg tab Take 1 Tab by mouth daily. No current facility-administered medications for this visit. Allergies   Allergen Reactions    Beta Blocker [Beta-Blockers (Beta-Adrenergic Blocking Agts)] Other (comments)     symptomatic bradycardia    Codeine Other (comments)     Passed//fainted  patient doesn't recall reaction, occurred as a teenager    Darvocet A500 [Propoxyphene N-Acetaminophen] Other (comments)     throat swelling    Dexbrompheniramine-Pseudoephed Other (comments)     Facial swelling.  1980\"s    Lasix [Furosemide] Itching    Lipitor [Atorvastatin] Other (comments)    Sulfa (Sulfonamide Antibiotics) Hives       Family History:   Non contributory for premature coronary disease in first degree relatives. Social History:   He  reports that he quit smoking about 9 years ago. His smoking use included cigarettes. He has a 75.00 pack-year smoking history. He has never used smokeless tobacco.  He  reports no history of alcohol use. [address], 3 years    Physical:   Vitals:   BP: 126/84  HR: 61  Wt: 99.3 kg (219 lb)    Exam:   General: Older gentleman, no complaints, no distress. Head/Neck: No JVD  Lungs:  No respiratory distress. Clear with good air movement. Heart:  Regular. No rubs or gallops. Abdomen: Bowel sounds present  Extremities: Intact pulses. No significant edema. Review of Data:   LABS:   Lab Results   Component Value Date/Time    WBC 8.0 11/11/2019 12:48 PM    HGB 14.4 11/11/2019 12:48 PM    HCT 45.2 11/11/2019 12:48 PM    PLATELET 675 14/18/9145 12:48 PM     Lab Results   Component Value Date/Time    Sodium 138 05/11/2022 02:08 PM    Potassium 3.8 05/11/2022 02:08 PM    Chloride 102 05/11/2022 02:08 PM    CO2 32 05/11/2022 02:08 PM    Anion gap 4 05/11/2022 02:08 PM    Glucose 162 (H) 05/11/2022 02:08 PM    BUN 11 05/11/2022 02:08 PM    Creatinine 1.04 05/11/2022 02:08 PM    BUN/Creatinine ratio 11 (L) 05/11/2022 02:08 PM    GFR est AA >60 05/11/2022 02:08 PM    GFR est non-AA >60 05/11/2022 02:08 PM    Calcium 9.1 05/11/2022 02:08 PM    Bilirubin, total 0.5 05/11/2022 02:08 PM    Alk.  phosphatase 103 05/11/2022 02:08 PM    Protein, total 7.3 05/11/2022 02:08 PM    Albumin 3.9 05/11/2022 02:08 PM    Globulin 3.4 05/11/2022 02:08 PM    A-G Ratio 1.1 05/11/2022 02:08 PM    ALT (SGPT) 38 05/11/2022 02:08 PM     Lab Results   Component Value Date/Time    Cholesterol, total 125 11/11/2021 03:07 PM    HDL Cholesterol 35 (L) 11/11/2021 03:07 PM    LDL, calculated 34 11/11/2021 03:07 PM    Triglyceride 280 (H) 11/11/2021 03:07 PM    CHOL/HDL Ratio 3.6 11/11/2021 03:07 PM     Lab Results   Component Value Date/Time    Hemoglobin A1c 7.5 (H) 05/11/2022 02:08 PM    Hemoglobin A1c (POC) 5.9 05/06/2013 11:17 AM    Hemoglobin A1c, External 7.0 01/04/2022 12:00 AM       TILT TABLE: February 2015:  SUMMARY:  Upright tilt table testing with reproduction of symptoms. More than 20 mmHg drop in systolic blood pressure, without change in the heart rate during upright tilting position, associated with symptoms. This may represent autonomic dysfunction    ECHO: (01/18)  Left ventricle: Systolic function was normal. Ejection fraction was estimated   in the range of 55 % to 60 %. There was hypokinesis of the basal inferoseptal wall. Wall thickness was at the upper limits of normal.  Right ventricle: The ventricle was dilated. Inferior vena cava, hepatic veins: The inferior vena cava was mildly dilated. The respirophasic change in diameter was  less than 50%. No major VHD    STRESS TEST (EST, PHARM, NUC, ECHO etc): October 2014:  1. Small to medium sized area of mildly intense reversible defect involvement anterolateral wall. 2.  There is also a small area of reversible defect involving basilar inferior wall concerning for ischemic focus. 3.  Calculated ejection fraction of 56% without any wall motion abnormality. CATHETERIZATION: October 2014:  LM - patent  LAD - 30% prox, 80% mid, 90% apical  D1 - 100%  RI - 95% ostial (failed PCI, calcified 1.75 - 2.0 mm vessel)  Cx - 30-40% prox  OM1 - subtotal  OM2 - 80%  RCA - 30% diffuse  RPDA - 100%  RPLV - 50%    Impression / Plan:     Diabetes    Hypertension    Dyslipidemia    Coronary artery disease     Mr. Evan Phoenix returns to the office for follow up. Coronary artery disease:    Mr. Evan Phoenix had a cardiac catheterization in October, 2014 and required stent of the obtuse marginal branch. He is on aspirin and Plavix, isosorbide mononitrate and Pravachol  Continue beta-blocker    Hypertension: Blood pressure is 126/64. Joan Martinez  Continue beta-blocker, and losartan amlodipine and Imdur.    Diabetes: This is being managed by primary care provider. Goal Hgb A1c less than 7 is recommended from cardiovascular standpoint. Last Hgb A1c  was 7.5. Has been started on Jardiance since last visit by PCP    Hyperlipidemia:  Unable to tolerate  atorvastatin because of significant myalgia. Last LDL 35. Currently on Zetia. Continue pravachol. TOlerating well. RTC in 6 months or sooner if needed.

## 2022-09-20 NOTE — PROGRESS NOTES
Identified pt with two pt identifiers(name and ). Reviewed record in preparation for visit and have obtained necessary documentation. Namrata Fajardo presents today for   Chief Complaint   Patient presents with    Follow-up       Pt c/o SOB when humid. Namrata Fajardo preferred language for health care discussion is english/other. Personal Protective Equipment:   Personal Protective Equipment was used including: mask-surgical and hands-gloves. Patient was placed on no precaution(s). Patient was masked. Precautions:   Patient currently on None  Patient currently roomed with door closed.     Is someone accompanying this pt? no    Is the patient using any DME equipment during  [address]? no    Depression Screening:  3 most recent PHQ Screens 2022   PHQ Not Done -   Little interest or pleasure in doing things Not at all   Feeling down, depressed, irritable, or hopeless Not at all   Total Score PHQ 2 0   Trouble falling or staying asleep, or sleeping too much -   Feeling tired or having little energy -   Poor appetite, weight loss, or overeating -   Feeling bad about yourself - or that you are a failure or have let yourself or your family down -   Trouble concentrating on things such as school, work, reading, or watching TV -   Moving or speaking so slowly that other people could have noticed; or the opposite being so fidgety that others notice -   Thoughts of being better off dead, or hurting yourself in some way -   PHQ 9 Score -   How difficult have these problems made it for you to do your work, take care of your home and get along with others -       Learning Assessment:  Learning Assessment 2021   PRIMARY LEARNER Patient   HIGHEST LEVEL OF EDUCATION - PRIMARY LEARNER  SOME COLLEGE   BARRIERS PRIMARY LEARNER NONE   CO-LEARNER CAREGIVER No   PRIMARY LANGUAGE ENGLISH    NEED -   LEARNER PREFERENCE PRIMARY DEMONSTRATION   LEARNING SPECIAL TOPICS -   ANSWERED BY patient RELATIONSHIP SELF       Abuse Screening:  Abuse Screening Questionnaire 11/11/2021   Do you ever feel afraid of your partner? N   Are you in a relationship with someone who physically or mentally threatens you? N   Is it safe for you to go home? Y       Fall Risk  Fall Risk Assessment, last 12 mths 3/17/2022   Able to walk? Yes   Fall in past 12 months? -   Do you feel unsteady? -   Are you worried about falling -   Number of falls in past 12 months -   Fall with injury? -       Pt currently taking Anticoagulant therapy? no  Pt currently taking Antiplatelet therapy? yes    Coordination of Care:  1. Have you been to the ER, urgent care clinic since your last visit? Hospitalized since your last visit? no    2. Have you seen or consulted any other health care providers outside of the [address] [place] [address] since your last visit? Include any pap smears or colon screening. no      Please see Red banners under Allergies and Med Rec to remove outside inquires. All correct information has been verified with patient and added to chart.      Medication's patient's would liked removed has been marked not taking to be removed per Verbal order and read back per Jim Cabrera MD

## 2022-10-24 NOTE — PATIENT DISCUSSION
(CD 0.55/0.70): Past w/u negative. IOP slightly increased today 22/19. Patient is considered Low Risk. Condition was discussed with patient and patient understands. Will continue to monitor patient for any progression in condition. Patient was advised to call us with any problems questions or concerns. Will do 10/OCT 6months.

## 2022-11-02 NOTE — TELEPHONE ENCOUNTER
From: Cathy Garber  To:  Timoteo Pinzon MD  Sent: 7/11/2022 9:56 AM EDT  Subject: new prescripion    i need a new prescription for my Trazodone 100mg tab   one tab my mouth at bedtime for insomnia Low Dose Naltrexone Pregnancy And Lactation Text: Naltrexone is pregnancy category C.  There have been no adequate and well-controlled studies in pregnant women.  It should be used in pregnancy only if the potential benefit justifies the potential risk to the fetus.   Limited data indicates that naltrexone is minimally excreted into breastmilk.

## 2022-11-11 ENCOUNTER — OFFICE VISIT (OUTPATIENT)
Dept: INTERNAL MEDICINE CLINIC | Age: 72
End: 2022-11-11
Payer: MEDICARE

## 2022-11-11 ENCOUNTER — HOSPITAL ENCOUNTER (OUTPATIENT)
Dept: LAB | Age: 72
Discharge: HOME | End: 2022-11-11
Payer: MEDICARE

## 2022-11-11 VITALS
RESPIRATION RATE: 16 BRPM | DIASTOLIC BLOOD PRESSURE: 51 MMHG | WEIGHT: 216 LBS | OXYGEN SATURATION: 97 % | BODY MASS INDEX: 27.72 KG/M2 | SYSTOLIC BLOOD PRESSURE: 104 MMHG | HEIGHT: 74 IN | TEMPERATURE: 97.1 F | HEART RATE: 68 BPM

## 2022-11-11 DIAGNOSIS — I11.9 BENIGN HYPERTENSIVE HEART DISEASE WITHOUT HEART FAILURE: ICD-10-CM

## 2022-11-11 DIAGNOSIS — E78.5 DYSLIPIDEMIA: ICD-10-CM

## 2022-11-11 DIAGNOSIS — E11.40 TYPE 2 DIABETES MELLITUS WITH DIABETIC NEUROPATHY, WITHOUT LONG-TERM CURRENT USE OF INSULIN (HCC): ICD-10-CM

## 2022-11-11 DIAGNOSIS — M62.81 QUADRICEPS WEAKNESS: ICD-10-CM

## 2022-11-11 DIAGNOSIS — Z00.00 MEDICARE ANNUAL WELLNESS VISIT, SUBSEQUENT: Primary | ICD-10-CM

## 2022-11-11 DIAGNOSIS — Z23 NEEDS FLU SHOT: ICD-10-CM

## 2022-11-11 LAB
ALBUMIN SERPL-MCNC: 3.6 G/DL (ref 3.4–5)
ALBUMIN/GLOB SERPL: 1.1 {RATIO} (ref 0.8–1.7)
ALP SERPL-CCNC: 91 U/L (ref 45–117)
ALT SERPL-CCNC: 20 U/L (ref 16–61)
ANION GAP SERPL CALC-SCNC: 8 MMOL/L (ref 3–18)
AST SERPL-CCNC: 10 U/L (ref 10–38)
BILIRUB SERPL-MCNC: 0.5 MG/DL (ref 0.2–1)
BUN SERPL-MCNC: 16 MG/DL (ref 7–18)
BUN/CREAT SERPL: 14 (ref 12–20)
CALCIUM SERPL-MCNC: 9 MG/DL (ref 8.5–10.1)
CHLORIDE SERPL-SCNC: 103 MMOL/L (ref 100–111)
CHOLEST SERPL-MCNC: 125 MG/DL
CO2 SERPL-SCNC: 29 MMOL/L (ref 21–32)
CREAT SERPL-MCNC: 1.14 MG/DL (ref 0.6–1.3)
CREAT UR-MCNC: 163 MG/DL (ref 30–125)
GLOBULIN SER CALC-MCNC: 3.2 G/DL (ref 2–4)
GLUCOSE SERPL-MCNC: 207 MG/DL (ref 74–99)
HBA1C MFR BLD: 6.2 % (ref 4.2–5.6)
HDLC SERPL-MCNC: 34 MG/DL (ref 40–60)
HDLC SERPL: 3.7 {RATIO} (ref 0–5)
LDLC SERPL CALC-MCNC: 45.6 MG/DL (ref 0–100)
LIPID PROFILE,FLP: ABNORMAL
MICROALBUMIN UR-MCNC: 2.54 MG/DL (ref 0–3)
MICROALBUMIN/CREAT UR-RTO: 16 MG/G (ref 0–30)
POTASSIUM SERPL-SCNC: 3.5 MMOL/L (ref 3.5–5.5)
PROT SERPL-MCNC: 6.8 G/DL (ref 6.4–8.2)
SODIUM SERPL-SCNC: 140 MMOL/L (ref 136–145)
TRIGL SERPL-MCNC: 227 MG/DL (ref ?–150)
VLDLC SERPL CALC-MCNC: 45.4 MG/DL

## 2022-11-11 PROCEDURE — G0439 PPPS, SUBSEQ VISIT: HCPCS | Performed by: INTERNAL MEDICINE

## 2022-11-11 PROCEDURE — 1101F PT FALLS ASSESS-DOCD LE1/YR: CPT | Performed by: INTERNAL MEDICINE

## 2022-11-11 PROCEDURE — G0008 ADMIN INFLUENZA VIRUS VAC: HCPCS | Performed by: INTERNAL MEDICINE

## 2022-11-11 PROCEDURE — G8754 DIAS BP LESS 90: HCPCS | Performed by: INTERNAL MEDICINE

## 2022-11-11 PROCEDURE — 36415 COLL VENOUS BLD VENIPUNCTURE: CPT

## 2022-11-11 PROCEDURE — 83036 HEMOGLOBIN GLYCOSYLATED A1C: CPT

## 2022-11-11 PROCEDURE — G8752 SYS BP LESS 140: HCPCS | Performed by: INTERNAL MEDICINE

## 2022-11-11 PROCEDURE — 80061 LIPID PANEL: CPT

## 2022-11-11 PROCEDURE — G8417 CALC BMI ABV UP PARAM F/U: HCPCS | Performed by: INTERNAL MEDICINE

## 2022-11-11 PROCEDURE — 3017F COLORECTAL CA SCREEN DOC REV: CPT | Performed by: INTERNAL MEDICINE

## 2022-11-11 PROCEDURE — G8536 NO DOC ELDER MAL SCRN: HCPCS | Performed by: INTERNAL MEDICINE

## 2022-11-11 PROCEDURE — G9717 DOC PT DX DEP/BP F/U NT REQ: HCPCS | Performed by: INTERNAL MEDICINE

## 2022-11-11 PROCEDURE — 90694 VACC AIIV4 NO PRSRV 0.5ML IM: CPT | Performed by: INTERNAL MEDICINE

## 2022-11-11 PROCEDURE — 80053 COMPREHEN METABOLIC PANEL: CPT

## 2022-11-11 PROCEDURE — 82043 UR ALBUMIN QUANTITATIVE: CPT

## 2022-11-11 PROCEDURE — G8427 DOCREV CUR MEDS BY ELIG CLIN: HCPCS | Performed by: INTERNAL MEDICINE

## 2022-11-11 PROCEDURE — 2022F DILAT RTA XM EVC RTNOPTHY: CPT | Performed by: INTERNAL MEDICINE

## 2022-11-11 PROCEDURE — 3051F HG A1C>EQUAL 7.0%<8.0%: CPT | Performed by: INTERNAL MEDICINE

## 2022-11-11 PROCEDURE — 1123F ACP DISCUSS/DSCN MKR DOCD: CPT | Performed by: INTERNAL MEDICINE

## 2022-11-11 PROCEDURE — 99214 OFFICE O/P EST MOD 30 MIN: CPT | Performed by: INTERNAL MEDICINE

## 2022-11-11 RX ORDER — ALBUTEROL SULFATE 90 UG/1
2 AEROSOL, METERED RESPIRATORY (INHALATION)
Qty: 18 G | Refills: 4 | Status: SHIPPED | OUTPATIENT
Start: 2022-11-11

## 2022-11-11 RX ORDER — FLUTICASONE PROPIONATE AND SALMETEROL 100; 50 UG/1; UG/1
1 POWDER RESPIRATORY (INHALATION) 2 TIMES DAILY
Qty: 180 EACH | Refills: 4 | Status: SHIPPED | OUTPATIENT
Start: 2022-11-11

## 2022-11-11 NOTE — PROGRESS NOTES
HISTORY OF PRESENT ILLNESS  Destini Inman is a 67 y.o. male. BP (!) 104/51 (BP 1 Location: Left upper arm, BP Patient Position: Sitting, BP Cuff Size: Large adult)   Pulse 68   Temp 97.1 °F (36.2 °C) (Temporal)   Resp 16   Ht 6' 2\" (1.88 m)   Wt 216 lb (98 kg)   SpO2 97%   BMI 27.73 kg/m²     Hypertension   The history is provided by the Patient. This is a chronic problem. The current episode started more than 1 week ago. Pertinent negatives include no chest pain, no palpitations and no shortness of breath. Risk factors include diabetes mellitus. Diabetes  The history is provided by the Patient. This is a chronic problem. The current episode started more than 1 week ago. Pertinent negatives include no chest pain and no shortness of breath. Cholesterol Problem  The history is provided by the Patient. This is a chronic problem. The current episode started more than 1 week ago. Pertinent negatives include no chest pain and no shortness of breath. Review of Systems   Constitutional:  Negative for chills and fever. Respiratory:  Negative for shortness of breath. Cardiovascular:  Negative for chest pain and palpitations. Genitourinary:  Negative for frequency. Neurological:  Positive for sensory change and focal weakness. Endo/Heme/Allergies:  Negative for polydipsia. Physical Exam  Vitals and nursing note reviewed. Constitutional:       General: He is not in acute distress. Appearance: Normal appearance. He is well-developed. He is not diaphoretic. HENT:      Head: Normocephalic and atraumatic. Cardiovascular:      Rate and Rhythm: Normal rate and regular rhythm. Pulmonary:      Effort: Pulmonary effort is normal.      Breath sounds: Normal breath sounds. Musculoskeletal:      Right lower leg: No edema. Left lower leg: No edema. Comments: Quad muscle weakness bilaterally   Skin:     General: Skin is warm and dry.    Neurological:      Mental Status: He is alert and oriented to person, place, and time. Comments: Diabetic foot exam:     Left Foot:   Visual Exam: great toe callous   Pulse DP: 2+ (normal)   Filament test: reduced sensation    Vibratory sensation: absent      Right Foot:   Visual Exam: great toe callous   Pulse DP: 2+ (normal)   Filament test: reduced sensation    Vibratory sensation: diminished       Psychiatric:         Mood and Affect: Mood normal.         Behavior: Behavior normal.       ASSESSMENT and PLAN    ICD-10-CM ICD-9-CM                        3. Type 2 diabetes mellitus with diabetic neuropathy, without long-term current use of insulin ([facility])  R88.36 920.58 METABOLIC PANEL, COMPREHENSIVE     357.2 LIPID PANEL      HEMOGLOBIN A1C W/O EAG       DIABETES FOOT EXAM      MICROALBUMIN, UR, RAND W/ MICROALB/CREAT RATIO      4. Hypertension  T87.6 493.18 METABOLIC PANEL, COMPREHENSIVE      LIPID PANEL      5. Dyslipidemia  E78.5 272.4 LIPID PANEL      6. Quadriceps weakness  M62.81 728.87         DM has been controlled  BP controlled  Update lab  Pt also [facility] to [facility]. Part of chart is available for viewing  Quad weakness--partly due to neuropathy, partly due to back issues, partly on the right due to prior surgery for sarcoma. Has been to PT.  Advised to continue home exercises  F/u 6 months for HTN, DM, chol

## 2022-11-11 NOTE — PROGRESS NOTES
This is the Subsequent Medicare Annual Wellness Exam, performed 12 months or more after the Initial AWV or the last Subsequent AWV    I have reviewed the patient's medical history in detail and updated the computerized patient record. BP (!) 104/51 (BP 1 Location: Left upper arm, BP Patient Position: Sitting, BP Cuff Size: Large adult)   Pulse 68   Temp 97.1 °F (36.2 °C) (Temporal)   Resp 16   Ht 6' 2\" (1.88 m)   Wt 216 lb (98 kg)   SpO2 97%   BMI 27.73 kg/m²       Assessment/Plan   Education and counseling provided:  Are appropriate based on today's review and evaluation    1. Medicare annual wellness visit, subsequent  2. Needs flu shot  -     INFLUENZA, FLUAD, (AGE 72 Y+), IM, PF, 0.5 ML       Depression Risk Factor Screening     3 most recent PHQ Screens 11/11/2022   PHQ Not Done -   Little interest or pleasure in doing things Not at all   Feeling down, depressed, irritable, or hopeless Several days   Total Score PHQ 2 1   Trouble falling or staying asleep, or sleeping too much -   Feeling tired or having little energy -   Poor appetite, weight loss, or overeating -   Feeling bad about yourself - or that you are a failure or have let yourself or your family down -   Trouble concentrating on things such as school, work, reading, or watching TV -   Moving or speaking so slowly that other people could have noticed; or the opposite being so fidgety that others notice -   Thoughts of being better off dead, or hurting yourself in some way -   PHQ 9 Score -   How difficult have these problems made it for you to do your work, take care of your home and get along with others -       Alcohol & Drug Abuse Risk Screen    Do you average more than 1 drink per night or more than 7 drinks a week: No    In the past three months have you have had more than 4 drinks containing alcohol on one occasion: No          Functional Ability and Level of Safety    Hearing: The patient wears hearing aids.  New ones in January Activities of Daily Living: The home contains: handrails, grab bars, and walk in shower  Patient does total self care      Ambulation: with mild difficulty     Fall Risk:  Fall Risk Assessment, last 12 mths 11/11/2022   Able to walk? Yes   Fall in past 12 months? 0   Do you feel unsteady? 1   Are you worried about falling 0   Is TUG test greater than 12 seconds? 0   Is the gait abnormal? 1   Number of falls in past 12 months 0   Fall with injury?  -      Abuse Screen:  Patient is not abused       Cognitive Screening    Has your family/caregiver stated any concerns about your memory: no     Cognitive Screening: Normal - Animal Naming Test    Health Maintenance Due     Health Maintenance Due   Topic Date Due    Low dose CT lung screening  Never done    COVID-19 Vaccine (4 - Booster for [employer] series) 01/08/2022    Foot Exam Q1  11/11/2022       Patient Care Team   Patient Care Team:  Andrew Bone MD as PCP - General (Internal Medicine Physician)  Andrew Bone MD as PCP - [facility] Empaneled Provider  Kim Clarke MD as Consulting Provider (Ophthalmology)  Valentino Motto, MD as Consulting Provider (Gastroenterology)  Aryan Moreira MD as Consulting Provider (Orthopedic Surgery)  Eufemia Fuentes MD as Consulting Provider (Orthopedic Surgery)  Angelina Griffith DPM as Consulting Provider (Podiatry)  Ruddy Murguia DO as Consulting Provider (Physical Medicine and Rehabilitation Physician)  Yonathan Billingsley MD (Otolaryngology)    History     Patient Active Problem List   Diagnosis Code    Dyslipidemia E78.5    Coronary artery disease I25.10    Hypertension I11.9    Type 2 diabetes mellitus with nephropathy (HCC) E11.21    Type 2 diabetes mellitus with diabetic neuropathy ([facility] [address] E11.40    Asthma J45.909    Coronary artery disease with stable angina pectoris ([address] I25.118    Recurrent major depressive disorder, in partial remission ([address] F33.41     Past Medical History:   Diagnosis Date    Agent orange exposure     Asbestos exposure     Asthma 09/2019    told mild. Autonomic dysfunction     abnormal tilt table 2/15    BPH     CAD (coronary artery disease)     S/P OM NOY in 2014    Cholesteatoma 2020    left ear    Choroidal nevus, left eye     Chronic pain 2008    Colonic polyp 08/28/2018    multiple. 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 Aranda [place] N/A 7/9/2018    COLONOSCOPY performed by Myke Pa MD at [address] [place] [address] N/A 8/28/2018    COLONOSCOPY performed by Myke Pa MD at [facility] ENDOSCOPY    HX CATARACT REMOVAL      7/14  bilat    HX CERVICAL LAMINECTOMY      HX COLONOSCOPY  07/09/2018    pt states have to redo d/t unclear imaging    HX ENDOSCOPY  10/01/2019    EGD. At [facility].   No significant pathological findings    HX GI  2019    [facility]    HX HEART CATHETERIZATION  2014    Stent    HX KNEE REPLACEMENT Right 03/06/2020    Dr. Channing Almanza    HX ORTHOPAEDIC Left 02/05/2018    knee replacement    HX TUMOR REMOVAL      3/13  wide excision right thigh sarcoma    HX TYMPANOMASTOIDECTOMY       Current Outpatient Medications   Medication Sig Dispense Refill    fluticasone propion-salmeteroL (ADVAIR/WIXELA) 100-50 mcg/dose diskus inhaler Take 1 Puff by inhalation two (2) times a day. Indications: bronchospasm prevention with COPD 180 Each 4    albuterol (PROVENTIL HFA, VENTOLIN HFA, PROAIR HFA) 90 mcg/actuation inhaler Take 2 Puffs by inhalation every six (6) hours as needed for Wheezing. 18 g 4    Jardiance 10 mg tablet TAKE 1 TABLET BY MOUTH DAILY 90 Tablet 4    traZODone (DESYREL) 50 mg tablet Take 2 Tablets by mouth nightly. 180 Tablet 3    amLODIPine (NORVASC) 2.5 mg tablet TAKE 1 TABLET BY MOUTH DAILY FOR HIGH BLOOD PRESSURE 90 Tablet 1    gabapentin (NEURONTIN) 300 mg capsule TAKE 1 CAPSULE BY MOUTH THREE TIMES DAILY. MAX DAILY AMOUNT: 900 MG 90 Capsule 5    losartan (COZAAR) 100 mg tablet TAKE 1 TABLET BY MOUTH DAILY 90 Tablet 4    sertraline (ZOLOFT) 100 mg tablet Take 1 Tablet by mouth daily. 90 Tablet 1    terazosin (HYTRIN) 2 mg capsule Take 1 Capsule by mouth nightly. 90 Capsule 3    cyanocobalamin (VITAMIN B12) 100 mcg tablet Take 100 mcg by mouth daily. pravastatin (PRAVACHOL) 40 mg tablet Take 1 Tablet by mouth every evening. 90 Tablet 3    ezetimibe (ZETIA) 10 mg tablet Take 1 Tablet by mouth daily. 90 Tablet 3    isosorbide mononitrate ER (IMDUR) 60 mg CR tablet TAKE 1 TABLET BY MOUTH EVERY MORNING 90 Tablet 3    metoprolol succinate (TOPROL-XL) 25 mg XL tablet TAKE 1 TABLET BY MOUTH DAILY 30 Tablet 6    cyclobenzaprine (FLEXERIL) 5 mg tablet Take one to two tablets by mouth at bedtime. Indications: muscle spasm      famotidine (PEPCID) 20 mg tablet Take 20 mg by mouth two (2) times a day. acetaminophen (TYLENOL) 500 mg tablet Take 1,000 mg by mouth nightly as needed for Pain. colchicine 0.6 mg tablet TAKE 2 TABLETS NOW AND 1 TABLET IN 6 HOURS IF NEEDED REPEAT IN 6 HOURS AS NEEDED 15 Tab 5    diclofenac (VOLTAREN) 1 % gel Apply 2 g to affected area four (4) times daily. carboxymethylcellulose sodium (REFRESH OP) Apply 1 Drop to eye daily.       nitroglycerin (NITROSTAT) 0.4 mg SL tablet 0.4 mg by SubLINGual route every five (5) minutes as needed for Chest Pain. Up to 3 doses. ferrous sulfate 325 mg (65 mg iron) tablet Take 65 mg by mouth Daily (before breakfast). clopidogrel (PLAVIX) 75 mg tab Take 1 Tab by mouth daily. 90 Tab 3    fluticasone (FLONASE) 50 mcg/actuation nasal spray SHAKE LIQUID AND USE 2 SPRAYS IN EACH NOSTRIL DAILY 3 Bottle 6    ZINC ACETATE PO Take 1,000 mg by mouth. cholecalciferol (VITAMIN D3) 1,000 unit tablet Take 1,000 Units by mouth daily. ascorbic acid, vitamin C, (VITAMIN C) 250 mg tablet Take 250 mg by mouth daily. garlic 8,842 mg cap Take 1 Cap by mouth daily. multivit-min-FA-lycopen-lutein 0.4-300-250 mg-mcg-mcg tab Take 1 Tab by mouth daily. aspirin delayed-release 81 mg tablet Take 1 tablet by mouth daily. 90 tablet 5     Allergies   Allergen Reactions    Beta Blocker [Beta-Blockers (Beta-Adrenergic Blocking Agts)] Other (comments)     symptomatic bradycardia    Codeine Other (comments)     Passed//fainted  patient doesn't recall reaction, occurred as a teenager    Darvocet A500 [Propoxyphene N-Acetaminophen] Other (comments)     throat swelling    Dexbrompheniramine-Pseudoephed Other (comments)     Facial swelling.  1980\"s    Lasix [Furosemide] Itching    Lipitor [Atorvastatin] Other (comments)    Sulfa (Sulfonamide Antibiotics) Hives       Family History   Problem Relation Age of Onset    Diabetes Father     Heart Disease Father         cardiomyopathydementia    Hypertension Father     Cancer Father         prostate    Parkinsonism Father     Dementia Father     High Cholesterol Father     Kidney Disease Father     Other Father         colon polyps    Headache Mother     Psychiatric Disorder Mother     Kidney Disease Mother     Hypertension Mother     High Cholesterol Mother     Other Mother         GERD/polymyalgia rheumaticacolon osiris    Heart Disease Mother     Cancer Sister     Diabetes Maternal Aunt     Diabetes Maternal Grandmother      Social History Tobacco Use    Smoking status: Former     Packs/day: 1.50     Years: 50.00     Pack years: 75.00     Types: Cigarettes     Quit date: 3/28/2013     Years since quittin.6     Passive exposure: Past    Smokeless tobacco: Never   Substance Use Topics    Alcohol use: No     Alcohol/week: 0.0 standard drinks         Rashid Albright MD

## 2022-11-11 NOTE — PATIENT INSTRUCTIONS
Vaccine Information Statement    Influenza (Flu) Vaccine (Inactivated or Recombinant): What You Need to Know    Many vaccine information statements are available in Thai and other languages. See www.immunize.org/vis. Hojas de información sobre vacunas están disponibles en español y en muchos otros idiomas. Visite www.immunize.org/vis. 1. Why get vaccinated? Influenza vaccine can prevent influenza (flu). Flu is a contagious disease that spreads around the [facility] every year, usually between October and May. Anyone can get the flu, but it is more dangerous for some people. Infants and young children, people 72 years and older, pregnant people, and people with certain health conditions or a weakened immune system are at greatest risk of flu complications. Pneumonia, bronchitis, sinus infections, and ear infections are examples of flu-related complications. If you have a medical condition, such as heart disease, cancer, or diabetes, flu can make it worse. Flu can cause fever and chills, sore throat, muscle aches, fatigue, cough, headache, and runny or stuffy nose. Some people may have vomiting and diarrhea, though this is more common in children than adults. In an average year, thousands of people in the [facility] die from flu, and many more are hospitalized. Flu vaccine prevents millions of illnesses and flu-related visits to the doctor each year. 2. Influenza vaccines     CDC recommends everyone 6 months and older get vaccinated every flu season. Children 6 months through 6years of age may need 2 doses during a single flu season. Everyone else needs only 1 dose each flu season. It takes about 2 weeks for protection to develop after vaccination. There are many flu viruses, and they are always changing. Each year a new flu vaccine is made to protect against the influenza viruses believed to be likely to cause disease in the upcoming flu season.  Even when the vaccine doesnt exactly match these viruses, it may still provide some protection. Influenza vaccine does not cause flu. Influenza vaccine may be given at the same time as other vaccines. 3. Talk with your health care provider    Tell your vaccination provider if the person getting the vaccine:  Has had an allergic reaction after a previous dose of influenza vaccine, or has any severe, life-threatening allergies   Has ever had Guillain-Barré Syndrome (also called GBS)    In some cases, your health care provider may decide to postpone influenza vaccination until a future visit. Influenza vaccine can be administered at any time during pregnancy. People who are or will be pregnant during influenza season should receive inactivated influenza vaccine. People with minor illnesses, such as a cold, may be vaccinated. People who are moderately or severely ill should usually wait until they recover before getting influenza vaccine. Your health care provider can give you more information. 4. Risks of a vaccine reaction    Soreness, redness, and swelling where the shot is given, fever, muscle aches, and headache can happen after influenza vaccination. There may be a very small increased risk of Guillain-Barré Syndrome (GBS) after inactivated influenza vaccine (the flu shot). [facility] children who get the flu shot along with pneumococcal vaccine (PCV13) and/or DTaP vaccine at the same time might be slightly more likely to have a seizure caused by fever. Tell your health care provider if a child who is getting flu vaccine has ever had a seizure. People sometimes faint after medical procedures, including vaccination. Tell your provider if you feel dizzy or have vision changes or ringing in the ears. As with any medicine, there is a very remote chance of a vaccine causing a severe allergic reaction, other serious injury, or death. 5. What if there is a serious problem?     An allergic reaction could occur after the vaccinated person leaves the clinic. If you see signs of a severe allergic reaction (hives, swelling of the face and throat, difficulty breathing, a fast heartbeat, dizziness, or weakness), call 9-1-1 and get the person to the nearest hospital.    For other signs that concern you, call your health care provider. Adverse reactions should be reported to the Vaccine Adverse Event Reporting System (VAERS). Your health care provider will usually file this report, or you can do it yourself. Visit the VAERS website at www.vaers. [facility].gov or call 1-586.859.7126. VAERS is only for reporting reactions, and VAERS staff members do not give medical advice. 6. The National Vaccine Injury Compensation Program    The [facility] Vaccine Injury Compensation Program (VICP) is a federal program that was created to compensate people who may have been injured by certain vaccines. Claims regarding alleged injury or death due to vaccination have a time limit for filing, which may be as short as two years. Visit the VICP website at www.[facility].gov/vaccinecompensation or call 9-495.488.1719 to learn about the program and about filing a claim. 7. How can I learn more? Ask your health care provider. Call your local or state health department. Visit the website of the Food and Drug Administration (FDA) for vaccine package inserts and additional information at www.fda.gov/vaccines-blood-biologics/vaccines. Contact the Centers for Disease Control and Prevention (CDC): Call 8-582.795.2762 (1-980-UBM-INFO) or  Visit CDCs influenza website at www.cdc.gov/flu. Vaccine Information Statement   Inactivated Influenza Vaccine   8/6/2021  42 U. Reshma Given [id number]-06   Department of Health and Human Services  Centers for Disease Control and Prevention    Office Use Only      Medicare Wellness Visit, Male    The best way to live healthy is to have a lifestyle where you eat a well-balanced diet, exercise regularly, limit alcohol use, and quit all forms of tobacco/nicotine, if applicable. Regular preventive services are another way to keep healthy. Preventive services (vaccines, screening tests, monitoring & exams) can help personalize your care plan, which helps you manage your own care. Screening tests can find health problems at the earliest stages, when they are easiest to treat. [facility] follows the current, evidence-based guidelines published by the [facility] States Oscar Hood ([facility]) when recommending preventive services for our patients. Because we follow these guidelines, sometimes recommendations change over time as research supports it. (For example, a prostate screening blood test is no longer routinely recommended for men with no symptoms). Of course, you and your doctor may decide to screen more often for some diseases, based on your risk and co-morbidities (chronic disease you are already diagnosed with). Preventive services for you include:  - Medicare offers their members a free annual wellness visit, which is time for you and your primary care provider to discuss and plan for your preventive service needs. Take advantage of this benefit every year!  -All adults over age 72 should receive the recommended pneumonia vaccines. Current USPSTF guidelines recommend a series of two vaccines for the best pneumonia protection.   -All adults should have a flu vaccine yearly and tetanus vaccine every 10 years.  -All adults age 48 and older should receive the shingles vaccines (series of two vaccines).        -All adults age 38-68 who are overweight should have a diabetes screening test once every three years.   -Other screening tests & preventive services for persons with diabetes include: an eye exam to screen for diabetic retinopathy, a kidney function test, a foot exam, and stricter control over your cholesterol.   -Cardiovascular screening for adults with routine risk involves an electrocardiogram (ECG) at intervals determined by the provider.   -Colorectal cancer screening should be done for adults age 54-65 with no increased risk factors for colorectal cancer. There are a number of acceptable methods of screening for this type of cancer. Each test has its own benefits and drawbacks. Discuss with your provider what is most appropriate for you during your annual wellness visit. The different tests include: colonoscopy (considered the best screening method), a fecal occult blood test, a fecal DNA test, and sigmoidoscopy.  -All adults born between [facility] should be screened once for Hepatitis C.  -An Abdominal Aortic Aneurysm (AAA) Screening is recommended for men age 73-68 who has ever smoked in their lifetime.      Here is a list of your current Health Maintenance items (your personalized list of preventive services) with a due date:  Health Maintenance Due   Topic Date Due    Smoker or Former Smoker - [address] 77  Never done    COVID-19 Vaccine (4 - Booster for [employer] series) 01/08/2022    Diabetic Foot Care  11/11/2022

## 2022-11-11 NOTE — PROGRESS NOTES
Reviewed record in preparation for visit and have obtained necessary documentation. Hao Friedman is a 67 y.o.  male presents today for office visit for   Chief Complaint   Patient presents with    Annual Wellness Visit    Hypertension    Diabetes    Cholesterol Problem   . Pt is not fasting. Patient is accompanied by self. I have received verbal consent from Hao Friedman to discuss any/all medical information while they are present in the room. Pt is in Room # [address] preferred language for health care discussion is english/other. Is the patient using any DME equipment during OV? NO    Advance Directive:  1. Do you have an advance directive in place? Patient Reply: Delfina Barajas    2. If not, would you like material regarding how to put one in place? NO      1. \"Have you been to the ER, urgent care clinic since your last visit? Hospitalized since your last visit? \" No    2. \"Have you seen or consulted any other health care providers outside of the [address] [place] [address] since your last visit? \" No     3. For patients aged 39-70: Has the patient had a colonoscopy? Yes - no Care Gap present     If the patient is female:    4. For patients aged 41-77: Has the patient had a mammogram within the past 2 years? NA - based on age    11. For patients aged 21-65: Has the patient had a pap smear? NA - based on age    Upcoming Appts  No    VORB:   Orders Placed This Encounter    Influenza, FLUAD, (age 72 y+), IM, PF, 0.5 mL   /Benita Houston MD/SARAH Palomino is due for:   Health Maintenance Due   Topic    Low dose CT lung screening     COVID-19 Vaccine (4 - Booster for RABT series)    Foot Exam Q1     Medicare Yearly Exam      Health Maintenance reviewed and discussed per provider  Please order/place referral if appropriate.     Requested Prescriptions     Pending Prescriptions Disp Refills    fluticasone propion-salmeteroL (ADVAIR/WIXELA) 100-50 mcg/dose diskus inhaler 60 Each      Sig: Take 1 Puff by inhalation. albuterol (PROVENTIL HFA, VENTOLIN HFA, PROAIR HFA) 90 mcg/actuation inhaler 18 g      Sig: Take 2 Puffs by inhalation.        Learning Assessment:  Learning Assessment 11/11/2021 4/13/2015 4/17/2014 12/12/2013   PRIMARY LEARNER Patient Patient Patient -   HIGHEST LEVEL OF EDUCATION - PRIMARY LEARNER  SOME COLLEGE SOME COLLEGE - SOME COLLEGE   BARRIERS PRIMARY LEARNER NONE NONE - NONE   CO-LEARNER CAREGIVER No No - -   PRIMARY LANGUAGE ENGLISH ENGLISH ENGLISH ENGLISH    NEED - - - No   LEARNER PREFERENCE PRIMARY DEMONSTRATION LISTENING DEMONSTRATION PICTURES   LEARNING SPECIAL TOPICS - - - none   ANSWERED BY patient  patient - -   RELATIONSHIP SELF SELF - -     Depression Screening:  3 most recent PHQ Screens 11/11/2022 9/20/2022 5/11/2022 3/17/2022 11/11/2021 3/22/2021 10/16/2020   PHQ Not Done - - - - - - -   Little interest or pleasure in doing things Not at all Not at all Nearly every day Not at all Not at all Not at all Not at all   Feeling down, depressed, irritable, or hopeless Several days Not at all Nearly every day Not at all Not at all Not at all Not at all   Total Score PHQ 2 1 0 6 0 0 0 0   Trouble falling or staying asleep, or sleeping too much - - Several days - - - -   Feeling tired or having little energy - - Nearly every day - - - -   Poor appetite, weight loss, or overeating - - Several days - - - -   Feeling bad about yourself - or that you are a failure or have let yourself or your family down - - Not at all - - - -   Trouble concentrating on things such as school, work, reading, or watching TV - - Not at all - - - -   Moving or speaking so slowly that other people could have noticed; or the opposite being so fidgety that others notice - - Not at all - - - -   Thoughts of being better off dead, or hurting yourself in some way - - Not at all - - - -   PHQ 9 Score - - 11 - - - -   How difficult have these problems made it for you to do your work, take care of your home and get along with others - - Somewhat difficult - - - -     Abuse Screening:  Abuse Screening Questionnaire 11/11/2022 11/11/2021 11/13/2017 4/13/2015   Do you ever feel afraid of your partner? N N N N   Are you in a relationship with someone who physically or mentally threatens you? N N N N   Is it safe for you to go home? Sander Godinez     Fall Risk  Fall Risk Assessment, last 12 mths 11/11/2022 3/17/2022 11/11/2021 3/26/2021 10/16/2020 4/2/2020 3/23/2020   Able to walk? Yes Yes Yes Yes Yes Yes Yes   Fall in past 12 months? 0 - 1 0 No No Yes   Do you feel unsteady? 1 - - 0 - - -   Are you worried about falling 0 - - 0 - - -   Is TUG test greater than 12 seconds? 0 - - - - - -   Is the gait abnormal? 1 - - - - - -   Number of falls in past 12 months 0 - 1 - - - 8 or more   Fall with injury? - - 0 - - - 1     Recent Travel Screening and Travel History documentation     Travel Screening       Question Response    In the last 10 days, have you been in contact with someone who was confirmed or suspected to have Coronavirus/COVID-19? No / Unsure    Have you had a COVID-19 viral test in the last 10 days? No    Do you have any of the following new or worsening symptoms? None of these    Have you traveled internationally or domestically in the last month? No          Travel History   Travel since 10/11/22    No documented travel since 10/11/22         Lizz Santoyo is a 67 y.o. male who presents for fluad immunization. he denies any symptoms , reactions or allergies that would exclude them from being immunized today. Risks and adverse reactions were discussed and the VIS was given to them. All questions were addressed. he was observed for 15 min post injection. There were no reactions observed.     Theresa Ruiz LPN

## 2022-11-27 DIAGNOSIS — E78.5 DYSLIPIDEMIA: ICD-10-CM

## 2022-11-28 RX ORDER — EZETIMIBE 10 MG/1
10 TABLET ORAL DAILY
Qty: 90 TABLET | Refills: 3 | Status: SHIPPED | OUTPATIENT
Start: 2022-11-28

## 2022-12-17 DIAGNOSIS — M79.2 NEURALGIC PAIN: ICD-10-CM

## 2022-12-17 RX ORDER — GABAPENTIN 300 MG/1
CAPSULE ORAL
Qty: 90 CAPSULE | Refills: 5 | Status: SHIPPED | OUTPATIENT
Start: 2022-12-17

## 2023-01-27 DIAGNOSIS — M79.2 NEURALGIC PAIN: ICD-10-CM

## 2023-01-27 RX ORDER — TRAMADOL HYDROCHLORIDE 50 MG/1
TABLET ORAL
Qty: 120 TABLET | Refills: 5 | Status: SHIPPED | OUTPATIENT
Start: 2023-01-27 | End: 2023-02-26

## 2023-03-15 NOTE — TELEPHONE ENCOUNTER
PCP:  Yunior Hua MD    Last appt:  09/20/22  Future Appointments   Date Time Provider Kathrine Paz   3/21/2023  1:00 PM Cheryle Amend, MD [facility] BS AMB   5/11/2023 11:00 AM Leonela Chaidez MD [facility] BS AMB       Requested Prescriptions     Pending Prescriptions Disp Refills    isosorbide mononitrate (IMDUR) 60 MG extended release tablet [Pharmacy Med Name: ISOSORBIDE MONONITRATE 60MG ER TABS] 90 tablet 1     Sig: TAKE 1 TABLET BY MOUTH EVERY MORNING

## 2023-03-16 RX ORDER — PRAVASTATIN SODIUM 40 MG
TABLET ORAL
Qty: 90 TABLET | Refills: 0 | Status: SHIPPED | OUTPATIENT
Start: 2023-03-16

## 2023-03-16 RX ORDER — ISOSORBIDE MONONITRATE 60 MG/1
TABLET, EXTENDED RELEASE ORAL
Qty: 90 TABLET | Refills: 1 | Status: SHIPPED | OUTPATIENT
Start: 2023-03-16

## 2023-03-20 RX ORDER — FLUTICASONE PROPIONATE AND SALMETEROL 100; 50 UG/1; UG/1
POWDER RESPIRATORY (INHALATION)
COMMUNITY
Start: 2023-02-17

## 2023-03-21 ENCOUNTER — OFFICE VISIT (OUTPATIENT)
Age: 73
End: 2023-03-21
Payer: MEDICARE

## 2023-03-21 VITALS
WEIGHT: 214 LBS | DIASTOLIC BLOOD PRESSURE: 49 MMHG | HEART RATE: 65 BPM | TEMPERATURE: 97.8 F | OXYGEN SATURATION: 95 % | SYSTOLIC BLOOD PRESSURE: 100 MMHG | BODY MASS INDEX: 27.48 KG/M2

## 2023-03-21 DIAGNOSIS — I25.83 CORONARY ARTERY DISEASE DUE TO LIPID RICH PLAQUE: Primary | ICD-10-CM

## 2023-03-21 DIAGNOSIS — I10 ESSENTIAL HYPERTENSION WITH GOAL BLOOD PRESSURE LESS THAN 140/90: ICD-10-CM

## 2023-03-21 DIAGNOSIS — I25.10 CORONARY ARTERY DISEASE DUE TO LIPID RICH PLAQUE: Primary | ICD-10-CM

## 2023-03-21 DIAGNOSIS — E78.00 PURE HYPERCHOLESTEROLEMIA: ICD-10-CM

## 2023-03-21 PROCEDURE — G8428 CUR MEDS NOT DOCUMENT: HCPCS | Performed by: INTERNAL MEDICINE

## 2023-03-21 PROCEDURE — 1036F TOBACCO NON-USER: CPT | Performed by: INTERNAL MEDICINE

## 2023-03-21 PROCEDURE — G8484 FLU IMMUNIZE NO ADMIN: HCPCS | Performed by: INTERNAL MEDICINE

## 2023-03-21 PROCEDURE — 1123F ACP DISCUSS/DSCN MKR DOCD: CPT | Performed by: INTERNAL MEDICINE

## 2023-03-21 PROCEDURE — 3074F SYST BP LT 130 MM HG: CPT | Performed by: INTERNAL MEDICINE

## 2023-03-21 PROCEDURE — 3078F DIAST BP <80 MM HG: CPT | Performed by: INTERNAL MEDICINE

## 2023-03-21 PROCEDURE — 3017F COLORECTAL CA SCREEN DOC REV: CPT | Performed by: INTERNAL MEDICINE

## 2023-03-21 PROCEDURE — G8417 CALC BMI ABV UP PARAM F/U: HCPCS | Performed by: INTERNAL MEDICINE

## 2023-03-21 PROCEDURE — 99213 OFFICE O/P EST LOW 20 MIN: CPT | Performed by: INTERNAL MEDICINE

## 2023-03-21 NOTE — PATIENT INSTRUCTIONS
New Location Address- projected for the month of May 2023    [address] [facility] [address], [place] [address]

## 2023-03-21 NOTE — PROGRESS NOTES
Identified pt with two pt identifiers(name and ). Reviewed record in preparation for visit and have obtained necessary documentation. Pranay Johnson presents today for   Chief Complaint   Patient presents with    Follow-up     6m       Pt denied DIZZINESS, SOB, CHEST PAIN/ PRESSURE, FATIGUE/WEAKNESS, HEADACHES, SWELLING. Pranay Johnson preferred language for health care discussion is english/other. Personal Protective Equipment:   Personal Protective Equipment was used including: mask-surgical and hands-gloves. Patient was placed on no precaution(s). Patient was not masked. Precautions:   Patient currently on None  Patient currently roomed with door closed. Is someone accompanying this pt? no    Is the patient using any DME equipment during [address]? no    Depression Screening:  PHQ-9 Questionaire 2022 2022 2022 3/17/2022 2021   Little interest or pleasure in doing things 0 0 3 0 0   Feeling down, depressed, or hopeless 1 0 3 0 0   Trouble falling or staying asleep, or sleeping too much - - 1 - -   Feeling tired or having little energy - - 3 - -   Poor appetite or overeating - - 1 - -   Feeling bad about yourself - or that you are a failure or have let yourself or your family down - - 0 - -   Trouble concentrating on things, such as reading the newspaper or watching television - - 0 - -   Moving or speaking so slowly that other people could have noticed. Or the opposite - being so fidgety or restless that you have been moving around a lot more than usual - - 0 - -   PHQ-9 Total Score 1 0 11 0 0        Learning Assessment:  No question data found. Abuse Screening: AMB Abuse Screening 3/21/2023   Do you ever feel afraid of your partner? N   Are you in a relationship with someone who physically or mentally threatens you? N   Is it safe for you to go home?  Y          Fall Risk  Fall Risk 3/21/2023   2 or more falls in past year? no   Fall with injury in past
14.4 11/11/2019 12:48 PM    HCT 45.2 11/11/2019 12:48 PM     11/11/2019 12:48 PM    MCV 95.2 11/11/2019 12:48 PM     Lab Results   Component Value Date/Time     11/11/2022 11:52 AM    K 3.5 11/11/2022 11:52 AM     11/11/2022 11:52 AM    CO2 29 11/11/2022 11:52 AM    BUN 16 11/11/2022 11:52 AM    CREATININE 1.14 11/11/2022 11:52 AM    GLUCOSE 207 11/11/2022 11:52 AM    CALCIUM 9.0 11/11/2022 11:52 AM       Lipids Latest Ref Rng & Units 11/11/2022 5/11/2022 11/11/2021 3/22/2021 11/11/2020   Chol <200 MG/ - 125 132 90   HDL 40 - 60 MG/DL 34(L) - 35(L) 36(L) 35(L)   LDL Calc 0 - 100 MG/DL 45.6 - 34 46.2 14   VLDL Calc MG/DL 45.4 - 56 49.8 41   Trig <150 MG/(H) - 280(H) 249(H) 205(H)   Ratio 0 - 5.0   3.7 - 3.6 3.7 2.6   ALT 16 - 61 U/L 20 38 30 24 21   AST 10 - 38 U/L 10 28 20 19 16   Some recent data might be hidden     Lab Results   Component Value Date/Time    ALT 20 11/11/2022 11:52 AM     Hemoglobin A1C   Date Value Ref Range Status   11/11/2022 6.2 (H) 4.2 - 5.6 % Final     Comment:     (NOTE)  HbA1C Interpretive Ranges  <5.7              Normal  5.7 - 6.4         Consider Prediabetes  >6.5              Consider Diabetes       No results found for: TSH, TSHREFLEX, TSHFT4, TSHELE, VQD3AJG, TSHHS    TILT TABLE: February 2015:  SUMMARY:   Upright tilt table testing with reproduction of symptoms. More than 20 mmHg drop in systolic blood pressure, without change in the heart rate during upright tilting position, associated with symptoms. This may represent autonomic dysfunction       STRESS TEST (EST, PHARM, NUC, ECHO etc): October 2014:  1. Small to medium sized area of mildly intense reversible defect involvement  anterolateral wall. 2.  There is also a small area of reversible defect involving basilar inferior wall concerning for ischemic focus. 3.  Calculated ejection fraction of 56% without any wall motion abnormality.       TRANSTHORACIC ECHOCARDIOGRAM (TTE) COMPLETE

## 2023-04-18 RX ORDER — TRAZODONE HYDROCHLORIDE 50 MG/1
TABLET ORAL
Qty: 180 TABLET | Refills: 3 | Status: SHIPPED | OUTPATIENT
Start: 2023-04-18

## 2023-05-11 ENCOUNTER — OFFICE VISIT (OUTPATIENT)
Facility: CLINIC | Age: 73
End: 2023-05-11
Payer: MEDICARE

## 2023-05-11 ENCOUNTER — HOSPITAL ENCOUNTER (OUTPATIENT)
Facility: HOSPITAL | Age: 73
Setting detail: SPECIMEN
Discharge: HOME | End: 2023-05-11
Payer: MEDICARE

## 2023-05-11 VITALS
DIASTOLIC BLOOD PRESSURE: 67 MMHG | WEIGHT: 207.8 LBS | OXYGEN SATURATION: 95 % | TEMPERATURE: 97.2 F | SYSTOLIC BLOOD PRESSURE: 121 MMHG | HEART RATE: 55 BPM | BODY MASS INDEX: 26.67 KG/M2 | RESPIRATION RATE: 17 BRPM | HEIGHT: 74 IN

## 2023-05-11 DIAGNOSIS — I25.119 ATHEROSCLEROSIS OF NATIVE CORONARY ARTERY OF NATIVE HEART WITH ANGINA PECTORIS (HCC): ICD-10-CM

## 2023-05-11 DIAGNOSIS — E78.5 HYPERLIPIDEMIA, UNSPECIFIED HYPERLIPIDEMIA TYPE: ICD-10-CM

## 2023-05-11 DIAGNOSIS — E11.40 TYPE 2 DIABETES MELLITUS WITH DIABETIC NEUROPATHY, WITHOUT LONG-TERM CURRENT USE OF INSULIN (HCC): ICD-10-CM

## 2023-05-11 DIAGNOSIS — F33.41 MAJOR DEPRESSIVE DISORDER, RECURRENT, IN PARTIAL REMISSION (HCC): ICD-10-CM

## 2023-05-11 DIAGNOSIS — I10 HYPERTENSION, UNSPECIFIED TYPE: ICD-10-CM

## 2023-05-11 DIAGNOSIS — I10 HYPERTENSION, UNSPECIFIED TYPE: Primary | ICD-10-CM

## 2023-05-11 LAB
ALBUMIN SERPL-MCNC: 3.8 G/DL (ref 3.4–5)
ALBUMIN/GLOB SERPL: 1.1 (ref 0.8–1.7)
ALP SERPL-CCNC: 97 U/L (ref 45–117)
ALT SERPL-CCNC: 21 U/L (ref 16–61)
ANION GAP SERPL CALC-SCNC: 4 MMOL/L (ref 3–18)
AST SERPL-CCNC: 17 U/L (ref 10–38)
BILIRUB SERPL-MCNC: 0.5 MG/DL (ref 0.2–1)
BUN SERPL-MCNC: 12 MG/DL (ref 7–18)
BUN/CREAT SERPL: 11 (ref 12–20)
CALCIUM SERPL-MCNC: 9.1 MG/DL (ref 8.5–10.1)
CHLORIDE SERPL-SCNC: 105 MMOL/L (ref 100–111)
CHOLEST SERPL-MCNC: 118 MG/DL
CO2 SERPL-SCNC: 30 MMOL/L (ref 21–32)
CREAT SERPL-MCNC: 1.06 MG/DL (ref 0.6–1.3)
EST. AVERAGE GLUCOSE BLD GHB EST-MCNC: 123 MG/DL
GLOBULIN SER CALC-MCNC: 3.4 G/DL (ref 2–4)
GLUCOSE SERPL-MCNC: 106 MG/DL (ref 74–99)
HBA1C MFR BLD: 5.9 % (ref 4.2–5.6)
HDLC SERPL-MCNC: 34 MG/DL (ref 40–60)
HDLC SERPL: 3.5 (ref 0–5)
LDLC SERPL CALC-MCNC: 41 MG/DL (ref 0–100)
LIPID PANEL: ABNORMAL
POTASSIUM SERPL-SCNC: 4.2 MMOL/L (ref 3.5–5.5)
PROT SERPL-MCNC: 7.2 G/DL (ref 6.4–8.2)
SODIUM SERPL-SCNC: 139 MMOL/L (ref 136–145)
TRIGL SERPL-MCNC: 215 MG/DL
VLDLC SERPL CALC-MCNC: 43 MG/DL

## 2023-05-11 PROCEDURE — 1123F ACP DISCUSS/DSCN MKR DOCD: CPT | Performed by: INTERNAL MEDICINE

## 2023-05-11 PROCEDURE — 80061 LIPID PANEL: CPT

## 2023-05-11 PROCEDURE — 80053 COMPREHEN METABOLIC PANEL: CPT

## 2023-05-11 PROCEDURE — 3017F COLORECTAL CA SCREEN DOC REV: CPT | Performed by: INTERNAL MEDICINE

## 2023-05-11 PROCEDURE — 3078F DIAST BP <80 MM HG: CPT | Performed by: INTERNAL MEDICINE

## 2023-05-11 PROCEDURE — 83036 HEMOGLOBIN GLYCOSYLATED A1C: CPT

## 2023-05-11 PROCEDURE — G8417 CALC BMI ABV UP PARAM F/U: HCPCS | Performed by: INTERNAL MEDICINE

## 2023-05-11 PROCEDURE — 36415 COLL VENOUS BLD VENIPUNCTURE: CPT

## 2023-05-11 PROCEDURE — 1036F TOBACCO NON-USER: CPT | Performed by: INTERNAL MEDICINE

## 2023-05-11 PROCEDURE — 3046F HEMOGLOBIN A1C LEVEL >9.0%: CPT | Performed by: INTERNAL MEDICINE

## 2023-05-11 PROCEDURE — 3074F SYST BP LT 130 MM HG: CPT | Performed by: INTERNAL MEDICINE

## 2023-05-11 PROCEDURE — G8427 DOCREV CUR MEDS BY ELIG CLIN: HCPCS | Performed by: INTERNAL MEDICINE

## 2023-05-11 PROCEDURE — 2022F DILAT RTA XM EVC RTNOPTHY: CPT | Performed by: INTERNAL MEDICINE

## 2023-05-11 PROCEDURE — 99214 OFFICE O/P EST MOD 30 MIN: CPT | Performed by: INTERNAL MEDICINE

## 2023-05-11 SDOH — ECONOMIC STABILITY: FOOD INSECURITY: WITHIN THE PAST 12 MONTHS, THE FOOD YOU BOUGHT JUST DIDN'T LAST AND YOU DIDN'T HAVE MONEY TO GET MORE.: NEVER TRUE

## 2023-05-11 SDOH — ECONOMIC STABILITY: FOOD INSECURITY: WITHIN THE PAST 12 MONTHS, YOU WORRIED THAT YOUR FOOD WOULD RUN OUT BEFORE YOU GOT MONEY TO BUY MORE.: NEVER TRUE

## 2023-05-11 SDOH — ECONOMIC STABILITY: HOUSING INSECURITY
IN THE LAST 12 MONTHS, WAS THERE A TIME WHEN YOU DID NOT HAVE A STEADY PLACE TO SLEEP OR SLEPT IN A SHELTER (INCLUDING NOW)?: NO

## 2023-05-11 SDOH — ECONOMIC STABILITY: INCOME INSECURITY: HOW HARD IS IT FOR YOU TO PAY FOR THE VERY BASICS LIKE FOOD, HOUSING, MEDICAL CARE, AND HEATING?: NOT HARD AT ALL

## 2023-05-11 ASSESSMENT — PATIENT HEALTH QUESTIONNAIRE - PHQ9
3. TROUBLE FALLING OR STAYING ASLEEP: 2
10. IF YOU CHECKED OFF ANY PROBLEMS, HOW DIFFICULT HAVE THESE PROBLEMS MADE IT FOR YOU TO DO YOUR WORK, TAKE CARE OF THINGS AT HOME, OR GET ALONG WITH OTHER PEOPLE: 2
5. POOR APPETITE OR OVEREATING: 3
6. FEELING BAD ABOUT YOURSELF - OR THAT YOU ARE A FAILURE OR HAVE LET YOURSELF OR YOUR FAMILY DOWN: 1
1. LITTLE INTEREST OR PLEASURE IN DOING THINGS: 3
SUM OF ALL RESPONSES TO PHQ QUESTIONS 1-9: 14
4. FEELING TIRED OR HAVING LITTLE ENERGY: 3
7. TROUBLE CONCENTRATING ON THINGS, SUCH AS READING THE NEWSPAPER OR WATCHING TELEVISION: 0
8. MOVING OR SPEAKING SO SLOWLY THAT OTHER PEOPLE COULD HAVE NOTICED. OR THE OPPOSITE, BEING SO FIGETY OR RESTLESS THAT YOU HAVE BEEN MOVING AROUND A LOT MORE THAN USUAL: 0
9. THOUGHTS THAT YOU WOULD BE BETTER OFF DEAD, OR OF HURTING YOURSELF: 0
2. FEELING DOWN, DEPRESSED OR HOPELESS: 2
SUM OF ALL RESPONSES TO PHQ9 QUESTIONS 1 & 2: 5
SUM OF ALL RESPONSES TO PHQ QUESTIONS 1-9: 14

## 2023-05-11 ASSESSMENT — ENCOUNTER SYMPTOMS
BACK PAIN: 1
RESPIRATORY NEGATIVE: 1

## 2023-05-11 NOTE — PROGRESS NOTES
Dain Bah is a 68 y.o.  male presents today for office visit for   Chief Complaint   Patient presents with    Hypertension    Diabetes    Cholesterol Problem     1. \"Have you been to the ER, urgent care clinic since your last visit? Hospitalized since your last visit? \" No    2. \"Have you seen or consulted any other health care providers outside of the [address] [place] [address] since your last visit? \" No     3. For patients aged 39-70: Has the patient had a colonoscopy / FIT/ Cologuard? Yes     If the patient is female:    4. For patients aged 41-77: Has the patient had a mammogram within the past 2 years? N/A    5. For patients aged 21-65: Has the patient had a pap smear?  N/A
mellitus with diabetic neuropathy, without long-term current use of insulin (HCC)  E11.40 Comprehensive Metabolic Panel     Hemoglobin A1C      3. Hyperlipidemia, unspecified hyperlipidemia type  E78.5 Lipid Panel      4. Atherosclerosis of native coronary artery of native heart with angina pectoris ([facility]  I25.119 Lipid Panel      5.  Major depressive disorder, recurrent, in partial remission ([facility]  F33.41            BP controlled    DM  has been controlled    Update lab    Pt sees psychiatry in [place] through the [facility] and will discuss his depression    F/u 6 months for MWV, HTN, DM, chol

## 2023-05-31 ENCOUNTER — TELEPHONE (OUTPATIENT)
Facility: CLINIC | Age: 73
End: 2023-05-31

## 2023-06-11 RX ORDER — PRAVASTATIN SODIUM 40 MG
TABLET ORAL
Qty: 90 TABLET | Refills: 3 | Status: SHIPPED | OUTPATIENT
Start: 2023-06-11

## 2023-07-31 RX ORDER — LOSARTAN POTASSIUM 100 MG/1
TABLET ORAL
Qty: 90 TABLET | Refills: 3 | Status: SHIPPED | OUTPATIENT
Start: 2023-07-31

## 2023-08-05 DIAGNOSIS — Z76.0 MEDICATION REFILL: Primary | ICD-10-CM

## 2023-08-05 RX ORDER — TRAMADOL HYDROCHLORIDE 50 MG/1
TABLET ORAL
Qty: 120 TABLET | Refills: 5 | Status: SHIPPED | OUTPATIENT
Start: 2023-08-05 | End: 2024-02-01

## 2023-10-02 RX ORDER — ISOSORBIDE MONONITRATE 60 MG/1
TABLET, EXTENDED RELEASE ORAL
Qty: 90 TABLET | Refills: 1 | Status: SHIPPED | OUTPATIENT
Start: 2023-10-02

## 2023-10-19 DIAGNOSIS — E11.40 TYPE 2 DIABETES MELLITUS WITH DIABETIC NEUROPATHY, UNSPECIFIED WHETHER LONG TERM INSULIN USE (HCC): Primary | ICD-10-CM

## 2023-10-19 RX ORDER — GABAPENTIN 300 MG/1
CAPSULE ORAL
Qty: 90 CAPSULE | Refills: 5 | Status: SHIPPED | OUTPATIENT
Start: 2023-10-19 | End: 2024-04-16

## 2023-11-13 ENCOUNTER — HOSPITAL ENCOUNTER (OUTPATIENT)
Facility: HOSPITAL | Age: 73
Setting detail: SPECIMEN
Discharge: HOME | End: 2023-11-16
Payer: MEDICARE

## 2023-11-13 ENCOUNTER — OFFICE VISIT (OUTPATIENT)
Facility: CLINIC | Age: 73
End: 2023-11-13
Payer: MEDICARE

## 2023-11-13 VITALS
HEIGHT: 71 IN | SYSTOLIC BLOOD PRESSURE: 119 MMHG | TEMPERATURE: 96.6 F | WEIGHT: 209 LBS | HEART RATE: 57 BPM | OXYGEN SATURATION: 97 % | RESPIRATION RATE: 14 BRPM | DIASTOLIC BLOOD PRESSURE: 57 MMHG | BODY MASS INDEX: 29.26 KG/M2

## 2023-11-13 DIAGNOSIS — Z53.20 LUNG CANCER SCREENING DECLINED BY PATIENT: ICD-10-CM

## 2023-11-13 DIAGNOSIS — E11.40 TYPE 2 DIABETES MELLITUS WITH DIABETIC NEUROPATHY, UNSPECIFIED WHETHER LONG TERM INSULIN USE (HCC): ICD-10-CM

## 2023-11-13 DIAGNOSIS — I10 HYPERTENSION, UNSPECIFIED TYPE: ICD-10-CM

## 2023-11-13 DIAGNOSIS — M10.9 GOUT, UNSPECIFIED CAUSE, UNSPECIFIED CHRONICITY, UNSPECIFIED SITE: ICD-10-CM

## 2023-11-13 DIAGNOSIS — E78.49 OTHER HYPERLIPIDEMIA: ICD-10-CM

## 2023-11-13 DIAGNOSIS — Z00.00 MEDICARE ANNUAL WELLNESS VISIT, SUBSEQUENT: Primary | ICD-10-CM

## 2023-11-13 DIAGNOSIS — Z23 NEEDS FLU SHOT: ICD-10-CM

## 2023-11-13 LAB
ALBUMIN SERPL-MCNC: 3.6 G/DL (ref 3.4–5)
ALBUMIN/GLOB SERPL: 1.1 (ref 0.8–1.7)
ALP SERPL-CCNC: 150 U/L (ref 45–117)
ALT SERPL-CCNC: 21 U/L (ref 16–61)
ANION GAP SERPL CALC-SCNC: 5 MMOL/L (ref 3–18)
AST SERPL-CCNC: 12 U/L (ref 10–38)
BILIRUB SERPL-MCNC: 0.3 MG/DL (ref 0.2–1)
BUN SERPL-MCNC: 16 MG/DL (ref 7–18)
BUN/CREAT SERPL: 14 (ref 12–20)
CALCIUM SERPL-MCNC: 8.9 MG/DL (ref 8.5–10.1)
CHLORIDE SERPL-SCNC: 103 MMOL/L (ref 100–111)
CHOLEST SERPL-MCNC: 200 MG/DL
CO2 SERPL-SCNC: 33 MMOL/L (ref 21–32)
CREAT SERPL-MCNC: 1.16 MG/DL (ref 0.6–1.3)
CREAT UR-MCNC: 105 MG/DL (ref 30–125)
EST. AVERAGE GLUCOSE BLD GHB EST-MCNC: 131 MG/DL
GLOBULIN SER CALC-MCNC: 3.2 G/DL (ref 2–4)
GLUCOSE SERPL-MCNC: 131 MG/DL (ref 74–99)
HBA1C MFR BLD: 6.2 % (ref 4.2–5.6)
HDLC SERPL-MCNC: 30 MG/DL (ref 40–60)
HDLC SERPL: 6.7 (ref 0–5)
LDLC SERPL CALC-MCNC: ABNORMAL MG/DL (ref 0–100)
LIPID PANEL: ABNORMAL
MICROALBUMIN UR-MCNC: 1.05 MG/DL (ref 0–3)
MICROALBUMIN/CREAT UR-RTO: 10 MG/G (ref 0–30)
POTASSIUM SERPL-SCNC: 3.8 MMOL/L (ref 3.5–5.5)
PROT SERPL-MCNC: 6.8 G/DL (ref 6.4–8.2)
SODIUM SERPL-SCNC: 141 MMOL/L (ref 136–145)
TRIGL SERPL-MCNC: 518 MG/DL
URATE SERPL-MCNC: 5.4 MG/DL (ref 2.6–7.2)
VLDLC SERPL CALC-MCNC: ABNORMAL MG/DL

## 2023-11-13 PROCEDURE — 3044F HG A1C LEVEL LT 7.0%: CPT | Performed by: INTERNAL MEDICINE

## 2023-11-13 PROCEDURE — 80053 COMPREHEN METABOLIC PANEL: CPT

## 2023-11-13 PROCEDURE — 83036 HEMOGLOBIN GLYCOSYLATED A1C: CPT

## 2023-11-13 PROCEDURE — G8427 DOCREV CUR MEDS BY ELIG CLIN: HCPCS | Performed by: INTERNAL MEDICINE

## 2023-11-13 PROCEDURE — G8417 CALC BMI ABV UP PARAM F/U: HCPCS | Performed by: INTERNAL MEDICINE

## 2023-11-13 PROCEDURE — 82570 ASSAY OF URINE CREATININE: CPT

## 2023-11-13 PROCEDURE — 82043 UR ALBUMIN QUANTITATIVE: CPT

## 2023-11-13 PROCEDURE — 2022F DILAT RTA XM EVC RTNOPTHY: CPT | Performed by: INTERNAL MEDICINE

## 2023-11-13 PROCEDURE — 1123F ACP DISCUSS/DSCN MKR DOCD: CPT | Performed by: INTERNAL MEDICINE

## 2023-11-13 PROCEDURE — 90694 VACC AIIV4 NO PRSRV 0.5ML IM: CPT | Performed by: INTERNAL MEDICINE

## 2023-11-13 PROCEDURE — 3074F SYST BP LT 130 MM HG: CPT | Performed by: INTERNAL MEDICINE

## 2023-11-13 PROCEDURE — G0008 ADMIN INFLUENZA VIRUS VAC: HCPCS | Performed by: INTERNAL MEDICINE

## 2023-11-13 PROCEDURE — 3078F DIAST BP <80 MM HG: CPT | Performed by: INTERNAL MEDICINE

## 2023-11-13 PROCEDURE — G8484 FLU IMMUNIZE NO ADMIN: HCPCS | Performed by: INTERNAL MEDICINE

## 2023-11-13 PROCEDURE — 1036F TOBACCO NON-USER: CPT | Performed by: INTERNAL MEDICINE

## 2023-11-13 PROCEDURE — 84550 ASSAY OF BLOOD/URIC ACID: CPT

## 2023-11-13 PROCEDURE — 36415 COLL VENOUS BLD VENIPUNCTURE: CPT

## 2023-11-13 PROCEDURE — 99214 OFFICE O/P EST MOD 30 MIN: CPT | Performed by: INTERNAL MEDICINE

## 2023-11-13 PROCEDURE — G0439 PPPS, SUBSEQ VISIT: HCPCS | Performed by: INTERNAL MEDICINE

## 2023-11-13 PROCEDURE — 3017F COLORECTAL CA SCREEN DOC REV: CPT | Performed by: INTERNAL MEDICINE

## 2023-11-13 PROCEDURE — 80061 LIPID PANEL: CPT

## 2023-11-13 SDOH — ECONOMIC STABILITY: FOOD INSECURITY: WITHIN THE PAST 12 MONTHS, YOU WORRIED THAT YOUR FOOD WOULD RUN OUT BEFORE YOU GOT MONEY TO BUY MORE.: NEVER TRUE

## 2023-11-13 SDOH — ECONOMIC STABILITY: INCOME INSECURITY: HOW HARD IS IT FOR YOU TO PAY FOR THE VERY BASICS LIKE FOOD, HOUSING, MEDICAL CARE, AND HEATING?: NOT HARD AT ALL

## 2023-11-13 SDOH — ECONOMIC STABILITY: FOOD INSECURITY: WITHIN THE PAST 12 MONTHS, THE FOOD YOU BOUGHT JUST DIDN'T LAST AND YOU DIDN'T HAVE MONEY TO GET MORE.: NEVER TRUE

## 2023-11-13 ASSESSMENT — PATIENT HEALTH QUESTIONNAIRE - PHQ9
SUM OF ALL RESPONSES TO PHQ QUESTIONS 1-9: 14
8. MOVING OR SPEAKING SO SLOWLY THAT OTHER PEOPLE COULD HAVE NOTICED. OR THE OPPOSITE, BEING SO FIGETY OR RESTLESS THAT YOU HAVE BEEN MOVING AROUND A LOT MORE THAN USUAL: 0
2. FEELING DOWN, DEPRESSED OR HOPELESS: 3
SUM OF ALL RESPONSES TO PHQ9 QUESTIONS 1 & 2: 6
3. TROUBLE FALLING OR STAYING ASLEEP: 3
4. FEELING TIRED OR HAVING LITTLE ENERGY: 3
6. FEELING BAD ABOUT YOURSELF - OR THAT YOU ARE A FAILURE OR HAVE LET YOURSELF OR YOUR FAMILY DOWN: 0
SUM OF ALL RESPONSES TO PHQ QUESTIONS 1-9: 14
10. IF YOU CHECKED OFF ANY PROBLEMS, HOW DIFFICULT HAVE THESE PROBLEMS MADE IT FOR YOU TO DO YOUR WORK, TAKE CARE OF THINGS AT HOME, OR GET ALONG WITH OTHER PEOPLE: 3
5. POOR APPETITE OR OVEREATING: 1
7. TROUBLE CONCENTRATING ON THINGS, SUCH AS READING THE NEWSPAPER OR WATCHING TELEVISION: 1
SUM OF ALL RESPONSES TO PHQ QUESTIONS 1-9: 14
9. THOUGHTS THAT YOU WOULD BE BETTER OFF DEAD, OR OF HURTING YOURSELF: 0
1. LITTLE INTEREST OR PLEASURE IN DOING THINGS: 3
SUM OF ALL RESPONSES TO PHQ QUESTIONS 1-9: 14

## 2023-11-13 ASSESSMENT — ENCOUNTER SYMPTOMS
COUGH: 1
BACK PAIN: 1

## 2023-11-13 ASSESSMENT — COLUMBIA-SUICIDE SEVERITY RATING SCALE - C-SSRS
4. HAVE YOU HAD THESE THOUGHTS AND HAD SOME INTENTION OF ACTING ON THEM?: NO
5. HAVE YOU STARTED TO WORK OUT OR WORKED OUT THE DETAILS OF HOW TO KILL YOURSELF? DO YOU INTEND TO CARRY OUT THIS PLAN?: NO
7. DID THIS OCCUR IN THE LAST THREE MONTHS: NO
3. HAVE YOU BEEN THINKING ABOUT HOW YOU MIGHT KILL YOURSELF?: NO

## 2023-11-13 ASSESSMENT — LIFESTYLE VARIABLES
HOW OFTEN DO YOU HAVE A DRINK CONTAINING ALCOHOL: NEVER
HOW MANY STANDARD DRINKS CONTAINING ALCOHOL DO YOU HAVE ON A TYPICAL DAY: PATIENT DOES NOT DRINK

## 2023-11-21 ENCOUNTER — OFFICE VISIT (OUTPATIENT)
Age: 73
End: 2023-11-21
Payer: MEDICARE

## 2023-11-21 VITALS
WEIGHT: 207 LBS | HEART RATE: 57 BPM | OXYGEN SATURATION: 97 % | SYSTOLIC BLOOD PRESSURE: 159 MMHG | DIASTOLIC BLOOD PRESSURE: 73 MMHG | BODY MASS INDEX: 29.08 KG/M2

## 2023-11-21 DIAGNOSIS — I10 ESSENTIAL HYPERTENSION WITH GOAL BLOOD PRESSURE LESS THAN 140/90: ICD-10-CM

## 2023-11-21 DIAGNOSIS — E78.00 PURE HYPERCHOLESTEROLEMIA: ICD-10-CM

## 2023-11-21 DIAGNOSIS — I25.83 CORONARY ARTERY DISEASE DUE TO LIPID RICH PLAQUE: Primary | ICD-10-CM

## 2023-11-21 DIAGNOSIS — R06.09 DOE (DYSPNEA ON EXERTION): ICD-10-CM

## 2023-11-21 DIAGNOSIS — I25.10 CORONARY ARTERY DISEASE DUE TO LIPID RICH PLAQUE: Primary | ICD-10-CM

## 2023-11-21 PROCEDURE — G8428 CUR MEDS NOT DOCUMENT: HCPCS | Performed by: INTERNAL MEDICINE

## 2023-11-21 PROCEDURE — 1036F TOBACCO NON-USER: CPT | Performed by: INTERNAL MEDICINE

## 2023-11-21 PROCEDURE — 99214 OFFICE O/P EST MOD 30 MIN: CPT | Performed by: INTERNAL MEDICINE

## 2023-11-21 PROCEDURE — 3017F COLORECTAL CA SCREEN DOC REV: CPT | Performed by: INTERNAL MEDICINE

## 2023-11-21 PROCEDURE — 1123F ACP DISCUSS/DSCN MKR DOCD: CPT | Performed by: INTERNAL MEDICINE

## 2023-11-21 PROCEDURE — 3078F DIAST BP <80 MM HG: CPT | Performed by: INTERNAL MEDICINE

## 2023-11-21 PROCEDURE — G8484 FLU IMMUNIZE NO ADMIN: HCPCS | Performed by: INTERNAL MEDICINE

## 2023-11-21 PROCEDURE — 3077F SYST BP >= 140 MM HG: CPT | Performed by: INTERNAL MEDICINE

## 2023-11-21 PROCEDURE — G8417 CALC BMI ABV UP PARAM F/U: HCPCS | Performed by: INTERNAL MEDICINE

## 2023-11-21 RX ORDER — EZETIMIBE 10 MG/1
10 TABLET ORAL DAILY
Qty: 90 TABLET | Refills: 3 | Status: SHIPPED | OUTPATIENT
Start: 2023-11-21

## 2023-11-21 NOTE — PATIENT INSTRUCTIONS
Testing   Nuclear Stress    Nuclear Stress Instructions-Nothing to eat or drink past midnight and no medicaitons the morning of cardiac testing. **call office 3-5 days after testing is completed for results** Please ensure testing is completed prior to scheduled follow up appointment.  If it is not completed your appointment may be rescheduled**

## 2023-11-22 NOTE — PROGRESS NOTES
Identified pt with two pt identifiers(name and ). Reviewed record in preparation for visit and have obtained necessary documentation. Rhonda Gar presents today for   Chief Complaint   Patient presents with    Follow-up       Pt c/o FATIGUE/WEAKNESS. Rhonda Gar preferred language for health care discussion is english/other. Personal Protective Equipment:   Personal Protective Equipment was used including: mask-surgical and hands-gloves. Patient was placed on no precaution(s). Patient was masked. Precautions:   Patient currently on None  Patient currently roomed with door closed. Is someone accompanying this pt? no    Is the patient using any DME equipment during [address]? Walking stick    Depression Screenin/13/2023    11:45 AM 2023    11:14 AM 2022    10:57 AM 2022     1:09 PM 2022     1:18 PM 3/17/2022     1:56 PM 2021     1:54 PM   PHQ-9 Questionaire   Little interest or pleasure in doing things 3 3 0 0 3 0 0   Feeling down, depressed, or hopeless 3 2 1 0 3 0 0   Trouble falling or staying asleep, or sleeping too much 3 2   1     Feeling tired or having little energy 3 3   3     Poor appetite or overeating 1 3   1     Feeling bad about yourself - or that you are a failure or have let yourself or your family down 0 1   0     Trouble concentrating on things, such as reading the newspaper or watching television 1 0   0     Moving or speaking so slowly that other people could have noticed. Or the opposite - being so fidgety or restless that you have been moving around a lot more than usual 0 0   0     Thoughts that you would be better off dead, or of hurting yourself in some way 0 0        PHQ-9 Total Score 14 14 1 0 11 0 0   If you checked off any problems, how difficult have these problems made it for you to do your work, take care of things at home, or get along with other people?  3 2             Learning Assessment:  No question data
2015:  SUMMARY:   Upright tilt table testing with reproduction of symptoms. More than 20 mmHg drop in systolic blood pressure, without change in the heart rate during upright tilting position, associated with symptoms. This may represent autonomic dysfunction       STRESS TEST (EST, PHARM, NUC, ECHO etc): October 2014:  1. Small to medium sized area of mildly intense reversible defect involvement  anterolateral wall. 2.  There is also a small area of reversible defect involving basilar inferior wall concerning for ischemic focus. 3.  Calculated ejection fraction of 56% without any wall motion abnormality. TRANSTHORACIC ECHOCARDIOGRAM (TTE) COMPLETE (CONTRAST/BUBBLE/3D PRN) 09/20/2022  1:27 PM, 09/20/2022 12:00 AM (Final)    Left Ventricle: Normal left ventricular systolic function with a visually estimated EF of 60 - 65%. Left ventricle size is normal. Normal wall thickness. Normal wall motion. Right Ventricle: Right ventricle is severely dilated. Mitral Valve: Mildly thickened leaflet. Left Atrium: Left atrium is mildly dilated. Left atrial volume index is mildly increased (35-41 mL/m2). CATHETERIZATION: October 2014:   LM - patent   LAD - 30% prox, 80% mid, 90% apical   D1 - 100%   RI - 95% ostial (failed PCI, calcified 1.75 - 2.0 mm vessel)   Cx - 30-40% prox   OM1 - subtotal   OM2 - 80%   RCA - 30% diffuse   RPDA - 100%   RPLV - 50%      Impression / Plan:        Coronary artery disease:     Mr. Jesus Hickman had a cardiac catheterization in October, 2014 and required stent of the obtuse marginal branch. He is on aspirin and Plavix, amlodipine and beta-blocker isosorbide mononitrate and Pravachol  Patient does not have any chest pain or chest tightness however he has some dyspnea and patient is functionally limited because of significant spinal stenosis  Considering last coronary evaluation in 2014 and dyspnea, recommend nuclear stress test      Hypertension: /73.   Patient is

## 2024-04-06 DIAGNOSIS — Z76.0 MEDICATION REFILL: ICD-10-CM

## 2024-04-09 RX ORDER — TRAMADOL HYDROCHLORIDE 50 MG/1
50 TABLET ORAL EVERY 6 HOURS PRN
Qty: 120 TABLET | Refills: 5 | Status: SHIPPED | OUTPATIENT
Start: 2024-04-09 | End: 2024-10-06

## 2024-05-14 ENCOUNTER — OFFICE VISIT (OUTPATIENT)
Facility: CLINIC | Age: 74
End: 2024-05-14
Payer: MEDICARE

## 2024-05-14 ENCOUNTER — HOSPITAL ENCOUNTER (OUTPATIENT)
Facility: HOSPITAL | Age: 74
Setting detail: SPECIMEN
Discharge: HOME | End: 2024-05-17
Payer: MEDICARE

## 2024-05-14 VITALS
BODY MASS INDEX: 30.21 KG/M2 | SYSTOLIC BLOOD PRESSURE: 127 MMHG | RESPIRATION RATE: 15 BRPM | HEIGHT: 70 IN | TEMPERATURE: 97 F | HEART RATE: 52 BPM | WEIGHT: 211 LBS | DIASTOLIC BLOOD PRESSURE: 58 MMHG | OXYGEN SATURATION: 98 %

## 2024-05-14 DIAGNOSIS — Z76.0 MEDICATION REFILL: ICD-10-CM

## 2024-05-14 DIAGNOSIS — E78.49 OTHER HYPERLIPIDEMIA: ICD-10-CM

## 2024-05-14 DIAGNOSIS — E11.40 TYPE 2 DIABETES MELLITUS WITH DIABETIC NEUROPATHY, UNSPECIFIED WHETHER LONG TERM INSULIN USE (HCC): ICD-10-CM

## 2024-05-14 DIAGNOSIS — I25.119 ATHEROSCLEROSIS OF NATIVE CORONARY ARTERY OF NATIVE HEART WITH ANGINA PECTORIS (HCC): ICD-10-CM

## 2024-05-14 DIAGNOSIS — M10.9 GOUT, UNSPECIFIED CAUSE, UNSPECIFIED CHRONICITY, UNSPECIFIED SITE: ICD-10-CM

## 2024-05-14 DIAGNOSIS — E11.40 TYPE 2 DIABETES MELLITUS WITH DIABETIC NEUROPATHY, UNSPECIFIED WHETHER LONG TERM INSULIN USE (HCC): Primary | ICD-10-CM

## 2024-05-14 DIAGNOSIS — I10 HYPERTENSION, UNSPECIFIED TYPE: ICD-10-CM

## 2024-05-14 DIAGNOSIS — F33.41 MAJOR DEPRESSIVE DISORDER, RECURRENT, IN PARTIAL REMISSION (HCC): ICD-10-CM

## 2024-05-14 LAB
ALBUMIN SERPL-MCNC: 3.8 G/DL (ref 3.4–5)
ALBUMIN/GLOB SERPL: 1.2 (ref 0.8–1.7)
ALP SERPL-CCNC: 92 U/L (ref 45–117)
ALT SERPL-CCNC: 30 U/L (ref 16–61)
ANION GAP SERPL CALC-SCNC: 3 MMOL/L (ref 3–18)
AST SERPL-CCNC: 20 U/L (ref 10–38)
BILIRUB SERPL-MCNC: 0.4 MG/DL (ref 0.2–1)
BUN SERPL-MCNC: 12 MG/DL (ref 7–18)
BUN/CREAT SERPL: 11 (ref 12–20)
CALCIUM SERPL-MCNC: 9.4 MG/DL (ref 8.5–10.1)
CHLORIDE SERPL-SCNC: 102 MMOL/L (ref 100–111)
CHOLEST SERPL-MCNC: 149 MG/DL
CO2 SERPL-SCNC: 32 MMOL/L (ref 21–32)
CREAT SERPL-MCNC: 1.11 MG/DL (ref 0.6–1.3)
EST. AVERAGE GLUCOSE BLD GHB EST-MCNC: 117 MG/DL
GLOBULIN SER CALC-MCNC: 3.1 G/DL (ref 2–4)
GLUCOSE SERPL-MCNC: 98 MG/DL (ref 74–99)
HBA1C MFR BLD: 5.7 % (ref 4.2–5.6)
HDLC SERPL-MCNC: 35 MG/DL (ref 40–60)
HDLC SERPL: 4.3 (ref 0–5)
LDLC SERPL CALC-MCNC: 59.8 MG/DL (ref 0–100)
LIPID PANEL: ABNORMAL
POTASSIUM SERPL-SCNC: 4.2 MMOL/L (ref 3.5–5.5)
PROT SERPL-MCNC: 6.9 G/DL (ref 6.4–8.2)
SODIUM SERPL-SCNC: 137 MMOL/L (ref 136–145)
TRIGL SERPL-MCNC: 271 MG/DL
URATE SERPL-MCNC: 4.6 MG/DL (ref 2.6–7.2)
VLDLC SERPL CALC-MCNC: 54.2 MG/DL

## 2024-05-14 PROCEDURE — 80053 COMPREHEN METABOLIC PANEL: CPT

## 2024-05-14 PROCEDURE — 83036 HEMOGLOBIN GLYCOSYLATED A1C: CPT

## 2024-05-14 PROCEDURE — 36415 COLL VENOUS BLD VENIPUNCTURE: CPT

## 2024-05-14 PROCEDURE — G8417 CALC BMI ABV UP PARAM F/U: HCPCS | Performed by: INTERNAL MEDICINE

## 2024-05-14 PROCEDURE — 80061 LIPID PANEL: CPT

## 2024-05-14 PROCEDURE — 2022F DILAT RTA XM EVC RTNOPTHY: CPT | Performed by: INTERNAL MEDICINE

## 2024-05-14 PROCEDURE — 3074F SYST BP LT 130 MM HG: CPT | Performed by: INTERNAL MEDICINE

## 2024-05-14 PROCEDURE — 3017F COLORECTAL CA SCREEN DOC REV: CPT | Performed by: INTERNAL MEDICINE

## 2024-05-14 PROCEDURE — 99214 OFFICE O/P EST MOD 30 MIN: CPT | Performed by: INTERNAL MEDICINE

## 2024-05-14 PROCEDURE — G8428 CUR MEDS NOT DOCUMENT: HCPCS | Performed by: INTERNAL MEDICINE

## 2024-05-14 PROCEDURE — 1123F ACP DISCUSS/DSCN MKR DOCD: CPT | Performed by: INTERNAL MEDICINE

## 2024-05-14 PROCEDURE — 3078F DIAST BP <80 MM HG: CPT | Performed by: INTERNAL MEDICINE

## 2024-05-14 PROCEDURE — 84550 ASSAY OF BLOOD/URIC ACID: CPT

## 2024-05-14 PROCEDURE — 3046F HEMOGLOBIN A1C LEVEL >9.0%: CPT | Performed by: INTERNAL MEDICINE

## 2024-05-14 PROCEDURE — G2211 COMPLEX E/M VISIT ADD ON: HCPCS | Performed by: INTERNAL MEDICINE

## 2024-05-14 PROCEDURE — 4004F PT TOBACCO SCREEN RCVD TLK: CPT | Performed by: INTERNAL MEDICINE

## 2024-05-14 RX ORDER — TRAZODONE HYDROCHLORIDE 50 MG/1
TABLET ORAL
Qty: 180 TABLET | Refills: 3 | Status: SHIPPED | OUTPATIENT
Start: 2024-05-14

## 2024-05-14 RX ORDER — ISOSORBIDE MONONITRATE 60 MG/1
60 TABLET, EXTENDED RELEASE ORAL EVERY MORNING
Qty: 90 TABLET | Refills: 3 | Status: SHIPPED | OUTPATIENT
Start: 2024-05-14

## 2024-05-14 RX ORDER — TRAMADOL HYDROCHLORIDE 50 MG/1
50 TABLET ORAL EVERY 6 HOURS PRN
Qty: 120 TABLET | Refills: 5 | Status: SHIPPED | OUTPATIENT
Start: 2024-05-14 | End: 2024-11-10

## 2024-05-14 RX ORDER — GABAPENTIN 300 MG/1
CAPSULE ORAL
Qty: 90 CAPSULE | Refills: 5 | Status: SHIPPED | OUTPATIENT
Start: 2024-05-14 | End: 2024-11-14

## 2024-05-14 RX ORDER — LOSARTAN POTASSIUM 100 MG/1
100 TABLET ORAL DAILY
Qty: 90 TABLET | Refills: 3 | Status: SHIPPED | OUTPATIENT
Start: 2024-05-14

## 2024-05-14 RX ORDER — PRAVASTATIN SODIUM 40 MG
40 TABLET ORAL NIGHTLY
Qty: 90 TABLET | Refills: 3 | Status: SHIPPED | OUTPATIENT
Start: 2024-05-14

## 2024-05-14 RX ORDER — EZETIMIBE 10 MG/1
10 TABLET ORAL DAILY
Qty: 90 TABLET | Refills: 3 | Status: SHIPPED | OUTPATIENT
Start: 2024-05-14

## 2024-05-14 ASSESSMENT — ENCOUNTER SYMPTOMS: SHORTNESS OF BREATH: 0

## 2024-05-14 ASSESSMENT — PATIENT HEALTH QUESTIONNAIRE - PHQ9
1. LITTLE INTEREST OR PLEASURE IN DOING THINGS: NEARLY EVERY DAY
3. TROUBLE FALLING OR STAYING ASLEEP: NEARLY EVERY DAY
4. FEELING TIRED OR HAVING LITTLE ENERGY: NEARLY EVERY DAY
7. TROUBLE CONCENTRATING ON THINGS, SUCH AS READING THE NEWSPAPER OR WATCHING TELEVISION: SEVERAL DAYS
SUM OF ALL RESPONSES TO PHQ QUESTIONS 1-9: 14
8. MOVING OR SPEAKING SO SLOWLY THAT OTHER PEOPLE COULD HAVE NOTICED. OR THE OPPOSITE, BEING SO FIGETY OR RESTLESS THAT YOU HAVE BEEN MOVING AROUND A LOT MORE THAN USUAL: NOT AT ALL
SUM OF ALL RESPONSES TO PHQ QUESTIONS 1-9: 14
10. IF YOU CHECKED OFF ANY PROBLEMS, HOW DIFFICULT HAVE THESE PROBLEMS MADE IT FOR YOU TO DO YOUR WORK, TAKE CARE OF THINGS AT HOME, OR GET ALONG WITH OTHER PEOPLE: EXTREMELY DIFFICULT
9. THOUGHTS THAT YOU WOULD BE BETTER OFF DEAD, OR OF HURTING YOURSELF: NOT AT ALL
5. POOR APPETITE OR OVEREATING: SEVERAL DAYS
SUM OF ALL RESPONSES TO PHQ9 QUESTIONS 1 & 2: 6
2. FEELING DOWN, DEPRESSED OR HOPELESS: NEARLY EVERY DAY
6. FEELING BAD ABOUT YOURSELF - OR THAT YOU ARE A FAILURE OR HAVE LET YOURSELF OR YOUR FAMILY DOWN: NOT AT ALL

## 2024-05-14 NOTE — PROGRESS NOTES
HISTORY OF PRESENT ILLNESS      Gilberto Cheney Jr is a 74 y.o. male.    BP (!) 127/58 (Site: Left Upper Arm, Position: Sitting, Cuff Size: Medium Adult)   Pulse 52   Temp 97 °F (36.1 °C) (Temporal)   Resp 15   Ht 1.778 m (5' 10\")   Wt 95.7 kg (211 lb)   SpO2 98%   BMI 30.28 kg/m²         Two neurologists disagree on whether he has Parkinson's disease. Dr. Stanton, thinks not. The other one was a nurse practitioner who did not.  Dr. Stanton ordered a brain scan/MRI and he had an EMG.    He has been using a pedal machine which has helped his legs.    He has a 100% [employer] disability  He would like a home health aide  He has trouble standing for long times. He has ambulation problems and balance issues    Diabetes  He presents for his follow-up diabetic visit. He has type 2 diabetes mellitus. Hypoglycemia symptoms include tremors. Pertinent negatives for diabetes include no chest pain, no polydipsia and no polyuria.   Hypertension  This is a chronic problem. The current episode started more than 1 year ago. Pertinent negatives include no chest pain, palpitations or shortness of breath.       Review of Systems   Constitutional: Negative.    Respiratory:  Negative for shortness of breath.    Cardiovascular:  Negative for chest pain and palpitations.   Endocrine: Negative for polydipsia and polyuria.   Musculoskeletal:  Positive for arthralgias and gait problem.   Neurological:  Positive for tremors.           Physical Exam  Vitals and nursing note reviewed.   Constitutional:       Appearance: Normal appearance.   HENT:      Head: Normocephalic and atraumatic.   Cardiovascular:      Rate and Rhythm: Normal rate and regular rhythm.   Pulmonary:      Effort: Pulmonary effort is normal.      Breath sounds: Normal breath sounds.   Musculoskeletal:      Right lower leg: No edema.      Left lower leg: No edema.   Skin:     General: Skin is warm and dry.   Neurological:      General: No focal deficit present.      Mental

## 2024-05-14 NOTE — PROGRESS NOTES
\"Have you been to the ER, urgent care clinic since your last visit?  Hospitalized since your last visit?\"    NO    “Have you seen or consulted any other health care providers outside of [facility] since your last visit?”    YES - When: approximately 1 months ago.  Where and Why: Dr. Lyles, Neurosurgery.      Left upper arm circumference is 14 inches.      Click Here for Release of Records Request

## 2024-07-01 ENCOUNTER — TELEPHONE (OUTPATIENT)
Age: 74
End: 2024-07-01

## 2024-07-01 NOTE — TELEPHONE ENCOUNTER
Patient called and said that his blood pressure has dropped to 90/50 and is having dizzy spells. Patient has been less active since his spinal stenosis. Patient would like to have some guidance about his medications

## 2024-07-01 NOTE — TELEPHONE ENCOUNTER
Contacted pt at  number. Two patient Identifiers confirmed. Pt scheduled for eval with PA. Pt verbalized understanding.

## 2024-07-02 ENCOUNTER — OFFICE VISIT (OUTPATIENT)
Age: 74
End: 2024-07-02
Payer: MEDICARE

## 2024-07-02 VITALS
SYSTOLIC BLOOD PRESSURE: 106 MMHG | BODY MASS INDEX: 29.84 KG/M2 | DIASTOLIC BLOOD PRESSURE: 55 MMHG | HEART RATE: 72 BPM | WEIGHT: 208 LBS | OXYGEN SATURATION: 96 %

## 2024-07-02 DIAGNOSIS — I25.83 CORONARY ATHEROSCLEROSIS DUE TO LIPID RICH PLAQUE (CODE): ICD-10-CM

## 2024-07-02 DIAGNOSIS — I25.118 CORONARY ARTERY DISEASE OF NATIVE ARTERY OF NATIVE HEART WITH STABLE ANGINA PECTORIS (HCC): ICD-10-CM

## 2024-07-02 DIAGNOSIS — Z76.0 MEDICATION REFILL: ICD-10-CM

## 2024-07-02 DIAGNOSIS — I10 ESSENTIAL HYPERTENSION WITH GOAL BLOOD PRESSURE LESS THAN 140/90: ICD-10-CM

## 2024-07-02 DIAGNOSIS — R42 DIZZINESS: Primary | ICD-10-CM

## 2024-07-02 DIAGNOSIS — E78.2 MIXED HYPERLIPIDEMIA: ICD-10-CM

## 2024-07-02 PROCEDURE — 99214 OFFICE O/P EST MOD 30 MIN: CPT | Performed by: PHYSICIAN ASSISTANT

## 2024-07-02 PROCEDURE — G8417 CALC BMI ABV UP PARAM F/U: HCPCS | Performed by: PHYSICIAN ASSISTANT

## 2024-07-02 PROCEDURE — 3074F SYST BP LT 130 MM HG: CPT | Performed by: PHYSICIAN ASSISTANT

## 2024-07-02 PROCEDURE — 3017F COLORECTAL CA SCREEN DOC REV: CPT | Performed by: PHYSICIAN ASSISTANT

## 2024-07-02 PROCEDURE — 3078F DIAST BP <80 MM HG: CPT | Performed by: PHYSICIAN ASSISTANT

## 2024-07-02 PROCEDURE — 1123F ACP DISCUSS/DSCN MKR DOCD: CPT | Performed by: PHYSICIAN ASSISTANT

## 2024-07-02 PROCEDURE — G8427 DOCREV CUR MEDS BY ELIG CLIN: HCPCS | Performed by: PHYSICIAN ASSISTANT

## 2024-07-02 PROCEDURE — 4004F PT TOBACCO SCREEN RCVD TLK: CPT | Performed by: PHYSICIAN ASSISTANT

## 2024-07-02 ASSESSMENT — PATIENT HEALTH QUESTIONNAIRE - PHQ9
SUM OF ALL RESPONSES TO PHQ QUESTIONS 1-9: 5
SUM OF ALL RESPONSES TO PHQ QUESTIONS 1-9: 5
8. MOVING OR SPEAKING SO SLOWLY THAT OTHER PEOPLE COULD HAVE NOTICED. OR THE OPPOSITE, BEING SO FIGETY OR RESTLESS THAT YOU HAVE BEEN MOVING AROUND A LOT MORE THAN USUAL: NOT AT ALL
3. TROUBLE FALLING OR STAYING ASLEEP: SEVERAL DAYS
5. POOR APPETITE OR OVEREATING: SEVERAL DAYS
SUM OF ALL RESPONSES TO PHQ QUESTIONS 1-9: 5
7. TROUBLE CONCENTRATING ON THINGS, SUCH AS READING THE NEWSPAPER OR WATCHING TELEVISION: NOT AT ALL
SUM OF ALL RESPONSES TO PHQ9 QUESTIONS 1 & 2: 2
2. FEELING DOWN, DEPRESSED OR HOPELESS: SEVERAL DAYS
9. THOUGHTS THAT YOU WOULD BE BETTER OFF DEAD, OR OF HURTING YOURSELF: NOT AT ALL
SUM OF ALL RESPONSES TO PHQ QUESTIONS 1-9: 5
6. FEELING BAD ABOUT YOURSELF - OR THAT YOU ARE A FAILURE OR HAVE LET YOURSELF OR YOUR FAMILY DOWN: NOT AT ALL
1. LITTLE INTEREST OR PLEASURE IN DOING THINGS: SEVERAL DAYS
10. IF YOU CHECKED OFF ANY PROBLEMS, HOW DIFFICULT HAVE THESE PROBLEMS MADE IT FOR YOU TO DO YOUR WORK, TAKE CARE OF THINGS AT HOME, OR GET ALONG WITH OTHER PEOPLE: NOT DIFFICULT AT ALL
4. FEELING TIRED OR HAVING LITTLE ENERGY: SEVERAL DAYS

## 2024-07-02 NOTE — PROGRESS NOTES
Identified pt with two pt identifiers(name and ). Reviewed record in preparation for visit and have obtained necessary documentation.    Gilberto ERIKA Cheney Jr presents today for   Chief Complaint   Patient presents with    Dizziness     With standing       Pt c/o DIZZINESS, SOB, , FATIGUE/WEAKNESS, HEADACHES/LIGHT HEADED            Gilberto JAMES Noni Smith preferred language for health care discussion is english/other.    Personal Protective Equipment:   Personal Protective Equipment was used including: mask-surgical and hands-gloves. Patient was placed on no precaution(s). Patient was NOT masked.    Precautions:   Patient currently on None  Patient currently roomed with door closed.    Is someone accompanying this pt? YES, SON    Is the patient using any DME equipment during OV? CANE    Depression Screenin/2/2024     1:03 PM 2024    11:43 AM 2023    11:45 AM 2023    11:14 AM 2022    10:57 AM 2022     1:09 PM 2022     1:18 PM   PHQ-9 Questionaire   Little interest or pleasure in doing things 1 3 3 3 0 0 3   Feeling down, depressed, or hopeless 1 3 3 2 1 0 3   Trouble falling or staying asleep, or sleeping too much 1 3 3 2   1   Feeling tired or having little energy 1 3 3 3   3   Poor appetite or overeating 1 1 1 3   1   Feeling bad about yourself - or that you are a failure or have let yourself or your family down 0 0 0 1   0   Trouble concentrating on things, such as reading the newspaper or watching television 0 1 1 0   0   Moving or speaking so slowly that other people could have noticed. Or the opposite - being so fidgety or restless that you have been moving around a lot more than usual 0 0 0 0   0   Thoughts that you would be better off dead, or of hurting yourself in some way 0 0 0 0      PHQ-9 Total Score 5 14 14 14 1 0 11   If you checked off any problems, how difficult have these problems made it for you to do your work, take care of things at home, or get along with 
significant myalgia.  Currently on Pravachol and Zetia dietary modification advised       Plan  -Stop amlodipine   -Hold BP medications for 2 days, resume on July 4th am with holding parameters. Check BP prior to taking medications, spacing out BP medications and holding parameters discussed.   -Decrease losartan to 50 mg (take nightly)   -Decrease Imdur to 30 mg (take am after meals)   -Compression stockings, hydration   -F/u Dr. Mcknight 6-8 weeks         TONJA Diallo-C  [facility]     Please note: This document has been produced using voice recognition software. Unrecognized errors in transcription may be present.

## 2024-07-02 NOTE — PATIENT INSTRUCTIONS
New Medication/Medication Changes/Medication Refill  Stop Amlodipine   Restart blood pressure medications July 4th   Decrease Imdur to 30 mg, take am   Decrease Losartan 50 mg, take nightly    Toprol XL 25 mg daily, am   Hold BP medications if SBP < 120 mmHg   Blood pressure and HR log     **please allow 24-48 hrs for medication to be escribed to pharmacy** If you need any refills on medications please contact your pharmacy so that the request can be escribed to the provider for review seven to 10 days prior to being out of medication.        Other   Buy compression socks           Patient Education     Learning About the Mediterranean Diet  What is the Mediterranean diet?     The Mediterranean diet is a style of eating rather than a diet plan. It features foods eaten in [place], [place], southern [place], and other countries along the [place] Sea. It emphasizes eating foods like fish, fruits, vegetables, beans, high-fiber breads and whole grains, nuts, and olive oil. This style of eating includes limited red meat, cheese, and sweets.  Why choose the Mediterranean diet?  A Mediterranean-style diet may improve heart health. It contains more fat than other heart-healthy diets. But the fats are mainly from nuts, unsaturated oils (such as fish oils and olive oil), and certain nut or seed oils (such as canola, soybean, or flaxseed oil). These fats may help protect the heart and blood vessels.  How can you get started on the Mediterranean diet?  Here are some things you can do to switch to a more Mediterranean way of eating.  What to eat  Eat a variety of fruits and vegetables each day, such as grapes, blueberries, tomatoes, broccoli, peppers, figs, olives, spinach, eggplant, beans, lentils, and chickpeas.  Eat a variety of whole-grain foods each day, such as oats, brown rice, and whole wheat bread, pasta, and couscous.  Eat fish at least 2 times a week. Try tuna, salmon, mackerel, lake trout, herring, or

## 2024-07-24 ENCOUNTER — TELEPHONE (OUTPATIENT)
Age: 74
End: 2024-07-24

## 2024-07-26 NOTE — TELEPHONE ENCOUNTER
Contacted pt at  number. Two patient Identifiers confirmed. Informed pt per Dr Mcknight.  Pt verbalized understanding.

## 2024-08-27 ENCOUNTER — OFFICE VISIT (OUTPATIENT)
Age: 74
End: 2024-08-27
Payer: MEDICARE

## 2024-08-27 VITALS — WEIGHT: 209 LBS | OXYGEN SATURATION: 96 % | BODY MASS INDEX: 26.82 KG/M2 | HEART RATE: 60 BPM | HEIGHT: 74 IN

## 2024-08-27 DIAGNOSIS — Z76.0 MEDICATION REFILL: ICD-10-CM

## 2024-08-27 DIAGNOSIS — I10 ESSENTIAL HYPERTENSION WITH GOAL BLOOD PRESSURE LESS THAN 140/90: Primary | ICD-10-CM

## 2024-08-27 DIAGNOSIS — R06.09 DOE (DYSPNEA ON EXERTION): ICD-10-CM

## 2024-08-27 PROCEDURE — 3017F COLORECTAL CA SCREEN DOC REV: CPT | Performed by: INTERNAL MEDICINE

## 2024-08-27 PROCEDURE — G8417 CALC BMI ABV UP PARAM F/U: HCPCS | Performed by: INTERNAL MEDICINE

## 2024-08-27 PROCEDURE — 4004F PT TOBACCO SCREEN RCVD TLK: CPT | Performed by: INTERNAL MEDICINE

## 2024-08-27 PROCEDURE — G8428 CUR MEDS NOT DOCUMENT: HCPCS | Performed by: INTERNAL MEDICINE

## 2024-08-27 PROCEDURE — 93000 ELECTROCARDIOGRAM COMPLETE: CPT | Performed by: INTERNAL MEDICINE

## 2024-08-27 PROCEDURE — 1123F ACP DISCUSS/DSCN MKR DOCD: CPT | Performed by: INTERNAL MEDICINE

## 2024-08-27 PROCEDURE — 99214 OFFICE O/P EST MOD 30 MIN: CPT | Performed by: INTERNAL MEDICINE

## 2024-08-27 RX ORDER — ISOSORBIDE MONONITRATE 60 MG/1
30 TABLET, EXTENDED RELEASE ORAL EVERY MORNING
Qty: 90 TABLET | Refills: 3 | Status: SHIPPED | OUTPATIENT
Start: 2024-08-27

## 2024-08-27 ASSESSMENT — PATIENT HEALTH QUESTIONNAIRE - PHQ9
4. FEELING TIRED OR HAVING LITTLE ENERGY: NOT AT ALL
10. IF YOU CHECKED OFF ANY PROBLEMS, HOW DIFFICULT HAVE THESE PROBLEMS MADE IT FOR YOU TO DO YOUR WORK, TAKE CARE OF THINGS AT HOME, OR GET ALONG WITH OTHER PEOPLE: NOT DIFFICULT AT ALL
SUM OF ALL RESPONSES TO PHQ9 QUESTIONS 1 & 2: 0
SUM OF ALL RESPONSES TO PHQ QUESTIONS 1-9: 0
9. THOUGHTS THAT YOU WOULD BE BETTER OFF DEAD, OR OF HURTING YOURSELF: NOT AT ALL
SUM OF ALL RESPONSES TO PHQ QUESTIONS 1-9: 0
8. MOVING OR SPEAKING SO SLOWLY THAT OTHER PEOPLE COULD HAVE NOTICED. OR THE OPPOSITE, BEING SO FIGETY OR RESTLESS THAT YOU HAVE BEEN MOVING AROUND A LOT MORE THAN USUAL: NOT AT ALL
SUM OF ALL RESPONSES TO PHQ QUESTIONS 1-9: 0
1. LITTLE INTEREST OR PLEASURE IN DOING THINGS: NOT AT ALL
3. TROUBLE FALLING OR STAYING ASLEEP: NOT AT ALL
2. FEELING DOWN, DEPRESSED OR HOPELESS: NOT AT ALL
7. TROUBLE CONCENTRATING ON THINGS, SUCH AS READING THE NEWSPAPER OR WATCHING TELEVISION: NOT AT ALL
5. POOR APPETITE OR OVEREATING: NOT AT ALL
SUM OF ALL RESPONSES TO PHQ QUESTIONS 1-9: 0
6. FEELING BAD ABOUT YOURSELF - OR THAT YOU ARE A FAILURE OR HAVE LET YOURSELF OR YOUR FAMILY DOWN: NOT AT ALL

## 2024-08-27 NOTE — PROGRESS NOTES
gabapentin (NEURONTIN) 300 MG capsule TAKE 1 CAPSULE BY MOUTH THREE TIMES DAILY. MAX DAILY AMOUNT: 900 MG    traMADol (ULTRAM) 50 MG tablet Take 1 tablet by mouth every 6 hours as needed for Pain for up to 180 days. Max Daily Amount: 200 mg    traZODone (DESYREL) 50 MG tablet TAKE 2 TABLETS BY MOUTH EVERY NIGHT    WIXELA INHUB 100-50 MCG/ACT AEPB diskus inhaler INHALE 1 PUFF BY MOUTH TWICE DAILY FOR COPD    Zinc Acetate, Oral, (ZINC ACETATE PO) Take 1,000 mg by mouth    acetaminophen (TYLENOL) 500 MG tablet Take 2 tablets by mouth    albuterol sulfate HFA (PROVENTIL;VENTOLIN;PROAIR) 108 (90 Base) MCG/ACT inhaler Inhale 2 puffs into the lungs every 6 hours as needed    ascorbic acid (VITAMIN C) 250 MG tablet Take 1 tablet by mouth daily    vitamin D (CHOLECALCIFEROL) 25 MCG (1000 UT) TABS tablet Take 1 tablet by mouth daily    colchicine (COLCRYS) 0.6 MG tablet TAKE 2 TABLETS NOW AND 1 TABLET IN 6 HOURS IF NEEDED REPEAT IN 6 HOURS AS NEEDED    cyanocobalamin 100 MCG tablet Take 1 tablet by mouth daily    cyclobenzaprine (FLEXERIL) 5 MG tablet Take one to two tablets by mouth at bedtime.  Indications: muscle spasm    diclofenac sodium (VOLTAREN) 1 % GEL Apply 2 g topically 4 times daily    famotidine (PEPCID) 20 MG tablet Take 1 tablet by mouth 2 times daily    ferrous sulfate (IRON 325) 325 (65 Fe) MG tablet Take 65 mg by mouth every morning (before breakfast)    fluticasone (FLONASE) 50 MCG/ACT nasal spray SHAKE LIQUID AND USE 2 SPRAYS IN EACH NOSTRIL DAILY    sertraline (ZOLOFT) 100 MG tablet Take 1 tablet by mouth daily    terazosin (HYTRIN) 2 MG capsule Take 1 capsule by mouth     No current facility-administered medications for this visit.       Allergies and Sensitivities:  Allergies   Allergen Reactions    Dexbrompheniramine-Pseudoeph Angioedema, Other (See Comments) and Swelling     Facial swelling. 1980\"s    Propoxyphene Other (See Comments)     throat swelling  throat swelling    Antihistamines,  45.4     Trig <150 MG/  518  215  227     Ratio 0 - 5.0 4.3  6.7  3.5  3.7     ALT 16 - 61 U/L 30  21  21  20  38    AST 10 - 38 U/L 20  12  17  10  28    LDL 0 - 100 MG/DL 59.8  LDL AND VLDL CHOLESTEROL NOT CALCULATED WHEN TRIGLYCERIDES >400 MG/DL OR HDL CHOLESTEROL <20 MG/DL  41  45.6       Lab Results   Component Value Date/Time    ALT 30 05/14/2024 12:28 PM     No results found for: \"HBA1C\", \"EQW3GQNS\"    EKG      TRANSTHORACIC ECHOCARDIOGRAM (TTE) COMPLETE (CONTRAST/BUBBLE/3D PRN) 09/20/2022  1:27 PM, 09/20/2022 12:00 AM (Final)    Left Ventricle: Normal left ventricular systolic function with a visually estimated EF of 60 - 65%. Left ventricle size is normal. Normal wall thickness. Normal wall motion.    Right Ventricle: Right ventricle is severely dilated.    Mitral Valve: Mildly thickened leaflet.    Left Atrium: Left atrium is mildly dilated. Left atrial volume index is mildly increased (35-41 mL/m2).      NM STRESS TEST WITH MYOCARDIAL PERFUSION 02/08/2024  7:23 AM (Final)  Interpretation Summary    Stress Combined Conclusion: Normal treadmill myocardial perfusion study at 46 %% PHR. Findings suggest a low risk of cardiac events as supported by: atypical clinical presentation. Clinical correlation is advised, but in the absence of progressive or typical angina, further cardiac evaluation for ischemic heart disease may not be necessary.    Perfusion Comments: LV perfusion is normal.    Perfusion Conclusion: TID ratio is 0.98.    ECG: Resting ECG demonstrates sinus bradycardia, first-degree AV block and right bundle branch block.    ECG: The ECG was negative for ischemia.    Stress Test: A pharmacological stress test was performed using lexiscan. Hemodynamics are suboptimal due to medication. Blood pressure demonstrated a hypertensive response and heart rate demonstrated a normal response to stress. The patient's heart rate recovery was normal.    CATHETERIZATION  October of 2014.  His anatomy is as

## 2024-08-27 NOTE — PROGRESS NOTES
Identified pt with two pt identifiers(name and ). Reviewed record in preparation for visit and have obtained necessary documentation.    Gilberto Cheney Jr presents today for   Chief Complaint   Patient presents with    Follow-up       Pt c/o no complaints             Gilberto Cheney Jr preferred language for health care discussion is english/other.    Personal Protective Equipment:   Personal Protective Equipment was used including: mask-surgical and hands-gloves. Patient was placed on no precaution(s). Patient was not masked.    Precautions:   Patient currently on None  Patient currently roomed with door closed.    Is someone accompanying this pt? Daughter     Is the patient using any DME equipment during OV? no    Depression Screenin/27/2024     1:14 PM 2024     1:03 PM 2024    11:43 AM 2023    11:45 AM 2023    11:14 AM 2022    10:57 AM 2022     1:09 PM   PHQ-9 Questionaire   Little interest or pleasure in doing things 0 1 3 3 3 0 0   Feeling down, depressed, or hopeless 0 1 3 3 2 1 0   Trouble falling or staying asleep, or sleeping too much 0 1 3 3 2     Feeling tired or having little energy 0 1 3 3 3     Poor appetite or overeating 0 1 1 1 3     Feeling bad about yourself - or that you are a failure or have let yourself or your family down 0 0 0 0 1     Trouble concentrating on things, such as reading the newspaper or watching television 0 0 1 1 0     Moving or speaking so slowly that other people could have noticed. Or the opposite - being so fidgety or restless that you have been moving around a lot more than usual 0 0 0 0 0     Thoughts that you would be better off dead, or of hurting yourself in some way 0 0 0 0 0     PHQ-9 Total Score 0 5 14 14 14 1 0   If you checked off any problems, how difficult have these problems made it for you to do your work, take care of things at home, or get along with other people? 0 0 3 3 2          Learning Assessment:  No

## 2024-08-30 NOTE — TELEPHONE ENCOUNTER
August 30, 2024    Hello, may I speak with Geri Romeo?    My name is Kerry      I am  with [facility]  [address] [address] [phone].    Before we get started may I verify your date of birth? 1975    I am calling to officially welcome you to our practice and ask about your recent visit. Is this a good time to talk? No Left Vm to call back.     Tell me about your visit with us. What things went well?         We're always looking for ways to make our patients' experiences even better. Do you have recommendations on ways we may improve?      Overall were you satisfied with your first visit to our practice?        I appreciate you taking the time to speak with me today. Is there anything else I can do for you?       Thank you, and have a great day.       Patient called stating he was having an issue with taking lipitor, he requested a call back from clinical staff

## 2024-11-20 ENCOUNTER — OFFICE VISIT (OUTPATIENT)
Facility: CLINIC | Age: 74
End: 2024-11-20

## 2024-11-20 VITALS
SYSTOLIC BLOOD PRESSURE: 151 MMHG | DIASTOLIC BLOOD PRESSURE: 80 MMHG | HEIGHT: 69 IN | WEIGHT: 210 LBS | BODY MASS INDEX: 31.1 KG/M2 | HEART RATE: 60 BPM | OXYGEN SATURATION: 95 % | RESPIRATION RATE: 15 BRPM | TEMPERATURE: 97.3 F

## 2024-11-20 DIAGNOSIS — M48.061 SPINAL STENOSIS OF LUMBAR REGION, UNSPECIFIED WHETHER NEUROGENIC CLAUDICATION PRESENT: ICD-10-CM

## 2024-11-20 DIAGNOSIS — E78.5 HYPERLIPIDEMIA, UNSPECIFIED HYPERLIPIDEMIA TYPE: ICD-10-CM

## 2024-11-20 DIAGNOSIS — Z00.00 MEDICARE ANNUAL WELLNESS VISIT, SUBSEQUENT: Primary | ICD-10-CM

## 2024-11-20 DIAGNOSIS — I25.119 ATHEROSCLEROSIS OF NATIVE CORONARY ARTERY OF NATIVE HEART WITH ANGINA PECTORIS (HCC): ICD-10-CM

## 2024-11-20 DIAGNOSIS — Z13.31 POSITIVE DEPRESSION SCREENING: ICD-10-CM

## 2024-11-20 DIAGNOSIS — Z87.891 PERSONAL HISTORY OF TOBACCO USE: ICD-10-CM

## 2024-11-20 DIAGNOSIS — I10 HYPERTENSION, UNSPECIFIED TYPE: ICD-10-CM

## 2024-11-20 DIAGNOSIS — E11.40 TYPE 2 DIABETES MELLITUS WITH DIABETIC NEUROPATHY, UNSPECIFIED WHETHER LONG TERM INSULIN USE (HCC): ICD-10-CM

## 2024-11-20 SDOH — ECONOMIC STABILITY: FOOD INSECURITY: WITHIN THE PAST 12 MONTHS, YOU WORRIED THAT YOUR FOOD WOULD RUN OUT BEFORE YOU GOT MONEY TO BUY MORE.: NEVER TRUE

## 2024-11-20 SDOH — ECONOMIC STABILITY: INCOME INSECURITY: HOW HARD IS IT FOR YOU TO PAY FOR THE VERY BASICS LIKE FOOD, HOUSING, MEDICAL CARE, AND HEATING?: NOT HARD AT ALL

## 2024-11-20 SDOH — ECONOMIC STABILITY: FOOD INSECURITY: WITHIN THE PAST 12 MONTHS, THE FOOD YOU BOUGHT JUST DIDN'T LAST AND YOU DIDN'T HAVE MONEY TO GET MORE.: NEVER TRUE

## 2024-11-20 ASSESSMENT — PATIENT HEALTH QUESTIONNAIRE - PHQ9
SUM OF ALL RESPONSES TO PHQ QUESTIONS 1-9: 11
SUM OF ALL RESPONSES TO PHQ9 QUESTIONS 1 & 2: 3
SUM OF ALL RESPONSES TO PHQ QUESTIONS 1-9: 11
SUM OF ALL RESPONSES TO PHQ QUESTIONS 1-9: 11
7. TROUBLE CONCENTRATING ON THINGS, SUCH AS READING THE NEWSPAPER OR WATCHING TELEVISION: SEVERAL DAYS
10. IF YOU CHECKED OFF ANY PROBLEMS, HOW DIFFICULT HAVE THESE PROBLEMS MADE IT FOR YOU TO DO YOUR WORK, TAKE CARE OF THINGS AT HOME, OR GET ALONG WITH OTHER PEOPLE: SOMEWHAT DIFFICULT
2. FEELING DOWN, DEPRESSED OR HOPELESS: NEARLY EVERY DAY
8. MOVING OR SPEAKING SO SLOWLY THAT OTHER PEOPLE COULD HAVE NOTICED. OR THE OPPOSITE, BEING SO FIGETY OR RESTLESS THAT YOU HAVE BEEN MOVING AROUND A LOT MORE THAN USUAL: NOT AT ALL
4. FEELING TIRED OR HAVING LITTLE ENERGY: NEARLY EVERY DAY
SUM OF ALL RESPONSES TO PHQ QUESTIONS 1-9: 11
5. POOR APPETITE OR OVEREATING: NEARLY EVERY DAY
1. LITTLE INTEREST OR PLEASURE IN DOING THINGS: NOT AT ALL
3. TROUBLE FALLING OR STAYING ASLEEP: SEVERAL DAYS
9. THOUGHTS THAT YOU WOULD BE BETTER OFF DEAD, OR OF HURTING YOURSELF: NOT AT ALL
6. FEELING BAD ABOUT YOURSELF - OR THAT YOU ARE A FAILURE OR HAVE LET YOURSELF OR YOUR FAMILY DOWN: NOT AT ALL

## 2024-11-20 ASSESSMENT — ANXIETY QUESTIONNAIRES
4. TROUBLE RELAXING: SEVERAL DAYS
GAD7 TOTAL SCORE: 4
IF YOU CHECKED OFF ANY PROBLEMS ON THIS QUESTIONNAIRE, HOW DIFFICULT HAVE THESE PROBLEMS MADE IT FOR YOU TO DO YOUR WORK, TAKE CARE OF THINGS AT HOME, OR GET ALONG WITH OTHER PEOPLE: SOMEWHAT DIFFICULT
5. BEING SO RESTLESS THAT IT IS HARD TO SIT STILL: NOT AT ALL
1. FEELING NERVOUS, ANXIOUS, OR ON EDGE: MORE THAN HALF THE DAYS
7. FEELING AFRAID AS IF SOMETHING AWFUL MIGHT HAPPEN: NOT AT ALL
3. WORRYING TOO MUCH ABOUT DIFFERENT THINGS: NOT AT ALL
2. NOT BEING ABLE TO STOP OR CONTROL WORRYING: NOT AT ALL
6. BECOMING EASILY ANNOYED OR IRRITABLE: SEVERAL DAYS

## 2024-11-20 ASSESSMENT — ENCOUNTER SYMPTOMS
CHEST TIGHTNESS: 0
BACK PAIN: 1
SHORTNESS OF BREATH: 0

## 2024-11-20 NOTE — PROGRESS NOTES
HISTORY OF PRESENT ILLNESS      Gilberto Cheney Jr is a 74 y.o. male.    BP (!) 151/80 (Site: Left Upper Arm, Position: Sitting, Cuff Size: Medium Adult)   Pulse 60   Temp 97.3 °F (36.3 °C) (Temporal)   Resp 15   Ht 1.753 m (5' 9\")   Wt 95.3 kg (210 lb)   SpO2 95%   BMI 31.01 kg/m²       Patient is here for his Medicare annual wellness visit and follow-up of his hypertension and diabetes.    He states that he is also followed at the [facility].  He states he had recent lab there.    He reports that he is now seeing a new orthopedic back surgeon, Dr. Odell Merrill in [place].  He had MRIs done of his cervical spine lumbar spine and thoracic spine..  The lumbar spine was the most significant:    \"MRI LUMBAR SPINE WO CONTRAST  Order: [id number]  Impression      No significant change compared to the prior MRI of February 1, 2023.       Structure:              1.  DISH with bulky prevertebral osteophyte proliferation.              2.  Moderate diffuse disc osteophyte complex at L4-L5.              3.  L5-S1 posterior decompression.              4.  Advanced chronic disc and facet degeneration.       Stenosis:              Central stenosis:  Moderate L4-L5.              Lateral recess stenosis:  Marked bilateral L4-L5.              Foraminal stenosis:  Low-grade. \"      Dr. Merrill plans to do surgery on his lumbar spine L4-5 on December 5.  Patient is optimistic that he will have significant improvement in his leg symptoms.              Past Medical History:  No date: Agent orange exposure  No date: Allergic rhinitis  No date: Anxiety  No date: Asbestos exposure  09/2019: Asthma      Comment:  told mild.  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 
[address] [facility]   CBC with DIFF(5 CELL) LYMPHOCYTES [#/VOLUME] IN BLOOD BY AUTOMATED COUNT 2.5 .5 - 3.3 10/31/2024     Specimen Type: BLOOD  No comment entered.  Ordering Provider: BRINA PABON  Report Released Date/Time: Oct 31, 2024 12:45 PM  Reporting Lab: [address]  Performing Lab: [address] [facility]   CBC with DIFF(5 CELL) MONOCYTES [#/VOLUME] IN BLOOD BY AUTOMATED COUNT 0.4 0 - 1.0 10/31/2024     Specimen Type: BLOOD  No comment entered.  Ordering Provider: BRINA PABON  Report Released Date/Time: Oct 31, 2024 12:45 PM  Reporting Lab: [address]  Performing Lab: [address] [facility]   CBC with DIFF(5 CELL) GRANULOCYTES [#/VOLUME] IN BLOOD BY AUTOMATED COUNT 4.0 1.7 - 8.4 10/31/2024     Specimen Type: BLOOD  No comment entered.  Ordering Provider: BRINA PABON  Report Released Date/Time: Oct 31, 2024 12:45 PM  Reporting Lab: [address] [address]  Performing Lab: [address] [facility]   CBC with DIFF(5 CELL) EOSINOPHILS/100 LEUKOCYTES IN BLOOD BY AUTOMATED COUNT 4.6 2 - 4 10/31/2024 H   Specimen Type: BLOOD  No comment entered.  Ordering Provider: BRINA PABON  Report Released Date/Time: Oct 31, 2024 12:45 PM  Reporting Lab: [address] [address]  Performing Lab: [address] [facility]   CBC with DIFF(5 CELL) BASOPHILS/100 LEUKOCYTES IN BLOOD BY AUTOMATED COUNT 1.1 0 - 1 10/31/2024 H   Specimen Type: BLOOD  No comment 
Reconciliation, Ar   empagliflozin (JARDIANCE) 10 MG tablet TAKE 1 TABLET BY MOUTH DAILY Yes Automatic Reconciliation, Ar   famotidine (PEPCID) 20 MG tablet Take 1 tablet by mouth 2 times daily Yes Automatic Reconciliation, Ar   ferrous sulfate (IRON 325) 325 (65 Fe) MG tablet Take 65 mg by mouth every morning (before breakfast) Yes Automatic Reconciliation, Ar   fluticasone (FLONASE) 50 MCG/ACT nasal spray SHAKE LIQUID AND USE 2 SPRAYS IN EACH NOSTRIL DAILY Yes Automatic Reconciliation, Ar   metoprolol succinate (TOPROL XL) 25 MG extended release tablet  Yes Automatic Reconciliation, Ar   nitroGLYCERIN (NITROSTAT) 0.4 MG SL tablet Place 1 tablet under the tongue Yes Automatic Reconciliation, Ar   sertraline (ZOLOFT) 100 MG tablet Take 1 tablet by mouth daily Yes Automatic Reconciliation, Ar   terazosin (HYTRIN) 2 MG capsule Take 1 capsule by mouth Yes Automatic Reconciliation, Ar   Zinc Acetate, Oral, (ZINC ACETATE PO) Take 1,000 mg by mouth  Patient not taking: Reported on 11/20/2024  Automatic Reconciliation, Ar       CareTeam (Including outside providers/suppliers regularly involved in providing care):   Patient Care Team:  Marga Marsh MD as PCP - General  Marga Marsh MD as PCP - Empaneled Provider  Carlos Enrique Carnes MD as Consulting Physician  Matthew Valenzuela MD as Consulting Physician  Isaiah White MD as Consulting Physician  Murray Jorge MD as Consulting Physician  Tye Zuñiga DPM as Consulting Physician  Nixon Shelley DO as Consulting Physician  Kati Pacheco RN as Ambulatory Care Manager      Reviewed and updated this visit:  Tobacco  Allergies  Meds  Problems  Med Hx  Surg Hx  Soc Hx  Fam Hx

## 2025-02-24 DIAGNOSIS — Z76.0 MEDICATION REFILL: ICD-10-CM

## 2025-02-25 RX ORDER — TRAMADOL HYDROCHLORIDE 50 MG/1
50 TABLET ORAL EVERY 6 HOURS PRN
Qty: 120 TABLET | Refills: 5 | Status: SHIPPED | OUTPATIENT
Start: 2025-02-25 | End: 2025-08-24

## 2025-02-27 ENCOUNTER — OFFICE VISIT (OUTPATIENT)
Age: 75
End: 2025-02-27
Payer: MEDICARE

## 2025-02-27 VITALS
OXYGEN SATURATION: 97 % | WEIGHT: 211 LBS | BODY MASS INDEX: 27.08 KG/M2 | HEIGHT: 74 IN | HEART RATE: 73 BPM | DIASTOLIC BLOOD PRESSURE: 69 MMHG | SYSTOLIC BLOOD PRESSURE: 129 MMHG

## 2025-02-27 DIAGNOSIS — I25.10 CORONARY ARTERY DISEASE DUE TO LIPID RICH PLAQUE: ICD-10-CM

## 2025-02-27 DIAGNOSIS — E78.00 PURE HYPERCHOLESTEROLEMIA: ICD-10-CM

## 2025-02-27 DIAGNOSIS — I25.83 CORONARY ARTERY DISEASE DUE TO LIPID RICH PLAQUE: ICD-10-CM

## 2025-02-27 DIAGNOSIS — I10 ESSENTIAL HYPERTENSION WITH GOAL BLOOD PRESSURE LESS THAN 140/90: Primary | ICD-10-CM

## 2025-02-27 PROCEDURE — 4004F PT TOBACCO SCREEN RCVD TLK: CPT | Performed by: INTERNAL MEDICINE

## 2025-02-27 PROCEDURE — 3078F DIAST BP <80 MM HG: CPT | Performed by: INTERNAL MEDICINE

## 2025-02-27 PROCEDURE — G8428 CUR MEDS NOT DOCUMENT: HCPCS | Performed by: INTERNAL MEDICINE

## 2025-02-27 PROCEDURE — 3074F SYST BP LT 130 MM HG: CPT | Performed by: INTERNAL MEDICINE

## 2025-02-27 PROCEDURE — G8417 CALC BMI ABV UP PARAM F/U: HCPCS | Performed by: INTERNAL MEDICINE

## 2025-02-27 PROCEDURE — 1126F AMNT PAIN NOTED NONE PRSNT: CPT | Performed by: INTERNAL MEDICINE

## 2025-02-27 PROCEDURE — 1123F ACP DISCUSS/DSCN MKR DOCD: CPT | Performed by: INTERNAL MEDICINE

## 2025-02-27 PROCEDURE — 3017F COLORECTAL CA SCREEN DOC REV: CPT | Performed by: INTERNAL MEDICINE

## 2025-02-27 PROCEDURE — 99214 OFFICE O/P EST MOD 30 MIN: CPT | Performed by: INTERNAL MEDICINE

## 2025-02-27 ASSESSMENT — PATIENT HEALTH QUESTIONNAIRE - PHQ9
SUM OF ALL RESPONSES TO PHQ QUESTIONS 1-9: 0
2. FEELING DOWN, DEPRESSED OR HOPELESS: NOT AT ALL
1. LITTLE INTEREST OR PLEASURE IN DOING THINGS: NOT AT ALL
SUM OF ALL RESPONSES TO PHQ QUESTIONS 1-9: 0
SUM OF ALL RESPONSES TO PHQ QUESTIONS 1-9: 0
SUM OF ALL RESPONSES TO PHQ9 QUESTIONS 1 & 2: 0
SUM OF ALL RESPONSES TO PHQ QUESTIONS 1-9: 0

## 2025-02-27 NOTE — PROGRESS NOTES
Identified pt with two pt identifiers(name and ). Reviewed record in preparation for visit and have obtained necessary documentation.    Gilberto JAMES Noni Smith presents today for   Chief Complaint   Patient presents with    Follow-up     1 year post NST         Pt denied DIZZINESS, SOB, CHEST PAIN/ PRESSURE, FATIGUE/WEAKNESS, HEADACHES, SWELLING.             Gilberto Cheney Jr preferred language for health care discussion is english/other.    Personal Protective Equipment:   Personal Protective Equipment was used including: mask-surgical and hands-gloves. Patient was placed on no precaution(s). Patient was not masked.    Precautions:   Patient currently on None  Patient currently roomed with door closed.    Is someone accompanying this pt? no    Is the patient using any DME equipment during OV? no    Depression Screenin/27/2025     1:04 PM 2024    11:22 AM 2024     1:14 PM 2024     1:03 PM 2024    11:43 AM 2023    11:45 AM 2023    11:14 AM   PHQ-9 Questionaire   Little interest or pleasure in doing things 0 0 0 1 3 3 3   Feeling down, depressed, or hopeless 0 3 0 1 3 3 2   Trouble falling or staying asleep, or sleeping too much  1 0 1 3 3 2   Feeling tired or having little energy  3 0 1 3 3 3   Poor appetite or overeating  3 0 1 1 1 3   Feeling bad about yourself - or that you are a failure or have let yourself or your family down  0 0 0 0 0 1   Trouble concentrating on things, such as reading the newspaper or watching television  1 0 0 1 1 0   Moving or speaking so slowly that other people could have noticed. Or the opposite - being so fidgety or restless that you have been moving around a lot more than usual  0 0 0 0 0 0   Thoughts that you would be better off dead, or of hurting yourself in some way  0 0 0 0 0 0   PHQ-9 Total Score 0 11 0 5 14 14 14   If you checked off any problems, how difficult have these problems made it for you to do your work, take care of things

## 2025-02-27 NOTE — PROGRESS NOTES
[facility]    Gilberto Cheney Jr is a 74 y.o. male, pmhx of CAD, ICMY, DM, HTN, dyslipidemia, spinal stenosis and tobacco abuse.     Coronary artery disease as documented by cardiac catheterization in October of 2014.  His anatomy is as follows:        LM - patent   LAD - 30% prox, 80% mid, 90% apical   D1 - 100%   RI - 95% ostial (failed PCI, calcified 1.75 - 2.0 mm vessel)   Cx - 30-40% prox   OM1 - subtotal   OM2 - 80% (3.5 x 18mm XIENCE 10/14)   RCA - 30% diffuse   RPDA - 100%   RPLV - 50%    Presents to the office today for follow-up appointment.  Since changing the medication dosing last time, his dizziness and fatigue has improved significantly.  No resting or exertional chest pain or chest tightness.  No shortness of breath.  Using cane to help with walking around because of back pain    Past Medical History:   Diagnosis Date    Agent orange exposure     Allergic rhinitis     Anxiety     Asbestos exposure     Asthma 09/2019    told mild.    Autonomic dysfunction     abnormal tilt table 2/15    BPH (benign prostatic hypertrophy)     CAD (coronary artery disease)     S/P OM MATEUSZ in 2014    Cholesteatoma 2020    left ear    Choroidal nevus, left eye     Chronic back pain     Chronic kidney disease     Chronic pain 2008    Colonic polyp 08/28/2018    multiple. each region had polyp, 4 tubular adenomas, 1 hyperplastic    Contact dermatitis and eczema due to cause 2017    Coronary atherosclerosis of native coronary artery     OM2 - 3.5 x 18mm XIENCE 10/14    COVID-19 08/2023    Degenerative joint disease     Degenerative lumbar spinal stenosis 2024    Most predominant at L4-5 with a significant osteophyte    Depression 2012    Diabetes (HCC)     DISH (diffuse idiopathic skeletal hyperostosis)     Diverticulosis 08/28/2018    Dyslipidemia     Erectile dysfunction     Fibrous histiocytoma     GERD (gastroesophageal reflux disease)     Glaucoma suspect     Gout     Hearing loss     Hypertension     Lumbar

## 2025-04-21 DIAGNOSIS — E11.40 TYPE 2 DIABETES MELLITUS WITH DIABETIC NEUROPATHY, UNSPECIFIED WHETHER LONG TERM INSULIN USE (HCC): ICD-10-CM

## 2025-04-21 DIAGNOSIS — Z76.0 MEDICATION REFILL: ICD-10-CM

## 2025-04-21 RX ORDER — GABAPENTIN 300 MG/1
CAPSULE ORAL
Qty: 90 CAPSULE | Refills: 0 | Status: SHIPPED | OUTPATIENT
Start: 2025-04-21 | End: 2025-10-22

## 2025-05-05 RX ORDER — METOPROLOL SUCCINATE 25 MG/1
25 TABLET, EXTENDED RELEASE ORAL DAILY
Qty: 90 TABLET | Refills: 3 | Status: SHIPPED | OUTPATIENT
Start: 2025-05-05

## 2025-05-05 RX ORDER — CLOPIDOGREL BISULFATE 75 MG/1
75 TABLET ORAL DAILY
Qty: 90 TABLET | Refills: 3 | Status: SHIPPED | OUTPATIENT
Start: 2025-05-05

## 2025-05-05 NOTE — TELEPHONE ENCOUNTER
PCP: Marga Marsh MD    Last appt:  2/27/2025   Future Appointments   Date Time Provider Department Center   5/20/2025  1:30 PM Marga Marsh MD [facility]   11/20/2025  2:00 PM Radha Scherer PA-C [facility]       Requested Prescriptions     Pending Prescriptions Disp Refills    metoprolol succinate (TOPROL XL) 25 MG extended release tablet 90 tablet 3     Sig: Take 1 tablet by mouth daily    clopidogrel (PLAVIX) 75 MG tablet 90 tablet 3     Sig: Take 1 tablet by mouth daily       Request for a 30 or 90 day supply? Provider Discretion    Pharmacy: CONFIRMED    Other Comments: N/A   Please advise

## 2025-05-07 RX ORDER — TERAZOSIN 2 MG/1
2 CAPSULE ORAL NIGHTLY
Qty: 90 CAPSULE | Refills: 3 | Status: SHIPPED | OUTPATIENT
Start: 2025-05-07

## 2025-05-07 NOTE — TELEPHONE ENCOUNTER
Mr. Noni Smith is requesting refills of:    Requested Prescriptions     Pending Prescriptions Disp Refills    terazosin (HYTRIN) 2 MG capsule 90 capsule      Sig: Take 1 capsule by mouth nightly         to be sent to   [facility]/pharmacy #[id number] - [place], [place] - [address] - P [phone] - F [phone]  [address] [facility] [address]  Phone: [phone] Fax: [phone]    LAST OFFICE VISIT:  11/20/2024     UPCOMING APPOINTMENT(S):  Future Appointments   Date Time Provider Department Center   5/20/2025  1:30 PM Marga Marsh MD [facility]   11/20/2025  2:00 PM Radha Scherer PA-C [facility] BS [facility]       Patient offered an appointment? N/a  How much medication does the patient have on hand? unknown    Provided notified

## 2025-05-16 NOTE — PROGRESS NOTES
HISTORY OF PRESENT ILLNESS      Gilberto Cheney Jr is a 75 y.o. male.    /61 (BP Site: Right Upper Arm, Patient Position: Sitting, BP Cuff Size: Medium Adult)   Pulse 68   Temp 97.2 °F (36.2 °C) (Temporal)   Resp 14   Ht 1.88 m (6' 2\")   Wt 95.9 kg (211 lb 6.4 oz)   SpO2 96%   BMI 27.14 kg/m²         Since he had lumbar surgery in December, he reports slow recovery and has not regained all of the strength in his legs he would like to have.  He had been going to physical therapy but this has been completed.    He is now thinking about downsizing his house and making it easier for him to function as he is little less mobile now.    Diabetes  He presents for his follow-up diabetic visit. He has type 2 diabetes mellitus. Associated symptoms include weakness (legs have not gotten a strong as he would like.). Pertinent negatives for diabetes include no chest pain.   Hypertension  This is a chronic problem. The current episode started more than 1 year ago. Pertinent negatives include no chest pain or palpitations.       Review of Systems   Constitutional: Negative.    Respiratory:  Positive for cough.    Cardiovascular:  Negative for chest pain and palpitations.   Neurological:  Positive for weakness (legs have not gotten a strong as he would like.).           Physical Exam  Vitals and nursing note reviewed.   Constitutional:       Appearance: Normal appearance.   HENT:      Head: Normocephalic and atraumatic.   Cardiovascular:      Rate and Rhythm: Normal rate and regular rhythm.   Pulmonary:      Effort: Pulmonary effort is normal.      Breath sounds: Rhonchi (anteriorly. Cleared with cough) present.   Skin:     General: Skin is warm and dry.   Neurological:      General: No focal deficit present.      Mental Status: He is alert and oriented to person, place, and time.   Psychiatric:         Mood and Affect: Mood normal.         Behavior: Behavior normal.         ASSESSMENT and PLAN      ICD-10-CM    1.

## 2025-05-20 ENCOUNTER — OFFICE VISIT (OUTPATIENT)
Facility: CLINIC | Age: 75
End: 2025-05-20
Payer: MEDICARE

## 2025-05-20 ENCOUNTER — HOSPITAL ENCOUNTER (OUTPATIENT)
Facility: HOSPITAL | Age: 75
Setting detail: SPECIMEN
Discharge: HOME | End: 2025-05-23
Payer: MEDICARE

## 2025-05-20 VITALS
SYSTOLIC BLOOD PRESSURE: 120 MMHG | WEIGHT: 211.4 LBS | OXYGEN SATURATION: 96 % | TEMPERATURE: 97.2 F | HEART RATE: 68 BPM | HEIGHT: 74 IN | DIASTOLIC BLOOD PRESSURE: 61 MMHG | BODY MASS INDEX: 27.13 KG/M2 | RESPIRATION RATE: 14 BRPM

## 2025-05-20 DIAGNOSIS — E11.40 TYPE 2 DIABETES MELLITUS WITH DIABETIC NEUROPATHY, UNSPECIFIED WHETHER LONG TERM INSULIN USE (HCC): ICD-10-CM

## 2025-05-20 DIAGNOSIS — E11.21 TYPE 2 DIABETES MELLITUS WITH NEPHROPATHY (HCC): Primary | ICD-10-CM

## 2025-05-20 DIAGNOSIS — M10.9 GOUT, UNSPECIFIED CAUSE, UNSPECIFIED CHRONICITY, UNSPECIFIED SITE: ICD-10-CM

## 2025-05-20 DIAGNOSIS — E11.21 TYPE 2 DIABETES MELLITUS WITH NEPHROPATHY (HCC): ICD-10-CM

## 2025-05-20 DIAGNOSIS — I10 HYPERTENSION, UNSPECIFIED TYPE: ICD-10-CM

## 2025-05-20 DIAGNOSIS — E78.5 DYSLIPIDEMIA: ICD-10-CM

## 2025-05-20 DIAGNOSIS — R05.2 SUBACUTE COUGH: ICD-10-CM

## 2025-05-20 DIAGNOSIS — Z76.0 MEDICATION REFILL: ICD-10-CM

## 2025-05-20 LAB
ALBUMIN SERPL-MCNC: 3.5 G/DL (ref 3.4–5)
ALBUMIN/GLOB SERPL: 1.2 (ref 0.8–1.7)
ALP SERPL-CCNC: 106 U/L (ref 45–117)
ALT SERPL-CCNC: 17 U/L (ref 10–50)
ANION GAP SERPL CALC-SCNC: 10 MMOL/L (ref 3–18)
AST SERPL-CCNC: 19 U/L (ref 10–38)
BILIRUB SERPL-MCNC: 0.4 MG/DL (ref 0.2–1)
BUN SERPL-MCNC: 10 MG/DL (ref 6–23)
BUN/CREAT SERPL: 10 (ref 12–20)
CALCIUM SERPL-MCNC: 9.7 MG/DL (ref 8.5–10.1)
CHLORIDE SERPL-SCNC: 103 MMOL/L (ref 98–107)
CHOLEST SERPL-MCNC: 144 MG/DL
CO2 SERPL-SCNC: 30 MMOL/L (ref 21–32)
CREAT SERPL-MCNC: 0.97 MG/DL (ref 0.6–1.3)
CREAT UR-MCNC: 101 MG/DL (ref 30–125)
EST. AVERAGE GLUCOSE BLD GHB EST-MCNC: 137 MG/DL
GLOBULIN SER CALC-MCNC: 3 G/DL (ref 2–4)
GLUCOSE SERPL-MCNC: 183 MG/DL (ref 74–108)
HBA1C MFR BLD: 6.4 % (ref 4.2–5.6)
HDLC SERPL-MCNC: 32 MG/DL (ref 40–60)
HDLC SERPL: 4.5 (ref 0–5)
LDLC SERPL CALC-MCNC: 57 MG/DL (ref 0–100)
MICROALBUMIN UR-MCNC: <1.2 MG/DL (ref 0–3)
MICROALBUMIN/CREAT UR-RTO: NORMAL MG/G (ref 0–30)
POTASSIUM SERPL-SCNC: 4.4 MMOL/L (ref 3.5–5.5)
PROT SERPL-MCNC: 6.5 G/DL (ref 6.4–8.2)
SODIUM SERPL-SCNC: 144 MMOL/L (ref 136–145)
TRIGL SERPL-MCNC: 275 MG/DL (ref 0–150)
URATE SERPL-MCNC: 5.2 MG/DL (ref 2.6–7.2)
VLDLC SERPL CALC-MCNC: 55 MG/DL

## 2025-05-20 PROCEDURE — 80053 COMPREHEN METABOLIC PANEL: CPT

## 2025-05-20 PROCEDURE — 84550 ASSAY OF BLOOD/URIC ACID: CPT

## 2025-05-20 PROCEDURE — 3046F HEMOGLOBIN A1C LEVEL >9.0%: CPT | Performed by: INTERNAL MEDICINE

## 2025-05-20 PROCEDURE — 83036 HEMOGLOBIN GLYCOSYLATED A1C: CPT

## 2025-05-20 PROCEDURE — 82570 ASSAY OF URINE CREATININE: CPT

## 2025-05-20 PROCEDURE — 1126F AMNT PAIN NOTED NONE PRSNT: CPT | Performed by: INTERNAL MEDICINE

## 2025-05-20 PROCEDURE — 99214 OFFICE O/P EST MOD 30 MIN: CPT | Performed by: INTERNAL MEDICINE

## 2025-05-20 PROCEDURE — 3017F COLORECTAL CA SCREEN DOC REV: CPT | Performed by: INTERNAL MEDICINE

## 2025-05-20 PROCEDURE — 3078F DIAST BP <80 MM HG: CPT | Performed by: INTERNAL MEDICINE

## 2025-05-20 PROCEDURE — 36415 COLL VENOUS BLD VENIPUNCTURE: CPT

## 2025-05-20 PROCEDURE — 1123F ACP DISCUSS/DSCN MKR DOCD: CPT | Performed by: INTERNAL MEDICINE

## 2025-05-20 PROCEDURE — 2022F DILAT RTA XM EVC RTNOPTHY: CPT | Performed by: INTERNAL MEDICINE

## 2025-05-20 PROCEDURE — G8417 CALC BMI ABV UP PARAM F/U: HCPCS | Performed by: INTERNAL MEDICINE

## 2025-05-20 PROCEDURE — 82043 UR ALBUMIN QUANTITATIVE: CPT

## 2025-05-20 PROCEDURE — 1159F MED LIST DOCD IN RCRD: CPT | Performed by: INTERNAL MEDICINE

## 2025-05-20 PROCEDURE — G2211 COMPLEX E/M VISIT ADD ON: HCPCS | Performed by: INTERNAL MEDICINE

## 2025-05-20 PROCEDURE — G8427 DOCREV CUR MEDS BY ELIG CLIN: HCPCS | Performed by: INTERNAL MEDICINE

## 2025-05-20 PROCEDURE — 1160F RVW MEDS BY RX/DR IN RCRD: CPT | Performed by: INTERNAL MEDICINE

## 2025-05-20 PROCEDURE — 80061 LIPID PANEL: CPT

## 2025-05-20 PROCEDURE — 3074F SYST BP LT 130 MM HG: CPT | Performed by: INTERNAL MEDICINE

## 2025-05-20 PROCEDURE — 4004F PT TOBACCO SCREEN RCVD TLK: CPT | Performed by: INTERNAL MEDICINE

## 2025-05-20 RX ORDER — EZETIMIBE 10 MG/1
10 TABLET ORAL DAILY
Qty: 90 TABLET | Refills: 3 | Status: SHIPPED | OUTPATIENT
Start: 2025-05-20

## 2025-05-20 RX ORDER — AMLODIPINE BESYLATE 2.5 MG/1
2.5 TABLET ORAL DAILY
COMMUNITY
Start: 2025-01-17

## 2025-05-20 RX ORDER — FERROUS SULFATE 325(65) MG
325 TABLET ORAL
Qty: 90 TABLET | Refills: 5 | Status: SHIPPED | OUTPATIENT
Start: 2025-05-20

## 2025-05-20 RX ORDER — GABAPENTIN 300 MG/1
CAPSULE ORAL
Qty: 90 CAPSULE | Refills: 5 | Status: SHIPPED | OUTPATIENT
Start: 2025-05-20 | End: 2025-11-20

## 2025-05-20 SDOH — ECONOMIC STABILITY: FOOD INSECURITY: WITHIN THE PAST 12 MONTHS, THE FOOD YOU BOUGHT JUST DIDN'T LAST AND YOU DIDN'T HAVE MONEY TO GET MORE.: NEVER TRUE

## 2025-05-20 SDOH — ECONOMIC STABILITY: FOOD INSECURITY: WITHIN THE PAST 12 MONTHS, YOU WORRIED THAT YOUR FOOD WOULD RUN OUT BEFORE YOU GOT MONEY TO BUY MORE.: NEVER TRUE

## 2025-05-20 ASSESSMENT — PATIENT HEALTH QUESTIONNAIRE - PHQ9
SUM OF ALL RESPONSES TO PHQ QUESTIONS 1-9: 0
2. FEELING DOWN, DEPRESSED OR HOPELESS: NOT AT ALL
1. LITTLE INTEREST OR PLEASURE IN DOING THINGS: NOT AT ALL

## 2025-05-20 ASSESSMENT — ENCOUNTER SYMPTOMS: COUGH: 1

## 2025-05-20 NOTE — PROGRESS NOTES
Have you been to the ER, urgent care clinic since your last visit?  Hospitalized since your last visit?   NO    Have you seen or consulted any other health care providers outside our system since your last visit?   NO    “Have you had a diabetic eye exam?”    YES - Where: Dr. Isaiah White  Nurse/ALIZE to request most recent records if not in the chart     Date of last diabetic eye exam: 10/28/2021

## 2025-05-21 ENCOUNTER — RESULTS FOLLOW-UP (OUTPATIENT)
Facility: CLINIC | Age: 75
End: 2025-05-21

## 2025-09-01 DIAGNOSIS — Z76.0 MEDICATION REFILL: ICD-10-CM

## 2025-09-02 RX ORDER — TRAZODONE HYDROCHLORIDE 50 MG/1
100 TABLET ORAL NIGHTLY PRN
Qty: 180 TABLET | Refills: 3 | Status: SHIPPED | OUTPATIENT
Start: 2025-09-02

## (undated) DEVICE — BASIN EMESIS 500CC ROSE 250/CS 60/PLT: Brand: MEDEGEN MEDICAL PRODUCTS, LLC

## (undated) DEVICE — MEDI-VAC NON-CONDUCTIVE SUCTION TUBING: Brand: CARDINAL HEALTH

## (undated) DEVICE — SYR 50ML SLIP TIP NSAF LF STRL --

## (undated) DEVICE — FLEX ADVANTAGE 1500CC: Brand: FLEX ADVANTAGE

## (undated) DEVICE — CONTAINER PREFIL FRMLN 15ML --

## (undated) DEVICE — DEVICE INFL 60ML 12ATM CONVENIENT LOK REL HNDL HI PRSS FLX

## (undated) DEVICE — KIT COLON W/ 1.1OZ LUB AND 2 END

## (undated) DEVICE — KENDALL 500 SERIES DIAPHORETIC FOAM MONITORING ELECTRODE - TEAR DROP SHAPE ( 30/PK): Brand: KENDALL

## (undated) DEVICE — SNARE ENDO 2.4MMX230CM -- COLD EXACTO

## (undated) DEVICE — TRAP SPEC COLL POLYP POLYSTYR --